# Patient Record
Sex: FEMALE | Race: WHITE | NOT HISPANIC OR LATINO | Employment: OTHER | URBAN - METROPOLITAN AREA
[De-identification: names, ages, dates, MRNs, and addresses within clinical notes are randomized per-mention and may not be internally consistent; named-entity substitution may affect disease eponyms.]

---

## 2017-01-15 NOTE — PROGRESS NOTES
MEDICAL ONCOLOGY FOLLOW-UP PATIENT VISIT     NAME: Sushila Chambers     DATE: January 17, 2016    PRIMARY CARE PHYSICIAN: Meryl Muro    PATIENT ID: Multifocal, stage IIa, T2N0M0, ER positive, SD negative, HER2 non-amplified invasive lobular carcinoma of the left breast.     Oncology History: Ms. Chambers is a 66 year old female with a history of lung cancer and right breast cancer with more recent invasive lobular carcinoma of the left breast. Ms. Chambers was treated for right sided breast cancer in 2009 with right breast lumpectomy, radiation, and endocrine therapy. Ms. Chambers self palpated a mass in the left breast in early August, 2015. A diagnostic mammogram and ultrasound showed 2 ill-defined spiculated masses at the 4:30 and 2:00 positions of the left breast. There was surrounding architectural distortion and the total area of involvement measured 6.5 cm. Targeted left breast ultrasound showed a multilobulated hypoechoic mass at the 4:00 position measuring 2.5 cm and an ill-defined hypoechoic mass at the 2:00 position measuring 2.4 cm. There was no suspicious left axillary lymphadenopathy. Biopsy of the mass at the 4:00 position showed grade II invasive pleomorphic lobular carcinoma. Biopsy of the mass at the 2:00 position showed a grade II invasive lobular carcinoma. No angiolymphatic invasion or associated LCIS was seen in either specimen. Estrogen receptor staining was positive in >80% of tumor cell nuclei. Progesterone receptor staining was positive in <5% of tumor cell nuclei. HER2 was non-amplifed in both specimens by FISH. Ms. Chambers elected to screen for ISPY-2. Breast MRI showed a slightly enlarged enhancing left axillary lymph node with normal morphologic appearance. Left axillary lymph node biopsy performed on 9/9/15 was benign. Mammaprint testing returned high-risk. She was randomized to the ganetespib arm of the trial. She received 12 weeks of ganetespib and Taxol and 4 cycles of dose dense  LUIS from 9/29/15 - 2/1/16. On 2/24/16 she underwent left breast mastectomy and sentinel lymph node procedure under the care of Dr. Betts. Pathology showed a residual grade II 4.8 cm, pleomorphic invasive lobular carcinoma with associated LCIS. Invasive tumor cellularity was 30%.  There was evidence of perineural invasion, however, no lymphvascular space invasion. Two sentinel lymph nodes were negative.  She has been on adjuvant letrozole since 03/2016.    Interim History:   Sushila comes in to clinic today for routine 3-month breast cancer followup.  She continues on letrozole and has now been on the medication for 10 months.  She has side effects including hot flashes, arthralgias, and insomnia.  She reports hot flashes are occurring intermittently both day and night.  They are more bothersome at night, because they wake her from sleep and then she has difficulty going back to sleep.  She reports insomnia over the last few months.  Both hot flashes and the need to urinate at night will wake her from sleep.  She will then be up for about an hour after trying to fall back asleep.  She feels she has been more fatigued during the day due to the episodes of wakefulness at night.  She states mood has been good.  She has otherwise been feeling well.  She denies vaginal dryness, however, states she has not been sexually active.  She denies concerning right breast or left chest lumps or masses.  She denies swelling or discomfort.  She currently has a cold characterized by rhinorrhea and cough.  Cough is productive of clear/whitish mucous.  She has some shortness of breath on exertion, only since having the cold.  She has not been exercising as much due to the cold but plans to resume this in the upcoming weeks.  She denies chest pain.  She has no abdominal complaints.  She reports pain in the bilateral shoulders, hips, and knees that presents as stiffness after prolonged periods of immobility and improves with movement.  She  has persistent neuropathy in the toes of the bilateral feet.  The remainder of a 10 point review of systems is otherwise negative.    REVIEW OF SYSTEMS: Full 10-point review of systems was performed. Pertinent symptoms are reviewed above per HPI.    PAST MEDICAL HISTORY:   Past Medical History   Diagnosis Date     Hypothyroid      Breast cancer, right breast (H)      Lung cancer (H) 2012     right     History of blood transfusion      Basal cell carcinoma        PAST SURGICAL HISTORY:  Past Surgical History   Procedure Laterality Date     Ectopic pregnancy surgery Left 1988     Lung surgery Right 2001     histoplasmosis     Lumpectomy breast Right 2010     Biopsy, lung nodule Right 2012     + cancer     Abdomen surgery       Colonoscopy       Genitourinary surgery       Gyn surgery       Thoracic surgery       Insert port vascular access       Mastectomy simple, sentinel node, combined Left 2/24/2016     Procedure: COMBINED MASTECTOMY SIMPLE, SENTINEL NODE;  Surgeon: Satnam Betts MD;  Location: [facility] OR     Remove port vascular access N/A 2/24/2016     Procedure: REMOVE PORT VASCULAR ACCESS;  Surgeon: Satnam Betts MD;  Location: [facility] OR     MEDS:  Current Outpatient Prescriptions   Medication     clotrimazole (LOTRIMIN) 1 % cream     zolpidem (AMBIEN) 5 MG tablet     levothyroxine (SYNTHROID, LEVOTHROID) 50 MCG tablet     order for DME     letrozole (FEMARA) 2.5 MG tablet     order for DME     order for DME     Acetaminophen (TYLENOL PO)     pyridOXINE (VITAMIN B6) 100 MG TABS     order for DME     multivitamin, therapeutic with minerals (MULTI-VITAMIN) TABS     biotin 1000 MCG TABS tablet     VITAMIN D, CHOLECALCIFEROL, PO     calcium carbonate (OS-OCTAVIANO 500 MG [place]. [place]) 500 MG tablet     No current facility-administered medications for this visit.     ALLERGIES:  Allergies   Allergen Reactions     Latex      SKIN REACTION       Sulfa Drugs Itching and Rash        PHYSICAL EXAM:  KPS: 100%  GENERAL: AAOx3, no acute  distress  HEENT: Normocephalic, atraumatic, PERRLA, oropharynx clear with no mucositis or thrush  LYMPH: No palpable cervical, supraclavicular, or axillary lymphadenopathy appreciated  CV: RRR, normal S1/S2, No murmurs, gallops, or rubs  LUNGS: CTA B/L, no wheezing or rhonchi  Breast:  Right breast is of increased fibroglandular density.  Left mastectomy.  There are no discretely palpable masses of either the right breast or left chest wall.  ABDOMEN: soft, nontender, non-distended, no hepatosplenomegaly. Bowel sounds present.  EXTREMITIES: no lower extremity edema  NEURO: Cranial nerves II-XII grossly intact  INTEGUMENT: No rashes    LABS REVIEWED THIS VISIT:  No interim laboratory studies pertinent to today's visit.    RADIOLOGY:   4/11/16 DEXA bone density scan:  Conclusions:    The most negative and valid T-score of -0.7 at the level of the left femoral neck, and -0.7 at the level of the right femoral neck,  corresponds with normal bone density.      8/8/16 Diagnostic right breast mammogram:  FINDINGS:  BREAST DENSITY: Scattered fibroglandular densities.      No significant change. Post therapy findings on the right.                                                                        IMPRESSION: BI-RADS CATEGORY: 2 - Benign Finding(s).     RECOMMENDED: Annual Mammography      IMPRESSION/PLAN: 65 yo female with stage II, T2N0M0, grade II, ER positive, DE negative, HER2 non-amplified invasive lobular carcinoma of the left breast. She is s/p treatment with 12 weeks of taxol and ganetespib followed by 4 cycles of dose dense adriamycin and cyclophosphamide. Left breast mastectomy and sentinel lymph node procedure was performed on 2/24/16. Her pathology at that time revealed a 4.8 cm residual tumor without sentinel node positivity. She has been on letrozole since 03/2016.    1.  Left breast ER-positive carcinoma:  Ms. Chambers is now approximately 11 months out from completion of neoadjuvant chemotherapy and surgical  excision of a left breast cancer.  She has been on adjuvant letrozole for the past 10 months.  She is tolerating the medication remarkably well.  We plan to treat her for a total of 10 years (through 03/2026).  There is no evidence of disease recurrence on my clinical breast examination performed today.  We will continue routine surveillance visits.  I will see her back in clinic in 3 months for her next exam.  We will also continue annual screening mammography which is next due in 08/2017.       I would like Sushila to be seen in survivor clinic to receive a treatment summary as well as review potential long term side effects of chemotherapy and breast cancer prevention.  We have previously discussed lifestyle recommendations suggested to prevent breast cancer recurrence including 30 minutes of daily cardiovascular exercise, maintaining a normal BMI, a diet low in saturated fat and refined sugar, a daily baby aspirin, vitamin D supplementation, and reduced alcohol intake.  Sushila admits she has not been routinely exercising lately, but plans to do so in the upcoming weeks.    2.  Hot flashes/insomnia:  Hot flashes are worst at night and wake her from sleep.  Start Gabapentin 300 mg PO qhs.     3.  Neuropathy:  Continue vitamin B6.  Gabapentin as above.     4.  Arthralgias:  Secondary to osteoarthritis an exacerbated by letrozole.  I asked that she continue to take daily vitamin D supplementation as well as perform a half hour a day of cardiovascular exercise.     5.  Bone health:  DEXA bone density in 04/2016 showed a lowest T-score of -0.7.  She continues on calcium and vitamin D supplementation as well as daily weightbearing activity.  We will plan to repeat a DEXA bone density scan when she has been on AI therapy for 2 years.  She has a h/o vitamin D deficiency, therefore will check a vitamin D level at our next visit.     6.  VUS in MRE11:  She is undergoing breast cancer screening recommendations as above.  It is  not clear that she requires any increase in other cancer screening surveillance.       7.  Followup:  Survivor clinic visit with Sofi Paz within 1-2 months.  Return to clinic in 3 months for visit with me.     It was a pleasure to see Sushila in clinic today.  A total of 25 minutes of our 30-minute face-to-face visit was spent counseling the patient.

## 2017-01-17 ENCOUNTER — ONCOLOGY VISIT (OUTPATIENT)
Dept: ONCOLOGY | Facility: CLINIC | Age: 67
End: 2017-01-17
Attending: INTERNAL MEDICINE
Payer: MEDICARE

## 2017-01-17 DIAGNOSIS — E55.9 VITAMIN D DEFICIENCY: ICD-10-CM

## 2017-01-17 DIAGNOSIS — G62.9 NEUROPATHY: ICD-10-CM

## 2017-01-17 DIAGNOSIS — C50.512 MALIGNANT NEOPLASM OF LOWER-OUTER QUADRANT OF LEFT FEMALE BREAST (H): ICD-10-CM

## 2017-01-17 DIAGNOSIS — C50.512 MALIGNANT NEOPLASM OF LOWER-OUTER QUADRANT OF LEFT FEMALE BREAST (H): Primary | ICD-10-CM

## 2017-01-17 DIAGNOSIS — N95.1 MENOPAUSAL SYNDROME (HOT FLASHES): ICD-10-CM

## 2017-01-17 PROCEDURE — 99212 OFFICE O/P EST SF 10 MIN: CPT

## 2017-01-17 PROCEDURE — 99212 OFFICE O/P EST SF 10 MIN: CPT | Mod: 25,ZF

## 2017-01-17 PROCEDURE — 99214 OFFICE O/P EST MOD 30 MIN: CPT | Mod: ZP | Performed by: INTERNAL MEDICINE

## 2017-01-17 RX ORDER — GABAPENTIN 300 MG/1
CAPSULE ORAL
Qty: 30 CAPSULE | Refills: 11 | Status: SHIPPED | OUTPATIENT
Start: 2017-01-17 | End: 2017-04-20

## 2017-01-17 NOTE — MR AVS SNAPSHOT
After Visit Summary   1/17/2017    Sushila Chambers    MRN: [id number]           Patient Information     Date Of Birth          1950        Visit Information        Provider Department      1/17/2017 8:45 AM Brittany Villegas MD [facility]        Today's Diagnoses     Malignant neoplasm of lower-outer quadrant of left female breast (H)    -  1     Vitamin D deficiency         Neuropathy (H)         Menopausal syndrome (hot flashes)            Follow-ups after your visit        Additional Services     ONC/HEME ADULT REFERRAL       Your provider has referred you to: [facility] [[email or web address]]: [facility] Infusion [facility] [phone]   [email or web address]    Please be aware that coverage of these services is subject to the terms and limitations of your health insurance plan.  Call member services at your health plan with any benefit or coverage questions.      Please bring the following with you to your appointment:    (1) Any X-Rays, CTs or MRIs which have been performed.  Contact the facility where they were done to arrange for  prior to your scheduled appointment.   (2) List of current medications  (3) This referral request   (4) Any documents/labs given to you for this referral                  Your next 10 appointments already scheduled     Feb 20, 2017  2:00 PM   (Arrive by 1:45 PM)   LONG TERM RETURN with Sofi Paz PA-C   [facility] ([facility])    [address]  [address] [facility] [id number] [phone]            Mar 16, 2017   Procedure with Morales Napier MD   [facility] [place] Endoscopy ([facility])    [address] [id number] [phone]           [facility] is located on the corner of [address] and [address] on the  [address] [place]. It is easily accessible from virtually any point in the [facility] area, via [address] and [address].            Apr 17, 2017  2:15 PM   (Arrive by 2:00 PM)   Return Visit with Brittany Villegas MD   [facility] ([facility])    [address]  [address] [facility] [address] [phone]              Who to contact     If you have questions or need follow up information about today's clinic visit or your schedule please contact [facility] directly at [phone].  Normal or non-critical lab and imaging results will be communicated to you by MyChart, letter or phone within 4 business days after the clinic has received the results. If you do not hear from us within 7 days, please contact the clinic through [employer] or phone. If you have a critical or abnormal lab result, we will notify you by phone as soon as possible.  Submit refill requests through [employer] or call your pharmacy and they will forward the refill request to us. Please allow 3 business days for your refill to be completed.          Additional Information About Your Visit        [employer] Information     [employer] gives you secure access to your electronic health record. If you see a primary care provider, you can also send messages to your care team and make appointments. If you have questions, please call your primary care clinic.  If you do not have a primary care provider, please call [phone] and they will assist you.        Care EveryWhere ID     This is your Care EveryWhere ID. This could be used by other organizations to access your [facility] medical records  [id number]         Blood Pressure from Last 3 Encounters:   11/02/16 131/77   10/17/16 139/81   07/18/16 138/83    Weight from Last 3 Encounters:   10/17/16 65.227 kg (143 lb 12.8 oz)   07/18/16 64.501 kg (142 lb 3.2 oz)   05/17/16 63.413 kg (139 lb 12.8 oz)              We Performed  the Following     ONC/HEME ADULT REFERRAL          Today's Medication Changes          These changes are accurate as of: 1/17/17  9:43 AM.  If you have any questions, ask your nurse or doctor.               Start taking these medicines.        Dose/Directions    gabapentin 300 MG capsule   Commonly known as:  NEURONTIN   Used for:  Neuropathy (H), Menopausal syndrome (hot flashes)   Started by:  Brittany Villegas MD        Take 1 tablet (300 mg) every night   Quantity:  30 capsule   Refills:  11            Where to get your medicines      These medications were sent to [id number] - [place], [place] - [address] AT [facility] [address]  [address], [place] [address]     Phone:  [phone]    - gabapentin 300 MG capsule             Primary Care Provider    [facility] Other Clinic                Thank you!     Thank you for choosing [facility]  for your care. Our goal is always to provide you with excellent care. Hearing back from our patients is one way we can continue to improve our services. Please take a few minutes to complete the written survey that you may receive in the mail after your visit with us. Thank you!             Your Updated Medication List - Protect others around you: Learn how to safely use, store and throw away your medicines at www.disposemymeds.org.          This list is accurate as of: 1/17/17  9:43 AM.  Always use your most recent med list.                   Brand Name Dispense Instructions for use    biotin 1000 MCG Tabs tablet      Take by mouth daily       calcium carbonate 500 MG tablet    OS-OCTAVIANO 500 mg [place]. [place]     Take by mouth 2 times daily       clotrimazole 1 % cream    LOTRIMIN    60 g    Apply topically 2 times daily       gabapentin 300 MG capsule    NEURONTIN    30 capsule    Take 1 tablet (300 mg) every night       letrozole 2.5 MG tablet    FEMARA    90 tablet    Take 1 tablet (2.5 mg) by mouth daily        levothyroxine 50 MCG tablet    SYNTHROID/LEVOTHROID    90 tablet    Take 1 tablet (50 mcg) by mouth daily       Multi-vitamin Tabs tablet      Take 1 tablet by mouth daily       * order for DME     1 Device    Equipment being ordered: Cranial Prosthesis       * order for DME     1 Units    Two post mastectomy bra's and prosthesis twice a year as insurance allows .       * order for DME     1 Units    Please allow 12 bras and prosthesis yearly as insurance covers.       * order for DME     1 Units    Four bras as allowed by insurance.       pyridOXINE 100 MG Tabs    VITAMIN B6    30 tablet    Take 1 tablet (100 mg) by mouth daily       TYLENOL PO      Take 500 mg by mouth every 4 hours as needed for mild pain or fever       VITAMIN D (CHOLECALCIFEROL) PO      Take by mouth daily       zolpidem 5 MG tablet    AMBIEN    30 tablet    Take 1 tablet (5 mg) by mouth nightly as needed for sleep       * Notice:  This list has 4 medication(s) that are the same as other medications prescribed for you. Read the directions carefully, and ask your doctor or other care provider to review them with you.

## 2017-01-17 NOTE — NURSING NOTE
"Sushila Chambers is a 66 year old female who presents for:  Chief Complaint   Patient presents with     Oncology Clinic Visit     Breast Cancer        Initial Vitals:  There were no vitals taken for this visit. Estimated body mass index is 24.67 kg/(m^2) as calculated from the following:    Height as of 7/18/16: 1.626 m (5' 4.02\").    Weight as of 10/17/16: 65.227 kg (143 lb 12.8 oz).. There is no height or weight on file to calculate BSA. BP completed using cuff size: regular  Data Unavailable No LMP recorded. Patient is postmenopausal. Allergies and medications reviewed.     Medications: Medication refills not needed today.  Pharmacy name entered into [facility]:    [facility] - [place], [place] - [address]  [id number] - [place], [place] - [address] AT [facility] [address]  [facility]          Comments: Patient is not having any pain today.     6 minutes for nursing intake (face to face time)   [place] CMA         "

## 2017-01-17 NOTE — Clinical Note
1/17/2017       RE: Sushila Chambers  [address] [place] [address]     Dear Colleague,    Thank you for referring your patient, Sushila Chambers, to the [facility]. Please see a copy of my visit note below.    MEDICAL ONCOLOGY FOLLOW-UP PATIENT VISIT     NAME: Sushila Chambers     DATE: January 17, 2016    PRIMARY CARE PHYSICIAN: Meryl Muro    PATIENT ID: Multifocal, stage IIa, T2N0M0, ER positive, AZ negative, HER2 non-amplified invasive lobular carcinoma of the left breast.     Oncology History: Ms. Chambers is a 66 year old female with a history of lung cancer and right breast cancer with more recent invasive lobular carcinoma of the left breast. Ms. Chambers was treated for right sided breast cancer in 2009 with right breast lumpectomy, radiation, and endocrine therapy. Ms. Chambers self palpated a mass in the left breast in early August, 2015. A diagnostic mammogram and ultrasound showed 2 ill-defined spiculated masses at the 4:30 and 2:00 positions of the left breast. There was surrounding architectural distortion and the total area of involvement measured 6.5 cm. Targeted left breast ultrasound showed a multilobulated hypoechoic mass at the 4:00 position measuring 2.5 cm and an ill-defined hypoechoic mass at the 2:00 position measuring 2.4 cm. There was no suspicious left axillary lymphadenopathy. Biopsy of the mass at the 4:00 position showed grade II invasive pleomorphic lobular carcinoma. Biopsy of the mass at the 2:00 position showed a grade II invasive lobular carcinoma. No angiolymphatic invasion or associated LCIS was seen in either specimen. Estrogen receptor staining was positive in >80% of tumor cell nuclei. Progesterone receptor staining was positive in <5% of tumor cell nuclei. HER2 was non-amplifed in both specimens by FISH. Ms. Chambers elected to screen for ISPY-2. Breast MRI showed a slightly enlarged enhancing left axillary lymph node with normal morphologic  appearance. Left axillary lymph node biopsy performed on 9/9/15 was benign. Mammaprint testing returned high-risk. She was randomized to the ganetespib arm of the trial. She received 12 weeks of ganetespib and Taxol and 4 cycles of dose dense AC from 9/29/15 - 2/1/16. On 2/24/16 she underwent left breast mastectomy and sentinel lymph node procedure under the care of Dr. Betts. Pathology showed a residual grade II 4.8 cm, pleomorphic invasive lobular carcinoma with associated LCIS. Invasive tumor cellularity was 30%.  There was evidence of perineural invasion, however, no lymphvascular space invasion. Two sentinel lymph nodes were negative.  She has been on adjuvant letrozole since 03/2016.    Interim History:   Sushila comes in to clinic today for routine 3-month breast cancer followup.  She continues on letrozole and has now been on the medication for 10 months.  She has side effects including hot flashes, arthralgias, and insomnia.  She reports hot flashes are occurring intermittently both day and night.  They are more bothersome at night, because they wake her from sleep and then she has difficulty going back to sleep.  She reports insomnia over the last few months.  Both hot flashes and the need to urinate at night will wake her from sleep.  She will then be up for about an hour after trying to fall back asleep.  She feels she has been more fatigued during the day due to the episodes of wakefulness at night.  She states mood has been good.  She has otherwise been feeling well.  She denies vaginal dryness, however, states she has not been sexually active.  She denies concerning right breast or left chest lumps or masses.  She denies swelling or discomfort.  She currently has a cold characterized by rhinorrhea and cough.  Cough is productive of clear/whitish mucous.  She has some shortness of breath on exertion, only since having the cold.  She has not been exercising as much due to the cold but plans to resume  this in the upcoming weeks.  She denies chest pain.  She has no abdominal complaints.  She reports pain in the bilateral shoulders, hips, and knees that presents as stiffness after prolonged periods of immobility and improves with movement.  She has persistent neuropathy in the toes of the bilateral feet.  The remainder of a 10 point review of systems is otherwise negative.    REVIEW OF SYSTEMS: Full 10-point review of systems was performed. Pertinent symptoms are reviewed above per HPI.    PAST MEDICAL HISTORY:   Past Medical History   Diagnosis Date     Hypothyroid      Breast cancer, right breast (H)      Lung cancer (H) 2012     right     History of blood transfusion      Basal cell carcinoma        PAST SURGICAL HISTORY:  Past Surgical History   Procedure Laterality Date     Ectopic pregnancy surgery Left 1988     Lung surgery Right 2001     histoplasmosis     Lumpectomy breast Right 2010     Biopsy, lung nodule Right 2012     + cancer     Abdomen surgery       Colonoscopy       Genitourinary surgery       Gyn surgery       Thoracic surgery       Insert port vascular access       Mastectomy simple, sentinel node, combined Left 2/24/2016     Procedure: COMBINED MASTECTOMY SIMPLE, SENTINEL NODE;  Surgeon: Satnam Betts MD;  Location: [facility] OR     Remove port vascular access N/A 2/24/2016     Procedure: REMOVE PORT VASCULAR ACCESS;  Surgeon: Satnam Betts MD;  Location: [facility] OR     MEDS:  Current Outpatient Prescriptions   Medication     clotrimazole (LOTRIMIN) 1 % cream     zolpidem (AMBIEN) 5 MG tablet     levothyroxine (SYNTHROID, LEVOTHROID) 50 MCG tablet     order for DME     letrozole (FEMARA) 2.5 MG tablet     order for DME     order for DME     Acetaminophen (TYLENOL PO)     pyridOXINE (VITAMIN B6) 100 MG TABS     order for DME     multivitamin, therapeutic with minerals (MULTI-VITAMIN) TABS     biotin 1000 MCG TABS tablet     VITAMIN D, CHOLECALCIFEROL, PO     calcium carbonate (OS-OCTAVIANO 500 MG [place]. [place]) 500  MG tablet     No current facility-administered medications for this visit.     ALLERGIES:  Allergies   Allergen Reactions     Latex      SKIN REACTION       Sulfa Drugs Itching and Rash        PHYSICAL EXAM:  KPS: 100%  GENERAL: AAOx3, no acute distress  HEENT: Normocephalic, atraumatic, PERRLA, oropharynx clear with no mucositis or thrush  LYMPH: No palpable cervical, supraclavicular, or axillary lymphadenopathy appreciated  CV: RRR, normal S1/S2, No murmurs, gallops, or rubs  LUNGS: CTA B/L, no wheezing or rhonchi  Breast:  Right breast is of increased fibroglandular density.  Left mastectomy.  There are no discretely palpable masses of either the right breast or left chest wall.  ABDOMEN: soft, nontender, non-distended, no hepatosplenomegaly. Bowel sounds present.  EXTREMITIES: no lower extremity edema  NEURO: Cranial nerves II-XII grossly intact  INTEGUMENT: No rashes    LABS REVIEWED THIS VISIT:  No interim laboratory studies pertinent to today's visit.    RADIOLOGY:   4/11/16 DEXA bone density scan:  Conclusions:    The most negative and valid T-score of -0.7 at the level of the left femoral neck, and -0.7 at the level of the right femoral neck,  corresponds with normal bone density.      8/8/16 Diagnostic right breast mammogram:  FINDINGS:  BREAST DENSITY: Scattered fibroglandular densities.      No significant change. Post therapy findings on the right.                                                                        IMPRESSION: BI-RADS CATEGORY: 2 - Benign Finding(s).     RECOMMENDED: Annual Mammography      IMPRESSION/PLAN: 67 yo female with stage II, T2N0M0, grade II, ER positive, CO negative, HER2 non-amplified invasive lobular carcinoma of the left breast. She is s/p treatment with 12 weeks of taxol and ganetespib followed by 4 cycles of dose dense adriamycin and cyclophosphamide. Left breast mastectomy and sentinel lymph node procedure was performed on 2/24/16. Her pathology at that time revealed  a 4.8 cm residual tumor without sentinel node positivity. She has been on letrozole since 03/2016.    1.  Left breast ER-positive carcinoma:  Ms. Chambers is now approximately 11 months out from completion of neoadjuvant chemotherapy and surgical excision of a left breast cancer.  She has been on adjuvant letrozole for the past 10 months.  She is tolerating the medication remarkably well.  We plan to treat her for a total of 10 years (through 03/2026).  There is no evidence of disease recurrence on my clinical breast examination performed today.  We will continue routine surveillance visits.  I will see her back in clinic in 3 months for her next exam.  We will also continue annual screening mammography which is next due in 08/2017.       I would like Sushila to be seen in survivor clinic to receive a treatment summary as well as review potential long term side effects of chemotherapy and breast cancer prevention.  We have previously discussed lifestyle recommendations suggested to prevent breast cancer recurrence including 30 minutes of daily cardiovascular exercise, maintaining a normal BMI, a diet low in saturated fat and refined sugar, a daily baby aspirin, vitamin D supplementation, and reduced alcohol intake.  Sushila admits she has not been routinely exercising lately, but plans to do so in the upcoming weeks.    2.  Hot flashes/insomnia:  Hot flashes are worst at night and wake her from sleep.  Start Gabapentin 300 mg PO qhs.     3.  Neuropathy:  Continue vitamin B6.  Gabapentin as above.     4.  Arthralgias:  Secondary to osteoarthritis an exacerbated by letrozole.  I asked that she continue to take daily vitamin D supplementation as well as perform a half hour a day of cardiovascular exercise.     5.  Bone health:  DEXA bone density in 04/2016 showed a lowest T-score of -0.7.  She continues on calcium and vitamin D supplementation as well as daily weightbearing activity.  We will plan to repeat a DEXA bone  density scan when she has been on AI therapy for 2 years.  She has a h/o vitamin D deficiency, therefore will check a vitamin D level at our next visit.     6.  VUS in MRE11:  She is undergoing breast cancer screening recommendations as above.  It is not clear that she requires any increase in other cancer screening surveillance.       7.  Followup:  Survivor clinic visit with Sofi Paz within 1-2 months.  Return to clinic in 3 months for visit with me.     It was a pleasure to see Sushila in clinic today.  A total of 25 minutes of our 30-minute face-to-face visit was spent counseling the patient.       Again, thank you for allowing me to participate in the care of your patient.      Sincerely,    Brittany Villegas MD

## 2017-02-27 ENCOUNTER — ONCOLOGY VISIT (OUTPATIENT)
Dept: ONCOLOGY | Facility: CLINIC | Age: 67
End: 2017-02-27
Attending: PHYSICIAN ASSISTANT
Payer: MEDICARE

## 2017-02-27 VITALS
BODY MASS INDEX: 25.03 KG/M2 | SYSTOLIC BLOOD PRESSURE: 153 MMHG | DIASTOLIC BLOOD PRESSURE: 92 MMHG | HEIGHT: 64 IN | WEIGHT: 146.6 LBS | RESPIRATION RATE: 18 BRPM | OXYGEN SATURATION: 99 % | HEART RATE: 120 BPM | TEMPERATURE: 97.1 F

## 2017-02-27 DIAGNOSIS — C50.512 MALIGNANT NEOPLASM OF LOWER-OUTER QUADRANT OF LEFT FEMALE BREAST (H): Primary | ICD-10-CM

## 2017-02-27 DIAGNOSIS — C34.90 MALIGNANT NEOPLASM OF LUNG, UNSPECIFIED LATERALITY, UNSPECIFIED PART OF LUNG (H): ICD-10-CM

## 2017-02-27 DIAGNOSIS — R03.0 ELEVATED BLOOD PRESSURE READING WITHOUT DIAGNOSIS OF HYPERTENSION: ICD-10-CM

## 2017-02-27 DIAGNOSIS — C50.911 MALIGNANT NEOPLASM OF RIGHT FEMALE BREAST, UNSPECIFIED SITE OF BREAST: ICD-10-CM

## 2017-02-27 PROCEDURE — 99212 OFFICE O/P EST SF 10 MIN: CPT | Mod: ZF

## 2017-02-27 PROCEDURE — 99215 OFFICE O/P EST HI 40 MIN: CPT | Mod: ZP | Performed by: PHYSICIAN ASSISTANT

## 2017-02-27 NOTE — MR AVS SNAPSHOT
After Visit Summary   2/27/2017    Sushila Chambers    MRN: [id number]           Patient Information     Date Of Birth          1950        Visit Information        Provider Department      2/27/2017 11:45 AM Sofi Paz PA-C [facility]        Today's Diagnoses     Malignant neoplasm of lower-outer quadrant of left female breast (H)    -  1    Malignant neoplasm of lung, unspecified laterality, unspecified part of lung (H)        Malignant neoplasm of right female breast, unspecified site of breast (H)        Elevated blood pressure reading without diagnosis of hypertension           Follow-ups after your visit        Your next 10 appointments already scheduled     Mar 16, 2017   Procedure with Morales Napier MD   [facility] ([facility])    [address] [id number] [phone]           [facility] is located on the corner of [address] and [address] on the [facility] [facility]. It is easily accessible from virtually any point in the [facility] area, via [address] and [address].            Apr 17, 2017  2:15 PM CDT   (Arrive by 2:00 PM)   Return Visit with Brittany Villegas MD   [facility] ([facility])    [address]  [address] [facility] [address] [phone]              Who to contact     If you have questions or need follow up information about today's clinic visit or your schedule please contact [facility] directly at [phone].  Normal or non-critical lab and imaging results will be communicated to you by MyChart, letter or phone within 4 business days after the clinic has received the results. If you do not hear from us within 7 days, please contact the clinic through MyChart or phone. If you have a critical or abnormal lab result, we will notify you by phone  "as soon as possible.  Submit refill requests through [employer] or call your pharmacy and they will forward the refill request to us. Please allow 3 business days for your refill to be completed.          Additional Information About Your Visit        [employer] Information     [employer] gives you secure access to your electronic health record. If you see a primary care provider, you can also send messages to your care team and make appointments. If you have questions, please call your primary care clinic.  If you do not have a primary care provider, please call [phone] and they will assist you.        Care EveryWhere ID     This is your Care EveryWhere ID. This could be used by other organizations to access your [facility] medical records  [id number]        Your Vitals Were     Pulse Temperature Respirations Height Pulse Oximetry Breastfeeding?    120 97.1  F (36.2  C) (Oral) 18 1.626 m (5' 4.02\") 99% Yes    BMI (Body Mass Index)                   25.15 kg/m2            Blood Pressure from Last 3 Encounters:   02/27/17 (!) 153/92   11/02/16 131/77   10/17/16 139/81    Weight from Last 3 Encounters:   02/27/17 66.5 kg (146 lb 9.6 oz)   10/17/16 65.2 kg (143 lb 12.8 oz)   07/18/16 64.5 kg (142 lb 3.2 oz)              Today, you had the following     No orders found for display       Primary Care Provider Office Phone #    [facility] [phone] [address] [place] [address]        Thank you!     Thank you for choosing [facility]  for your care. Our goal is always to provide you with excellent care. Hearing back from our patients is one way we can continue to improve our services. Please take a few minutes to complete the written survey that you may receive in the mail after your visit with us. Thank you!             Your Updated Medication List - Protect others around you: Learn how to safely use, store and throw away your medicines at www.disposemymeds.org.        "   This list is accurate as of: 2/27/17 11:59 PM.  Always use your most recent med list.                   Brand Name Dispense Instructions for use    biotin 1000 MCG Tabs tablet      Take by mouth daily       calcium carbonate 500 MG tablet    OS-OCTAVIANO 500 mg [place]. Ca     Take by mouth 2 times daily       clotrimazole 1 % cream    LOTRIMIN    60 g    Apply topically 2 times daily       gabapentin 300 MG capsule    NEURONTIN    30 capsule    Take 1 tablet (300 mg) every night       letrozole 2.5 MG tablet    FEMARA    90 tablet    Take 1 tablet (2.5 mg) by mouth daily       levothyroxine 50 MCG tablet    SYNTHROID/LEVOTHROID    90 tablet    Take 1 tablet (50 mcg) by mouth daily       Multi-vitamin Tabs tablet      Take 1 tablet by mouth daily       * order for DME     1 Device    Equipment being ordered: Cranial Prosthesis       * order for DME     1 Units    Two post mastectomy bra's and prosthesis twice a year as insurance allows .       * order for DME     1 Units    Please allow 12 bras and prosthesis yearly as insurance covers.       * order for DME     1 Units    Four bras as allowed by insurance.       pyridOXINE 100 MG Tabs    VITAMIN B6    30 tablet    Take 1 tablet (100 mg) by mouth daily       TYLENOL PO      Take 500 mg by mouth every 4 hours as needed for mild pain or fever       VITAMIN D (CHOLECALCIFEROL) PO      Take by mouth daily       zolpidem 5 MG tablet    AMBIEN    30 tablet    Take 1 tablet (5 mg) by mouth nightly as needed for sleep       * Notice:  This list has 4 medication(s) that are the same as other medications prescribed for you. Read the directions carefully, and ask your doctor or other care provider to review them with you.

## 2017-02-27 NOTE — NURSING NOTE
"Sushila Chambers is a 67 year old female who presents for:  Chief Complaint   Patient presents with     Oncology Clinic Visit     Pt is here for a referral appt from her oncologist        Initial Vitals:  BP (!) 153/92 (BP Location: Left arm, Patient Position: Chair, Cuff Size: Adult Regular)  Pulse 120  Temp 97.1  F (36.2  C) (Oral)  Resp 18  Ht 1.626 m (5' 4.02\")  Wt 66.5 kg (146 lb 9.6 oz)  SpO2 99%  Breastfeeding? Yes  BMI 25.15 kg/m2 Estimated body mass index is 25.15 kg/(m^2) as calculated from the following:    Height as of this encounter: 1.626 m (5' 4.02\").    Weight as of this encounter: 66.5 kg (146 lb 9.6 oz).. Body surface area is 1.73 meters squared. BP completed using cuff size: regular  Data Unavailable No LMP recorded. Patient is postmenopausal. Allergies and medications reviewed.     Medications: Medication refills not needed today.  Pharmacy name entered into [facility]:    [facility] - [place], [place] - [address]  [id number] - [place], [place] - [address] [facility] [address]  [facility]          Comments:     6 minutes for nursing intake (face to face time)   Radha Ace CMA        "

## 2017-02-27 NOTE — PROGRESS NOTES
REASON FOR VISIT:  I am seeing Ms. Chambers in the Cancer Survivorship Program for her diagnosis of left breast cancer (previous diagnosis of right breast cancer and lung cancer treated in [place] [facility]).      Ms. Chambers states that she was living in [place] at the time that she got diagnosed with her right breast cancer.  She states this was found on the screening mammogram in 2009.  She had a lumpectomy followed by radiation followed by 3 years of Femara.  Records are not available.      She was doing fine until she was found to have a right lung cancer, but she is uncertain of the type.  She is status post surgery.  Records are in [facility] and unavailable.      She was doing well until she found a left breast lump in the summer of 2015.  She had a diagnostic mammogram on 08/18/2015.  This showed a large area of architectural distortion in the left breast.  There were 2 ill-defined spiculated masses, total area was 6.5 cm.  An ultrasound showed a multi-lobulated hypoechoic mass of 2.5 cm, and an ill-defined hypoechoic masses of 2.4 cm.  There were no suspicious lymph nodes.  She had a biopsy of the left breast x2 on 08/18/2015.  One showed an invasive pleomorphic lobular carcinoma, Dickinson grade 2.  The other was an infiltrating lobular carcinoma, Dickinson grade 2.  ER and KS-positive and HER2/juan ramon-negative.  She had a breast MRI on 09/04/2015.  There were 2 distinctive irregular masses with spiculated margins, both 2.7 cm.  There were prominent lymph nodes in the right axilla.  She had a lymph node biopsy on 09/09/2015, which showed no cancer.  She was diagnosed with a clinical stage II (T2 N0) left breast cancer.  She was on I-SPY and received ganetespib with Taxol x12 weeks from 09/29/2015 through 12/14/2015.  This was followed by dose-dense Adriamycin and Cytoxan x4 from 12/21/2015 until 02/01/2016.  Total accumulation dose of Adriamycin was 240 mg/m2.  She went on to have a left mastectomy with  sentinel lymph node biopsy on 02/24/2016.  This showed a residual invasive pleomorphic lobular carcinoma.  It was 4.8 cm with 30% cellularity.  Dunlevy grade 2.  LCIS was present.  Clear margins.  Zero of 2 lymph nodes positive.  She was started on letrozole in 03/2016.      Ms. Chambers states she is feeling fairly well at this time.  She has noticed elevation in her blood pressure at the last couple of visits.  She denies any vision changes or headaches.  She does have neuropathy in her feet, mainly in her toes and in her fingertips.  She states the peripheral neuropathy is constant.  She will notice intermittent sharp pains, but mostly this is a tingling sensation.  She thinks that the neuropathy is getting better.  She has begun taking Neurontin at night.  Her biggest concern remains some difficulty with sleeping.  She says certain nights she has problems getting to sleep; however, sometimes she gets to sleep, but wakes up in the middle of the night and is unable to get back to sleep.  She is otherwise eating and drinking fine.  She denies any chest pain, shortness of breath, cough, nausea, vomiting, abdominal pain or new bone pains.     CANCER TREATMENT SUMMARY:  Right breast cancer  *Right breast cancer.  She states this was found on the screening mammogram in 2009.  She had a lumpectomy followed by radiation followed by 3 years of Femara.  Treatment was in [place] and records are not available.     Lung cancer   *Right lung cancer, uncertain of the type.  She is status post surgery.  Treatment was in [place] [facility] and records are not available.     Left breast cancer   *Left breast lump in the summer of 2015.    *Diagnostic mammogram on 08/18/2015.  This showed a large area of architectural distortion in the left breast.  There were 2 ill-defined spiculated masses, total area was 6.5 cm.    *Ultrasound on 08/18/2015 showed a multi-lobulated hypoechoic mass of 2.5 cm, and an ill-defined hypoechoic masses  of 2.4 cm.  There were no suspicious lymph nodes.    *Biopsy of the left breast x2 on 08/18/2015.  One showed an invasive pleomorphic lobular carcinoma, Maday grade 2.  The other was an infiltrating lobular carcinoma, Colbert grade 2.  ER and HI-positive and HER2/juan ramon-negative.    *Breast MRI on 09/04/2015.  There were 2 distinctive irregular masses with spiculated margins, both 2.7 cm.  There were prominent lymph nodes in the right axilla.    *Lymph node biopsy on 09/09/2015, which showed no cancer.    *I-SPY clinical trial.  *Ganetespib with Taxol x12 weeks from 09/29/2015 through 12/14/2015.    *Dose-dense Adriamycin and Cytoxan x4 from 12/21/2015 until 02/01/2016.  Total accumulation dose of Adriamycin was 240 mg/m2.   *Left mastectomy with sentinel lymph node biopsy on 02/24/2016.  This showed a residual invasive pleomorphic lobular carcinoma.  It was 4.8 cm with 30% cellularity.  Maday grade 2.  LCIS was present.  Clear margins.  Zero of 2 lymph nodes positive.   *Letrozole 03/2016 to present.     MEDICATIONS:   Current Outpatient Prescriptions   Medication Sig Dispense Refill     gabapentin (NEURONTIN) 300 MG capsule Take 1 tablet (300 mg) every night 30 capsule 11     clotrimazole (LOTRIMIN) 1 % cream Apply topically 2 times daily 60 g 3     zolpidem (AMBIEN) 5 MG tablet Take 1 tablet (5 mg) by mouth nightly as needed for sleep 30 tablet 1     levothyroxine (SYNTHROID, LEVOTHROID) 50 MCG tablet Take 1 tablet (50 mcg) by mouth daily 90 tablet 3     order for DME Four bras as allowed by insurance. 1 Units 1     letrozole (FEMARA) 2.5 MG tablet Take 1 tablet (2.5 mg) by mouth daily 90 tablet 3     order for DME Please allow 12 bras and prosthesis yearly as insurance covers. 1 Units 1     order for DME Two post mastectomy bra's and prosthesis twice a year as insurance allows . 1 Units 1     Acetaminophen (TYLENOL PO) Take 500 mg by mouth every 4 hours as needed for mild pain or fever       pyridOXINE  "(VITAMIN B6) 100 MG TABS Take 1 tablet (100 mg) by mouth daily 30 tablet 11     order for DME Equipment being ordered: Cranial Prosthesis 1 Device 0     multivitamin, therapeutic with minerals (MULTI-VITAMIN) TABS Take 1 tablet by mouth daily       biotin 1000 MCG TABS tablet Take by mouth daily       VITAMIN D, CHOLECALCIFEROL, PO Take by mouth daily       calcium carbonate (OS-OCTAVIANO 500 MG [place]. [place]) 500 MG tablet Take by mouth 2 times daily         ALLERGIES:   Allergies   Allergen Reactions     Latex      SKIN REACTION       Sulfa Drugs Itching and Rash       FAMILY HISTORY:  Her mother is  at 99.  She did have a history of breast cancer.  Father is  at 71 from colorectal cancer and bone cancer.     PHYSICAL EXAM:  Vitals: BP (!) 153/92 (BP Location: Left arm, Patient Position: Chair, Cuff Size: Adult Regular)  Pulse 120  Temp 97.1  F (36.2  C) (Oral)  Resp 18  Ht 1.626 m (5' 4.02\")  Wt 66.5 kg (146 lb 9.6 oz)  SpO2 99%  Breastfeeding? Yes  BMI 25.15 kg/m2  GENERAL:  A pleasant person in no acute distress.   HEENT:  Sclerae are nonicteric.    NECK:  Supple.   NEUROLOGICAL:  Alert/orientated/able to answer all questions.  CN grossly intact.  PSYCH:  Normal affect.  SKIN:  No suspicious lesions on areas of exposed skin.    ASSESSMENT/PLAN:  I had the pleasure of seeing Ms. Chambers in the Cancer Survivorship Program for her breast cancer x2 and lung cancer.  Given her diagnosis and treatment, I gave her the following recommendations:   1.  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  This was treated as above with neoadjuvant chemotherapy, surgery and hormonal therapy.  She also has a prior history of a right breast cancer treated with surgery, radiation and hormonal therapy, and a lung cancer treated with surgery.  The right breast cancer and lung cancer were treated in [place], and I currently do not have any " information regarding these cancers, history per the patient.  Ms. Chambers is doing well at this time.  She does not have any signs or symptoms that would suggest recurrence of her cancer.  She will plan to continue to follow up with Dr. Villegas, and she will see her in 04/2017.  Plan will be to see her every 3-4 months for the first 2-3 years.  She already had a right mammogram in 08/2016, which was read out as benign, and will a followup mammogram in a year.  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   2.  Coping.  Overall, she seems to be coping well with her cancer diagnoses.  She admits tonot coping well with the death of her .  I did give her Kristen Aldrich' contact information, as well as information about [facility].   3.  Energy.  She currently feels that her energy level is okay.  She does do Pilates twice a week, and strength/endurance once a week.  I did ask her to add in cardiovascular exercises of at least 150 minutes a week.   4.  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  She believes this is getting a little bit better at this time.  She will remain on the Neurontin at night.   5.  Lymphedema.  She has not noted any swelling in either of her arms.  Should she notice any edema, she should contact the clinic for a referral to the lymphedema specialists.     6.  Letrozole.  She does have joint aches and pains, but they are tolerable.  She states that back in 2012 this is part of the reason why she discontinued the Femara.  I informed her that if the pains become intolerable, we could consider changing her to a different aromatase inhibitor.  She does have mild hot flashes, but overall they are improving.  She denies any difficulty with vaginal dryness.     7.  Bone health.  DEXA scan in 04/2015 was consistent with normal bone density.  She does weightbearing exercises 2-3 days a week.  She also should consume 3929-4215 mg of calcium  along with vitamin D daily.   8.  Cognitive changes.  She was having some difficulty with concentration, but feels she is back to baseline at this time.  Should the cognitive changes interfere with her daily activity, I would consider neuropsychological testing.   9.  Cardiac complications.  She did get exposed to Adriamycin, which could cause late effects of cardiotoxicity, left ventricular dysfunction and arrhythmias.  I suggested a baseline echocardiogram 5 years after completion of the chemotherapy, which would be in 02/2021.  She should see her primary care provider for aggressive management and treatment of cholesterol, blood pressure and glucose.   10.  Cancer screening.  She will plan to schedule her colonoscopy this year.  She does continue to get Pap and pelvic exams by her primary care provider.  Should have any vaginal bleeding, this would be abnormal, and would need to have this evaluated.  She should limit her sun exposure and use sunscreens.   11.  Healthy lifestyle.  She should refrain from tobacco smoking.  She should exercise 150 minutes of cardiovascular exercise per week.  Her diet should include low low-fats.  Her BMI should be between 20 and 25.  She should see her primary care provider for aggressive management and treatment of her cholesterol, blood pressure and glucose.  She should receive the influenza vaccine.  She should discuss the pneumococcal vaccine with her primary care provider.  She should see the eye doctor every 1-2 years.  She should see her dentist yearly.   12.  High blood pressure.  I asked MS. Chambers to check her BP at an outside facility.  If the BP remain 140/90 or greater, she was advised to follow up with her PCP.    If there are no interval concerns, the patient will follow up with Dr. Villegas in April.   I spent 45 minutes with this patient, over 50% in counseling and coordination of care.

## 2017-03-21 ENCOUNTER — OFFICE VISIT (OUTPATIENT)
Dept: FAMILY MEDICINE | Facility: CLINIC | Age: 67
End: 2017-03-21
Payer: MEDICARE

## 2017-03-21 VITALS
WEIGHT: 144.2 LBS | BODY MASS INDEX: 24.62 KG/M2 | DIASTOLIC BLOOD PRESSURE: 78 MMHG | HEIGHT: 64 IN | RESPIRATION RATE: 18 BRPM | SYSTOLIC BLOOD PRESSURE: 131 MMHG | TEMPERATURE: 97.5 F | OXYGEN SATURATION: 100 % | HEART RATE: 78 BPM

## 2017-03-21 DIAGNOSIS — Z23 NEED FOR PROPHYLACTIC VACCINATION WITH TETANUS-DIPHTHERIA (TD): ICD-10-CM

## 2017-03-21 DIAGNOSIS — E55.9 VITAMIN D DEFICIENCY: ICD-10-CM

## 2017-03-21 DIAGNOSIS — E03.9 HYPOTHYROIDISM, UNSPECIFIED TYPE: ICD-10-CM

## 2017-03-21 DIAGNOSIS — Z12.11 SCREEN FOR COLON CANCER: ICD-10-CM

## 2017-03-21 DIAGNOSIS — Z23 NEED FOR VACCINATION: ICD-10-CM

## 2017-03-21 DIAGNOSIS — Z23 NEED FOR PROPHYLACTIC VACCINATION AGAINST STREPTOCOCCUS PNEUMONIAE (PNEUMOCOCCUS): ICD-10-CM

## 2017-03-21 DIAGNOSIS — Z00.00 MEDICARE ANNUAL WELLNESS VISIT, SUBSEQUENT: Primary | ICD-10-CM

## 2017-03-21 LAB
ERYTHROCYTE [DISTWIDTH] IN BLOOD BY AUTOMATED COUNT: 13.2 % (ref 10–15)
HCT VFR BLD AUTO: 38.3 % (ref 35–47)
HGB BLD-MCNC: 12.8 G/DL (ref 11.7–15.7)
MCH RBC QN AUTO: 31.1 PG (ref 26.5–33)
MCHC RBC AUTO-ENTMCNC: 33.4 G/DL (ref 31.5–36.5)
MCV RBC AUTO: 93 FL (ref 78–100)
PLATELET # BLD AUTO: 159 10E9/L (ref 150–450)
RBC # BLD AUTO: 4.12 10E12/L (ref 3.8–5.2)
WBC # BLD AUTO: 5 10E9/L (ref 4–11)

## 2017-03-21 PROCEDURE — 80053 COMPREHEN METABOLIC PANEL: CPT | Performed by: NURSE PRACTITIONER

## 2017-03-21 PROCEDURE — 90715 TDAP VACCINE 7 YRS/> IM: CPT | Performed by: NURSE PRACTITIONER

## 2017-03-21 PROCEDURE — 82306 VITAMIN D 25 HYDROXY: CPT | Performed by: NURSE PRACTITIONER

## 2017-03-21 PROCEDURE — 80061 LIPID PANEL: CPT | Performed by: NURSE PRACTITIONER

## 2017-03-21 PROCEDURE — 84443 ASSAY THYROID STIM HORMONE: CPT | Performed by: NURSE PRACTITIONER

## 2017-03-21 PROCEDURE — G0009 ADMIN PNEUMOCOCCAL VACCINE: HCPCS | Mod: 59 | Performed by: NURSE PRACTITIONER

## 2017-03-21 PROCEDURE — G0439 PPPS, SUBSEQ VISIT: HCPCS | Performed by: NURSE PRACTITIONER

## 2017-03-21 PROCEDURE — 36415 COLL VENOUS BLD VENIPUNCTURE: CPT | Performed by: NURSE PRACTITIONER

## 2017-03-21 PROCEDURE — 90670 PCV13 VACCINE IM: CPT | Performed by: NURSE PRACTITIONER

## 2017-03-21 PROCEDURE — 85027 COMPLETE CBC AUTOMATED: CPT | Performed by: NURSE PRACTITIONER

## 2017-03-21 PROCEDURE — 90471 IMMUNIZATION ADMIN: CPT | Performed by: NURSE PRACTITIONER

## 2017-03-21 RX ORDER — LEVOTHYROXINE SODIUM 50 UG/1
50 TABLET ORAL DAILY
Qty: 90 TABLET | Refills: 3 | Status: SHIPPED | OUTPATIENT
Start: 2017-03-21 | End: 2018-05-08

## 2017-03-21 NOTE — NURSING NOTE
"Chief Complaint   Patient presents with     Wellness Visit       Initial /80  Pulse 78  Temp 97.5  F (36.4  C) (Oral)  Resp 18  Ht 5' 4\" (1.626 m)  Wt 144 lb 3.2 oz (65.4 kg)  SpO2 100%  BMI 24.75 kg/m2 Estimated body mass index is 24.75 kg/(m^2) as calculated from the following:    Height as of this encounter: 5' 4\" (1.626 m).    Weight as of this encounter: 144 lb 3.2 oz (65.4 kg).  Medication Reconciliation: complete     Cathy Bowling MA    "

## 2017-03-21 NOTE — PATIENT INSTRUCTIONS
Preventive Health Recommendations    Female Ages 65 +    Yearly exam:     See your health care provider every year in order to  o Review health changes.   o Discuss preventive care.    o Review your medicines if your doctor has prescribed any.      You no longer need a yearly Pap test unless you've had an abnormal Pap test in the past 10 years. If you have vaginal symptoms, such as bleeding or discharge, be sure to talk with your provider about a Pap test.      Every 1 to 2 years, have a mammogram.  If you are over 69, talk with your health care provider about whether or not you want to continue having screening mammograms.      Every 10 years, have a colonoscopy. Or, have a yearly FIT test (stool test). These exams will check for colon cancer.       Have a cholesterol test every 5 years, or more often if your doctor advises it.       Have a diabetes test (fasting glucose) every three years. If you are at risk for diabetes, you should have this test more often.       At age 65, have a bone density scan (DEXA) to check for osteoporosis (brittle bone disease).    Shots:    Get a flu shot each year.    Get a tetanus shot every 10 years.    Talk to your doctor about your pneumonia vaccines. There are now two you should receive - Pneumovax (PPSV 23) and Prevnar (PCV 13).    Talk to your doctor about the shingles vaccine.    Talk to your doctor about the hepatitis B vaccine.    Nutrition:     Eat at least 5 servings of fruits and vegetables each day.      Eat whole-grain bread, whole-wheat pasta and brown rice instead of white grains and rice.      Talk to your provider about Calcium and Vitamin D.     Lifestyle    Exercise at least 150 minutes a week (30 minutes a day, 5 days a week). This will help you control your weight and prevent disease.      Limit alcohol to one drink per day.      No smoking.       Wear sunscreen to prevent skin cancer.       See your dentist twice a year for an exam and cleaning.      See your  eye doctor every 1 to 2 years to screen for conditions such as glaucoma, macular degeneration and cataracts.        High Blood Pressure, To Be Confirmed, No Treatment  Your blood pressure today was higher than normal. Sometimes anxiety or pain can cause a temporary rise in blood pressure. It later returns to normal. Blood pressure that is high only one time doesn t mean that you have high blood pressure (hypertension). High blood pressure is a chronic illness. But you should have your blood pressure measured again within the next few days to find out if it s still high.    A blood pressure reading is made up of two numbers: a higher number over a lower number. The top number is the systolic pressure. The bottom number is the diastolic pressure. A normal blood pressure is less than 120 over less than 80.  High blood pressure is when either the top number is 140 or higher, or the bottom number is 90 or higher. This must be the result when taking your blood pressure a number of times.   The blood pressures between normal and high are called prehypertension. This is systolic pressure of 120 to 140 or diastolic pressure of 80 to 89. Prehypertension means you are at risk of getting high blood pressure. You should have your blood pressure checked regularly to be sure it isn t rising.  Home care  Measure your blood pressure on 3 different days and write down the results. This can be done at your health care provider's office or at this facility. Some pharmacies and grocery stores have automated blood pressure machines that you can use.  Follow-up care  If your blood pressure is high (more than 120 over 80) on 2 out of 3 days, you will need to follow up with your health care provider for more evaluation and treatment.  Don t put this off! High blood pressure can be treated. High blood pressure that s not treated raises your risk for heart attack and stroke.  When to seek medical advice  Call your health care provider right  away if any of these occur:    Chest pain or shortness of breath    Severe headache    Throbbing or rushing sound in the ears    Nosebleed    Sudden severe pain in your belly (abdomen)    Extreme drowsiness, confusion, or fainting    Dizziness or dizziness with spinning sensation (vertigo)    Weakness of an arm or leg or one side of the face    You have problems speaking or seeing     1662-3124 [email or web address] [address] [place], [place] [id number]. All rights reserved. This information is not intended as a substitute for professional medical care. Always follow your healthcare professional's instructions.

## 2017-03-21 NOTE — MR AVS SNAPSHOT
After Visit Summary   3/21/2017    Sushila Chambers    MRN: [id number]           Patient Information     Date Of Birth          1950        Visit Information        Provider Department      3/21/2017 11:20 AM Meryl Muro APRN [facility]        Today's Diagnoses     Medicare annual wellness visit, subsequent    -  1    Hypothyroidism, unspecified type        Vitamin D deficiency         Need for prophylactic vaccination against Streptococcus pneumoniae (pneumococcus)        Need for prophylactic vaccination with tetanus-diphtheria (TD)        Screen for colon cancer        Need for vaccination          Care Instructions      Preventive Health Recommendations    Female Ages 65 +    Yearly exam:     See your health care provider every year in order to  o Review health changes.   o Discuss preventive care.    o Review your medicines if your doctor has prescribed any.      You no longer need a yearly Pap test unless you've had an abnormal Pap test in the past 10 years. If you have vaginal symptoms, such as bleeding or discharge, be sure to talk with your provider about a Pap test.      Every 1 to 2 years, have a mammogram.  If you are over 69, talk with your health care provider about whether or not you want to continue having screening mammograms.      Every 10 years, have a colonoscopy. Or, have a yearly FIT test (stool test). These exams will check for colon cancer.       Have a cholesterol test every 5 years, or more often if your doctor advises it.       Have a diabetes test (fasting glucose) every three years. If you are at risk for diabetes, you should have this test more often.       At age 65, have a bone density scan (DEXA) to check for osteoporosis (brittle bone disease).    Shots:    Get a flu shot each year.    Get a tetanus shot every 10 years.    Talk to your doctor about your pneumonia vaccines. There are now two you should receive - Pneumovax (PPSV 23)  and Prevnar (PCV 13).    Talk to your doctor about the shingles vaccine.    Talk to your doctor about the hepatitis B vaccine.    Nutrition:     Eat at least 5 servings of fruits and vegetables each day.      Eat whole-grain bread, whole-wheat pasta and brown rice instead of white grains and rice.      Talk to your provider about Calcium and Vitamin D.     Lifestyle    Exercise at least 150 minutes a week (30 minutes a day, 5 days a week). This will help you control your weight and prevent disease.      Limit alcohol to one drink per day.      No smoking.       Wear sunscreen to prevent skin cancer.       See your dentist twice a year for an exam and cleaning.      See your eye doctor every 1 to 2 years to screen for conditions such as glaucoma, macular degeneration and cataracts.        High Blood Pressure, To Be Confirmed, No Treatment  Your blood pressure today was higher than normal. Sometimes anxiety or pain can cause a temporary rise in blood pressure. It later returns to normal. Blood pressure that is high only one time doesn t mean that you have high blood pressure (hypertension). High blood pressure is a chronic illness. But you should have your blood pressure measured again within the next few days to find out if it s still high.    A blood pressure reading is made up of two numbers: a higher number over a lower number. The top number is the systolic pressure. The bottom number is the diastolic pressure. A normal blood pressure is less than 120 over less than 80.  High blood pressure is when either the top number is 140 or higher, or the bottom number is 90 or higher. This must be the result when taking your blood pressure a number of times.   The blood pressures between normal and high are called prehypertension. This is systolic pressure of 120 to 140 or diastolic pressure of 80 to 89. Prehypertension means you are at risk of getting high blood pressure. You should have your blood pressure checked  regularly to be sure it isn t rising.  Home care  Measure your blood pressure on 3 different days and write down the results. This can be done at your health care provider's office or at this facility. Some pharmacies and grocery stores have automated blood pressure machines that you can use.  Follow-up care  If your blood pressure is high (more than 120 over 80) on 2 out of 3 days, you will need to follow up with your health care provider for more evaluation and treatment.  Don t put this off! High blood pressure can be treated. High blood pressure that s not treated raises your risk for heart attack and stroke.  When to seek medical advice  Call your health care provider right away if any of these occur:    Chest pain or shortness of breath    Severe headache    Throbbing or rushing sound in the ears    Nosebleed    Sudden severe pain in your belly (abdomen)    Extreme drowsiness, confusion, or fainting    Dizziness or dizziness with spinning sensation (vertigo)    Weakness of an arm or leg or one side of the face    You have problems speaking or seeing     [id number] [employer]. [address] [place] [address]. All rights reserved. This information is not intended as a substitute for professional medical care. Always follow your healthcare professional's instructions.              Follow-ups after your visit        Your next 10 appointments already scheduled     Apr 17, 2017  2:15 PM CDT   (Arrive by 2:00 PM)   Return Visit with Brittany Villegas MD   [facility] ([facility])    [address] [facility]  2nd [facility] [id number] [phone]            May 04, 2017   Procedure with Morales Napier MD   [facility] [place], Endoscopy ([facility], [facility])    [address] [address]   [phone]           The [facility] is located on the corner of [facility]  [address] [address] on the [facility]. It is easily accessible from virtually any point in the [facility] area, via [id number].              Who to contact     If you have questions or need follow up information about today's clinic visit or your schedule please contact [facility] directly at [phone].  Normal or non-critical lab and imaging results will be communicated to you by MyChart, letter or phone within 4 business days after the clinic has received the results. If you do not hear from us within 7 days, please contact the clinic through [employer] or phone. If you have a critical or abnormal lab result, we will notify you by phone as soon as possible.  Submit refill requests through [employer] or call your pharmacy and they will forward the refill request to us. Please allow 3 business days for your refill to be completed.          Additional Information About Your Visit        [employer] Information     [employer] gives you secure access to your electronic health record. If you see a primary care provider, you can also send messages to your care team and make appointments. If you have questions, please call your primary care clinic.  If you do not have a primary care provider, please call [phone] and they will assist you.        Care EveryWhere ID     This is your Care EveryWhere ID. This could be used by other organizations to access your [place] medical records  [id number]        Your Vitals Were     Pulse Temperature Respirations Height Pulse Oximetry BMI (Body Mass Index)    78 97.5  F (36.4  C) (Oral) 18 5' 4\" (1.626 m) 100% 24.75 kg/m2       Blood Pressure from Last 3 Encounters:   03/21/17 148/80   02/27/17 (!) 153/92   11/02/16 131/77    Weight from Last 3 Encounters:   03/21/17 144 lb 3.2 oz (65.4 kg)   02/27/17 146 lb 9.6 oz (66.5 kg)   10/17/16 143 lb 12.8 oz (65.2 kg)              We Performed the Following     ADMIN 1st VACCINE     CBC with " platelets     Comprehensive metabolic panel     EA ADD'L VACCINE     Lipid panel reflex to direct LDL     PNEUMOCOCCAL CONJ VACCINE 13 VALENT IM (PREVNAR 13)     TDAP (ADACEL AGES 11-64)     TSH with free T4 reflex     Vitamin D Deficiency          Where to get your medicines      These medications were sent to [id number] - [place], [place] - [address] AT [facility] [place] [address]  [address], [place] [address]     Phone:  [phone]     levothyroxine 50 MCG tablet          Primary Care Provider Office Phone #    [facility] [phone] [address] [place] [address]        Thank you!     Thank you for choosing [facility]  for your care. Our goal is always to provide you with excellent care. Hearing back from our patients is one way we can continue to improve our services. Please take a few minutes to complete the written survey that you may receive in the mail after your visit with us. Thank you!             Your Updated Medication List - Protect others around you: Learn how to safely use, store and throw away your medicines at www.disposemymeds.org.          This list is accurate as of: 3/21/17  1:27 PM.  Always use your most recent med list.                   Brand Name Dispense Instructions for use    biotin 1000 MCG Tabs tablet      Take by mouth daily       calcium carbonate 500 MG tablet    OS-OCTAVIANO 500 mg [place]. [place]     Take by mouth 2 times daily       clotrimazole 1 % cream    LOTRIMIN    60 g    Apply topically 2 times daily       gabapentin 300 MG capsule    NEURONTIN    30 capsule    Take 1 tablet (300 mg) every night       letrozole 2.5 MG tablet    FEMARA    90 tablet    Take 1 tablet (2.5 mg) by mouth daily       levothyroxine 50 MCG tablet    SYNTHROID/LEVOTHROID    90 tablet    Take 1 tablet (50 mcg) by mouth daily       Multi-vitamin Tabs tablet      Take 1 tablet by mouth daily       * order for DME      1 Device    Equipment being ordered: Cranial Prosthesis       * order for DME     1 Units    Two post mastectomy bra's and prosthesis twice a year as insurance allows .       * order for DME     1 Units    Please allow 12 bras and prosthesis yearly as insurance covers.       * order for DME     1 Units    Four bras as allowed by insurance.       pyridOXINE 100 MG Tabs    VITAMIN B6    30 tablet    Take 1 tablet (100 mg) by mouth daily       TYLENOL PO      Take 500 mg by mouth every 4 hours as needed for mild pain or fever Reported on 3/21/2017       VITAMIN D (CHOLECALCIFEROL) PO      Take by mouth daily       * Notice:  This list has 4 medication(s) that are the same as other medications prescribed for you. Read the directions carefully, and ask your doctor or other care provider to review them with you.

## 2017-03-21 NOTE — NURSING NOTE
Prior to injection verified patient identity using patient's name and date of birth.  Screening Questionnaire for Adult Immunization     Are you sick today?   No    Do you have allergies to medications, food or any vaccine?   yes    Have you ever had a serious reaction after receiving a vaccination?   No    Do you have a long-term health problem with heart disease, lung disease,  asthma, kidney disease, diabetes, anemia, metabolic or blood disease?   No    Do you have cancer, leukemia, AIDS, or any immune system problem?   No    Do you take cortisone, prednisone, other steroids, or anticancer drugs, or  have you had any x-ray (radiation) treatments?   No    Have you had a seizure, brain, or other nervous system problem?   No    During the past year, have you received a transfusion of blood or blood       products, or been given a medicine called immune (gamma) globulin?   No    For women: Are you pregnant or is there a chance you could become         pregnant during the next month?   No    Have you received any vaccinations in the past 4 weeks?   No     Immunization questionnaire answers were all negative.      MNVFC doesn't apply on this patient     Screening performed by Philippe Sears on 3/21/2017 at 12:31 PM.  Per orders of co, injection(s) of prevnar and tdap given by Philippe Sears. Patient instructed to remain in clinic for 20 minutes afterwards, and to report any adverse reaction to me immediately.

## 2017-03-22 LAB
ALBUMIN SERPL-MCNC: 3.8 G/DL (ref 3.4–5)
ALP SERPL-CCNC: 51 U/L (ref 40–150)
ALT SERPL W P-5'-P-CCNC: 24 U/L (ref 0–50)
ANION GAP SERPL CALCULATED.3IONS-SCNC: 9 MMOL/L (ref 3–14)
AST SERPL W P-5'-P-CCNC: 16 U/L (ref 0–45)
BILIRUB SERPL-MCNC: 1 MG/DL (ref 0.2–1.3)
BUN SERPL-MCNC: 15 MG/DL (ref 7–30)
CALCIUM SERPL-MCNC: 9.8 MG/DL (ref 8.5–10.1)
CHLORIDE SERPL-SCNC: 106 MMOL/L (ref 94–109)
CHOLEST SERPL-MCNC: 266 MG/DL
CO2 SERPL-SCNC: 28 MMOL/L (ref 20–32)
CREAT SERPL-MCNC: 0.9 MG/DL (ref 0.52–1.04)
DEPRECATED CALCIDIOL+CALCIFEROL SERPL-MC: 44 UG/L (ref 20–75)
GFR SERPL CREATININE-BSD FRML MDRD: 63 ML/MIN/1.7M2
GLUCOSE SERPL-MCNC: 83 MG/DL (ref 70–99)
HDLC SERPL-MCNC: 54 MG/DL
LDLC SERPL CALC-MCNC: 171 MG/DL
NONHDLC SERPL-MCNC: 212 MG/DL
POTASSIUM SERPL-SCNC: 4.1 MMOL/L (ref 3.4–5.3)
PROT SERPL-MCNC: 7.1 G/DL (ref 6.8–8.8)
SODIUM SERPL-SCNC: 143 MMOL/L (ref 133–144)
TRIGL SERPL-MCNC: 206 MG/DL
TSH SERPL DL<=0.005 MIU/L-ACNC: 1.81 MU/L (ref 0.4–4)

## 2017-03-22 NOTE — PROGRESS NOTES
Lavon Conti,    This note is to let you know the results of your recent lab studies.    Your complete blood count, liver function studies, kidney function studies, electrolytes, blood sugar, thyroid, and vitamin D levels are all normal.    Your cholesterol levels are mildly elevated but the results are not overly concerning at this time. I recommend a diet low in fat and cholesterol as well as regular aerobic activity. I would like to recheck your cholesterol in one year.    Let me know if you have any questions. It was great seeing you again. Take care of yourself.    Meryl RAYMUNDO CNP

## 2017-04-17 ENCOUNTER — ONCOLOGY VISIT (OUTPATIENT)
Dept: ONCOLOGY | Facility: CLINIC | Age: 67
End: 2017-04-17
Attending: INTERNAL MEDICINE
Payer: MEDICARE

## 2017-04-17 VITALS
WEIGHT: 145.9 LBS | BODY MASS INDEX: 24.31 KG/M2 | TEMPERATURE: 97.9 F | RESPIRATION RATE: 12 BRPM | OXYGEN SATURATION: 97 % | DIASTOLIC BLOOD PRESSURE: 87 MMHG | HEIGHT: 65 IN | SYSTOLIC BLOOD PRESSURE: 140 MMHG

## 2017-04-17 DIAGNOSIS — G62.9 NEUROPATHY: ICD-10-CM

## 2017-04-17 DIAGNOSIS — F19.982 DRUG INDUCED INSOMNIA (H): ICD-10-CM

## 2017-04-17 DIAGNOSIS — N95.1 MENOPAUSAL SYNDROME (HOT FLASHES): ICD-10-CM

## 2017-04-17 DIAGNOSIS — Z12.31 VISIT FOR SCREENING MAMMOGRAM: ICD-10-CM

## 2017-04-17 DIAGNOSIS — C50.512 MALIGNANT NEOPLASM OF LOWER-OUTER QUADRANT OF LEFT FEMALE BREAST (H): Primary | ICD-10-CM

## 2017-04-17 PROCEDURE — 99212 OFFICE O/P EST SF 10 MIN: CPT | Mod: ZF

## 2017-04-17 PROCEDURE — 99214 OFFICE O/P EST MOD 30 MIN: CPT | Mod: ZP | Performed by: INTERNAL MEDICINE

## 2017-04-17 RX ORDER — ZOLPIDEM TARTRATE 5 MG/1
5 TABLET ORAL
Qty: 30 TABLET | Refills: 1 | Status: SHIPPED | OUTPATIENT
Start: 2017-04-17 | End: 2021-06-29

## 2017-04-17 ASSESSMENT — PAIN SCALES - GENERAL: PAINLEVEL: NO PAIN (0)

## 2017-04-17 NOTE — LETTER
4/17/2017       RE: Sushila Chambers  [address] [place] [address]     Dear Colleague,    Thank you for referring your patient, Sushila Chambers, to the [facility]. Please see a copy of my visit note below.    MEDICAL ONCOLOGY FOLLOW-UP PATIENT VISIT     NAME: Sushila Chambers     DATE: 4/17/17    PRIMARY CARE PHYSICIAN: Meryl Muro    PATIENT ID: Multifocal, stage IIa, T2N0M0, ER positive, VT negative, HER2 non-amplified invasive lobular carcinoma of the left breast.     Oncology History: Ms. Chambers is a 67 year old female with a history of lung cancer and right breast cancer with more recent invasive lobular carcinoma of the left breast. Ms. Chambers was treated for right sided breast cancer in 2009 with right breast lumpectomy, radiation, and endocrine therapy. Ms. Chambers self palpated a mass in the left breast in early August, 2015. A diagnostic mammogram and ultrasound showed 2 ill-defined spiculated masses at the 4:30 and 2:00 positions of the left breast. There was surrounding architectural distortion and the total area of involvement measured 6.5 cm. Targeted left breast ultrasound showed a multilobulated hypoechoic mass at the 4:00 position measuring 2.5 cm and an ill-defined hypoechoic mass at the 2:00 position measuring 2.4 cm. There was no suspicious left axillary lymphadenopathy. Biopsy of the mass at the 4:00 position showed grade II invasive pleomorphic lobular carcinoma. Biopsy of the mass at the 2:00 position showed a grade II invasive lobular carcinoma. No angiolymphatic invasion or associated LCIS was seen in either specimen. Estrogen receptor staining was positive in >80% of tumor cell nuclei. Progesterone receptor staining was positive in <5% of tumor cell nuclei. HER2 was non-amplifed in both specimens by FISH. Ms. Chambers elected to screen for ISPY-2. Breast MRI showed a slightly enlarged enhancing left axillary lymph node with normal morphologic appearance.  Left axillary lymph node biopsy performed on 9/9/15 was benign. Mammaprint testing returned high-risk. She was randomized to the ganetespib arm of the trial. She received 12 weeks of ganetespib and Taxol and 4 cycles of dose dense AC from 9/29/15 - 2/1/16. On 2/24/16 she underwent left breast mastectomy and sentinel lymph node procedure under the care of Dr. Betts. Pathology showed a residual grade II 4.8 cm, pleomorphic invasive lobular carcinoma with associated LCIS. Invasive tumor cellularity was 30%.  There was evidence of perineural invasion, however, no lymphvascular space invasion. Two sentinel lymph nodes were negative.  She has been on adjuvant letrozole since 03/2016.    Interim History:   Sushila comes into clinic today for routine 3 month breast cancer follow up.  She continues on treatment with letrozole.  She reports she is tolerating the medication well.  Most bothersome to her at this time is insomnia.  She either has difficulty falling asleep or will fall asleep but then wake a few hours later.  She states this is chronic since her  passed away approximately 4-5 years ago, but is much worse since her breast cancer therapy.  She is not getting 7 hours of solid sleep a night and feels fatigued during the day.  She denies concerning lumps or masses of either the right breast or left chest wall.  She continues to have diffuse, chronic joint aches and pains.  No site is worse than any other.  She denies new bone or joint aches or pains.  She has no cough, shortness of breath, or chest pains.  She denies abdominal complaints.  She denies headaches, visual changes, or focal neurologic complaints.  She denies hot flashes, vaginal symptoms, anxiety, or depression.  She is leaving next week for [place], where she, her sister, and her sister's family will be going on a week long [employer] followed by a week stay at [employer].  The remainder of a 10 point review of systems is  "otherwise negative.    REVIEW OF SYSTEMS: Full 10-point review of systems was performed. Pertinent symptoms are reviewed above per HPI.    PAST MEDICAL HISTORY:   Past Medical History:   Diagnosis Date     Basal cell carcinoma      Breast cancer, right breast (H)      History of blood transfusion      Hypothyroid      Lung cancer (H) 2012    right       PAST SURGICAL HISTORY:  Past Surgical History:   Procedure Laterality Date     ABDOMEN SURGERY       BIOPSY, LUNG NODULE Right 2012    + cancer     COLONOSCOPY       ECTOPIC PREGNANCY SURGERY Left 1988     GENITOURINARY SURGERY       GYN SURGERY       INSERT PORT VASCULAR ACCESS       LUMPECTOMY BREAST Right 2010     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     MASTECTOMY SIMPLE, SENTINEL NODE, COMBINED Left 2/24/2016    Procedure: COMBINED MASTECTOMY SIMPLE, SENTINEL NODE;  Surgeon: Satnam Betts MD;  Location: [facility] OR     REMOVE PORT VASCULAR ACCESS N/A 2/24/2016    Procedure: REMOVE PORT VASCULAR ACCESS;  Surgeon: Satnam Betts MD;  Location: [facility] OR     THORACIC SURGERY       MEDS:  Current Outpatient Prescriptions   Medication     levothyroxine (SYNTHROID/LEVOTHROID) 50 MCG tablet     gabapentin (NEURONTIN) 300 MG capsule     clotrimazole (LOTRIMIN) 1 % cream     order for DME     letrozole (FEMARA) 2.5 MG tablet     order for DME     order for DME     Acetaminophen (TYLENOL PO)     pyridOXINE (VITAMIN B6) 100 MG TABS     order for DME     multivitamin, therapeutic with minerals (MULTI-VITAMIN) TABS     biotin 1000 MCG TABS tablet     VITAMIN D, CHOLECALCIFEROL, PO     calcium carbonate (OS-OCTAVIANO 500 MG [place]) 500 MG tablet     No current facility-administered medications for this visit.      ALLERGIES:  Allergies   Allergen Reactions     Latex      SKIN REACTION       Sulfa Drugs Itching and Rash        PHYSICAL EXAM:  /87  Temp 97.9  F (36.6  C) (Oral)  Resp 12  Ht 1.65 m (5' 4.96\")  Wt 66.2 kg (145 lb 14.4 oz)  SpO2 97%  BMI 24.31 kg/m2  KPS: " 100%  GENERAL: AAOx3, well appearing and in no acute distress  HEENT: Normocephalic, atraumatic, PERRL, MMM.  No lesions of the oropharynx  LYMPH: No palpable cervical, supraclavicular, or axillary lymphadenopathy  CV: RRR, normal S1/S2, No murmurs, gallops, or rubs  LUNGS: CTA B/L, no wheezing or rhonchi  Breast:  Right breast is of increased fibroglandular density.  Left mastectomy.  There are no discretely palpable masses of either the right breast or left chest wall.  ABDOMEN: soft, nontender, non-distended, no hepatosplenomegaly. Bowel sounds present.  EXTREMITIES: no lower extremity edema  NEURO: Cranial nerves II-XII grossly intact  INTEGUMENT: No rashes    LABS REVIEWED THIS VISIT:  No interim laboratory studies pertinent to today's visit.    RADIOLOGY:   4/11/16 DEXA bone density scan:  Conclusions:    The most negative and valid T-score of -0.7 at the level of the left femoral neck, and -0.7 at the level of the right femoral neck,  corresponds with normal bone density.      8/8/16 Diagnostic right breast mammogram:  FINDINGS:  BREAST DENSITY: Scattered fibroglandular densities.      No significant change. Post therapy findings on the right.                                                                        IMPRESSION: BI-RADS CATEGORY: 2 - Benign Finding(s).     RECOMMENDED: Annual Mammography      IMPRESSION/PLAN: 66 yo female with stage II, T2N0M0, grade II, ER positive, TN negative, HER2 non-amplified invasive lobular carcinoma of the left breast. She is s/p treatment with 12 weeks of taxol and ganetespib followed by 4 cycles of dose dense adriamycin and cyclophosphamide. Left breast mastectomy and sentinel lymph node procedure was performed on 2/24/16. She had a 4.8 cm residual tumor without lymph node involvement. She has been on letrozole since 03/2016.    1.  Left breast ER-positive carcinoma:  Ms. Chambers is now approximately 1 year and 2 months out from completion of neoadjuvant chemotherapy  and surgical excision of a left breast cancer.  She has been on adjuvant letrozole for nearly the same amount of time.  She is tolerating the medication remarkably well.  We plan to treat her for a total of 10 years (through 03/2026).  There is no evidence of disease recurrence on my clinical breast examination performed today.  I will see her back in clinic in approximately 3 months for her next exam.  We will schedule her return visit to coincide with her annual mammogram due in 08/2017.       We have previously discussed lifestyle recommendations suggested to prevent breast cancer recurrence including 30 minutes of daily cardiovascular exercise, maintaining a normal BMI, a diet low in saturated fat and refined sugar, a daily baby aspirin, vitamin D supplementation, and reduced alcohol intake.  Sushila has been seen in survivor clinic.    2.  Insomnia:  Start ambien 5 mg PO QHS prn.    3.  Hot flashes:  Some improvement with gabapentin 300 mg PO QHS.  She declines increase in dose at this time.  Prescription was refilled for her today.     3.  Neuropathy:  Continue vitamin B6.  Gabapentin as above.     4.  Arthralgias:  Secondary to osteoarthritis and exacerbated by letrozole.  Continue to take daily vitamin D supplementation and walk a half hour a day.  Ibu profen as needed for pain.     5.  Bone health:  DEXA bone density in 04/2016 showed a lowest T-score of -0.7.  She continues on calcium and vitamin D supplementation as well as daily weightbearing activity.  We will plan to repeat a DEXA bone density scan in 04/2018.       6.  VUS in MRE11:  Breast cancer screening recommendations as above.        7.  Followup:  Return to clinic in 3-4 months for visit with me.  Right breast screening mammogram the same day as the return visit.     It was a pleasure to see Sushila in clinic today.  A total of 25 minutes of our 30-minute face-to-face visit was spent counseling the patient.         Again, thank you for allowing me  to participate in the care of your patient.      Sincerely,    Brittany Villegas MD

## 2017-04-17 NOTE — PROGRESS NOTES
MEDICAL ONCOLOGY FOLLOW-UP PATIENT VISIT     NAME: Sushila Chambers     DATE: 4/17/17    PRIMARY CARE PHYSICIAN: Meryl Muro    PATIENT ID: Multifocal, stage IIa, T2N0M0, ER positive, MD negative, HER2 non-amplified invasive lobular carcinoma of the left breast.     Oncology History: Ms. Chambers is a 67 year old female with a history of lung cancer and right breast cancer with more recent invasive lobular carcinoma of the left breast. Ms. Chambers was treated for right sided breast cancer in 2009 with right breast lumpectomy, radiation, and endocrine therapy. Ms. Chambers self palpated a mass in the left breast in early August, 2015. A diagnostic mammogram and ultrasound showed 2 ill-defined spiculated masses at the 4:30 and 2:00 positions of the left breast. There was surrounding architectural distortion and the total area of involvement measured 6.5 cm. Targeted left breast ultrasound showed a multilobulated hypoechoic mass at the 4:00 position measuring 2.5 cm and an ill-defined hypoechoic mass at the 2:00 position measuring 2.4 cm. There was no suspicious left axillary lymphadenopathy. Biopsy of the mass at the 4:00 position showed grade II invasive pleomorphic lobular carcinoma. Biopsy of the mass at the 2:00 position showed a grade II invasive lobular carcinoma. No angiolymphatic invasion or associated LCIS was seen in either specimen. Estrogen receptor staining was positive in >80% of tumor cell nuclei. Progesterone receptor staining was positive in <5% of tumor cell nuclei. HER2 was non-amplifed in both specimens by FISH. Ms. Chambers elected to screen for ISPY-2. Breast MRI showed a slightly enlarged enhancing left axillary lymph node with normal morphologic appearance. Left axillary lymph node biopsy performed on 9/9/15 was benign. Mammaprint testing returned high-risk. She was randomized to the ganetespib arm of the trial. She received 12 weeks of ganetespib and Taxol and 4 cycles of dose dense AC from  9/29/15 - 2/1/16. On 2/24/16 she underwent left breast mastectomy and sentinel lymph node procedure under the care of Dr. Betts. Pathology showed a residual grade II 4.8 cm, pleomorphic invasive lobular carcinoma with associated LCIS. Invasive tumor cellularity was 30%.  There was evidence of perineural invasion, however, no lymphvascular space invasion. Two sentinel lymph nodes were negative.  She has been on adjuvant letrozole since 03/2016.    Interim History:   Sushila comes into clinic today for routine 3 month breast cancer follow up.  She continues on treatment with letrozole.  She reports she is tolerating the medication well.  Most bothersome to her at this time is insomnia.  She either has difficulty falling asleep or will fall asleep but then wake a few hours later.  She states this is chronic since her  passed away approximately 4-5 years ago, but is much worse since her breast cancer therapy.  She is not getting 7 hours of solid sleep a night and feels fatigued during the day.  She denies concerning lumps or masses of either the right breast or left chest wall.  She continues to have diffuse, chronic joint aches and pains.  No site is worse than any other.  She denies new bone or joint aches or pains.  She has no cough, shortness of breath, or chest pains.  She denies abdominal complaints.  She denies headaches, visual changes, or focal neurologic complaints.  She denies hot flashes, vaginal symptoms, anxiety, or depression.  She is leaving next week for [place], where she, her sister, and her sister's family will be going on a week long [employer] followed by a week stay at [employer] [place].  The remainder of a 10 point review of systems is otherwise negative.    REVIEW OF SYSTEMS: Full 10-point review of systems was performed. Pertinent symptoms are reviewed above per HPI.    PAST MEDICAL HISTORY:   Past Medical History:   Diagnosis Date     Basal cell carcinoma      Breast  "cancer, right breast (H)      History of blood transfusion      Hypothyroid      Lung cancer (H) 2012    right       PAST SURGICAL HISTORY:  Past Surgical History:   Procedure Laterality Date     ABDOMEN SURGERY       BIOPSY, LUNG NODULE Right 2012    + cancer     COLONOSCOPY       ECTOPIC PREGNANCY SURGERY Left 1988     GENITOURINARY SURGERY       GYN SURGERY       INSERT PORT VASCULAR ACCESS       LUMPECTOMY BREAST Right 2010     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     MASTECTOMY SIMPLE, SENTINEL NODE, COMBINED Left 2/24/2016    Procedure: COMBINED MASTECTOMY SIMPLE, SENTINEL NODE;  Surgeon: Satnam Betts MD;  Location: [facility] OR     REMOVE PORT VASCULAR ACCESS N/A 2/24/2016    Procedure: REMOVE PORT VASCULAR ACCESS;  Surgeon: Satnam Betts MD;  Location: [facility] OR     THORACIC SURGERY       MEDS:  Current Outpatient Prescriptions   Medication     levothyroxine (SYNTHROID/LEVOTHROID) 50 MCG tablet     gabapentin (NEURONTIN) 300 MG capsule     clotrimazole (LOTRIMIN) 1 % cream     order for DME     letrozole (FEMARA) 2.5 MG tablet     order for DME     order for DME     Acetaminophen (TYLENOL PO)     pyridOXINE (VITAMIN B6) 100 MG TABS     order for DME     multivitamin, therapeutic with minerals (MULTI-VITAMIN) TABS     biotin 1000 MCG TABS tablet     VITAMIN D, CHOLECALCIFEROL, PO     calcium carbonate (OS-OCTAVIANO 500 MG [place]) 500 MG tablet     No current facility-administered medications for this visit.      ALLERGIES:  Allergies   Allergen Reactions     Latex      SKIN REACTION       Sulfa Drugs Itching and Rash        PHYSICAL EXAM:  /87  Temp 97.9  F (36.6  C) (Oral)  Resp 12  Ht 1.65 m (5' 4.96\")  Wt 66.2 kg (145 lb 14.4 oz)  SpO2 97%  BMI 24.31 kg/m2  KPS: 100%  GENERAL: AAOx3, well appearing and in no acute distress  HEENT: Normocephalic, atraumatic, PERRL, MMM.  No lesions of the oropharynx  LYMPH: No palpable cervical, supraclavicular, or axillary lymphadenopathy  CV: RRR, normal S1/S2, No " murmurs, gallops, or rubs  LUNGS: CTA B/L, no wheezing or rhonchi  Breast:  Right breast is of increased fibroglandular density.  Left mastectomy.  There are no discretely palpable masses of either the right breast or left chest wall.  ABDOMEN: soft, nontender, non-distended, no hepatosplenomegaly. Bowel sounds present.  EXTREMITIES: no lower extremity edema  NEURO: Cranial nerves II-XII grossly intact  INTEGUMENT: No rashes    LABS REVIEWED THIS VISIT:  No interim laboratory studies pertinent to today's visit.    RADIOLOGY:   4/11/16 DEXA bone density scan:  Conclusions:    The most negative and valid T-score of -0.7 at the level of the left femoral neck, and -0.7 at the level of the right femoral neck,  corresponds with normal bone density.      8/8/16 Diagnostic right breast mammogram:  FINDINGS:  BREAST DENSITY: Scattered fibroglandular densities.      No significant change. Post therapy findings on the right.                                                                        IMPRESSION: BI-RADS CATEGORY: 2 - Benign Finding(s).     RECOMMENDED: Annual Mammography      IMPRESSION/PLAN: 68 yo female with stage II, T2N0M0, grade II, ER positive, TN negative, HER2 non-amplified invasive lobular carcinoma of the left breast. She is s/p treatment with 12 weeks of taxol and ganetespib followed by 4 cycles of dose dense adriamycin and cyclophosphamide. Left breast mastectomy and sentinel lymph node procedure was performed on 2/24/16. She had a 4.8 cm residual tumor without lymph node involvement. She has been on letrozole since 03/2016.    1.  Left breast ER-positive carcinoma:  Ms. Chambers is now approximately 1 year and 2 months out from completion of neoadjuvant chemotherapy and surgical excision of a left breast cancer.  She has been on adjuvant letrozole for nearly the same amount of time.  She is tolerating the medication remarkably well.  We plan to treat her for a total of 10 years (through 03/2026).  There  is no evidence of disease recurrence on my clinical breast examination performed today.  I will see her back in clinic in approximately 3 months for her next exam.  We will schedule her return visit to coincide with her annual mammogram due in 08/2017.       We have previously discussed lifestyle recommendations suggested to prevent breast cancer recurrence including 30 minutes of daily cardiovascular exercise, maintaining a normal BMI, a diet low in saturated fat and refined sugar, a daily baby aspirin, vitamin D supplementation, and reduced alcohol intake.  Sushila has been seen in survivor clinic.    2.  Insomnia:  Start ambien 5 mg PO QHS prn.    3.  Hot flashes:  Some improvement with gabapentin 300 mg PO QHS.  She declines increase in dose at this time.  Prescription was refilled for her today.     3.  Neuropathy:  Continue vitamin B6.  Gabapentin as above.     4.  Arthralgias:  Secondary to osteoarthritis and exacerbated by letrozole.  Continue to take daily vitamin D supplementation and walk a half hour a day.  Ibu profen as needed for pain.     5.  Bone health:  DEXA bone density in 04/2016 showed a lowest T-score of -0.7.  She continues on calcium and vitamin D supplementation as well as daily weightbearing activity.  We will plan to repeat a DEXA bone density scan in 04/2018.       6.  VUS in MRE11:  Breast cancer screening recommendations as above.        7.  Followup:  Return to clinic in 3-4 months for visit with me.  Right breast screening mammogram the same day as the return visit.     It was a pleasure to see Sushila in clinic today.  A total of 25 minutes of our 30-minute face-to-face visit was spent counseling the patient.

## 2017-04-17 NOTE — MR AVS SNAPSHOT
After Visit Summary   4/17/2017    Sushila Chambers    MRN: [id number]           Patient Information     Date Of Birth          1950        Visit Information        Provider Department      4/17/2017 2:15 PM Brittany Villegas MD [facility]        Today's Diagnoses     Malignant neoplasm of lower-outer quadrant of left female breast (H)    -  1    Visit for screening mammogram        Drug induced insomnia (H)           Follow-ups after your visit        Your next 10 appointments already scheduled     May 04, 2017   Procedure with Morales Napier MD   [facility] [place] ([facility])    [address] [address]   [phone]           The [facility] is located on the corner of [address] and [address] on the [facility]. It is easily accessible from virtually any point in the [facility] area, via [address] and [address].            Aug 14, 2017  1:45 PM CDT   (Arrive by 1:30 PM)   MA SCREENING DIGITAL RIGHT with [id number]   [facility] ([facility])    [address] [id number] [phone]           Do not use any powder, lotion or deodorant under your arms or on your breast. If you do, we will ask you to remove it before your exam.  Wear comfortable, two-piece clothing.  If you have any allergies, tell your care team.  Bring any previous mammograms from other facilities or have them mailed to the breast center. This mammogram location, [facility], now offers 3D mammography. It doesn't replace a screening mammogram and can be done with a regular screening mammogram. It is optional and not all insurances will pay for it. 3D mammography is a special kind of mammogram that produces a three-dimensional image of the breast by using low dose-xrays. 3D allows the  radiologist to see the breast tissue differently from 2D, which reduces the chance of repeat testing due to overlapping breast tissue. If you are interested in have a 3D mammogram, please check with your insurance before you arrive for your exam. 3D mammography is offered to all patients. On the day of your exam you will be asked to sign a form stating yes, you wish to have 3D imaging or, no, you decline.            Aug 14, 2017  2:45 PM CDT   (Arrive by 2:30 PM)   Return Visit with Brittany Villegas MD   [facility] ([facility])    [address] [facility] [address] [phone]              Future tests that were ordered for you today     Open Future Orders        Priority Expected Expires Ordered    Mammo screening  digital right* Routine 8/8/2017 5/16/2018 4/16/2017            Who to contact     If you have questions or need follow up information about today's clinic visit or your schedule please contact [facility] directly at [phone].  Normal or non-critical lab and imaging results will be communicated to you by GigaPanhart, letter or phone within 4 business days after the clinic has received the results. If you do not hear from us within 7 days, please contact the clinic through GigaPanhart or phone. If you have a critical or abnormal lab result, we will notify you by phone as soon as possible.  Submit refill requests through Idooble or call your pharmacy and they will forward the refill request to us. Please allow 3 business days for your refill to be completed.          Additional Information About Your Visit        Idooble Information     Idooble gives you secure access to your electronic health record. If you see a primary care provider, you can also send messages to your care team and make appointments. If you have questions, please call your primary care clinic.  If you do not have a primary care provider,  "please call [phone] and they will assist you.        Care EveryWhere ID     This is your Care EveryWhere ID. This could be used by other organizations to access your [facility] medical records  [id number]        Your Vitals Were     Temperature Respirations Height Pulse Oximetry BMI (Body Mass Index)       97.9  F (36.6  C) (Oral) 12 1.65 m (5' 4.96\") 97% 24.31 kg/m2        Blood Pressure from Last 3 Encounters:   04/17/17 140/87   03/21/17 131/78   02/27/17 (!) 153/92    Weight from Last 3 Encounters:   04/17/17 66.2 kg (145 lb 14.4 oz)   03/21/17 65.4 kg (144 lb 3.2 oz)   02/27/17 66.5 kg (146 lb 9.6 oz)                 Today's Medication Changes          These changes are accurate as of: 4/17/17  3:46 PM.  If you have any questions, ask your nurse or doctor.               Start taking these medicines.        Dose/Directions    zolpidem 5 MG tablet   Commonly known as:  AMBIEN   Used for:  Drug induced insomnia (H)   Started by:  Brittany Villegas MD        Dose:  5 mg   Take 1 tablet (5 mg) by mouth nightly as needed for sleep   Quantity:  30 tablet   Refills:  1            Where to get your medicines      Some of these will need a paper prescription and others can be bought over the counter.  Ask your nurse if you have questions.     Bring a paper prescription for each of these medications     zolpidem 5 MG tablet                Primary Care Provider Office Phone #    [facility] [phone] [address] [place] [address]        Thank you!     Thank you for choosing [facility]  for your care. Our goal is always to provide you with excellent care. Hearing back from our patients is one way we can continue to improve our services. Please take a few minutes to complete the written survey that you may receive in the mail after your visit with us. Thank you!             Your Updated Medication List - Protect others around you: Learn how to safely " use, store and throw away your medicines at www.disposemymeds.org.          This list is accurate as of: 4/17/17  3:46 PM.  Always use your most recent med list.                   Brand Name Dispense Instructions for use    biotin 1000 MCG Tabs tablet      Take by mouth daily       calcium carbonate 500 MG tablet    OS-OCTAVIANO 500 mg [place]     Take by mouth 2 times daily       clotrimazole 1 % cream    LOTRIMIN    60 g    Apply topically 2 times daily       gabapentin 300 MG capsule    NEURONTIN    30 capsule    Take 1 tablet (300 mg) every night       letrozole 2.5 MG tablet    FEMARA    90 tablet    Take 1 tablet (2.5 mg) by mouth daily       levothyroxine 50 MCG tablet    SYNTHROID/LEVOTHROID    90 tablet    Take 1 tablet (50 mcg) by mouth daily       Multi-vitamin Tabs tablet      Take 1 tablet by mouth daily       * order for DME     1 Device    Equipment being ordered: Cranial Prosthesis       * order for DME     1 Units    Two post mastectomy bra's and prosthesis twice a year as insurance allows .       * order for DME     1 Units    Please allow 12 bras and prosthesis yearly as insurance covers.       * order for DME     1 Units    Four bras as allowed by insurance.       pyridOXINE 100 MG Tabs    VITAMIN B6    30 tablet    Take 1 tablet (100 mg) by mouth daily       TYLENOL PO      Take 500 mg by mouth every 4 hours as needed for mild pain or fever Reported on 3/21/2017       VITAMIN D (CHOLECALCIFEROL) PO      Take by mouth daily       zolpidem 5 MG tablet    AMBIEN    30 tablet    Take 1 tablet (5 mg) by mouth nightly as needed for sleep       * Notice:  This list has 4 medication(s) that are the same as other medications prescribed for you. Read the directions carefully, and ask your doctor or other care provider to review them with you.

## 2017-04-17 NOTE — NURSING NOTE
"Sushila Chambers is a 67 year old female who presents for:  Chief Complaint   Patient presents with     Oncology Clinic Visit     f/u left breast        Initial Vitals:  /87  Temp 97.9  F (36.6  C) (Oral)  Resp 12  Ht 1.65 m (5' 4.96\")  Wt 66.2 kg (145 lb 14.4 oz)  SpO2 97%  BMI 24.31 kg/m2 Estimated body mass index is 24.31 kg/(m^2) as calculated from the following:    Height as of this encounter: 1.65 m (5' 4.96\").    Weight as of this encounter: 66.2 kg (145 lb 14.4 oz).. Body surface area is 1.74 meters squared. BP completed using cuff size: regular  No Pain (0) No LMP recorded. Patient is postmenopausal. Allergies and medications reviewed.     Medications: Medication refills not needed today.  Pharmacy name entered into treadalong:    [facility] [place], [place] [facility] [id number] [place] - [address] [facility] [address]  [facility]          Comments: pt denies pain    5 minutes for nursing intake (face to face time)   Rosette Alfonso CMA        "

## 2017-04-20 RX ORDER — GABAPENTIN 300 MG/1
CAPSULE ORAL
Qty: 90 CAPSULE | Refills: 3 | Status: SHIPPED | OUTPATIENT
Start: 2017-04-20 | End: 2018-04-30

## 2017-06-11 DIAGNOSIS — C50.512 MALIGNANT NEOPLASM OF LOWER-OUTER QUADRANT OF LEFT FEMALE BREAST (H): ICD-10-CM

## 2017-06-12 RX ORDER — LETROZOLE 2.5 MG/1
TABLET, FILM COATED ORAL
Qty: 90 TABLET | Refills: 0 | Status: SHIPPED | OUTPATIENT
Start: 2017-06-12 | End: 2017-10-31

## 2017-06-13 DIAGNOSIS — E03.9 HYPOTHYROIDISM, UNSPECIFIED TYPE: ICD-10-CM

## 2017-06-14 RX ORDER — LEVOTHYROXINE SODIUM 50 UG/1
TABLET ORAL
Qty: 90 TABLET | Refills: 2 | Status: SHIPPED | OUTPATIENT
Start: 2017-06-14 | End: 2017-11-13

## 2017-06-14 NOTE — TELEPHONE ENCOUNTER
Levothyroxine     Last Written Prescription Date: 3/21/17  Last Quantity: 90, # refills: 3  Last Office Visit with [facility] or [facility] prescribing provider: 3/21/17  roger rosales          TSH   Date Value Ref Range Status   03/21/2017 1.81 0.40 - 4.00 mU/L Final

## 2017-08-14 ENCOUNTER — ONCOLOGY VISIT (OUTPATIENT)
Dept: ONCOLOGY | Facility: CLINIC | Age: 67
End: 2017-08-14
Attending: INTERNAL MEDICINE
Payer: MEDICARE

## 2017-08-14 VITALS
SYSTOLIC BLOOD PRESSURE: 156 MMHG | HEIGHT: 65 IN | WEIGHT: 150.2 LBS | RESPIRATION RATE: 16 BRPM | DIASTOLIC BLOOD PRESSURE: 83 MMHG | TEMPERATURE: 98 F | OXYGEN SATURATION: 99 % | BODY MASS INDEX: 25.02 KG/M2 | HEART RATE: 73 BPM

## 2017-08-14 DIAGNOSIS — Z17.0 MALIGNANT NEOPLASM OF LOWER-OUTER QUADRANT OF LEFT BREAST OF FEMALE, ESTROGEN RECEPTOR POSITIVE (H): Primary | ICD-10-CM

## 2017-08-14 DIAGNOSIS — C50.512 MALIGNANT NEOPLASM OF LOWER-OUTER QUADRANT OF LEFT BREAST OF FEMALE, ESTROGEN RECEPTOR POSITIVE (H): Primary | ICD-10-CM

## 2017-08-14 PROCEDURE — 99212 OFFICE O/P EST SF 10 MIN: CPT | Mod: ZF

## 2017-08-14 PROCEDURE — 99214 OFFICE O/P EST MOD 30 MIN: CPT | Mod: ZP | Performed by: INTERNAL MEDICINE

## 2017-08-14 ASSESSMENT — PAIN SCALES - GENERAL: PAINLEVEL: NO PAIN (0)

## 2017-08-14 NOTE — LETTER
8/14/2017       RE: Sushila Chambers  [address] [place] [address]     Dear Colleague,    Thank you for referring your patient, Sushila Chambers, to the [facility]. Please see a copy of my visit note below.    MEDICAL ONCOLOGY FOLLOW-UP PATIENT VISIT     NAME: Sushila Chambers     DATE: 8/14/17    PRIMARY CARE PHYSICIAN: Meryl Muro    PATIENT ID: Multifocal, stage IIa, T2N0M0, ER positive, AR negative, HER2 non-amplified invasive lobular carcinoma of the left breast.     Oncology History: Ms. Chambers is a 67 year old female with a history of lung cancer and right breast cancer with more recent invasive lobular carcinoma of the left breast. Ms. Chambers was treated for right sided breast cancer in 2009 with right breast lumpectomy, radiation, and endocrine therapy. Ms. Chambers self palpated a mass in the left breast in early August, 2015. A diagnostic mammogram and ultrasound showed 2 ill-defined spiculated masses at the 4:30 and 2:00 positions of the left breast. There was surrounding architectural distortion and the total area of involvement measured 6.5 cm. Targeted left breast ultrasound showed a multilobulated hypoechoic mass at the 4:00 position measuring 2.5 cm and an ill-defined hypoechoic mass at the 2:00 position measuring 2.4 cm. There was no suspicious left axillary lymphadenopathy. Biopsy of the mass at the 4:00 position showed grade II invasive pleomorphic lobular carcinoma. Biopsy of the mass at the 2:00 position showed a grade II invasive lobular carcinoma. No angiolymphatic invasion or associated LCIS was seen in either specimen. Estrogen receptor staining was positive in >80% of tumor cell nuclei. Progesterone receptor staining was positive in <5% of tumor cell nuclei. HER2 was non-amplifed in both specimens by FISH. Ms. Chambers elected to screen for ISPY-2. Breast MRI showed a slightly enlarged enhancing left axillary lymph node with normal morphologic appearance.  Left axillary lymph node biopsy performed on 9/9/15 was benign. Mammaprint testing returned high-risk. She was randomized to the ganetespib arm of the trial. She received 12 weeks of ganetespib and Taxol and 4 cycles of dose dense AC from 9/29/15 - 2/1/16. On 2/24/16 she underwent left breast mastectomy and sentinel lymph node procedure under the care of Dr. Betts. Pathology showed a residual grade II 4.8 cm, pleomorphic invasive lobular carcinoma with associated LCIS. Invasive tumor cellularity was 30%.  There was evidence of perineural invasion, however, no lymphvascular space invasion. Two sentinel lymph nodes were negative.  She has been on adjuvant letrozole since 03/2016.    Interim History:   Sushila comes into clinic today for routine breast cancer follow up. She continues on treatment with letrozole.  She reports she is tolerating the medication well. She has diffuse, chronic joint aches and pains that has remained stable. She does not use pain meds. She denies new bone or joint aches or pains. She has chronic difficulty falling asleep and or staying asleep, has tried OTC melatonin but only 1 mg with no benefit.  She is vary active doing Pilates 2 times/week. She denies concerning lumps or masses of either the right breast or left chest wall. She denies cough, shortness of breath, or chest pains. She denies abdominal complaints, N/V/D. Denies headaches, visual changes, or focal neurologic complaints. She denies hot flashes, vaginal symptoms, anxiety.    REVIEW OF SYSTEMS: Full 10-point review of systems was performed. Pertinent symptoms are reviewed above per HPI.    PAST MEDICAL HISTORY:   Past Medical History:   Diagnosis Date     Basal cell carcinoma      Breast cancer, right breast (H)      History of blood transfusion      Hypothyroid      Lung cancer (H) 2012    right       PAST SURGICAL HISTORY:  Past Surgical History:   Procedure Laterality Date     ABDOMEN SURGERY       BIOPSY, LUNG NODULE Right  "2012    + cancer     COLONOSCOPY       ECTOPIC PREGNANCY SURGERY Left 1988     GENITOURINARY SURGERY       GYN SURGERY       INSERT PORT VASCULAR ACCESS       LUMPECTOMY BREAST Right 2010     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     MASTECTOMY SIMPLE, SENTINEL NODE, COMBINED Left 2/24/2016    Procedure: COMBINED MASTECTOMY SIMPLE, SENTINEL NODE;  Surgeon: Satnam Betts MD;  Location: [facility] OR     REMOVE PORT VASCULAR ACCESS N/A 2/24/2016    Procedure: REMOVE PORT VASCULAR ACCESS;  Surgeon: Satnam Betts MD;  Location: [facility] OR     THORACIC SURGERY       MEDS:  Current Outpatient Prescriptions   Medication     letrozole (FEMARA) 2.5 MG tablet     gabapentin (NEURONTIN) 300 MG capsule     levothyroxine (SYNTHROID/LEVOTHROID) 50 MCG tablet     order for DME     order for DME     pyridOXINE (VITAMIN B6) 100 MG TABS     multivitamin, therapeutic with minerals (MULTI-VITAMIN) TABS     biotin 1000 MCG TABS tablet     VITAMIN D, CHOLECALCIFEROL, PO     calcium carbonate (OS-OCTAVIANO 500 MG [place]. [place]) 500 MG tablet     levothyroxine (SYNTHROID/LEVOTHROID) 50 MCG tablet     zolpidem (AMBIEN) 5 MG tablet     clotrimazole (LOTRIMIN) 1 % cream     order for DME     Acetaminophen (TYLENOL PO)     order for DME     No current facility-administered medications for this visit.      ALLERGIES:  Allergies   Allergen Reactions     Latex      SKIN REACTION       Sulfa Drugs Itching and Rash        PHYSICAL EXAM:  /83 (BP Location: Right arm, Patient Position: Chair, Cuff Size: Adult Regular)  Pulse 73  Temp 98  F (36.7  C) (Tympanic)  Resp 16  Ht 1.65 m (5' 4.96\")  Wt 68.1 kg (150 lb 3.2 oz)  SpO2 99%  BMI 25.02 kg/m2  KPS: 100%  GENERAL: AAOx3, well appearing and in no acute distress  HEENT: Normocephalic, atraumatic, PERRL, MMM.  No lesions of the oropharynx  LYMPH: No palpable cervical, supraclavicular, or axillary lymphadenopathy  CV: RRR, normal S1/S2, No murmurs, gallops, or rubs  LUNGS: CTA B/L, no wheezing or " rhonchi  Breast:  Right breast is of increased fibroglandular density.  Left mastectomy.  There are no discretely palpable masses of either the right breast or left chest wall.  ABDOMEN: soft, nontender, non-distended, no hepatosplenomegaly. Bowel sounds present.  EXTREMITIES: no lower extremity edema  NEURO: Cranial nerves II-XII grossly intact  INTEGUMENT: No rashes    LABS REVIEWED THIS VISIT:  No interim laboratory studies pertinent to today's visit.    RADIOLOGY:   4/11/16 DEXA bone density scan:  Conclusions:    The most negative and valid T-score of -0.7 at the level of the left femoral neck, and -0.7 at the level of the right femoral neck,  corresponds with normal bone density.      Mammography: 8/14/2017    Findings:  Post conservation therapy changes.  No significant change.   Impression: BI-RADS CATEGORY: 2 - Benign Findings..   Recommended Followup: Annual Mammography.         IMPRESSION/PLAN: 66 yo female with stage II, T2N0M0, grade II, ER positive, [place] negative, HER2 non-amplified invasive lobular carcinoma of the left breast. She is s/p treatment with 12 weeks of taxol and ganetespib followed by 4 cycles of dose dense adriamycin and cyclophosphamide. Left breast mastectomy and sentinel lymph node procedure was performed on 2/24/16. She had a 4.8 cm residual tumor without lymph node involvement. She has been on letrozole since 03/2016.    1.  Left breast ER-positive carcinoma:  Ms. Chambers is now approximately 1 year and 6 months out from completion of neoadjuvant chemotherapy and surgical excision of a left breast cancer.  She has been on adjuvant letrozole for nearly the same amount of time.  She is tolerating the medication remarkably well.  We plan to treat her for a total of 10 years (through 03/2026).  There is no evidence of disease recurrence on my clinical breast examination performed today.  Mammogram done today on 8/14/17 was unremarkable. We will see her again in 3 months. Next mammogram in  8/2018.      We have previously discussed lifestyle recommendations suggested to prevent breast cancer recurrence including 30 minutes of daily cardiovascular exercise, maintaining a normal BMI, a diet low in saturated fat and refined sugar, a daily baby aspirin, vitamin D supplementation, and reduced alcohol intake.  Sushila has been seen in survivor clinic.    2.  Insomnia:  She did not start the Ambien 5 mg PO QHS prn. Asked her to start OTC Melatonin at 3 mg at night time daily.    3.  Hot flashes:  Improvement with gabapentin 300 mg PO QHS.       3.  Neuropathy:  Improved. Continue vitamin B6.  Gabapentin as above.     4.  Arthralgias:  Secondary to osteoarthritis and exacerbated by letrozole.  Continue to take daily vitamin D supplementation and walk a half hour a day.  Ibu profen as needed for pain.     5.  Bone health:  DEXA bone density in 04/2016 showed a lowest T-score of -0.7.  She continues on calcium and vitamin D supplementation as well as daily weightbearing activity.  We will plan to repeat a DEXA bone density scan in 04/2018.       6.  VUS in MRE11:  Breast cancer screening recommendations as above.        7.  Followup:  Return to clinic in 3-4 months for visit with me.       Patient was seen and discussed with Dr. Kaye.  I have edited the above note to reflect our joint assessment and plan.  A total of 25 minutes of our 30-minute face-to-face visit was spent counseling the patient.         Again, thank you for allowing me to participate in the care of your patient.      Sincerely,    Brittany Villegas MD

## 2017-08-14 NOTE — PROGRESS NOTES
MEDICAL ONCOLOGY FOLLOW-UP PATIENT VISIT     NAME: Sushila Chambers     DATE: 8/14/17    PRIMARY CARE PHYSICIAN: Meryl Muro    PATIENT ID: Multifocal, stage IIa, T2N0M0, ER positive, GA negative, HER2 non-amplified invasive lobular carcinoma of the left breast.     Oncology History: Ms. Chambers is a 67 year old female with a history of lung cancer and right breast cancer with more recent invasive lobular carcinoma of the left breast. Ms. Chambers was treated for right sided breast cancer in 2009 with right breast lumpectomy, radiation, and endocrine therapy. Ms. Chambers self palpated a mass in the left breast in early August, 2015. A diagnostic mammogram and ultrasound showed 2 ill-defined spiculated masses at the 4:30 and 2:00 positions of the left breast. There was surrounding architectural distortion and the total area of involvement measured 6.5 cm. Targeted left breast ultrasound showed a multilobulated hypoechoic mass at the 4:00 position measuring 2.5 cm and an ill-defined hypoechoic mass at the 2:00 position measuring 2.4 cm. There was no suspicious left axillary lymphadenopathy. Biopsy of the mass at the 4:00 position showed grade II invasive pleomorphic lobular carcinoma. Biopsy of the mass at the 2:00 position showed a grade II invasive lobular carcinoma. No angiolymphatic invasion or associated LCIS was seen in either specimen. Estrogen receptor staining was positive in >80% of tumor cell nuclei. Progesterone receptor staining was positive in <5% of tumor cell nuclei. HER2 was non-amplifed in both specimens by FISH. Ms. Chambers elected to screen for ISPY-2. Breast MRI showed a slightly enlarged enhancing left axillary lymph node with normal morphologic appearance. Left axillary lymph node biopsy performed on 9/9/15 was benign. Mammaprint testing returned high-risk. She was randomized to the ganetespib arm of the trial. She received 12 weeks of ganetespib and Taxol and 4 cycles of dose dense AC from  9/29/15 - 2/1/16. On 2/24/16 she underwent left breast mastectomy and sentinel lymph node procedure under the care of Dr. Betts. Pathology showed a residual grade II 4.8 cm, pleomorphic invasive lobular carcinoma with associated LCIS. Invasive tumor cellularity was 30%.  There was evidence of perineural invasion, however, no lymphvascular space invasion. Two sentinel lymph nodes were negative.  She has been on adjuvant letrozole since 03/2016.    Interim History:   Sushila comes into clinic today for routine breast cancer follow up. She continues on treatment with letrozole.  She reports she is tolerating the medication well. She has diffuse, chronic joint aches and pains that has remained stable. She does not use pain meds. She denies new bone or joint aches or pains. She has chronic difficulty falling asleep and or staying asleep, has tried OTC melatonin but only 1 mg with no benefit.  She is vary active doing Pilates 2 times/week. She denies concerning lumps or masses of either the right breast or left chest wall. She denies cough, shortness of breath, or chest pains. She denies abdominal complaints, N/V/D. Denies headaches, visual changes, or focal neurologic complaints. She denies hot flashes, vaginal symptoms, anxiety.    REVIEW OF SYSTEMS: Full 10-point review of systems was performed. Pertinent symptoms are reviewed above per HPI.    PAST MEDICAL HISTORY:   Past Medical History:   Diagnosis Date     Basal cell carcinoma      Breast cancer, right breast (H)      History of blood transfusion      Hypothyroid      Lung cancer (H) 2012    right       PAST SURGICAL HISTORY:  Past Surgical History:   Procedure Laterality Date     ABDOMEN SURGERY       BIOPSY, LUNG NODULE Right 2012    + cancer     COLONOSCOPY       ECTOPIC PREGNANCY SURGERY Left 1988     GENITOURINARY SURGERY       GYN SURGERY       INSERT PORT VASCULAR ACCESS       LUMPECTOMY BREAST Right 2010     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      "MASTECTOMY SIMPLE, SENTINEL NODE, COMBINED Left 2/24/2016    Procedure: COMBINED MASTECTOMY SIMPLE, SENTINEL NODE;  Surgeon: Satnam Betts MD;  Location: [facility] OR     REMOVE PORT VASCULAR ACCESS N/A 2/24/2016    Procedure: REMOVE PORT VASCULAR ACCESS;  Surgeon: Satnam Betts MD;  Location: [facility] OR     THORACIC SURGERY       MEDS:  Current Outpatient Prescriptions   Medication     letrozole (FEMARA) 2.5 MG tablet     gabapentin (NEURONTIN) 300 MG capsule     levothyroxine (SYNTHROID/LEVOTHROID) 50 MCG tablet     order for DME     order for DME     pyridOXINE (VITAMIN B6) 100 MG TABS     multivitamin, therapeutic with minerals (MULTI-VITAMIN) TABS     biotin 1000 MCG TABS tablet     VITAMIN D, CHOLECALCIFEROL, PO     calcium carbonate (OS-OCTAVIANO 500 MG [place]. [place]) 500 MG tablet     levothyroxine (SYNTHROID/LEVOTHROID) 50 MCG tablet     zolpidem (AMBIEN) 5 MG tablet     clotrimazole (LOTRIMIN) 1 % cream     order for DME     Acetaminophen (TYLENOL PO)     order for DME     No current facility-administered medications for this visit.      ALLERGIES:  Allergies   Allergen Reactions     Latex      SKIN REACTION       Sulfa Drugs Itching and Rash        PHYSICAL EXAM:  /83 (BP Location: Right arm, Patient Position: Chair, Cuff Size: Adult Regular)  Pulse 73  Temp 98  F (36.7  C) (Tympanic)  Resp 16  Ht 1.65 m (5' 4.96\")  Wt 68.1 kg (150 lb 3.2 oz)  SpO2 99%  BMI 25.02 kg/m2  KPS: 100%  GENERAL: AAOx3, well appearing and in no acute distress  HEENT: Normocephalic, atraumatic, PERRL, MMM.  No lesions of the oropharynx  LYMPH: No palpable cervical, supraclavicular, or axillary lymphadenopathy  CV: RRR, normal S1/S2, No murmurs, gallops, or rubs  LUNGS: CTA B/L, no wheezing or rhonchi  Breast:  Right breast is of increased fibroglandular density.  Left mastectomy.  There are no discretely palpable masses of either the right breast or left chest wall.  ABDOMEN: soft, nontender, non-distended, no hepatosplenomegaly. Bowel " sounds present.  EXTREMITIES: no lower extremity edema  NEURO: Cranial nerves II-XII grossly intact  INTEGUMENT: No rashes    LABS REVIEWED THIS VISIT:  No interim laboratory studies pertinent to today's visit.    RADIOLOGY:   4/11/16 DEXA bone density scan:  Conclusions:    The most negative and valid T-score of -0.7 at the level of the left femoral neck, and -0.7 at the level of the right femoral neck,  corresponds with normal bone density.      Mammography: 8/14/2017    Findings:  Post conservation therapy changes.  No significant change.   Impression: BI-RADS CATEGORY: 2 - Benign Findings..   Recommended Followup: Annual Mammography.         IMPRESSION/PLAN: 68 yo female with stage II, T2N0M0, grade II, ER positive, [place] negative, HER2 non-amplified invasive lobular carcinoma of the left breast. She is s/p treatment with 12 weeks of taxol and ganetespib followed by 4 cycles of dose dense adriamycin and cyclophosphamide. Left breast mastectomy and sentinel lymph node procedure was performed on 2/24/16. She had a 4.8 cm residual tumor without lymph node involvement. She has been on letrozole since 03/2016.    1.  Left breast ER-positive carcinoma:  Ms. Chambers is now approximately 1 year and 6 months out from completion of neoadjuvant chemotherapy and surgical excision of a left breast cancer.  She has been on adjuvant letrozole for nearly the same amount of time.  She is tolerating the medication remarkably well.  We plan to treat her for a total of 10 years (through 03/2026).  There is no evidence of disease recurrence on my clinical breast examination performed today.  Mammogram done today on 8/14/17 was unremarkable. We will see her again in 3 months. Next mammogram in 8/2018.      We have previously discussed lifestyle recommendations suggested to prevent breast cancer recurrence including 30 minutes of daily cardiovascular exercise, maintaining a normal BMI, a diet low in saturated fat and refined sugar, a  daily baby aspirin, vitamin D supplementation, and reduced alcohol intake.  Sushila has been seen in survivor clinic.    2.  Insomnia:  She did not start the Ambien 5 mg PO QHS prn. Asked her to start OTC Melatonin at 3 mg at night time daily.    3.  Hot flashes:  Improvement with gabapentin 300 mg PO QHS.       3.  Neuropathy:  Improved. Continue vitamin B6.  Gabapentin as above.     4.  Arthralgias:  Secondary to osteoarthritis and exacerbated by letrozole.  Continue to take daily vitamin D supplementation and walk a half hour a day.  Ibu profen as needed for pain.     5.  Bone health:  DEXA bone density in 04/2016 showed a lowest T-score of -0.7.  She continues on calcium and vitamin D supplementation as well as daily weightbearing activity.  We will plan to repeat a DEXA bone density scan in 04/2018.       6.  VUS in MRE11:  Breast cancer screening recommendations as above.        7.  Followup:  Return to clinic in 3-4 months for visit with me.       Patient was seen and discussed with Dr. Kaye.  I have edited the above note to reflect our joint assessment and plan.  A total of 25 minutes of our 30-minute face-to-face visit was spent counseling the patient.

## 2017-08-14 NOTE — NURSING NOTE
"Oncology Rooming Note    August 14, 2017 2:45 PM   Sushila Chambers is a 67 year old female who presents for:    Chief Complaint   Patient presents with     Oncology Clinic Visit     Return-Breast Ca     Initial Vitals: /83 (BP Location: Right arm, Patient Position: Chair, Cuff Size: Adult Regular)  Pulse 73  Temp 98  F (36.7  C) (Tympanic)  Resp 16  Ht 1.65 m (5' 4.96\")  Wt 68.1 kg (150 lb 3.2 oz)  SpO2 99%  BMI 25.02 kg/m2 Estimated body mass index is 25.02 kg/(m^2) as calculated from the following:    Height as of this encounter: 1.65 m (5' 4.96\").    Weight as of this encounter: 68.1 kg (150 lb 3.2 oz). Body surface area is 1.77 meters squared.  No Pain (0) Comment: Data Unavailable   No LMP recorded. Patient is postmenopausal.  Allergies reviewed: Yes  Medications reviewed: Yes    Medications: Medication refills not needed today.  Pharmacy name entered into [facility]:    [facility] - [place], [place] - [address]  [id number] - [place], [place] - [address] AT [facility] [address]  [facility]          Clinical concerns: No new concerns    6 minutes for nursing intake (face to face time)     Ashanti Levy LPN            "

## 2017-09-20 ENCOUNTER — ALLIED HEALTH/NURSE VISIT (OUTPATIENT)
Dept: NURSING | Facility: CLINIC | Age: 67
End: 2017-09-20
Payer: MEDICARE

## 2017-09-20 DIAGNOSIS — Z23 NEED FOR PROPHYLACTIC VACCINATION AND INOCULATION AGAINST INFLUENZA: Primary | ICD-10-CM

## 2017-09-20 PROCEDURE — 90662 IIV NO PRSV INCREASED AG IM: CPT

## 2017-09-20 PROCEDURE — G0008 ADMIN INFLUENZA VIRUS VAC: HCPCS

## 2017-09-20 PROCEDURE — 99207 ZZC NO CHARGE NURSE ONLY: CPT

## 2017-09-20 NOTE — MR AVS SNAPSHOT
After Visit Summary   9/20/2017    Sushila Chambers    MRN: [id number]           Patient Information     Date Of Birth          1950        Visit Information        Provider Department      9/20/2017 3:00 PM [facility] NURSE [facility]        Today's Diagnoses     Need for prophylactic vaccination and inoculation against influenza    -  1       Follow-ups after your visit        Your next 10 appointments already scheduled     Sep 20, 2017  3:00 PM CDT   Nurse Only with [facility] NURSE   [facility] ([facility]    [address] [place] [id number]   [phone]            Nov 06, 2017 10:15 AM CST   (Arrive by 10:00 AM)   Return Visit with Zahida Zambrano MD   [facility] ([facility])    [address] [place] [id number] [phone]            Nov 13, 2017  2:15 PM CST   (Arrive by 2:00 PM)   Return Visit with Brittany Villegas MD   [facility] ([facility])    [address] [place] [id number] [phone]              Who to contact     If you have questions or need follow up information about today's clinic visit or your schedule please contact [facility] directly at [phone].  Normal or non-critical lab and imaging results will be communicated to you by MyChart, letter or phone within 4 business days after the clinic has received the results. If you do not hear from us within 7 days, please contact the clinic through MyChart or phone. If you have a critical or abnormal lab result, we will notify you by phone as soon as possible.  Submit refill requests through [employer] or call your pharmacy and they will forward the refill request to us. Please allow 3 business days for your refill to be completed.          Additional Information About Your Visit         Zinch Information     [employer] gives you secure access to your electronic health record. If you see a primary care provider, you can also send messages to your care team and make appointments. If you have questions, please call your primary care clinic.  If you do not have a primary care provider, please call [phone] and they will assist you.        Care EveryWhere ID     This is your Care EveryWhere ID. This could be used by other organizations to access your [place] medical records  [id number]         Blood Pressure from Last 3 Encounters:   08/14/17 156/83   04/17/17 140/87   03/21/17 131/78    Weight from Last 3 Encounters:   08/14/17 150 lb 3.2 oz (68.1 kg)   04/17/17 145 lb 14.4 oz (66.2 kg)   03/21/17 144 lb 3.2 oz (65.4 kg)              We Performed the Following     FLU VACCINE, INCREASED ANTIGEN, PRESV FREE, AGE 65+ [77632]     Vaccine Administration, Initial [27422]        Primary Care Provider Office Phone #    [facility] [phone] [address] [place] [address]        Equal Access to Services     SONAM SANDOVAL AH: Hadii aad ku hadasho Soomaali, waaxda luqadaha, qaybta kaalmada adeegyada, bakari donnelly hayиван chavez . So [facility] [phone].    ATENCIÓN: Si habla español, tiene a hinojosa disposición servicios gratuitos de asistencia lingüística. Cesar al [phone].    We comply with applicable federal civil rights laws and [place] laws. We do not discriminate on the basis of race, color, national origin, age, disability sex, sexual orientation or gender identity.            Thank you!     Thank you for choosing [facility]  for your care. Our goal is always to provide you with excellent care. Hearing back from our patients is one way we can continue to improve our services. Please take a few minutes to complete the written survey that you may receive in the mail after your visit with us. Thank you!             Your Updated  Medication List - Protect others around you: Learn how to safely use, store and throw away your medicines at www.disposemymeds.org.          This list is accurate as of: 9/20/17 12:45 PM.  Always use your most recent med list.                   Brand Name Dispense Instructions for use Diagnosis    biotin 1000 MCG Tabs tablet      Take by mouth daily        calcium carbonate 1250 MG tablet    OS-OCTAVIANO 500 mg [place]. Ca     Take by mouth 2 times daily        clotrimazole 1 % cream    LOTRIMIN    60 g    Apply topically 2 times daily    Tinea pedis of both feet       gabapentin 300 MG capsule    NEURONTIN    90 capsule    Take 1 tablet (300 mg) every night    Neuropathy (H), Menopausal syndrome (hot flashes)       letrozole 2.5 MG tablet    FEMARA    90 tablet    TAKE 1 TABLET(2.5 MG) BY MOUTH DAILY    Malignant neoplasm of lower-outer quadrant of left female breast (H)       * levothyroxine 50 MCG tablet    SYNTHROID/LEVOTHROID    90 tablet    Take 1 tablet (50 mcg) by mouth daily    Hypothyroidism, unspecified type       * levothyroxine 50 MCG tablet    SYNTHROID/LEVOTHROID    90 tablet    TAKE 1 TABLET(50 MCG) BY MOUTH DAILY    Hypothyroidism, unspecified type       Multi-vitamin Tabs tablet      Take 1 tablet by mouth daily        * order for DME     1 Device    Equipment being ordered: Cranial Prosthesis    Malignant neoplasm of left breast (H), Alopecia, Encounter for antineoplastic chemotherapy       * order for DME     1 Units    Two post mastectomy bra's and prosthesis twice a year as insurance allows .    Malignant neoplasm of left breast (H)       * order for DME     1 Units    Please allow 12 bras and prosthesis yearly as insurance covers.    Malignant neoplasm of lower-outer quadrant of left female breast (H)       * order for DME     1 Units    Four bras as allowed by insurance.    Breast cancer, right breast (H)       pyridOXINE 100 MG Tabs    VITAMIN B6    30 tablet    Take 1 tablet (100 mg) by mouth daily     Neuropathy (H)       TYLENOL PO      Take 500 mg by mouth every 4 hours as needed for mild pain or fever Reported on 3/21/2017        VITAMIN D (CHOLECALCIFEROL) PO      Take by mouth daily        zolpidem 5 MG tablet    AMBIEN    30 tablet    Take 1 tablet (5 mg) by mouth nightly as needed for sleep    Drug induced insomnia (H)       * Notice:  This list has 6 medication(s) that are the same as other medications prescribed for you. Read the directions carefully, and ask your doctor or other care provider to review them with you.

## 2017-09-20 NOTE — PROGRESS NOTES
Injectable Influenza Immunization Documentation    1.  Is the person to be vaccinated sick today?   No    2. Does the person to be vaccinated have an allergy to a component   of the vaccine?   No    3. Has the person to be vaccinated ever had a serious reaction   to influenza vaccine in the past?   No    4. Has the person to be vaccinated ever had Guillain-Barré syndrome?   No    Form completed by Cathy Bowling MA

## 2017-10-31 DIAGNOSIS — C50.512 MALIGNANT NEOPLASM OF LOWER-OUTER QUADRANT OF LEFT FEMALE BREAST (H): ICD-10-CM

## 2017-10-31 RX ORDER — LETROZOLE 2.5 MG/1
TABLET, FILM COATED ORAL
Qty: 90 TABLET | Refills: 0 | Status: SHIPPED | OUTPATIENT
Start: 2017-10-31 | End: 2018-01-29

## 2017-10-31 NOTE — TELEPHONE ENCOUNTER
letrozole      Last Written Prescription Date: 6/12/17  Last Fill Quantity: 90,  # refills: 0   Last Office Visit with [facility], [facility] or [facility] prescribing provider: 8/14/17 with Dr. Villegas  Next office visit: 11/13/17 with Dr. Villegas

## 2017-11-06 ENCOUNTER — OFFICE VISIT (OUTPATIENT)
Dept: DERMATOLOGY | Facility: CLINIC | Age: 67
End: 2017-11-06

## 2017-11-06 DIAGNOSIS — B35.3 TINEA PEDIS OF BOTH FEET: Primary | ICD-10-CM

## 2017-11-06 DIAGNOSIS — Z85.828 HISTORY OF SKIN CANCER: ICD-10-CM

## 2017-11-06 ASSESSMENT — PAIN SCALES - GENERAL: PAINLEVEL: NO PAIN (0)

## 2017-11-06 NOTE — LETTER
11/6/2017       RE: Sushila Chambers  [address] [place] [address]     Dear Colleague,    Thank you for referring your patient, Sushila Chambers, to the [facility] at [facility]. Please see a copy of my visit note below.    [facility] D[facility]atology Note      Dermatology Problem List:  1.Hx of NMSC  -Back, unknown type    2. Lobular breast cancer  -S/P chemo, radiation, and surgery    3. Tinea pedis  -Clotrimazole    Encounter Date: Nov 6, 2017    CC:   Chief Complaint   Patient presents with     Derm Problem     Sushila is here today for a skin check.  States she has no areas of concern today.          History of Present Illness:  Ms. Sushila Chambers is a 67 year old female who presents as a referral from her oncologist for a skin cancer exam. Last seen 11/2/2016. The pt admits to undergoing radiation (as well as chemo and surgery) for her breast cancer. 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 Otherwise, the pt denies any new or changing skin lesions, and denies any painful, itching, or bleeding skin lesions today.    Past Medical History:   Patient Active Problem List   Diagnosis     Hypothyroidism     Lung cancer (H)     Breast cancer, right breast (H)     Hyperlipidemia LDL goal <130     Grief     Breast cancer, left breast (H)     Encounter for antineoplastic chemotherapy     Malignant neoplasm of lower-outer quadrant of left female breast (H)     Diarrhea     Non-intractable vomiting with nausea, vomiting of unspecified type     Other specified anemias     Past Medical History:   Diagnosis Date     Basal cell carcinoma      Breast cancer, right breast (H)      History of blood transfusion      Hypothyroid      Lung cancer (H) 2012    right     Past Surgical History:   Procedure Laterality Date     ABDOMEN SURGERY       BIOPSY, LUNG NODULE Right  2012    + cancer     COLONOSCOPY       ECTOPIC PREGNANCY SURGERY Left 1988     GENITOURINARY SURGERY       GYN SURGERY       INSERT PORT VASCULAR ACCESS       LUMPECTOMY BREAST Right 2010     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     MASTECTOMY SIMPLE, SENTINEL NODE, COMBINED Left 2/24/2016    Procedure: COMBINED MASTECTOMY SIMPLE, SENTINEL NODE;  Surgeon: Satnam Betts MD;  Location: [facility] OR     REMOVE PORT VASCULAR ACCESS N/A 2/24/2016    Procedure: REMOVE PORT VASCULAR ACCESS;  Surgeon: Satnam Betts MD;  Location: [facility] OR     THORACIC SURGERY         Social History:  The patient is retired. The patient denies use of tanning beds.    Family History:  There is no family history of skin cancer.    Medications:  Current Outpatient Prescriptions   Medication Sig Dispense Refill     letrozole (FEMARA) 2.5 MG tablet TAKE 1 TABLET(2.5 MG) BY MOUTH DAILY 90 tablet 0     levothyroxine (SYNTHROID/LEVOTHROID) 50 MCG tablet TAKE 1 TABLET(50 MCG) BY MOUTH DAILY 90 tablet 2     gabapentin (NEURONTIN) 300 MG capsule Take 1 tablet (300 mg) every night 90 capsule 3     zolpidem (AMBIEN) 5 MG tablet Take 1 tablet (5 mg) by mouth nightly as needed for sleep 30 tablet 1     levothyroxine (SYNTHROID/LEVOTHROID) 50 MCG tablet Take 1 tablet (50 mcg) by mouth daily 90 tablet 3     clotrimazole (LOTRIMIN) 1 % cream Apply topically 2 times daily 60 g 3     order for DME Four bras as allowed by insurance. 1 Units 1     order for DME Please allow 12 bras and prosthesis yearly as insurance covers. 1 Units 1     order for DME Two post mastectomy bra's and prosthesis twice a year as insurance allows . 1 Units 1     Acetaminophen (TYLENOL PO) Take 500 mg by mouth every 4 hours as needed for mild pain or fever Reported on 3/21/2017       pyridOXINE (VITAMIN B6) 100 MG TABS Take 1 tablet (100 mg) by mouth daily 30 tablet 11     order for DME Equipment being ordered: Cranial Prosthesis 1 Device 0     multivitamin, therapeutic with minerals  (MULTI-VITAMIN) TABS Take 1 tablet by mouth daily       biotin 1000 MCG TABS tablet Take by mouth daily       VITAMIN D, CHOLECALCIFEROL, PO Take by mouth daily       calcium carbonate (OS-OCTAVIANO 500 MG [place]. CA) 500 MG tablet Take by mouth 2 times daily          Allergies   Allergen Reactions     Latex      SKIN REACTION       Sulfa Drugs Itching and Rash         Review of Systems:  -As per HPI  -Constitutional: The patient denies fatigue, fevers, chills, unintended weight loss, and night sweats.  -HEENT: Patient denies nonhealing oral sores.  -Skin: As above in HPI. No additional skin concerns.    Physical exam:  Vitals: There were no vitals taken for this visit.  GEN: This is a well developed, well-nourished female in no acute distress, in a pleasant mood.    SKIN: Full skin, which includes the head/face, both arms, chest, back, abdomen,both legs, genitalia and/or groin buttocks, digits and/or nails, was examined.  -There are bright red dome-shaped papules scattered on the chest, back, arms, and legs.   -Radiation tattoos on the chest  -There are waxy stuck on tan to brown papules on the back and abdomen.  -Maceration between the 4th and 5th toes on both feet  -No other lesions of concern on areas examined  -no axillary lymphadenopathy    Impression/Plan:  1. Tinea pedis    Clotrimazole 1% BID to bilateral feet for four weeks and then use once a week for prevention    2. Hx of NMSC    Discussed sun protective measures with the pt    Follow-up in 1 year, earlier for new or changing lesions.     Dr. Zambrano staffed the patient.    Staff Involved:  Resident(Ethan Munguia)/Staff(as above)    .I, Zahida Zambrano MD, saw this patient with the resident and agree with the resident s findings and plan of care as documented in the resident s note.

## 2017-11-06 NOTE — MR AVS SNAPSHOT
After Visit Summary   11/6/2017    Sushila Chambers    MRN: [id number]           Patient Information     Date Of Birth          1950        Visit Information        Provider Department      11/6/2017 10:15 AM Zahida Zambrano MD [facility]        Today's Diagnoses     Tinea pedis of both feet    -  1    History of skin cancer          Care Instructions    Clotrimazole twice daily for four weeks  IF this does not cause skin to return to normal, may change to Lamisil (terbinafine) cream in the same manner          Follow-ups after your visit        Follow-up notes from your care team     Return in about 1 year (around 11/6/2018).      Your next 10 appointments already scheduled     Nov 13, 2017  2:15 PM CST   (Arrive by 2:00 PM)   Return Visit with Brittany Villegas MD   [facility] ([facility])    [address] [place] [id number] [phone]              Who to contact     Please call your clinic at [phone] to:    Ask questions about your health    Make or cancel appointments    Discuss your medicines    Learn about your test results    Speak to your doctor   If you have compliments or concerns about an experience at your clinic, or if you wish to file a complaint, please contact [facility] Patient Relations at [phone] or email us at [email or web address]         Additional Information About Your Visit        MyChart Information     [employer] gives you secure access to your electronic health record. If you see a primary care provider, you can also send messages to your care team and make appointments. If you have questions, please call your primary care clinic.  If you do not have a primary care provider, please call [phone] and they will assist you.      [employer] is an electronic gateway that provides easy, online access to your medical records. With  [employer], you can request a clinic appointment, read your test results, renew a prescription or communicate with your care team.     To access your existing account, please contact your [facility] or call [phone] for assistance.        Care EveryWhere ID     This is your Care EveryWhere ID. This could be used by other organizations to access your [place] medical records  [id number]         Blood Pressure from Last 3 Encounters:   08/14/17 156/83   04/17/17 140/87   03/21/17 131/78    Weight from Last 3 Encounters:   08/14/17 68.1 kg (150 lb 3.2 oz)   04/17/17 66.2 kg (145 lb 14.4 oz)   03/21/17 65.4 kg (144 lb 3.2 oz)              Today, you had the following     No orders found for display       Primary Care Provider Office Phone # Fax #    [facility] [phone] [address] [facility] [address]        Equal Access to Services     SONAM SANDOVAL : Hadii aad ku hadasho Soomaali, waaxda luqadaha, qaybta kaalmada adeegyada, waxay idiin hayaan adeeg kharaflora la'иван . So [facility] [phone].    ATENCIÓN: Si habla español, tiene a hinojosa disposición servicios gratuitos de asistencia lingüística. Llame al [phone].    We comply with applicable federal civil rights laws and [place] laws. We do not discriminate on the basis of race, color, national origin, age, disability, sex, sexual orientation, or gender identity.            Thank you!     Thank you for choosing [facility]  for your care. Our goal is always to provide you with excellent care. Hearing back from our patients is one way we can continue to improve our services. Please take a few minutes to complete the written survey that you may receive in the mail after your visit with us. Thank you!             Your Updated Medication List - Protect others around you: Learn how to safely use, store and throw away your medicines at www.disposemymeds.org.          This list is accurate as  of: 11/6/17 10:39 AM.  Always use your most recent med list.                   Brand Name Dispense Instructions for use Diagnosis    biotin 1000 MCG Tabs tablet      Take by mouth daily        calcium carbonate 1250 MG tablet    OS-OCTAVIANO 500 mg [place]. Ca     Take by mouth 2 times daily        clotrimazole 1 % cream    LOTRIMIN    60 g    Apply topically 2 times daily    Tinea pedis of both feet       gabapentin 300 MG capsule    NEURONTIN    90 capsule    Take 1 tablet (300 mg) every night    Neuropathy, Menopausal syndrome (hot flashes)       letrozole 2.5 MG tablet    FEMARA    90 tablet    TAKE 1 TABLET(2.5 MG) BY MOUTH DAILY    Malignant neoplasm of lower-outer quadrant of left female breast (H)       * levothyroxine 50 MCG tablet    SYNTHROID/LEVOTHROID    90 tablet    Take 1 tablet (50 mcg) by mouth daily    Hypothyroidism, unspecified type       * levothyroxine 50 MCG tablet    SYNTHROID/LEVOTHROID    90 tablet    TAKE 1 TABLET(50 MCG) BY MOUTH DAILY    Hypothyroidism, unspecified type       Multi-vitamin Tabs tablet      Take 1 tablet by mouth daily        * order for DME     1 Device    Equipment being ordered: Cranial Prosthesis    Malignant neoplasm of left breast (H), Alopecia, Encounter for antineoplastic chemotherapy       * order for DME     1 Units    Two post mastectomy bra's and prosthesis twice a year as insurance allows .    Malignant neoplasm of left breast (H)       * order for DME     1 Units    Please allow 12 bras and prosthesis yearly as insurance covers.    Malignant neoplasm of lower-outer quadrant of left female breast (H)       * order for DME     1 Units    Four bras as allowed by insurance.    Breast cancer, right breast (H)       pyridOXINE 100 MG Tabs    VITAMIN B6    30 tablet    Take 1 tablet (100 mg) by mouth daily    Neuropathy       TYLENOL PO      Take 500 mg by mouth every 4 hours as needed for mild pain or fever Reported on 3/21/2017        VITAMIN D (CHOLECALCIFEROL) PO       Take by mouth daily        zolpidem 5 MG tablet    AMBIEN    30 tablet    Take 1 tablet (5 mg) by mouth nightly as needed for sleep    Drug induced insomnia (H)       * Notice:  This list has 6 medication(s) that are the same as other medications prescribed for you. Read the directions carefully, and ask your doctor or other care provider to review them with you.

## 2017-11-06 NOTE — PATIENT INSTRUCTIONS
Clotrimazole twice daily for four weeks  IF this does not cause skin to return to normal, may change to Lamisil (terbinafine) cream in the same manner

## 2017-11-06 NOTE — PROGRESS NOTES
[facility] Dermatology Note      Dermatology Problem List:  1.Hx of NMSC  -Back, unknown type    2. Lobular breast cancer  -S/P chemo, radiation, and surgery    3. Tinea pedis  -Clotrimazole    Encounter Date: Nov 6, 2017    CC:   Chief Complaint   Patient presents with     Derm Problem     Sushila is here today for a skin check.  States she has no areas of concern today.          History of Present Illness:  Ms. Sushila Chambers is a 67 year old female who presents as a referral from her oncologist for a skin cancer exam. Last seen 11/2/2016. The pt admits to undergoing radiation (as well as chemo and surgery) for her breast cancer. 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 Otherwise, the pt denies any new or changing skin lesions, and denies any painful, itching, or bleeding skin lesions today.    Past Medical History:   Patient Active Problem List   Diagnosis     Hypothyroidism     Lung cancer (H)     Breast cancer, right breast (H)     Hyperlipidemia LDL goal <130     Grief     Breast cancer, left breast (H)     Encounter for antineoplastic chemotherapy     Malignant neoplasm of lower-outer quadrant of left female breast (H)     Diarrhea     Non-intractable vomiting with nausea, vomiting of unspecified type     Other specified anemias     Past Medical History:   Diagnosis Date     Basal cell carcinoma      Breast cancer, right breast (H)      History of blood transfusion      Hypothyroid      Lung cancer (H) 2012    right     Past Surgical History:   Procedure Laterality Date     ABDOMEN SURGERY       BIOPSY, LUNG NODULE Right 2012    + cancer     COLONOSCOPY       ECTOPIC PREGNANCY SURGERY Left 1988     GENITOURINARY SURGERY       GYN SURGERY       INSERT PORT VASCULAR ACCESS       LUMPECTOMY BREAST Right 2010     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     MASTECTOMY SIMPLE, SENTINEL NODE,  COMBINED Left 2/24/2016    Procedure: COMBINED MASTECTOMY SIMPLE, SENTINEL NODE;  Surgeon: Satnam Betts MD;  Location: [facility] OR     REMOVE PORT VASCULAR ACCESS N/A 2/24/2016    Procedure: REMOVE PORT VASCULAR ACCESS;  Surgeon: Satnam Betts MD;  Location: [facility] OR     THORACIC SURGERY         Social History:  The patient is retired. The patient denies use of tanning beds.    Family History:  There is no family history of skin cancer.    Medications:  Current Outpatient Prescriptions   Medication Sig Dispense Refill     letrozole (FEMARA) 2.5 MG tablet TAKE 1 TABLET(2.5 MG) BY MOUTH DAILY 90 tablet 0     levothyroxine (SYNTHROID/LEVOTHROID) 50 MCG tablet TAKE 1 TABLET(50 MCG) BY MOUTH DAILY 90 tablet 2     gabapentin (NEURONTIN) 300 MG capsule Take 1 tablet (300 mg) every night 90 capsule 3     zolpidem (AMBIEN) 5 MG tablet Take 1 tablet (5 mg) by mouth nightly as needed for sleep 30 tablet 1     levothyroxine (SYNTHROID/LEVOTHROID) 50 MCG tablet Take 1 tablet (50 mcg) by mouth daily 90 tablet 3     clotrimazole (LOTRIMIN) 1 % cream Apply topically 2 times daily 60 g 3     order for DME Four bras as allowed by insurance. 1 Units 1     order for DME Please allow 12 bras and prosthesis yearly as insurance covers. 1 Units 1     order for DME Two post mastectomy bra's and prosthesis twice a year as insurance allows . 1 Units 1     Acetaminophen (TYLENOL PO) Take 500 mg by mouth every 4 hours as needed for mild pain or fever Reported on 3/21/2017       pyridOXINE (VITAMIN B6) 100 MG TABS Take 1 tablet (100 mg) by mouth daily 30 tablet 11     order for DME Equipment being ordered: Cranial Prosthesis 1 Device 0     multivitamin, therapeutic with minerals (MULTI-VITAMIN) TABS Take 1 tablet by mouth daily       biotin 1000 MCG TABS tablet Take by mouth daily       VITAMIN D, CHOLECALCIFEROL, PO Take by mouth daily       calcium carbonate (OS-OCTAVIANO 500 MG [place]. CA) 500 MG tablet Take by mouth 2 times daily          Allergies    Allergen Reactions     Latex      SKIN REACTION       Sulfa Drugs Itching and Rash         Review of Systems:  -As per HPI  -Constitutional: The patient denies fatigue, fevers, chills, unintended weight loss, and night sweats.  -HEENT: Patient denies nonhealing oral sores.  -Skin: As above in HPI. No additional skin concerns.    Physical exam:  Vitals: There were no vitals taken for this visit.  GEN: This is a well developed, well-nourished female in no acute distress, in a pleasant mood.    SKIN: Full skin, which includes the head/face, both arms, chest, back, abdomen,both legs, genitalia and/or groin buttocks, digits and/or nails, was examined.  -There are bright red dome-shaped papules scattered on the chest, back, arms, and legs.   -Radiation tattoos on the chest  -There are waxy stuck on tan to brown papules on the back and abdomen.  -Maceration between the 4th and 5th toes on both feet  -No other lesions of concern on areas examined  -no axillary lymphadenopathy    Impression/Plan:  1. Tinea pedis    Clotrimazole 1% BID to bilateral feet for four weeks and then use once a week for prevention    2. Hx of NMSC    Discussed sun protective measures with the pt    Follow-up in 1 year, earlier for new or changing lesions.     Dr. Zambrano staffed the patient.    Staff Involved:  Resident(Ethan Munguia)/Staff(as above)    .I, Zahida Zambrano MD, saw this patient with the resident and agree with the resident s findings and plan of care as documented in the resident s note.

## 2017-11-06 NOTE — NURSING NOTE
Dermatology Rooming Note    Sushila Chambers's goals for this visit include:   Chief Complaint   Patient presents with     Derm Problem     Sushila is here today for a skin check.  States she has no areas of concern today.          Nimo Manning LPN

## 2017-11-10 NOTE — PROGRESS NOTES
MEDICAL ONCOLOGY FOLLOW-UP PATIENT VISIT     NAME: Sushila Chambers     DATE: 11/13/17    PRIMARY CARE PHYSICIAN: Meryl Muro    PATIENT ID: Multifocal, stage IIa, T2N0M0, ER positive, OK negative, HER2 non-amplified invasive lobular carcinoma of the left breast.     Oncology History: Ms. Chambers is a 67 year old female with a history of lung cancer and right breast cancer with more recent invasive lobular carcinoma of the left breast. Ms. Chambers was treated for right sided breast cancer in 2009 with right breast lumpectomy, radiation, and endocrine therapy. Ms. Chambers self palpated a mass in the left breast in early August, 2015. A diagnostic mammogram and ultrasound showed 2 ill-defined spiculated masses at the 4:30 and 2:00 positions of the left breast. There was surrounding architectural distortion and the total area of involvement measured 6.5 cm. Targeted left breast ultrasound showed a multilobulated hypoechoic mass at the 4:00 position measuring 2.5 cm and an ill-defined hypoechoic mass at the 2:00 position measuring 2.4 cm. There was no suspicious left axillary lymphadenopathy. Biopsy of the mass at the 4:00 position showed grade II invasive pleomorphic lobular carcinoma. Biopsy of the mass at the 2:00 position showed a grade II invasive lobular carcinoma. No angiolymphatic invasion or associated LCIS was seen in either specimen. Estrogen receptor staining was positive in >80% of tumor cell nuclei. Progesterone receptor staining was positive in <5% of tumor cell nuclei. HER2 was non-amplifed in both specimens by FISH.     Ms. Chambers elected to screen for ISPY-2. Mammaprint testing returned high-risk.  She received 12 weeks of ganetespib and Taxol and 4 cycles of dose dense AC from 9/29/15 - 2/1/16. On 2/24/16 she underwent left breast mastectomy and sentinel lymph node procedure under the care of Dr. Betts. Pathology showed a residual grade II 4.8 cm, pleomorphic invasive lobular carcinoma with  associated LCIS. Invasive tumor cellularity was 30%.  There was evidence of perineural invasion, however, no lymphvascular space invasion. Two sentinel lymph nodes were negative.  She has been on adjuvant letrozole since 03/2016.    Interim History:   Sushila comes in to clinic today for routine 3-month breast cancer followup.  She is currently without concerns or complaints.  In fact, she states she has been very healthy over the last 3 months.  She denies breast lumps or masses.  She has no right breast pain or discomfort, or pain or discomfort of the left chest wall.  She has no headaches, visual changes or focal neurologic complaints.  She has chronic bilateral knee pain which has become slightly worse on letrozole but is tolerable.  She has had an increase in hot flashes since starting letrozole.  These are partially relieved by taking gabapentin at night. She describes the hot flashes as manageable manageable and she declines an increase in the gabapentin at this time. Gabapentin does cause drowsiness.  She has vaginal dryness that is not bothersome for her as she is not currently sexually active.  She denies symptoms of depression or anxiety.  She has no cough, shortness of breath or chest pain.  She reports that since she had her right upper lobe wedge resection for her lung cancer, her exercise tolerance is not what it used to be.  She is walking her dog daily.  She confirms that she is taking calcium and vitamin D.  The remainder of a    10-point review of systems is otherwise negative.     PAST MEDICAL HISTORY:   Past Medical History:   Diagnosis Date     Basal cell carcinoma      Breast cancer, right breast (H)      History of blood transfusion      Hypothyroid      Lung cancer (H) 2012    right       PAST SURGICAL HISTORY:  Past Surgical History:   Procedure Laterality Date     ABDOMEN SURGERY       BIOPSY, LUNG NODULE Right 2012    + cancer     COLONOSCOPY       ECTOPIC PREGNANCY SURGERY Left 1988      "GENITOURINARY SURGERY       GYN SURGERY       INSERT PORT VASCULAR ACCESS       LUMPECTOMY BREAST Right 2010     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     MASTECTOMY SIMPLE, SENTINEL NODE, COMBINED Left 2/24/2016    Procedure: COMBINED MASTECTOMY SIMPLE, SENTINEL NODE;  Surgeon: Satnam Betts MD;  Location: [facility] OR     REMOVE PORT VASCULAR ACCESS N/A 2/24/2016    Procedure: REMOVE PORT VASCULAR ACCESS;  Surgeon: Satnam Betts MD;  Location: [facility] OR     THORACIC SURGERY       MEDS:  Current Outpatient Prescriptions   Medication     letrozole (FEMARA) 2.5 MG tablet     levothyroxine (SYNTHROID/LEVOTHROID) 50 MCG tablet     gabapentin (NEURONTIN) 300 MG capsule     zolpidem (AMBIEN) 5 MG tablet     levothyroxine (SYNTHROID/LEVOTHROID) 50 MCG tablet     clotrimazole (LOTRIMIN) 1 % cream     order for DME     order for DME     order for DME     Acetaminophen (TYLENOL PO)     pyridOXINE (VITAMIN B6) 100 MG TABS     order for DME     multivitamin, therapeutic with minerals (MULTI-VITAMIN) TABS     biotin 1000 MCG TABS tablet     VITAMIN D, CHOLECALCIFEROL, PO     calcium carbonate (OS-OCTAVIANO 500 MG [place]. [place]) 500 MG tablet     No current facility-administered medications for this visit.      ALLERGIES:  Allergies   Allergen Reactions     Latex      SKIN REACTION       Sulfa Drugs Itching and Rash        PHYSICAL EXAM:  /76 (BP Location: Right arm, Patient Position: Sitting, Cuff Size: Adult Regular)  Pulse 86  Temp 97  F (36.1  C) (Oral)  Ht 1.65 m (5' 4.96\")  Wt 69.7 kg (153 lb 11.2 oz)  SpO2 99%  BMI 25.61 kg/m2  KPS: 100%  GENERAL: AAOx3, well appearing adult female in no acute distress  HEENT: Normocephalic, atraumatic, MMM.  No lesions of the oropharynx  LYMPH: No palpable cervical, supraclavicular, or axillary lymphadenopathy  CV: RRR, normal S1/S2, No murmurs, gallops, or rubs  LUNGS: CTA B/L, no wheezing or rhonchi  Breast:  Right breast is of increased fibroglandular density.  Left mastectomy.  There " are no discretely palpable masses of either the right breast or left chest wall.  Right nipple is everted.  ABDOMEN: soft, nontender, non-distended, no hepatosplenomegaly. Bowel sounds present.  EXTREMITIES: no lower extremity edema  NEURO: Cranial nerves II-XII grossly intact  INTEGUMENT: No rashes    LABS REVIEWED THIS VISIT:  Results for JEREMIAH MORA (MRN [id number]) as of 11/15/2017 10:04   Ref. Range 3/21/2017 12:44   Triglycerides Latest Ref Range: <150 mg/dL 206 (H)       RADIOLOGY:   Mammography: 8/14/2017    Findings:  Post conservation therapy changes.  No significant change.   Impression: BI-RADS CATEGORY: 2 - Benign Findings..   Recommended Followup: Annual Mammography.    IMPRESSION/PLAN: 68 yo female with stage II, T2N0M0, grade II, ER positive, VT negative, HER2 non-amplified invasive lobular carcinoma of the left breast. She is s/p treatment with taxol and ganetespib followed by dose dense adriamycin and cyclophosphamide, and left breast mastectomy. She has been on letrozole since 03/2016.    1.  Left breast ER-positive carcinoma:  Ms. Mora is now approximately 1 year and 9 months out from completion of neoadjuvant chemotherapy and surgical excision of a left breast cancer.  She has been on adjuvant letrozole for nearly the same amount of time.  She is tolerating the medication remarkably well.  We plan to treat her for a total of 10 years (through 03/2026).  There is no evidence of disease recurrence on my clinical breast examination performed today.  I will see her again in 3 months.     2.  Hypertriglyceridemia:  Triglycerides 206 in 03/2017.  May be elevated due to letrozole.  PCP is managing.  Attempting management with diet low in saturated fat and aerobic exercise at this time.    3.  Hot flashes:  Continue gabapentin 300 mg PO QHS.  Declines increase in dosing due to drowsiness with the medication.  Declines an SSRI.     3.  Neuropathy:  Improved. Continue vitamin B6.  Gabapentin as above.      4.  Arthralgias:  Secondary to osteoarthritis and exacerbated by letrozole.  Continue to take daily vitamin D supplementation and walk a half hour a day.  Ibu profen as needed for pain.     5.  Bone health:  DEXA bone density in 04/2016 showed a lowest T-score of -0.7.  She continues on calcium and vitamin D supplementation as well as daily weightbearing activity.  Will repeat a DEXA bone density scan in 04/2018.        6.  H/o lung cancer:  In 2012 treated with RUL wedge resection.  Will obtain surveillance CT chest within the next 1-2 weeks.     7.  Followup:  Return to clinic in approximately 3 months for visit with me.    It was a pleasure to see Ms. Chambers in clinic today.  A total of 20 minutes of our 25 minute face to face visit was spent in counseling.

## 2017-11-13 ENCOUNTER — ONCOLOGY VISIT (OUTPATIENT)
Dept: ONCOLOGY | Facility: CLINIC | Age: 67
End: 2017-11-13
Attending: INTERNAL MEDICINE
Payer: MEDICARE

## 2017-11-13 ENCOUNTER — MYC MEDICAL ADVICE (OUTPATIENT)
Dept: FAMILY MEDICINE | Facility: CLINIC | Age: 67
End: 2017-11-13

## 2017-11-13 VITALS
HEIGHT: 65 IN | HEART RATE: 86 BPM | OXYGEN SATURATION: 99 % | DIASTOLIC BLOOD PRESSURE: 76 MMHG | WEIGHT: 153.7 LBS | TEMPERATURE: 97 F | SYSTOLIC BLOOD PRESSURE: 166 MMHG | BODY MASS INDEX: 25.61 KG/M2

## 2017-11-13 DIAGNOSIS — C34.11 MALIGNANT NEOPLASM OF UPPER LOBE OF RIGHT LUNG (H): ICD-10-CM

## 2017-11-13 DIAGNOSIS — C50.512 MALIGNANT NEOPLASM OF LOWER-OUTER QUADRANT OF LEFT BREAST OF FEMALE, ESTROGEN RECEPTOR POSITIVE (H): Primary | ICD-10-CM

## 2017-11-13 DIAGNOSIS — Z17.0 MALIGNANT NEOPLASM OF LOWER-OUTER QUADRANT OF LEFT BREAST OF FEMALE, ESTROGEN RECEPTOR POSITIVE (H): Primary | ICD-10-CM

## 2017-11-13 PROCEDURE — 99213 OFFICE O/P EST LOW 20 MIN: CPT

## 2017-11-13 PROCEDURE — 99214 OFFICE O/P EST MOD 30 MIN: CPT | Mod: ZP | Performed by: INTERNAL MEDICINE

## 2017-11-13 PROCEDURE — 99212 OFFICE O/P EST SF 10 MIN: CPT | Mod: ZF

## 2017-11-13 ASSESSMENT — PAIN SCALES - GENERAL: PAINLEVEL: NO PAIN (0)

## 2017-11-13 NOTE — MR AVS SNAPSHOT
After Visit Summary   11/13/2017    Sushila Chambers    MRN: [id number]           Patient Information     Date Of Birth          1950        Visit Information        Provider Department      11/13/2017 2:15 PM Brittany Villegas MD [facility]        Today's Diagnoses     Malignant neoplasm of lower-outer quadrant of left breast of female, estrogen receptor positive (H)    -  1    Malignant neoplasm of upper lobe of right lung (H)           Follow-ups after your visit        Your next 10 appointments already scheduled     Nov 29, 2017 11:20 AM CST   (Arrive by 11:05 AM)   CT CHEST W/O CONTRAST with UCCT2   [facility] ([facility])    [address] [place] [id number] [phone]           Please bring any scans or X-rays taken at other hospitals, if similar tests were done. Also bring a list of your medicines, including vitamins, minerals and over-the-counter drugs. It is safest to leave personal items at home.  Be sure to tell your doctor:   If you have any allergies.   If there s any chance you are pregnant.   If you are breastfeeding.   If you have any special needs.  You do not need to do anything special to prepare.  Please wear loose clothing, such as a sweat suit or jogging clothes. Avoid snaps, zippers and other metal. We may ask you to undress and put on a hospital gown.            Feb 20, 2018  8:45 AM CST   (Arrive by 8:30 AM)   Return Visit with Brittany Villegas MD   [facility] ([facility])    [address] [place] [id number] [phone]              Future tests that were ordered for you today     Open Future Orders        Priority Expected Expires Ordered    CT Chest w/o contrast Routine  11/13/2018 11/13/2017            Who to contact     If you have questions or need follow up information about today's  "clinic visit or your schedule please contact [facility] directly at [phone].  Normal or non-critical lab and imaging results will be communicated to you by MyChart, letter or phone within 4 business days after the clinic has received the results. If you do not hear from us within 7 days, please contact the clinic through [employer] or phone. If you have a critical or abnormal lab result, we will notify you by phone as soon as possible.  Submit refill requests through [employer] or call your pharmacy and they will forward the refill request to us. Please allow 3 business days for your refill to be completed.          Additional Information About Your Visit        [employer] Information     [employer] gives you secure access to your electronic health record. If you see a primary care provider, you can also send messages to your care team and make appointments. If you have questions, please call your primary care clinic.  If you do not have a primary care provider, please call [phone] and they will assist you.        Care EveryWhere ID     This is your Care EveryWhere ID. This could be used by other organizations to access your House medical records  [id number]        Your Vitals Were     Pulse Temperature Height Pulse Oximetry BMI (Body Mass Index)       86 97  F (36.1  C) (Oral) 1.65 m (5' 4.96\") 99% 25.61 kg/m2        Blood Pressure from Last 3 Encounters:   11/13/17 166/76   08/14/17 156/83   04/17/17 140/87    Weight from Last 3 Encounters:   11/13/17 69.7 kg (153 lb 11.2 oz)   08/14/17 68.1 kg (150 lb 3.2 oz)   04/17/17 66.2 kg (145 lb 14.4 oz)                 Today's Medication Changes          These changes are accurate as of: 11/13/17  3:06 PM.  If you have any questions, ask your nurse or doctor.               These medicines have changed or have updated prescriptions.        Dose/Directions    levothyroxine 50 MCG tablet   Commonly known as:  SYNTHROID/LEVOTHROID   This may have " changed:  Another medication with the same name was removed. Continue taking this medication, and follow the directions you see here.   Used for:  Hypothyroidism, unspecified type   Changed by:  Meryl Muro APRN CNP        Dose:  50 mcg   Take 1 tablet (50 mcg) by mouth daily   Quantity:  90 tablet   Refills:  3       * order for DME   This may have changed:  Another medication with the same name was removed. Continue taking this medication, and follow the directions you see here.   Used for:  Malignant neoplasm of left breast (H), Alopecia, Encounter for antineoplastic chemotherapy   Changed by:  Brittany Villegas MD        Equipment being ordered: Cranial Prosthesis   Quantity:  1 Device   Refills:  0       * order for DME   This may have changed:  Another medication with the same name was removed. Continue taking this medication, and follow the directions you see here.   Used for:  Malignant neoplasm of left breast (H)        Two post mastectomy bra's and prosthesis twice a year as insurance allows .   Quantity:  1 Units   Refills:  1       * order for DME   This may have changed:  Another medication with the same name was removed. Continue taking this medication, and follow the directions you see here.   Used for:  Malignant neoplasm of lower-outer quadrant of left female breast (H)   Changed by:  Eva Duckworth, RN        Please allow 12 bras and prosthesis yearly as insurance covers.   Quantity:  1 Units   Refills:  1       * Notice:  This list has 3 medication(s) that are the same as other medications prescribed for you. Read the directions carefully, and ask your doctor or other care provider to review them with you.             Primary Care Provider Office Phone # Fax #    [facility] [phone] [address] [facility] [address]        Equal Access to Services     SONAM SANDOVAL : Christopher Daily, sofía robbins, kalpana young  bakari venturaani michaeldanisha nanceaan ah. So [facility] [phone].    ATENCIÓN: Si francesco dumont, tiene a hinojosa disposición servicios gratuitos de asistencia lingüística. Cesar al [phone].    We comply with applicable federal civil rights laws and [place] laws. We do not discriminate on the basis of race, color, national origin, age, disability, sex, sexual orientation, or gender identity.            Thank you!     Thank you for choosing [facility]  for your care. Our goal is always to provide you with excellent care. Hearing back from our patients is one way we can continue to improve our services. Please take a few minutes to complete the written survey that you may receive in the mail after your visit with us. Thank you!             Your Updated Medication List - Protect others around you: Learn how to safely use, store and throw away your medicines at www.disposemymeds.org.          This list is accurate as of: 11/13/17  3:06 PM.  Always use your most recent med list.                   Brand Name Dispense Instructions for use Diagnosis    biotin 1000 MCG Tabs tablet      Take by mouth daily        calcium carbonate 1250 MG tablet    OS-OCTAVIANO 500 mg [place]. [place]     Take by mouth 2 times daily        clotrimazole 1 % cream    LOTRIMIN    60 g    Apply topically 2 times daily    Tinea pedis of both feet       gabapentin 300 MG capsule    NEURONTIN    90 capsule    Take 1 tablet (300 mg) every night    Neuropathy, Menopausal syndrome (hot flashes)       letrozole 2.5 MG tablet    FEMARA    90 tablet    TAKE 1 TABLET(2.5 MG) BY MOUTH DAILY    Malignant neoplasm of lower-outer quadrant of left female breast (H)       levothyroxine 50 MCG tablet    SYNTHROID/LEVOTHROID    90 tablet    Take 1 tablet (50 mcg) by mouth daily    Hypothyroidism, unspecified type       Multi-vitamin Tabs tablet      Take 1 tablet by mouth daily        * order for DME     1 Device    Equipment being ordered:  Cranial Prosthesis    Malignant neoplasm of left breast (H), Alopecia, Encounter for antineoplastic chemotherapy       * order for DME     1 Units    Two post mastectomy bra's and prosthesis twice a year as insurance allows .    Malignant neoplasm of left breast (H)       * order for DME     1 Units    Please allow 12 bras and prosthesis yearly as insurance covers.    Malignant neoplasm of lower-outer quadrant of left female breast (H)       pyridOXINE 100 MG Tabs    VITAMIN B6    30 tablet    Take 1 tablet (100 mg) by mouth daily    Neuropathy       TYLENOL PO      Take 500 mg by mouth every 4 hours as needed for mild pain or fever Reported on 3/21/2017        VITAMIN D (CHOLECALCIFEROL) PO      Take by mouth daily        zolpidem 5 MG tablet    AMBIEN    30 tablet    Take 1 tablet (5 mg) by mouth nightly as needed for sleep    Drug induced insomnia (H)       * Notice:  This list has 3 medication(s) that are the same as other medications prescribed for you. Read the directions carefully, and ask your doctor or other care provider to review them with you.

## 2017-11-13 NOTE — LETTER
11/13/2017       RE: Sushila Chambers  [address] [place] [address]     Dear Colleague,    Thank you for referring your patient, Sushila Chambers, to the [facility]. Please see a copy of my visit note below.    MEDICAL ONCOLOGY FOLLOW-UP PATIENT VISIT     NAME: Sushila Chambers     DATE: 11/13/17    PRIMARY CARE PHYSICIAN: Meryl Muro    PATIENT ID: Multifocal, stage IIa, T2N0M0, ER positive, W[place] negative, HER2 non-amplified invasive lobular carcinoma of the left breast.     Oncology History: Ms. Chambers is a 67 year old female with a history of lung cancer and right breast cancer with more recent invasive lobular carcinoma of the left breast. Ms. Chambers was treated for right sided breast cancer in 2009 with right breast lumpectomy, radiation, and endocrine therapy. Ms. Chambers self palpated a mass in the left breast in early August, 2015. A diagnostic mammogram and ultrasound showed 2 ill-defined spiculated masses at the 4:30 and 2:00 positions of the left breast. There was surrounding architectural distortion and the total area of involvement measured 6.5 cm. Targeted left breast ultrasound showed a multilobulated hypoechoic mass at the 4:00 position measuring 2.5 cm and an ill-defined hypoechoic mass at the 2:00 position measuring 2.4 cm. There was no suspicious left axillary lymphadenopathy. Biopsy of the mass at the 4:00 position showed grade II invasive pleomorphic lobular carcinoma. Biopsy of the mass at the 2:00 position showed a grade II invasive lobular carcinoma. No angiolymphatic invasion or associated LCIS was seen in either specimen. Estrogen receptor staining was positive in >80% of tumor cell nuclei. Progesterone receptor staining was positive in <5% of tumor cell nuclei. HER2 was non-amplifed in both specimens by FISH.     Ms. Chambers elected to screen for ISPY-2. Mammaprint testing returned high-risk.  She received 12 weeks of ganetespib and Taxol and 4 cycles of dose  dense AC from 9/29/15 - 2/1/16. On 2/24/16 she underwent left breast mastectomy and sentinel lymph node procedure under the care of Dr. Betts. Pathology showed a residual grade II 4.8 cm, pleomorphic invasive lobular carcinoma with associated LCIS. Invasive tumor cellularity was 30%.  There was evidence of perineural invasion, however, no lymphvascular space invasion. Two sentinel lymph nodes were negative.  She has been on adjuvant letrozole since 03/2016.    Interim History:   Sushila comes in to clinic today for routine 3-month breast cancer followup.  She is currently without concerns or complaints.  In fact, she states she has been very healthy over the last 3 months.  She denies breast lumps or masses.  She has no right breast pain or discomfort, or pain or discomfort of the left chest wall.  She has no headaches, visual changes or focal neurologic complaints.  She has chronic bilateral knee pain which has become slightly worse on letrozole but is tolerable.  She has had an increase in hot flashes since starting letrozole.  These are partially relieved by taking gabapentin at night. She describes the hot flashes as manageable manageable and she declines an increase in the gabapentin at this time. Gabapentin does cause drowsiness.  She has vaginal dryness that is not bothersome for her as she is not currently sexually active.  She denies symptoms of depression or anxiety.  She has no cough, shortness of breath or chest pain.  She reports that since she had her right upper lobe wedge resection for her lung cancer, her exercise tolerance is not what it used to be.  She is walking her dog daily.  She confirms that she is taking calcium and vitamin D.  The remainder of a    10-point review of systems is otherwise negative.     PAST MEDICAL HISTORY:   Past Medical History:   Diagnosis Date     Basal cell carcinoma      Breast cancer, right breast (H)      History of blood transfusion      Hypothyroid      Lung cancer  "(H) 2012    right       PAST SURGICAL HISTORY:  Past Surgical History:   Procedure Laterality Date     ABDOMEN SURGERY       BIOPSY, LUNG NODULE Right 2012    + cancer     COLONOSCOPY       ECTOPIC PREGNANCY SURGERY Left 1988     GENITOURINARY SURGERY       GYN SURGERY       INSERT PORT VASCULAR ACCESS       LUMPECTOMY BREAST Right 2010     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     MASTECTOMY SIMPLE, SENTINEL NODE, COMBINED Left 2/24/2016    Procedure: COMBINED MASTECTOMY SIMPLE, SENTINEL NODE;  Surgeon: Satnam Betts MD;  Location: [facility] OR     REMOVE PORT VASCULAR ACCESS N/A 2/24/2016    Procedure: REMOVE PORT VASCULAR ACCESS;  Surgeon: Satnam Betts MD;  Location: [facility] OR     THORACIC SURGERY       MEDS:  Current Outpatient Prescriptions   Medication     letrozole (FEMARA) 2.5 MG tablet     levothyroxine (SYNTHROID/LEVOTHROID) 50 MCG tablet     gabapentin (NEURONTIN) 300 MG capsule     zolpidem (AMBIEN) 5 MG tablet     levothyroxine (SYNTHROID/LEVOTHROID) 50 MCG tablet     clotrimazole (LOTRIMIN) 1 % cream     order for DME     order for DME     order for DME     Acetaminophen (TYLENOL PO)     pyridOXINE (VITAMIN B6) 100 MG TABS     order for DME     multivitamin, therapeutic with minerals (MULTI-VITAMIN) TABS     biotin 1000 MCG TABS tablet     VITAMIN D, CHOLECALCIFEROL, PO     calcium carbonate (OS-OCTAVIANO 500 MG [place]. CA) 500 MG tablet     No current facility-administered medications for this visit.      ALLERGIES:  Allergies   Allergen Reactions     Latex      SKIN REACTION       Sulfa Drugs Itching and Rash        PHYSICAL EXAM:  /76 (BP Location: Right arm, Patient Position: Sitting, Cuff Size: Adult Regular)  Pulse 86  Temp 97  F (36.1  C) (Oral)  Ht 1.65 m (5' 4.96\")  Wt 69.7 kg (153 lb 11.2 oz)  SpO2 99%  BMI 25.61 kg/m2  KPS: 100%  GENERAL: AAOx3, well appearing adult female in no acute distress  HEENT: Normocephalic, atraumatic, MMM.  No lesions of the oropharynx  LYMPH: No palpable " cervical, supraclavicular, or axillary lymphadenopathy  CV: RRR, normal S1/S2, No murmurs, gallops, or rubs  LUNGS: CTA B/L, no wheezing or rhonchi  Breast:  Right breast is of increased fibroglandular density.  Left mastectomy.  There are no discretely palpable masses of either the right breast or left chest wall.  Right nipple is everted.  ABDOMEN: soft, nontender, non-distended, no hepatosplenomegaly. Bowel sounds present.  EXTREMITIES: no lower extremity edema  NEURO: Cranial nerves II-XII grossly intact  INTEGUMENT: No rashes    LABS REVIEWED THIS VISIT:  Results for JEREMIAH MORA (MRN [id number]) as of 11/15/2017 10:04   Ref. Range 3/21/2017 12:44   Triglycerides Latest Ref Range: <150 mg/dL 206 (H)       RADIOLOGY:   Mammography: 8/14/2017    Findings:  Post conservation therapy changes.  No significant change.   Impression: BI-RADS CATEGORY: 2 - Benign Findings..   Recommended Followup: Annual Mammography.    IMPRESSION/PLAN: 68 yo female with stage II, T2N0M0, grade II, ER positive, ND negative, HER2 non-amplified invasive lobular carcinoma of the left breast. She is s/p treatment with taxol and ganetespib followed by dose dense adriamycin and cyclophosphamide, and left breast mastectomy. She has been on letrozole since 03/2016.    1.  Left breast ER-positive carcinoma:  Ms. Mora is now approximately 1 year and 9 months out from completion of neoadjuvant chemotherapy and surgical excision of a left breast cancer.  She has been on adjuvant letrozole for nearly the same amount of time.  She is tolerating the medication remarkably well.  We plan to treat her for a total of 10 years (through 03/2026).  There is no evidence of disease recurrence on my clinical breast examination performed today.  I will see her again in 3 months.     2.  Hypertriglyceridemia:  Triglycerides 206 in 03/2017.  May be elevated due to letrozole.  PCP is managing.  Attempting management with diet low in saturated fat and aerobic  exercise at this time.    3.  Hot flashes:  Continue gabapentin 300 mg PO QHS.  Declines increase in dosing due to drowsiness with the medication.  Declines an SSRI.     3.  Neuropathy:  Improved. Continue vitamin B6.  Gabapentin as above.     4.  Arthralgias:  Secondary to osteoarthritis and exacerbated by letrozole.  Continue to take daily vitamin D supplementation and walk a half hour a day.  Ibu profen as needed for pain.     5.  Bone health:  DEXA bone density in 04/2016 showed a lowest T-score of -0.7.  She continues on calcium and vitamin D supplementation as well as daily weightbearing activity.  Will repeat a DEXA bone density scan in 04/2018.        6.  H/o lung cancer:  In 2012 treated with RUL wedge resection.  Will obtain surveillance CT chest within the next 1-2 weeks.     7.  Followup:  Return to clinic in approximately 3 months for visit with me.    It was a pleasure to see Ms. Chambers in clinic today.  A total of 20 minutes of our 25 minute face to face visit was spent in counseling.      Again, thank you for allowing me to participate in the care of your patient.      Sincerely,    Brittany Villegas MD

## 2017-11-13 NOTE — NURSING NOTE
"Oncology Rooming Note    November 13, 2017 1:59 PM   Sushila Chambers is a 67 year old female who presents for:    Chief Complaint   Patient presents with     Oncology Clinic Visit     Follow up-Breast CA     Initial Vitals: /76 (BP Location: Right arm, Patient Position: Sitting, Cuff Size: Adult Regular)  Pulse 86  Temp 97  F (36.1  C) (Oral)  Ht 1.65 m (5' 4.96\")  Wt 69.7 kg (153 lb 11.2 oz)  SpO2 99%  BMI 25.61 kg/m2 Estimated body mass index is 25.61 kg/(m^2) as calculated from the following:    Height as of this encounter: 1.65 m (5' 4.96\").    Weight as of this encounter: 69.7 kg (153 lb 11.2 oz). Body surface area is 1.79 meters squared.  No Pain (0) Comment: Data Unavailable   No LMP recorded. Patient is postmenopausal.  Allergies reviewed: Yes  Medications reviewed: Yes    Medications: Medication refills not needed today.  Pharmacy name entered into [facility]:    [facility] - [place], [place] [facility] [address] [id number] - [place], [place] [address] [facility] [address]  [facility]          Clinical concerns: No Concerns Dr. Villegas was notified.    10 minutes for nursing intake (face to face time)     Nikkie Collins LPN              "

## 2017-11-14 ENCOUNTER — OFFICE VISIT (OUTPATIENT)
Dept: FAMILY MEDICINE | Facility: CLINIC | Age: 67
End: 2017-11-14
Payer: MEDICARE

## 2017-11-14 VITALS
WEIGHT: 150 LBS | TEMPERATURE: 97.8 F | OXYGEN SATURATION: 98 % | HEART RATE: 102 BPM | BODY MASS INDEX: 24.99 KG/M2 | RESPIRATION RATE: 16 BRPM | SYSTOLIC BLOOD PRESSURE: 150 MMHG | DIASTOLIC BLOOD PRESSURE: 72 MMHG

## 2017-11-14 DIAGNOSIS — I10 BENIGN ESSENTIAL HYPERTENSION: Primary | ICD-10-CM

## 2017-11-14 PROCEDURE — 99213 OFFICE O/P EST LOW 20 MIN: CPT | Performed by: NURSE PRACTITIONER

## 2017-11-14 PROCEDURE — 80048 BASIC METABOLIC PNL TOTAL CA: CPT | Performed by: NURSE PRACTITIONER

## 2017-11-14 PROCEDURE — 36415 COLL VENOUS BLD VENIPUNCTURE: CPT | Performed by: NURSE PRACTITIONER

## 2017-11-14 RX ORDER — HYDROCHLOROTHIAZIDE 12.5 MG/1
12.5 TABLET ORAL DAILY
Qty: 90 TABLET | Refills: 1 | Status: CANCELLED | OUTPATIENT
Start: 2017-11-14

## 2017-11-14 NOTE — PROGRESS NOTES
SUBJECTIVE:   Sushila Chambers is a 67 year old female who presents to clinic today for the following health issues:  Elevated BP    HPI:  Patient presents to the clinic for elevated blood pressure. She was seen at her oncologists office yesterday and reports she had a systolic reading in the 160's.  She reports she can feel when her BP is elevated by pulsations in her head and ears.  She was on a cruise for 21 days and reports her diet was different, but for the most part she denies any significant changes.  She is vegetarian and does most of her own cooking.  She also denies changes to her medications.  She exercises regularly and does a combination of walking, pilates, and strength training.  She reports her mother had a severe stroke in her 70's due to hypertension and her sister was recently put on antihypertensive medication.    Problem list and histories reviewed & adjusted, as indicated.  Additional history: as documented    Patient Active Problem List   Diagnosis     Hypothyroidism     Lung cancer (H)     Breast cancer, right breast (H)     Hyperlipidemia LDL goal <130     Grief     Breast cancer, left breast (H)     Encounter for antineoplastic chemotherapy     Malignant neoplasm of lower-outer quadrant of left female breast (H)     Diarrhea     Non-intractable vomiting with nausea, vomiting of unspecified type     Other specified anemias     Uncontrolled hypertension     Hypertension goal BP (blood pressure) < 140/90     Past Surgical History:   Procedure Laterality Date     ABDOMEN SURGERY       BIOPSY, LUNG NODULE Right 2012    + cancer     COLONOSCOPY       ECTOPIC PREGNANCY SURGERY Left 1988     GENITOURINARY SURGERY       GYN SURGERY       INSERT PORT VASCULAR ACCESS       LUMPECTOMY BREAST Right 2010     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     MASTECTOMY SIMPLE, SENTINEL NODE, COMBINED Left 2/24/2016    Procedure: COMBINED MASTECTOMY SIMPLE, SENTINEL NODE;  Surgeon: Satnam Betts MD;   Location: [facility] OR     REMOVE PORT VASCULAR ACCESS N/A 2/24/2016    Procedure: REMOVE PORT VASCULAR ACCESS;  Surgeon: Satnam Betts MD;  Location: [facility] OR     THORACIC SURGERY         Social History   Substance Use Topics     Smoking status: Never Smoker     Smokeless tobacco: Never Used     Alcohol use Yes      Comment: Occasionally     Family History   Problem Relation Age of Onset     Breast Cancer Mother      CANCER Father      bone cancer     Melanoma No family hx of      Skin Cancer No family hx of          Current Outpatient Prescriptions   Medication Sig Dispense Refill     letrozole (FEMARA) 2.5 MG tablet TAKE 1 TABLET(2.5 MG) BY MOUTH DAILY 90 tablet 0     gabapentin (NEURONTIN) 300 MG capsule Take 1 tablet (300 mg) every night 90 capsule 3     levothyroxine (SYNTHROID/LEVOTHROID) 50 MCG tablet Take 1 tablet (50 mcg) by mouth daily 90 tablet 3     Acetaminophen (TYLENOL PO) Take 500 mg by mouth every 4 hours as needed for mild pain or fever Reported on 3/21/2017       multivitamin, therapeutic with minerals (MULTI-VITAMIN) TABS Take 1 tablet by mouth daily       biotin 1000 MCG TABS tablet Take by mouth daily       VITAMIN D, CHOLECALCIFEROL, PO Take by mouth daily       calcium carbonate (OS-OCTAVIANO 500 MG [place]. CA) 500 MG tablet Take by mouth 2 times daily       hydrochlorothiazide 12.5 MG TABS tablet Take 1 tablet (12.5 mg) by mouth daily 90 tablet 0     zolpidem (AMBIEN) 5 MG tablet Take 1 tablet (5 mg) by mouth nightly as needed for sleep (Patient not taking: Reported on 11/13/2017) 30 tablet 1     order for DME Please allow 12 bras and prosthesis yearly as insurance covers. (Patient not taking: Reported on 11/13/2017) 1 Units 1     order for DME Two post mastectomy bra's and prosthesis twice a year as insurance allows . (Patient not taking: Reported on 11/13/2017) 1 Units 1     order for DME Equipment being ordered: Cranial Prosthesis (Patient not taking: Reported on 11/13/2017) 1 Device 0         Reviewed  and updated as needed this visit by clinical staffTobacco  Allergies  Med Hx  Surg Hx  Fam Hx  Soc Hx      Reviewed and updated as needed this visit by Provider         ROS:  Constitutional, HEENT, cardiovascular, pulmonary, gi and gu systems are negative, except as otherwise noted.      OBJECTIVE:   /72  Pulse 102  Temp 97.8  F (36.6  C) (Oral)  Resp 16  Wt 150 lb (68 kg)  SpO2 98%  Breastfeeding? No  BMI 24.99 kg/m2  Body mass index is 24.99 kg/(m^2).  GENERAL: healthy, alert and no distress  EYES: Eyes grossly normal to inspection, PERRL and conjunctivae and sclerae normal  HENT: ear canals and TM's normal, nose and mouth without ulcers or lesions  NECK: no adenopathy, no asymmetry, masses, or scars and thyroid normal to palpation  RESP: lungs clear to auscultation - no rales, rhonchi or wheezes  CV: regular rate and rhythm, normal S1 S2, no S3 or S4, no murmur, click or rub, no peripheral edema and peripheral pulses strong  ABDOMEN: soft, nontender, no hepatosplenomegaly, no masses and bowel sounds normal  MS: no gross musculoskeletal defects noted, no edema    Diagnostic Test Results:  Results for orders placed or performed in visit on 11/14/17 (from the past 24 hour(s))   Basic metabolic panel  (Ca, Cl, CO2, Creat, Gluc, K, Na, BUN)   Result Value Ref Range    Sodium 140 133 - 144 mmol/L    Potassium 3.8 3.4 - 5.3 mmol/L    Chloride 106 94 - 109 mmol/L    Carbon Dioxide 25 20 - 32 mmol/L    Anion Gap 9 3 - 14 mmol/L    Glucose 102 (H) 70 - 99 mg/dL    Urea Nitrogen 16 7 - 30 mg/dL    Creatinine 0.93 0.52 - 1.04 mg/dL    GFR Estimate 60 (L) >60 mL/min/1.7m2    GFR Estimate If Black 73 >60 mL/min/1.7m2    Calcium 9.4 8.5 - 10.1 mg/dL       ASSESSMENT/PLAN:         1. Benign essential hypertension  Patient is hypertensive at this visit and looking back at her chart she has had several pre-hypertensive readings.  She is a candidate for the PGen study and we referred her to evaluate if she would  be a candidate.  She was instructed to contact them this week and if she is not able to participate, we will send an order for HCTZ 12.5mg.  We also angelia a BMP.  If she is not a candidate for the study we would like to see her back in a month or 2, otherwise she can be managed through the study.  - Basic metabolic panel  (Ca, Cl, CO2, Creat, Gluc, K, Na, BUN)    Follow-up per study guidelines or in 1-2 months if not able to participate in PGen study.    This note was scribed by Nakia Noel RN, Student NP    In supervising the nurse practitioner student, I have reviewed the ROS and PSFH documented by the student.  I performed the pertinent history, exam and assessment and plan components as documented above.   BREANNE Persaud [facility]

## 2017-11-14 NOTE — MR AVS SNAPSHOT
After Visit Summary   11/14/2017    Sushila Chambers    MRN: [id number]           Patient Information     Date Of Birth          1950        Visit Information        Provider Department      11/14/2017 1:40 PM Meryl Muro APRN [facility]        Today's Diagnoses     Benign essential hypertension    -  1      Care Instructions    [facility] Hypertension Study Summary     Thank you for your interest in the [facility] hypertension research study. This study is designed to test whether genetically guided blood pressure medication management is better than the standard of care.  Both groups will use medications that have been used for many years to treat high blood pressure ( diuretics, beta blockers, calcium channel blockers, AceI /ARB).  The ORDER of medications chosen may be different however. All participants will receive the genetic testing for free along with free research related visits.  Participants will not be given the results of their genetic test results until the END of the study.Participants will also receive compensation totaling about $100 for completing all study visits and surveys.      If you would like to enroll or know more about using your genetics to determine which hypertensive medications may work best for you please contact the research coordinator as [phone] or email [email or web address]    Who may qualify for the study:  1) New diagnosis of high blood pressure but not yet on blood pressure medication.  2) Existing diagnosis of hypertension but blood pressure is  uncontrolled with 1 or less class of medications.   3) Age between 30 and 70  4) BMI between 19-50    Who should NOT be in the study:    1) All patient taking more than 1 class of hypertensive medication are excluded.   2) Documented instance of following conditions    A. Cardiac Disease  i. ICD-10 Code for Coronary Artery Disease - CAD: 410.* -414.*, I25.*  ii. ICD-10 Code for Heart  Failure - 428.*, I50.*  iii. ICD-10 Code for Congenital Cardiac Disease - 745.*, -747.*, Q20.*, -Q28.*   B. Kidney Disease        i. ICD-10 Code for Chronic Kidney Disease - CKD:ICD9 585.*and ICD10 N18.*   C. Vascular Disease        i. ICD-10 Code for Peripheral Vascular Disease - 443.9, I73.9        ii. ICD-10 Code for Pulmonary Hypertension - 416.0, 416.8, I27.0, I27.2   D. Secondary Hypertension                     i. ICD-10 Code for Hypertension Secondary to Other Diseases - I15.0-2, I15.8-9  3) Any patient who has chronic medical conditions that  their doctor/provider feels would make them unsafe to participate in a research study where initial choice of blood pressure  medication could be from 1 of these 4 BP classes of medicines: diuretics, beta blockers, calcium channel blockers, AceI /ARB.    Study visit occur at the following clinic locations  North:  1) [place]  2) [place]  3) [place]  4) [place]  5) [place]  6) [place]  7) [place]    South:  1) [facility])  2) [place]  [facility]) [facility]  1) [place]    Patient Expectations:    1) Take Hypertension medications  2) Attend scheduled study visits  3) Complete online surveys sent between visits     If enrolled patient is asked to attend 5 to 8 study visits over the next 12 months.          Controlling High Blood Pressure  High blood pressure (hypertension) is often called the silent killer. This is because many people who have it don t know it. High blood pressure is defined as 140/90 mm Hg or higher. Know your blood pressure and remember to check it regularly. Doing so can save your life. Here are some things you can do to help control your blood pressure.    Choose heart-healthy foods    Select low-salt, low-fat foods. Limit sodium intake to 2,400 mg per day or the amount suggested by your healthcare provider.    Limit canned, dried, cured, packaged, and fast foods. These can contain a lot of salt.    Eat 8 to 10 servings  of fruits and vegetables every day.    Choose lean meats, fish, or chicken.    Eat whole-grain pasta, brown rice, and beans.    Eat 2 to 3 servings of low-fat or fat-free dairy products.    Ask your doctor about the DASH eating plan. This plan helps reduce blood pressure.    When you go to a restaurant, ask that your meal be prepared with no added salt.  Maintain a healthy weight    Ask your healthcare provider how many calories to eat a day. Then stick to that number.    Ask your healthcare provider what weight range is healthiest for you. If you are overweight, a weight loss of only 3% to 5% of your body weight can help lower blood pressure. Generally, a good weight loss goal is to lose 10% of your body weight in a year.    Limit snacks and sweets.    Get regular exercise.  Get up and get active    Choose activities you enjoy. Find ones you can do with friends or family. This includes bicycling, dancing, walking, and jogging.    Park farther away from building entrances.    Use stairs instead of the elevator.    When you can, walk or bike instead of driving.    [place] leaves, garden, or do household repairs.    Be active at a moderate to vigorous level of physical activity for at least 40 minutes for a minimum of 3 to 4 days a week.   Manage stress    Make time to relax and enjoy life. Find time to laugh.    Communicate your concerns with your loved ones and your healthcare provider.    Visit with family and friends, and keep up with hobbies.  Limit alcohol and quit smoking    Men should have no more than 2 drinks per day.    Women should have no more than 1 drink per day.    Talk with your healthcare provider about quitting smoking. Smoking significantly increases your risk for heart disease and stroke. Ask your healthcare provider about community smoking cessation programs and other options.  Medicines  If lifestyle changes aren t enough, your healthcare provider may prescribe high blood pressure medicine. Take  all medicines as prescribed. If you have any questions about your medicines, ask your healthcare provider before stopping or changing them.   Date Last Reviewed: 4/27/2016 2000-2017 [employer]. [address] [place] [address], [place] [address]. All rights reserved. This information is not intended as a substitute for professional medical care. Always follow your healthcare professional's instructions.                  Follow-ups after your visit        Follow-up notes from your care team     Return in about 4 weeks (around 12/12/2017), or follow-up in 1-2 months, for BP Recheck.      Your next 10 appointments already scheduled     Dec 07, 2017 11:20 AM CST   (Arrive by 11:05 AM)   CT CHEST W/O CONTRAST with UCCT1   [facility] ([facility])    [address] [place] [id number] [phone]           Please bring any scans or X-rays taken at other hospitals, if similar tests were done. Also bring a list of your medicines, including vitamins, minerals and over-the-counter drugs. It is safest to leave personal items at home.  Be sure to tell your doctor:   If you have any allergies.   If there s any chance you are pregnant.   If you are breastfeeding.   If you have any special needs.  You do not need to do anything special to prepare.  Please wear loose clothing, such as a sweat suit or jogging clothes. Avoid snaps, zippers and other metal. We may ask you to undress and put on a hospital gown.            Feb 20, 2018  8:45 AM CST   (Arrive by 8:30 AM)   Return Visit with Brittany Villegas MD   [facility] ([facility])    [address] [place] [address] [phone]              Who to contact     If you have questions or need follow up information about today's clinic visit or your schedule please contact [facility] directly at  [phone].  Normal or non-critical lab and imaging results will be communicated to you by [employer], letter or phone within 4 business days after the clinic has received the results. If you do not hear from us within 7 days, please contact the clinic through [employer] or phone. If you have a critical or abnormal lab result, we will notify you by phone as soon as possible.  Submit refill requests through [employer] or call your pharmacy and they will forward the refill request to us. Please allow 3 business days for your refill to be completed.          Additional Information About Your Visit        [employer] Information     [employer] gives you secure access to your electronic health record. If you see a primary care provider, you can also send messages to your care team and make appointments. If you have questions, please call your primary care clinic.  If you do not have a primary care provider, please call [phone] and they will assist you.        Care EveryWhere ID     This is your Care EveryWhere ID. This could be used by other organizations to access your [place] medical records  [id number]        Your Vitals Were     Pulse Temperature Respirations Pulse Oximetry Breastfeeding? BMI (Body Mass Index)    102 97.8  F (36.6  C) (Oral) 16 98% No 24.99 kg/m2       Blood Pressure from Last 3 Encounters:   11/14/17 150/72   11/13/17 166/76   08/14/17 156/83    Weight from Last 3 Encounters:   11/14/17 150 lb (68 kg)   11/13/17 153 lb 11.2 oz (69.7 kg)   08/14/17 150 lb 3.2 oz (68.1 kg)              We Performed the Following     Basic metabolic panel  (Ca, Cl, CO2, Creat, Gluc, K, Na, BUN)          Today's Medication Changes          These changes are accurate as of: 11/14/17 11:59 PM.  If you have any questions, ask your nurse or doctor.               Stop taking these medicines if you haven't already. Please contact your care team if you have questions.     clotrimazole 1 % cream   Commonly known as:  LOTRIMIN    Stopped by:  Meryl Muro APRN CNP                    Primary Care Provider Office Phone # Fax #    [facility] [phone] [address] [facility] [address]        Equal Access to Services     SONAM SANDOVAL : Hadii pop miller hadkathleeno Soomaali, waaxda luqadaha, qaybta kaalmada adeegyada, waxtee michellein haybostonfabián rodriguezmarcflora parry. So [facility] [phone].    ATENCIÓN: Si habla español, tiene a hinojosa disposición servicios gratuitos de asistencia lingüística. Llame al [phone].    We comply with applicable federal civil rights laws and [place] laws. We do not discriminate on the basis of race, color, national origin, age, disability, sex, sexual orientation, or gender identity.            Thank you!     Thank you for choosing [facility]  for your care. Our goal is always to provide you with excellent care. Hearing back from our patients is one way we can continue to improve our services. Please take a few minutes to complete the written survey that you may receive in the mail after your visit with us. Thank you!             Your Updated Medication List - Protect others around you: Learn how to safely use, store and throw away your medicines at www.disposemymeds.org.          This list is accurate as of: 11/14/17 11:59 PM.  Always use your most recent med list.                   Brand Name Dispense Instructions for use Diagnosis    biotin 1000 MCG Tabs tablet      Take by mouth daily        calcium carbonate 1250 MG tablet    OS-OCTAVIANO 500 mg [place]. Ca     Take by mouth 2 times daily        gabapentin 300 MG capsule    NEURONTIN    90 capsule    Take 1 tablet (300 mg) every night    Neuropathy, Menopausal syndrome (hot flashes)       letrozole 2.5 MG tablet    FEMARA    90 tablet    TAKE 1 TABLET(2.5 MG) BY MOUTH DAILY    Malignant neoplasm of lower-outer quadrant of left female breast (H)       levothyroxine 50 MCG tablet    SYNTHROID/LEVOTHROID    90  tablet    Take 1 tablet (50 mcg) by mouth daily    Hypothyroidism, unspecified type       Multi-vitamin Tabs tablet      Take 1 tablet by mouth daily        * order for DME     1 Device    Equipment being ordered: Cranial Prosthesis    Malignant neoplasm of left breast (H), Alopecia, Encounter for antineoplastic chemotherapy       * order for DME     1 Units    Two post mastectomy bra's and prosthesis twice a year as insurance allows .    Malignant neoplasm of left breast (H)       * order for DME     1 Units    Please allow 12 bras and prosthesis yearly as insurance covers.    Malignant neoplasm of lower-outer quadrant of left female breast (H)       TYLENOL PO      Take 500 mg by mouth every 4 hours as needed for mild pain or fever Reported on 3/21/2017        VITAMIN D (CHOLECALCIFEROL) PO      Take by mouth daily        zolpidem 5 MG tablet    AMBIEN    30 tablet    Take 1 tablet (5 mg) by mouth nightly as needed for sleep    Drug induced insomnia (H)       * Notice:  This list has 3 medication(s) that are the same as other medications prescribed for you. Read the directions carefully, and ask your doctor or other care provider to review them with you.

## 2017-11-14 NOTE — PATIENT INSTRUCTIONS
[facility] Hypertension Study Summary     Thank you for your interest in the [facility] hypertension research study. This study is designed to test whether genetically guided blood pressure medication management is better than the standard of care.  Both groups will use medications that have been used for many years to treat high blood pressure ( diuretics, beta blockers, calcium channel blockers, AceI /ARB).  The ORDER of medications chosen may be different however. All participants will receive the genetic testing for free along with free research related visits.  Participants will not be given the results of their genetic test results until the END of the study.Participants will also receive compensation totaling about $100 for completing all study visits and surveys.      If you would like to enroll or know more about using your genetics to determine which hypertensive medications may work best for you please contact the research coordinator as [phone] or email [email or web address]    Who may qualify for the study:  1) New diagnosis of high blood pressure but not yet on blood pressure medication.  2) Existing diagnosis of hypertension but blood pressure is  uncontrolled with 1 or less class of medications.   3) Age between 30 and 70  4) BMI between 19-50    Who should NOT be in the study:    1) All patient taking more than 1 class of hypertensive medication are excluded.   2) Documented instance of following conditions    A. Cardiac Disease  i. ICD-10 Code for Coronary Artery Disease - CAD: 410.* -414.*, I25.*  ii. ICD-10 Code for Heart Failure - 428.*, I50.*  iii. ICD-10 Code for Congenital Cardiac Disease - 745.*, -747.*, Q20.*, -Q28.*   B. Kidney Disease        i. ICD-10 Code for Chronic Kidney Disease - CKD:ICD9 585.*and ICD10 N18.*   C. Vascular Disease        i. ICD-10 Code for Peripheral Vascular Disease - 443.9, I73.9        ii. ICD-10 Code for Pulmonary Hypertension - 416.0, 416.8, I27.0, I27.2   D.  Secondary Hypertension                     i. ICD-10 Code for Hypertension Secondary to Other Diseases - I15.0-2, I15.8-9  3) Any patient who has chronic medical conditions that  their doctor/provider feels would make them unsafe to participate in a research study where initial choice of blood pressure  medication could be from 1 of these 4 BP classes of medicines: diuretics, beta blockers, calcium channel blockers, AceI /ARB.    Study visit occur at the following clinic locations  North:  1) [place]  2) [place]  3) [place]  4) [place]  5) [place]  6) [place]  7) [place]    South:  1) [facility]  2) [place]  [facility]) [facility]  [facility] [place]    Patient Expectations:    1) Take Hypertension medications  2) Attend scheduled study visits  3) Complete online surveys sent between visits     If enrolled patient is asked to attend 5 to 8 study visits over the next 12 months.          Controlling High Blood Pressure  High blood pressure (hypertension) is often called the silent killer. This is because many people who have it don t know it. High blood pressure is defined as 140/90 mm Hg or higher. Know your blood pressure and remember to check it regularly. Doing so can save your life. Here are some things you can do to help control your blood pressure.    Choose heart-healthy foods    Select low-salt, low-fat foods. Limit sodium intake to 2,400 mg per day or the amount suggested by your healthcare provider.    Limit canned, dried, cured, packaged, and fast foods. These can contain a lot of salt.    Eat 8 to 10 servings of fruits and vegetables every day.    Choose lean meats, fish, or chicken.    Eat whole-grain pasta, brown rice, and beans.    Eat 2 to 3 servings of low-fat or fat-free dairy products.    Ask your doctor about the DASH eating plan. This plan helps reduce blood pressure.    When you go to a restaurant, ask that your meal be prepared with no added salt.  Maintain a healthy  weight    Ask your healthcare provider how many calories to eat a day. Then stick to that number.    Ask your healthcare provider what weight range is healthiest for you. If you are overweight, a weight loss of only 3% to 5% of your body weight can help lower blood pressure. Generally, a good weight loss goal is to lose 10% of your body weight in a year.    Limit snacks and sweets.    Get regular exercise.  Get up and get active    Choose activities you enjoy. Find ones you can do with friends or family. This includes bicycling, dancing, walking, and jogging.    Park farther away from building entrances.    Use stairs instead of the elevator.    When you can, walk or bike instead of driving.    [place] leaves, garden, or do household repairs.    Be active at a moderate to vigorous level of physical activity for at least 40 minutes for a minimum of 3 to 4 days a week.   Manage stress    Make time to relax and enjoy life. Find time to laugh.    Communicate your concerns with your loved ones and your healthcare provider.    Visit with family and friends, and keep up with hobbies.  Limit alcohol and quit smoking    Men should have no more than 2 drinks per day.    Women should have no more than 1 drink per day.    Talk with your healthcare provider about quitting smoking. Smoking significantly increases your risk for heart disease and stroke. Ask your healthcare provider about community smoking cessation programs and other options.  Medicines  If lifestyle changes aren t enough, your healthcare provider may prescribe high blood pressure medicine. Take all medicines as prescribed. If you have any questions about your medicines, ask your healthcare provider before stopping or changing them.   Date Last Reviewed: 4/27/2016 2000-2017 [employer]. [address] [facility], [place] [address]. All rights reserved. This information is not intended as a substitute for professional medical care. Always follow your  healthcare professional's instructions.

## 2017-11-15 PROBLEM — I10 UNCONTROLLED HYPERTENSION: Status: ACTIVE | Noted: 2017-11-15

## 2017-11-15 PROBLEM — I10 HYPERTENSION GOAL BP (BLOOD PRESSURE) < 140/90: Status: ACTIVE | Noted: 2017-11-15

## 2017-11-15 LAB
ANION GAP SERPL CALCULATED.3IONS-SCNC: 9 MMOL/L (ref 3–14)
BUN SERPL-MCNC: 16 MG/DL (ref 7–30)
CALCIUM SERPL-MCNC: 9.4 MG/DL (ref 8.5–10.1)
CHLORIDE SERPL-SCNC: 106 MMOL/L (ref 94–109)
CO2 SERPL-SCNC: 25 MMOL/L (ref 20–32)
CREAT SERPL-MCNC: 0.93 MG/DL (ref 0.52–1.04)
GFR SERPL CREATININE-BSD FRML MDRD: 60 ML/MIN/1.7M2
GLUCOSE SERPL-MCNC: 102 MG/DL (ref 70–99)
POTASSIUM SERPL-SCNC: 3.8 MMOL/L (ref 3.4–5.3)
SODIUM SERPL-SCNC: 140 MMOL/L (ref 133–144)

## 2017-11-15 NOTE — PROGRESS NOTES
Lavon Conti,    This is to let you know that the results of your recent blood tests are all looking good.    Your blood sugar did come back slightly above the normal limit. I am not overly concerned about this at this time, but I do recommend that you eat a diet low and simple sugars, low in simple carbohydrates (such as pasta, rice, cereals,chips, crackers and starchy vegetables). An increase in aerobic activity maintaining a normal body weight can also be helpful for the treatment of elevated blood sugars and the prevention of type II diabetes.  I would like to check your blood sugar in one year.    One of your kidney function studies, the GFR, came back borderline at 60. Again, I am not overly concerned about this result, but I will recheck your kidney function in 6-12 months.    If there is anything else that I can do for you, please do not hesitate to let me know.    Meryl RAYMUNDO CNP

## 2018-01-29 DIAGNOSIS — C50.512 MALIGNANT NEOPLASM OF LOWER-OUTER QUADRANT OF LEFT FEMALE BREAST (H): ICD-10-CM

## 2018-01-29 RX ORDER — LETROZOLE 2.5 MG/1
2.5 TABLET, FILM COATED ORAL DAILY
Qty: 90 TABLET | Refills: 3 | Status: SHIPPED | OUTPATIENT
Start: 2018-01-29 | End: 2019-01-15

## 2018-02-02 DIAGNOSIS — N95.1 MENOPAUSAL SYNDROME (HOT FLASHES): ICD-10-CM

## 2018-02-02 DIAGNOSIS — G62.9 NEUROPATHY: ICD-10-CM

## 2018-02-02 RX ORDER — GABAPENTIN 300 MG/1
CAPSULE ORAL
Qty: 30 CAPSULE | Refills: 0 | OUTPATIENT
Start: 2018-02-02

## 2018-04-27 NOTE — PROGRESS NOTES
MEDICAL ONCOLOGY FOLLOW-UP PATIENT VISIT     NAME: Sushila Chambers     DATE: 4/30/18    PRIMARY CARE PHYSICIAN: Meryl Muro    PATIENT ID: Multifocal, stage IIa, T2N0M0, ER positive, MT negative, HER2 non-amplified invasive lobular carcinoma of the left breast.     Oncology History: Ms. Chambers is a 68 year old female with a history of lung cancer and right breast cancer with more recent invasive lobular carcinoma of the left breast. Ms. Chambers was treated for right sided breast cancer in 2009 with right breast lumpectomy, radiation, and endocrine therapy. Ms. Chambers self palpated a mass in the left breast in early August, 2015. A diagnostic mammogram and ultrasound showed 2 ill-defined spiculated masses at the 4:30 and 2:00 positions of the left breast. There was surrounding architectural distortion and the total area of involvement measured 6.5 cm. Targeted left breast ultrasound showed a multilobulated hypoechoic mass at the 4:00 position measuring 2.5 cm and an ill-defined hypoechoic mass at the 2:00 position measuring 2.4 cm. There was no suspicious left axillary lymphadenopathy. Biopsy of the mass at the 4:00 position showed grade II invasive pleomorphic lobular carcinoma. Biopsy of the mass at the 2:00 position showed a grade II invasive lobular carcinoma. No angiolymphatic invasion or associated LCIS was seen in either specimen. Estrogen receptor staining was positive in >80% of tumor cell nuclei. Progesterone receptor staining was positive in <5% of tumor cell nuclei. HER2 was non-amplifed in both specimens by FISH.     Ms. Chambers elected to screen for ISPY-2. Mammaprint testing returned high-risk.  She received 12 weeks of ganetespib and Taxol and 4 cycles of dose dense AC from 9/29/15 - 2/1/16. On 2/24/16 she underwent left breast mastectomy and sentinel lymph node procedure under the care of Dr. Betts. Pathology showed a residual grade II 4.8 cm, pleomorphic invasive lobular carcinoma with  "associated LCIS. Invasive tumor cellularity was 30%.  There was evidence of perineural invasion, however, no lymphvascular space invasion. Two sentinel lymph nodes were negative.  She has been on adjuvant letrozole since 03/2016.    Ms. Chambers had genetic testing on 9/15/15.  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  Of note she had a number of alterations in RUFINA, BARD1, BRCA1/2, MUTYH, and TP53 that were classified as \"likely benign\" and \"benign\".    Interim History:   Ms. Chambers comes in to clinic today for routine breast cancer followup.  She continues on treatment with letrozole.  She is tolerating it well.  She has hot flashes, both day and night.  She reports them as tolerable and declines medication for them.  She also has insomnia associated with letrozole.  She takes gabapentin prior to going to bed.  She has not taken any other sleep aids.  She does not feel excessively fatigued during the day.  She denies symptoms of depression or anxiety.  She has joint stiffness, especially in the hands and the feet.  These do improve with movement.  She is doing Pilates twice a week as well as doing strengthening and cardiovascular exercise on a regular basis.  She denies concerning lumps or masses of either breast.  She has no cough, shortness of breath or chest pain.  No current abdominal complaints.  No swelling of her lower extremities.  She denies headaches, visual changes or focal neurologic complaints.  She has no new bone or joint aches or pains.  She and her [place] recently went to [place]  She enjoyed the time with her family.  The remainder of a complete 12-point review of systems is otherwise negative.     PAST MEDICAL HISTORY:   Past Medical History:   Diagnosis Date     Basal cell carcinoma      Breast cancer, right breast (H)      History of blood transfusion      Hypothyroid      Lung cancer (H) 2012    right     Uncontrolled hypertension 11/15/2017       PAST SURGICAL HISTORY:  Past " "Surgical History:   Procedure Laterality Date     ABDOMEN SURGERY       BIOPSY, LUNG NODULE Right 2012    + cancer     COLONOSCOPY       ECTOPIC PREGNANCY SURGERY Left 1988     GENITOURINARY SURGERY       GYN SURGERY       INSERT PORT VASCULAR ACCESS       LUMPECTOMY BREAST Right 2010     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     MASTECTOMY SIMPLE, SENTINEL NODE, COMBINED Left 2/24/2016    Procedure: COMBINED MASTECTOMY SIMPLE, SENTINEL NODE;  Surgeon: Satnam Betts MD;  Location: [facility] OR     REMOVE PORT VASCULAR ACCESS N/A 2/24/2016    Procedure: REMOVE PORT VASCULAR ACCESS;  Surgeon: Satnam Betts MD;  Location: [facility] OR     THORACIC SURGERY       MEDS:  Current Outpatient Prescriptions   Medication     Acetaminophen (TYLENOL PO)     biotin 1000 MCG TABS tablet     calcium carbonate (OS-OCTAVIANO 500 MG [place]. CA) 500 MG tablet     gabapentin (NEURONTIN) 300 MG capsule     hydrochlorothiazide 12.5 MG TABS tablet     letrozole (FEMARA) 2.5 MG tablet     levothyroxine (SYNTHROID/LEVOTHROID) 50 MCG tablet     multivitamin, therapeutic with minerals (MULTI-VITAMIN) TABS     order for DME     order for DME     order for DME     VITAMIN D, CHOLECALCIFEROL, PO     zolpidem (AMBIEN) 5 MG tablet     No current facility-administered medications for this visit.      ALLERGIES:  Allergies   Allergen Reactions     Latex      SKIN REACTION       Sulfa Drugs Itching and Rash        PHYSICAL EXAM:  /82  Pulse 102  Temp 97.2  F (36.2  C) (Oral)  Resp 16  Ht 1.638 m (5' 4.5\")  Wt 69.4 kg (153 lb 1 oz)  SpO2 100%  Breastfeeding? No  BMI 25.87 kg/m2  KPS: 100%  GENERAL: AAOx3, well appearing adult female in no acute distress  HEENT: Normocephalic, atraumatic, MMM.  No lesions of the oropharynx  LYMPH: No palpable cervical, supraclavicular, or axillary lymphadenopathy  CV: RRR, normal S1/S2, No murmurs, gallops, or rubs  LUNGS: CTA B/L, no wheezing or rhonchi  Breast:  Right breast is of increased fibroglandular density.  " Left mastectomy.  There are no discretely palpable masses of either the right breast or left chest wall.  Right nipple is everted.  Exam is overall unchanged from prior.  ABDOMEN: soft, nontender, non-distended, no hepatosplenomegaly. Bowel sounds present.  EXTREMITIES: no pitting edema of the bilateral lower extremies  NEURO: Cranial nerves II-XII grossly intact.  Gait stable.  INTEGUMENT: No visible rashes or skin lesions.    LABS REVIEWED THIS VISIT:  No interim imaging or laboratory studies pertinent to today's visit.    IMPRESSION/PLAN: 69 yo female with stage II, T2N0M0, grade II, ER positive, AZ negative, HER2 non-amplified invasive lobular carcinoma of the left breast. She is s/p treatment with taxol and ganetespib followed by dose dense adriamycin and cyclophosphamide and left breast mastectomy. She has been on letrozole since 03/2016.    1.  Left breast ER-positive carcinoma:  Ms. Chambers is now approximately 2 years 2 months out from completion of neoadjuvant chemotherapy and surgical excision of a left breast cancer.  She continues on letrozole.  She has hot flashes and arthralgias secondary to the medication.  Despite these, she is willing to continue it at this time.  We plan to treat her for a total of 10 years (through 03/2026).  There is no evidence of disease recurrence on my clinical breast examination performed today.  I will see her back in mid-August at the time she is due for right breast screening mammogram.     2.  Hypertriglyceridemia:  Triglycerides 206 in 03/2017.  May be elevated due to letrozole.  PCP is managing.  Attempting management with diet low in saturated fat and aerobic exercise at this time.  Recommended repeat triglycerides at this time.    3.  Hot flashes:  Continue gabapentin 300 mg PO QHS.  Declines increase in dosing due to drowsiness with the medication.  Declines an SSRI.     3.  Neuropathy:  Advised to stop vitamin B6 at this point.     4.  Arthralgias:  Secondary to  osteoarthritis and exacerbated by letrozole.  Continue to take daily vitamin D supplementation and daily cardiovascular activity.  Ibu profen as needed for pain.     5.  Bone health:  DEXA bone density in 04/2016 showed a lowest T-score of -0.7.  She is due for  DEXA at this time.  We will schedule it for 5/7 (the same day as planned chest CT).  She continues on calcium and vitamin D supplementation as well as daily weightbearing activity.      6.  H/o lung cancer:  In 2012 treated with RUL wedge resection.  She is overdue for chest CT at this time; it is currently scheduled for 5/7/18.     7.  Followup:  DEXA bone density scan and CT chest on 5/7/18.  Return to clinic mid-August for right breast screening mammogram and visit with me.    It was a pleasure to see Ms. Chambers in clinic today.  A total of 20 minutes of our 25 minute face to face visit was spent in counseling.

## 2018-04-30 ENCOUNTER — ONCOLOGY VISIT (OUTPATIENT)
Dept: ONCOLOGY | Facility: CLINIC | Age: 68
End: 2018-04-30
Attending: INTERNAL MEDICINE
Payer: MEDICARE

## 2018-04-30 VITALS
SYSTOLIC BLOOD PRESSURE: 147 MMHG | TEMPERATURE: 97.2 F | HEIGHT: 65 IN | WEIGHT: 153.06 LBS | BODY MASS INDEX: 25.5 KG/M2 | HEART RATE: 102 BPM | DIASTOLIC BLOOD PRESSURE: 82 MMHG | RESPIRATION RATE: 16 BRPM | OXYGEN SATURATION: 100 %

## 2018-04-30 DIAGNOSIS — C50.512 MALIGNANT NEOPLASM OF LOWER-OUTER QUADRANT OF LEFT BREAST OF FEMALE, ESTROGEN RECEPTOR POSITIVE (H): Primary | ICD-10-CM

## 2018-04-30 DIAGNOSIS — Z17.0 MALIGNANT NEOPLASM OF LOWER-OUTER QUADRANT OF LEFT BREAST OF FEMALE, ESTROGEN RECEPTOR POSITIVE (H): Primary | ICD-10-CM

## 2018-04-30 DIAGNOSIS — G62.9 NEUROPATHY: ICD-10-CM

## 2018-04-30 DIAGNOSIS — M89.9 DISORDER OF BONE AND CARTILAGE: Primary | ICD-10-CM

## 2018-04-30 DIAGNOSIS — R92.30 DENSE BREAST TISSUE: ICD-10-CM

## 2018-04-30 DIAGNOSIS — Z12.39 BREAST CANCER SCREENING, HIGH RISK PATIENT: ICD-10-CM

## 2018-04-30 DIAGNOSIS — M94.9 DISORDER OF BONE AND CARTILAGE: Primary | ICD-10-CM

## 2018-04-30 DIAGNOSIS — N95.1 MENOPAUSAL SYNDROME (HOT FLASHES): ICD-10-CM

## 2018-04-30 PROCEDURE — 99214 OFFICE O/P EST MOD 30 MIN: CPT | Mod: ZP | Performed by: INTERNAL MEDICINE

## 2018-04-30 PROCEDURE — G0463 HOSPITAL OUTPT CLINIC VISIT: HCPCS | Mod: ZF

## 2018-04-30 ASSESSMENT — PAIN SCALES - GENERAL: PAINLEVEL: NO PAIN (0)

## 2018-04-30 NOTE — MR AVS SNAPSHOT
After Visit Summary   4/30/2018    Sushila Chambers    MRN: [id number]           Patient Information     Date Of Birth          1950        Visit Information        Provider Department      4/30/2018 12:45 PM Brittany Villegas MD [facility]        Today's Diagnoses     Malignant neoplasm of lower-outer quadrant of left breast of female, estrogen receptor positive (H)    -  1    Breast cancer screening, high risk patient        Dense breast tissue           Follow-ups after your visit        Your next 10 appointments already scheduled     May 07, 2018  1:20 PM CDT   (Arrive by 1:05 PM)   CT CHEST W/O CONTRAST with UCCT2   [facility] CT ([facility])    [address] [place] [id number] [phone]           Please bring any scans or X-rays taken at other hospitals, if similar tests were done. Also bring a list of your medicines, including vitamins, minerals and over-the-counter drugs. It is safest to leave personal items at home.  Be sure to tell your doctor:   If you have any allergies.   If there s any chance you are pregnant.   If you are breastfeeding.  You do not need to do anything special to prepare for this exam.  Please wear loose clothing, such as a sweat suit or jogging clothes. Avoid snaps, zippers and other metal. We may ask you to undress and put on a hospital gown.            May 14, 2018  1:30 PM CDT   DX HIP/PELVIS/SPINE with UCDX1   [facility] Dexa ([facility])    [address] [place] [address] [phone]           Please do not take any of the following 24 hours prior to the day of your exam: vitamins, calcium tablets, antacids.  If possible, please wear clothes without metal (snaps, zippers). A sweatsuit works well.            Aug 20, 2018  3:00 PM CDT   MA SCREEN RIGHT W/ MILI with UCBCMA1   [facility]  "Imaging ([facility])    [address]  [address]  [facility] [id number] [phone]           Three-dimensional (3D) mammograms are available at [facility] locations in [facility], [place], [place], [facility], [place], [place], and [place]. [facility] locations include [place] and [facility] in [place]. Benefits of 3D mammograms include: - Improved rate of cancer detection - Decreases your chance of having to go back for more tests, which means fewer: - \"False-positive\" results (This means that there is an abnormal area but it isn't cancer.) - Invasive testing procedures, such as a biopsy or surgery - Can provide clearer images of the breast if you have dense breast tissue. 3D mammography is an optional exam that anyone can have with a 2D mammogram. It doesn't replace or take the place of a 2D mammogram. 2D mammograms remain an effective screening test for all women.  Not all insurance companies cover the cost of a 3D mammogram. Check with your insurance.            Aug 20, 2018  3:45 PM CDT   (Arrive by 3:30 PM)   Return Visit with Brittany Villegas MD   [facility] ([facility])    [address]  [address]  [facility] [id number] [phone]              Future tests that were ordered for you today     Open Future Orders        Priority Expected Expires Ordered    DX Hip/Pelvis/Spine Routine  11/26/2018 4/30/2018    MA Screen Right w/Hugh Routine  4/27/2019 4/27/2018            Who to contact     If you have questions or need follow up information about today's clinic visit or your schedule please contact [facility] directly at [phone].  Normal or non-critical lab and imaging results will be communicated to you by MyChart, letter or phone within 4 business days after the clinic has received the results. If you do not hear from us within 7 days, please " "contact the clinic through [employer] or phone. If you have a critical or abnormal lab result, we will notify you by phone as soon as possible.  Submit refill requests through [employer] or call your pharmacy and they will forward the refill request to us. Please allow 3 business days for your refill to be completed.          Additional Information About Your Visit        [employer] Information     [employer] gives you secure access to your electronic health record. If you see a primary care provider, you can also send messages to your care team and make appointments. If you have questions, please call your primary care clinic.  If you do not have a primary care provider, please call [phone] and they will assist you.        Care EveryWhere ID     This is your Care EveryWhere ID. This could be used by other organizations to access your [place] medical records  [id number]        Your Vitals Were     Pulse Temperature Respirations Height Pulse Oximetry Breastfeeding?    102 97.2  F (36.2  C) (Oral) 16 1.638 m (5' 4.5\") 100% No    BMI (Body Mass Index)                   25.87 kg/m2            Blood Pressure from Last 3 Encounters:   04/30/18 147/82   11/14/17 150/72   11/13/17 166/76    Weight from Last 3 Encounters:   04/30/18 69.4 kg (153 lb 1 oz)   11/14/17 68 kg (150 lb)   11/13/17 69.7 kg (153 lb 11.2 oz)               Primary Care Provider Office Phone # Fax #    [facility] [phone] [address] [address]        Equal Access to Services     SONAM SANDOVAL AH: Hadii pop ku hadasho Soomaali, waaxda luqadaha, qaybta kaalmada adeegyada, bakari parry. So [facility] [phone].    ATENCIÓN: Si habla español, tiene a hinojosa disposición servicios gratuitos de asistencia lingüística. Llame al [phone].    We comply with applicable federal civil rights laws and [place] laws. We do not discriminate on the basis of race, color, national origin, age, " disability, sex, sexual orientation, or gender identity.            Thank you!     Thank you for choosing [facility]  for your care. Our goal is always to provide you with excellent care. Hearing back from our patients is one way we can continue to improve our services. Please take a few minutes to complete the written survey that you may receive in the mail after your visit with us. Thank you!             Your Updated Medication List - Protect others around you: Learn how to safely use, store and throw away your medicines at www.disposemymeds.org.          This list is accurate as of 4/30/18  1:56 PM.  Always use your most recent med list.                   Brand Name Dispense Instructions for use Diagnosis    biotin 1000 MCG Tabs tablet      Take by mouth daily        calcium carbonate 500 tablet    OS-OCTAVIANO 500 mg [place]. Ca     Take by mouth 2 times daily        gabapentin 300 MG capsule    NEURONTIN    90 capsule    Take 1 tablet (300 mg) every night    Neuropathy, Menopausal syndrome (hot flashes)       hydrochlorothiazide 12.5 MG Tabs tablet     90 tablet    TAKE 1 TABLET(12.5 MG) BY MOUTH DAILY    Hypertension goal BP (blood pressure) < 140/90, Uncontrolled hypertension       letrozole 2.5 MG tablet    FEMARA    90 tablet    Take 1 tablet (2.5 mg) by mouth daily    Malignant neoplasm of lower-outer quadrant of left female breast (H)       levothyroxine 50 MCG tablet    SYNTHROID/LEVOTHROID    90 tablet    Take 1 tablet (50 mcg) by mouth daily    Hypothyroidism, unspecified type       Multi-vitamin Tabs tablet      Take 1 tablet by mouth daily        order for DME     1 Device    Equipment being ordered: Cranial Prosthesis    Malignant neoplasm of left breast (H), Alopecia, Encounter for antineoplastic chemotherapy       * order for DME     1 Units    Two post mastectomy bra's and prosthesis twice a year as insurance allows .    Malignant neoplasm of left breast (H)       * order for DME     1  Units    Please allow 12 bras and prosthesis yearly as insurance covers.    Malignant neoplasm of lower-outer quadrant of left female breast (H)       TYLENOL PO      Take 500 mg by mouth every 4 hours as needed for mild pain or fever Reported on 3/21/2017        VITAMIN D (CHOLECALCIFEROL) PO      Take by mouth daily        zolpidem 5 MG tablet    AMBIEN    30 tablet    Take 1 tablet (5 mg) by mouth nightly as needed for sleep    Drug induced insomnia (H)       * Notice:  This list has 2 medication(s) that are the same as other medications prescribed for you. Read the directions carefully, and ask your doctor or other care provider to review them with you.

## 2018-04-30 NOTE — NURSING NOTE
"Oncology Rooming Note    April 30, 2018 1:01 PM   Sushila Chambers is a 68 year old female who presents for:    Chief Complaint   Patient presents with     Oncology Clinic Visit     Return: Breast CA     Initial Vitals: /82  Pulse 102  Temp 97.2  F (36.2  C) (Oral)  Resp 16  Ht 1.638 m (5' 4.5\")  Wt 69.4 kg (153 lb 1 oz)  SpO2 100%  Breastfeeding? No  BMI 25.87 kg/m2 Estimated body mass index is 25.87 kg/(m^2) as calculated from the following:    Height as of this encounter: 1.638 m (5' 4.5\").    Weight as of this encounter: 69.4 kg (153 lb 1 oz). Body surface area is 1.78 meters squared.  No Pain (0) Comment: Data Unavailable   No LMP recorded. Patient is postmenopausal.  Allergies reviewed: Yes  Medications reviewed: Yes    Medications: Medication refills not needed today.  Pharmacy name entered into [facility]:    [facility] - [place], [place] - [address] [id number] - [place], [place] [address] [facility] [address]  [facility]          Clinical concerns: no new concerns today Dr. Villegas was notified.    10 minutes for nursing intake (face to face time)     Aaron Presley CMA              "

## 2018-05-01 RX ORDER — GABAPENTIN 300 MG/1
300 CAPSULE ORAL AT BEDTIME
Qty: 90 CAPSULE | Refills: 3 | Status: SHIPPED | OUTPATIENT
Start: 2018-05-01 | End: 2019-01-23

## 2018-05-14 ENCOUNTER — RADIANT APPOINTMENT (OUTPATIENT)
Dept: BONE DENSITY | Facility: CLINIC | Age: 68
End: 2018-05-14
Attending: INTERNAL MEDICINE
Payer: MEDICARE

## 2018-05-14 DIAGNOSIS — M94.9 DISORDER OF BONE AND CARTILAGE: ICD-10-CM

## 2018-05-14 DIAGNOSIS — M89.9 DISORDER OF BONE AND CARTILAGE: ICD-10-CM

## 2018-05-22 ENCOUNTER — OFFICE VISIT (OUTPATIENT)
Dept: FAMILY MEDICINE | Facility: CLINIC | Age: 68
End: 2018-05-22
Payer: MEDICARE

## 2018-05-22 VITALS
HEART RATE: 98 BPM | DIASTOLIC BLOOD PRESSURE: 80 MMHG | TEMPERATURE: 98.5 F | OXYGEN SATURATION: 98 % | RESPIRATION RATE: 20 BRPM | BODY MASS INDEX: 24.99 KG/M2 | SYSTOLIC BLOOD PRESSURE: 139 MMHG | WEIGHT: 150 LBS | HEIGHT: 65 IN

## 2018-05-22 DIAGNOSIS — M85.9 LOW BONE DENSITY: ICD-10-CM

## 2018-05-22 DIAGNOSIS — E03.9 HYPOTHYROIDISM, UNSPECIFIED TYPE: ICD-10-CM

## 2018-05-22 DIAGNOSIS — Z12.11 SCREENING FOR COLON CANCER: ICD-10-CM

## 2018-05-22 DIAGNOSIS — I10 HYPERTENSION GOAL BP (BLOOD PRESSURE) < 140/90: Primary | ICD-10-CM

## 2018-05-22 LAB
ANION GAP SERPL CALCULATED.3IONS-SCNC: 9 MMOL/L (ref 3–14)
BUN SERPL-MCNC: 17 MG/DL (ref 7–30)
CALCIUM SERPL-MCNC: 9.9 MG/DL (ref 8.5–10.1)
CHLORIDE SERPL-SCNC: 101 MMOL/L (ref 94–109)
CO2 SERPL-SCNC: 28 MMOL/L (ref 20–32)
CREAT SERPL-MCNC: 1 MG/DL (ref 0.52–1.04)
GFR SERPL CREATININE-BSD FRML MDRD: 55 ML/MIN/1.7M2
GLUCOSE SERPL-MCNC: 109 MG/DL (ref 70–99)
POTASSIUM SERPL-SCNC: 3.8 MMOL/L (ref 3.4–5.3)
SODIUM SERPL-SCNC: 138 MMOL/L (ref 133–144)
TSH SERPL DL<=0.005 MIU/L-ACNC: 3.48 MU/L (ref 0.4–4)

## 2018-05-22 PROCEDURE — 84443 ASSAY THYROID STIM HORMONE: CPT | Performed by: NURSE PRACTITIONER

## 2018-05-22 PROCEDURE — 99214 OFFICE O/P EST MOD 30 MIN: CPT | Performed by: NURSE PRACTITIONER

## 2018-05-22 PROCEDURE — 80048 BASIC METABOLIC PNL TOTAL CA: CPT | Performed by: NURSE PRACTITIONER

## 2018-05-22 PROCEDURE — 36415 COLL VENOUS BLD VENIPUNCTURE: CPT | Performed by: NURSE PRACTITIONER

## 2018-05-22 RX ORDER — LEVOTHYROXINE SODIUM 50 UG/1
50 TABLET ORAL DAILY
Qty: 90 TABLET | Refills: 3 | Status: SHIPPED | OUTPATIENT
Start: 2018-05-22 | End: 2019-01-23

## 2018-05-22 RX ORDER — HYDROCHLOROTHIAZIDE 12.5 MG/1
12.5 TABLET ORAL DAILY
Qty: 90 TABLET | Refills: 3 | Status: SHIPPED | OUTPATIENT
Start: 2018-05-22 | End: 2019-01-23

## 2018-05-22 NOTE — PROGRESS NOTES
SUBJECTIVE:   Sushila Chambers is a 68 year old female who presents to clinic today for the following health issues:      Hypertension Follow-up      Outpatient blood pressures are not being checked.    Low Salt Diet: low salt    Amount of exercise or physical activity: piliates, and strength taining    Problems taking medications regularly: No    Medication side effects: none    Diet: regular (no restrictions) and low salt    Requesting blood sugar test/labs  Nutritionist     Dexa scan:  On Famira (known for drawing calcium from her bones).  Post-menopausal.  She takes a calcium supplement  she has an upcoming appointment planned with Brittany nunez with who ordered the DEXA scan.  Sushila tries to eat a healthy diet, vegetarian, but she does get calcium sources.  She also tries to get regular exercise, aerobic and weight lifting.    Vegetarian:  No red meat  She eats fish.  No poultry.  She is requesting a consultation with a nutritionist for vegetarian diet/cancer survivor/calcium.    She is feeling great overall.    Problem list and histories reviewed & adjusted, as indicated.  Additional history: as documented    Patient Active Problem List   Diagnosis     Hypothyroidism     Lung cancer (H)     Breast cancer, right breast (H)     Hyperlipidemia LDL goal <130     Grief     Breast cancer, left breast (H)     Encounter for antineoplastic chemotherapy     Malignant neoplasm of lower-outer quadrant of left female breast (H)     Diarrhea     Non-intractable vomiting with nausea, vomiting of unspecified type     Other specified anemias     Uncontrolled hypertension     Hypertension goal BP (blood pressure) < 140/90     Low bone density     Past Surgical History:   Procedure Laterality Date     ABDOMEN SURGERY       BIOPSY, LUNG NODULE Right 2012    + cancer     COLONOSCOPY       ECTOPIC PREGNANCY SURGERY Left 1988     GENITOURINARY SURGERY       GYN SURGERY       INSERT PORT VASCULAR ACCESS       LUMPECTOMY BREAST Right  2010     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     MASTECTOMY SIMPLE, SENTINEL NODE, COMBINED Left 2/24/2016    Procedure: COMBINED MASTECTOMY SIMPLE, SENTINEL NODE;  Surgeon: Satnam Betts MD;  Location: [facility] OR     REMOVE PORT VASCULAR ACCESS N/A 2/24/2016    Procedure: REMOVE PORT VASCULAR ACCESS;  Surgeon: Satnam Betts MD;  Location: [facility] OR     THORACIC SURGERY         Social History   Substance Use Topics     Smoking status: Never Smoker     Smokeless tobacco: Never Used     Alcohol use Yes      Comment: Occasionally     Family History   Problem Relation Age of Onset     Breast Cancer Mother      CANCER Father      bone cancer     Melanoma No family hx of      Skin Cancer No family hx of          Current Outpatient Prescriptions   Medication Sig Dispense Refill     Acetaminophen (TYLENOL PO) Take 500 mg by mouth every 4 hours as needed for mild pain or fever Reported on 3/21/2017       biotin 1000 MCG TABS tablet Take by mouth daily       calcium carbonate (OS-OCTAVIANO 500 MG [place]. [place]) 500 MG tablet Take by mouth 2 times daily       gabapentin (NEURONTIN) 300 MG capsule Take 1 capsule (300 mg) by mouth At Bedtime 90 capsule 3     hydrochlorothiazide 12.5 MG TABS tablet Take 1 tablet (12.5 mg) by mouth daily 90 tablet 3     letrozole (FEMARA) 2.5 MG tablet Take 1 tablet (2.5 mg) by mouth daily 90 tablet 3     levothyroxine (SYNTHROID/LEVOTHROID) 50 MCG tablet Take 1 tablet (50 mcg) by mouth daily 90 tablet 3     multivitamin, therapeutic with minerals (MULTI-VITAMIN) TABS Take 1 tablet by mouth daily       VITAMIN D, CHOLECALCIFEROL, PO Take by mouth daily       zolpidem (AMBIEN) 5 MG tablet Take 1 tablet (5 mg) by mouth nightly as needed for sleep 30 tablet 1     Allergies   Allergen Reactions     Latex      SKIN REACTION       Sulfa Drugs Itching and Rash     Recent Labs   Lab Test  05/22/18   1130  11/14/17   1436  03/21/17   1244   03/30/16   1418   01/18/16   0905   09/09/15   0857  06/22/15   0924   "09/09/14   1019   A1C   --    --    --    --    --    --    --    --   5.3   --    --    LDL   --    --   171*   --    --    --    --    --    --   67  103   HDL   --    --   54   --    --    --    --    --    --   72  62   TRIG   --    --   206*   --    --    --    --    --    --   105  187*   ALT   --    --   24   --   33   --   14   < >  20   --   20   CR  1.00  0.93  0.90   --   0.87   --   0.71   < >  0.88   --    --    GFRESTIMATED  55*  60*  63   --   65   --   82   < >  64   --    --    GFRESTBLACK  66  73  76   --   79   --   >90   GFR Calc     < >  78   --    --    POTASSIUM  3.8  3.8  4.1   --   3.5   --   3.9   < >  3.7   --    --    TSH  3.48   --   1.81   < >   --    < >   --    --    --   2.34   --     < > = values in this interval not displayed.      BP Readings from Last 3 Encounters:   05/22/18 139/80   04/30/18 147/82   11/14/17 150/72    Wt Readings from Last 3 Encounters:   05/22/18 150 lb (68 kg)   04/30/18 153 lb 1 oz (69.4 kg)   11/14/17 150 lb (68 kg)              Labs reviewed in EPIC    Reviewed and updated as needed this visit by clinical staff  Tobacco  Allergies  Med Hx  Surg Hx  Fam Hx  Soc Hx      Reviewed and updated as needed this visit by Provider         ROS:  Constitutional, HEENT, cardiovascular, pulmonary, GI, , musculoskeletal, neuro, skin, endocrine and psych systems are negative, except as otherwise noted.    OBJECTIVE:     /80  Pulse 98  Temp 98.5  F (36.9  C) (Oral)  Resp 20  Ht 5' 4.5\" (1.638 m)  Wt 150 lb (68 kg)  SpO2 98%  Breastfeeding? No  BMI 25.35 kg/m2  Body mass index is 25.35 kg/(m^2).  Constitutional: healthy, alert and no distress  Neck: supple, no adenopathy  Cardiovascular: RRR. No murmurs, clicks gallops or rub  Respiratory: Respirations easy and regular. No respiratory distress noted. Lung sounds clear to auscultation.  Gastrointestinal: abdomen flat, soft, nontender to light or deep palpation. Bowel sounds active in 4 " quadrants. No hepatosplenomegaly. No rebound or guarding.  Neurologic: Gait normal. Reflexes normal and symmetric. Sensation grossly WNL.  Psychiatric: mentation appears normal and affect normal/bright      ASSESSMENT/PLAN:     (I10) Hypertension goal BP (blood pressure) < 140/90  (primary encounter diagnosis)  Comment: Improved  Plan: Basic metabolic panel, hydrochlorothiazide 12.5        MG TABS tablet        Sushila will continue her medications.  Labs done today and results are pending.  She will continue to take care of herself with a healthy diet, lower salt choices, and she will continue her aerobic activity.  I did give her a consultation to a nutritionist to be seen at her convenience.    She is to return to the clinic to see me in 6 months for a follow-up/physical, sooner if problems.    (E03.9) Hypothyroidism, unspecified type  Comment: Uncertain  Plan: TSH with free T4 reflex, levothyroxine         (SYNTHROID/LEVOTHROID) 50 MCG tablet        Labs pending.  Continue current dose of levothyroxine.    (M85.80) Low bone density  Comment:   Plan: NUTRITION REFERRAL        I did review the results of the DEXA scan with Sushila showing her low bone density.  Since this was ordered by Dr. Villegas, I will reserve additional treatment regarding this result as I anticipate Dr. Villegas will take care of it.  Sushila is to let me know or let Dr. Villegas know if she does not get official results/Additional treatment recommendations.    (Z12.11) Screening for colon cancer  Comment: routine  Plan: Fecal colorectal cancer screen (FIT)        FIT at earliest convenience.  If negative for blood, next FIT test in one year.  If positive for blood, referral for colonoscopy.  Patient verbalizes understanding.        BREANNE Persaud [facility]

## 2018-05-22 NOTE — MR AVS SNAPSHOT
After Visit Summary   5/22/2018    Sushila Chambers    MRN: [id number]           Patient Information     Date Of Birth          1950        Visit Information        Provider Department      5/22/2018 10:50 AM Meryl Muro APRN CNP [facility]        Today's Diagnoses     Hypertension goal BP (blood pressure) < 140/90    -  1    Hypothyroidism, unspecified type        Low bone density        Screening for colon cancer           Follow-ups after your visit        Additional Services     NUTRITION REFERRAL       Your provider has referred you to:   G: [facility] [phone]   [email or web address]  G: [facility], [phone]  [email or web address]   UMP: [facility] (on call location) [facility] [phone]   [email or web address]  RUST: [facility] (on call location)  [facility] [phone]   [email or web address]    Please be aware that coverage of these services is subject to the terms and limitations of your health insurance plan.  Call member services at your health plan with any benefit or coverage questions.      Please bring the following with you to your appointment:    (1) This referral request  (2) Any documents given to you regarding this referral  (3) Any specific questions you have about diet and/or food choices                  Your next 10 appointments already scheduled     Jul 02, 2018 10:40 AM CDT   CT CHEST W/O CONTRAST with UCCT1   [facility] ([facility])    [address] [address] [phone]           Please bring any scans or X-rays taken at other hospitals, if similar tests were done. Also bring a list of your medicines, including vitamins, minerals and over-the-counter  "drugs. It is safest to leave personal items at home.  Be sure to tell your doctor:   If you have any allergies.   If there s any chance you are pregnant.   If you are breastfeeding.  You do not need to do anything special to prepare for this exam.  Please wear loose clothing, such as a sweat suit or jogging clothes. Avoid snaps, zippers and other metal. We may ask you to undress and put on a hospital gown.            Aug 20, 2018  3:00 PM CDT   MA SCREEN RIGHT W/ MILI with [id number]   [facility] ([facility])    [address]  [address] Floor  [facility] [address] [phone]           Three-dimensional (3D) mammograms are available at [place] locations in [facility], [facility], and [place]. [facility] locations include [place] [facility] in [place]. Benefits of 3D mammograms include: - Improved rate of cancer detection - Decreases your chance of having to go back for more tests, which means fewer: - \"False-positive\" results (This means that there is an abnormal area but it isn't cancer.) - Invasive testing procedures, such as a biopsy or surgery - Can provide clearer images of the breast if you have dense breast tissue. 3D mammography is an optional exam that anyone can have with a 2D mammogram. It doesn't replace or take the place of a 2D mammogram. 2D mammograms remain an effective screening test for all women.  Not all insurance companies cover the cost of a 3D mammogram. Check with your insurance.            Aug 20, 2018  3:45 PM CDT   (Arrive by 3:30 PM)   Return Visit with Brittany Villegas MD   [facility] ([facility])    [address]  [address]  [facility] [address] [phone]              Future tests that were ordered for you today     Open Future Orders        Priority Expected Expires Ordered    Fecal " "colorectal cancer screen (FIT) Routine 6/12/2018 8/14/2018 5/22/2018            Who to contact     If you have questions or need follow up information about today's clinic visit or your schedule please contact [facility] directly at [phone].  Normal or non-critical lab and imaging results will be communicated to you by MyChart, letter or phone within 4 business days after the clinic has received the results. If you do not hear from us within 7 days, please contact the clinic through [facility] or phone. If you have a critical or abnormal lab result, we will notify you by phone as soon as possible.  Submit refill requests through [employer] or call your pharmacy and they will forward the refill request to us. Please allow 3 business days for your refill to be completed.          Additional Information About Your Visit        MyChart Information     [employer] gives you secure access to your electronic health record. If you see a primary care provider, you can also send messages to your care team and make appointments. If you have questions, please call your primary care clinic.  If you do not have a primary care provider, please call [phone] and they will assist you.        Care EveryWhere ID     This is your Care EveryWhere ID. This could be used by other organizations to access your [facility] medical records  [id number]        Your Vitals Were     Pulse Temperature Respirations Height Pulse Oximetry Breastfeeding?    98 98.5  F (36.9  C) (Oral) 20 5' 4.5\" (1.638 m) 98% No    BMI (Body Mass Index)                   25.35 kg/m2            Blood Pressure from Last 3 Encounters:   05/22/18 139/80   04/30/18 147/82   11/14/17 150/72    Weight from Last 3 Encounters:   05/22/18 150 lb (68 kg)   04/30/18 153 lb 1 oz (69.4 kg)   11/14/17 150 lb (68 kg)              We Performed the Following     Basic metabolic panel     NUTRITION REFERRAL     TSH with free T4 reflex          Today's Medication " Changes          These changes are accurate as of 5/22/18 11:23 AM.  If you have any questions, ask your nurse or doctor.               These medicines have changed or have updated prescriptions.        Dose/Directions    levothyroxine 50 MCG tablet   Commonly known as:  SYNTHROID/LEVOTHROID   This may have changed:  See the new instructions.   Used for:  Hypothyroidism, unspecified type   Changed by:  Meryl Muro APRN CNP        Dose:  50 mcg   Take 1 tablet (50 mcg) by mouth daily   Quantity:  90 tablet   Refills:  3         Stop taking these medicines if you haven't already. Please contact your care team if you have questions.     order for DME   Stopped by:  Meryl Muro APRN CNP           order for DME   Stopped by:  Meryl Muro APRN CNP                Where to get your medicines      These medications were sent to [id number] - [place], [place] - [address] AT [facility] [address]  [address], [place] [address]     Phone:  [phone]     levothyroxine 50 MCG tablet                Primary Care Provider Office Phone # Fax #    [facility] [phone] [address] [address]        Equal Access to Services     SONAM SANDOVAL AH: Hadii pop ku hadasho Soomaali, waaxda luqadaha, qaybta kaalmada adeegyada, waxay michellein hayиван parry. So [facility] [phone].    ATENCIÓN: Si habla español, tiene a hinojosa disposición servicios gratuitos de asistencia lingüística. Llame al [phone].    We comply with applicable federal civil rights laws and [place] laws. We do not discriminate on the basis of race, color, national origin, age, disability, sex, sexual orientation, or gender identity.            Thank you!     Thank you for choosing [facility]  for your care. Our goal is always to provide you with excellent care. Hearing back from our patients is one way  we can continue to improve our services. Please take a few minutes to complete the written survey that you may receive in the mail after your visit with us. Thank you!             Your Updated Medication List - Protect others around you: Learn how to safely use, store and throw away your medicines at www.disposemymeds.org.          This list is accurate as of 5/22/18 11:23 AM.  Always use your most recent med list.                   Brand Name Dispense Instructions for use Diagnosis    biotin 1000 MCG Tabs tablet      Take by mouth daily        calcium carbonate 500 MG tablet    OS-OCTAVIANO 500 mg [place]. Ca     Take by mouth 2 times daily        gabapentin 300 MG capsule    NEURONTIN    90 capsule    Take 1 capsule (300 mg) by mouth At Bedtime    Neuropathy, Menopausal syndrome (hot flashes)       hydrochlorothiazide 12.5 MG Tabs tablet     90 tablet    TAKE 1 TABLET BY MOUTH EVERY DAY    Hypertension goal BP (blood pressure) < 140/90, Uncontrolled hypertension       letrozole 2.5 MG tablet    FEMARA    90 tablet    Take 1 tablet (2.5 mg) by mouth daily    Malignant neoplasm of lower-outer quadrant of left female breast (H)       levothyroxine 50 MCG tablet    SYNTHROID/LEVOTHROID    90 tablet    Take 1 tablet (50 mcg) by mouth daily    Hypothyroidism, unspecified type       Multi-vitamin Tabs tablet      Take 1 tablet by mouth daily        TYLENOL PO      Take 500 mg by mouth every 4 hours as needed for mild pain or fever Reported on 3/21/2017        VITAMIN D (CHOLECALCIFEROL) PO      Take by mouth daily        zolpidem 5 MG tablet    AMBIEN    30 tablet    Take 1 tablet (5 mg) by mouth nightly as needed for sleep    Drug induced insomnia (H)

## 2018-05-23 NOTE — PROGRESS NOTES
Lavon Conit,    This note is to let you know the results of your recent lab studies.    Your electrolytes and calcium levels are normal.    Your blood sugar did come back slightly above the normal limit. I am not overly concerned about this at this time, but I do recommend that you eat a diet low and simple sugars, simple carbohydrates (such as pasta, rice, cereals,chips, crackders and starchy vegetables). An increase in aerobic activity maintaining a normal body weight can also be helpful for the treatment of elevated blood sugars and the prevention of type II diabetes.  I would like to check your blood sugar in one year.    One of your kidney function studies, the GFR, came back slightly low.  This is not overly concerning, but it is slightly lower than 6 months ago.  I would recommend that you avoid ibuprofen or Aleve products as they are excreted from your body by your kidneys.  Otherwise, I will plan to recheck your kidney function studies in 6-12 months.  Let me know if you have any questions.    Meryl

## 2018-08-11 DIAGNOSIS — I10 HYPERTENSION GOAL BP (BLOOD PRESSURE) < 140/90: ICD-10-CM

## 2018-08-11 DIAGNOSIS — I10 UNCONTROLLED HYPERTENSION: ICD-10-CM

## 2018-08-12 NOTE — TELEPHONE ENCOUNTER
"Requested Prescriptions   Pending Prescriptions Disp Refills     hydrochlorothiazide 12.5 MG TABS tablet [Pharmacy Med Name: HYDROCHLOROTHIAZIDE 12.5MG TABLETS]  Last Written Prescription Date:  5/22/18  Last Fill Quantity: 90 TABLET,  # refills: 3   Last office visit: 5/22/2018 with prescribing provider:  LOPEZ   Future Office Visit:     90 tablet 0     Sig: TAKE 1 TABLET BY MOUTH EVERY DAY    Diuretics (Including Combos) Protocol Passed    8/11/2018 11:43 AM       Passed - Blood pressure under 140/90 in past 12 months    BP Readings from Last 3 Encounters:   05/22/18 139/80   04/30/18 147/82   11/14/17 150/72                Passed - Recent (12 mo) or future (30 days) visit within the authorizing provider's specialty    Patient had office visit in the last 12 months or has a visit in the next 30 days with authorizing provider or within the authorizing provider's specialty.  See \"Patient Info\" tab in inbasket, or \"Choose Columns\" in Meds & Orders section of the refill encounter.           Passed - Patient is age 18 or older       Passed - No active pregancy on record       Passed - Normal serum creatinine on file in past 12 months    Recent Labs   Lab Test  05/22/18   1130   CR  1.00             Passed - Normal serum potassium on file in past 12 months    Recent Labs   Lab Test  05/22/18   1130   POTASSIUM  3.8                   Passed - Normal serum sodium on file in past 12 months    Recent Labs   Lab Test  05/22/18   1130   NA  138             Passed - No positive pregnancy test in past 12 months          "

## 2018-08-15 RX ORDER — HYDROCHLOROTHIAZIDE 12.5 MG/1
TABLET ORAL
Qty: 0.1 TABLET | Refills: 0 | OUTPATIENT
Start: 2018-08-15

## 2018-08-15 NOTE — TELEPHONE ENCOUNTER
duplicate see script sent 5/22/2018 hydrochlorothiazide 12.5 MG TABS tablet # 90 tablet x 3 refills

## 2018-08-19 NOTE — PROGRESS NOTES
MEDICAL ONCOLOGY FOLLOW-UP PATIENT VISIT     NAME: Sushila Chambers     DATE: 8/20/18    PRIMARY CARE PHYSICIAN: Meryl Muro    PATIENT ID: Multifocal, stage IIa, T2N0M0, ER positive, MD negative, HER2 non-amplified invasive lobular carcinoma of the left breast.     Oncology History: Ms. Chambers is a 68 year old female with a history of lung cancer and right breast cancer with more recent invasive lobular carcinoma of the left breast. Ms. Chambers was treated for right sided breast cancer in 2009 with right breast lumpectomy, radiation, and endocrine therapy. She self palpated a mass in the left breast in early August, 2015. Diagnostic mammogram and ultrasound showed 2 ill-defined spiculated masses at the 4:30 and 2:00 positions of the left breast. There was surrounding architectural distortion and the total area of involvement measured 6.5 cm. Targeted left breast ultrasound showed a multilobulated hypoechoic mass at the 4:00 position measuring 2.5 cm and an ill-defined hypoechoic mass at the 2:00 position measuring 2.4 cm.  Biopsy of the mass at the 4:00 position showed grade II invasive pleomorphic lobular carcinoma. Biopsy of the mass at the 2:00 position showed a grade II invasive lobular carcinoma. No angiolymphatic invasion or associated LCIS was seen in either specimen. Estrogen receptor staining was positive in >80% of tumor cell nuclei. Progesterone receptor staining was positive in <5% of tumor cell nuclei. HER2 was non-amplifed by FISH in both specimens.     Ms. Chambers enrolled on the ISPY2 clinical trial.  She received 12 weeks of ganetespib and Taxol and 4 cycles of dose dense AC from 9/29/15 - 2/1/16. On 2/24/16 she underwent left breast mastectomy and sentinel lymph node procedure under the care of Dr. Betts. Pathology showed a residual grade II 4.8 cm, pleomorphic invasive lobular carcinoma with associated LCIS. Invasive tumor cellularity was 30%.  There was evidence of perineural invasion,  "however, no lymphvascular space invasion. Two sentinel lymph nodes were negative.  She has been on adjuvant letrozole since 03/2016.    Ms. Chambers had genetic testing on 9/15/15.  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  Of note she had a number of alterations in RUFINA, BARD1, BRCA1/2, MUTYH, and TP53 that were classified as \"likely benign\" and \"benign\".    Interim History:   Ms. Chambers presents today for routine follow up. She has no specific questions nor concerns, noting that her endocrine therapy related symptoms have been stable and generally tolerable. Notably, she continues to have arthralgias and neuropathy (in b/l feet). They are not life/QOL limiting. She continues to be active, going to the gym 1hr/day x 4 days per week, doing pilates and strength training.    She has two daughters who live locally, one with a 1 and 15 year old.    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 They also worked on a project with [employer], raising money. Her  unfortunately passed away from metastatic cancer.    On ROS:  Endorses  +stable, good energy level, not napping    Denies  Headaches, nausea/vomiting, weight loss/gain, chest pain, shortness of breath, abdominal pain, constipation/diarrhea, medication, hematuria, dysuria, rashes, lymphadenopathy, edema.  The remainder of a complete 12 point review of systems was completed and was negative with the exception of that mentioned above.    PAST MEDICAL HISTORY:   Past Medical History:   Diagnosis Date     Basal cell carcinoma      Breast cancer, right breast (H)      History of blood transfusion      Hypothyroid      Lung cancer (H) 2012    right     Uncontrolled hypertension 11/15/2017       PAST SURGICAL HISTORY:  Past Surgical History:   Procedure Laterality Date     ABDOMEN SURGERY       BIOPSY, LUNG NODULE Right 2012    + cancer     COLONOSCOPY       ECTOPIC PREGNANCY SURGERY " "Left 1988     GENITOURINARY SURGERY       GYN SURGERY       INSERT PORT VASCULAR ACCESS       LUMPECTOMY BREAST Right 2010     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     MASTECTOMY SIMPLE, SENTINEL NODE, COMBINED Left 2/24/2016    Procedure: COMBINED MASTECTOMY SIMPLE, SENTINEL NODE;  Surgeon: Satnam Betts MD;  Location: [facility] OR     REMOVE PORT VASCULAR ACCESS N/A 2/24/2016    Procedure: REMOVE PORT VASCULAR ACCESS;  Surgeon: Satnam Betts MD;  Location: [facility] OR     THORACIC SURGERY       MEDS:  Current Outpatient Prescriptions   Medication     Acetaminophen (TYLENOL PO)     biotin 1000 MCG TABS tablet     calcium carbonate (OS-OCTAVIANO 500 MG [place]. [place]) 500 MG tablet     gabapentin (NEURONTIN) 300 MG capsule     hydrochlorothiazide 12.5 MG TABS tablet     letrozole (FEMARA) 2.5 MG tablet     levothyroxine (SYNTHROID/LEVOTHROID) 50 MCG tablet     multivitamin, therapeutic with minerals (MULTI-VITAMIN) TABS     VITAMIN D, CHOLECALCIFEROL, PO     zolpidem (AMBIEN) 5 MG tablet     No current facility-administered medications for this visit.      ALLERGIES:  Allergies   Allergen Reactions     Latex      SKIN REACTION       Sulfa Drugs Itching and Rash        PHYSICAL EXAM:  /75 (BP Location: Right arm, Patient Position: Sitting, Cuff Size: Adult Regular)  Pulse 92  Temp 97.3  F (36.3  C) (Oral)  Resp 16  Ht 1.638 m (5' 4.49\")  Wt 67.2 kg (148 lb 1.6 oz)  SpO2 99%  BMI 25.04 kg/m2  KPS: 100%  GENERAL: AAOx3, well appearing adult female in no acute distress  HEENT: Normocephalic, atraumatic, MMM.  No lesions of the oropharynx  LYMPH: Approx 1 cm firm right posterior cervical node (patient reports has had for decades and stable); No palpable supraclavicular, or axillary lymphadenopathy  CV: RRR, normal S1/S2, No murmurs, gallops, or rubs  LUNGS: CTA B/L, no wheezing or rhonchi  Breast:  Right breast is of increased fibroglandular density, particularly notable at 3 o'clock position just lateral to the areola, " however no discretely palpable mass in this area.  Left mastectomy.  There are no discretely palpable masses of either the right breast or left chest wall.  Right nipple is everted.  Exam is overall unchanged from prior.  ABDOMEN: soft, nontender, non-distended, no hepatosplenomegaly. Bowel sounds present.  EXTREMITIES: no pitting edema of the bilateral lower extremies  NEURO: Cranial nerves II-XII grossly intact.  Gait stable.  INTEGUMENT: No visible rashes or skin lesions.    LABS REVIEWED THIS VISIT:  8/20/18 Right breast mammogram:  IMPRESSION: BI-RADS CATEGORY: 2 - Benign Finding(s).  RECOMMENDED FOLLOW-UP: Annual Mammography.    5/14/18 DEXA bone density scan:  Lowest T-score of -1.4    IMPRESSION/PLAN: 67 yo female with a h/o T2N0M0, grade II, ER positive, ND negative, HER2 non-amplified invasive lobular carcinoma of the left breast. She is s/p treatment with taxol and ganetespib followed by dose dense adriamycin and cyclophosphamide and left breast mastectomy. She has been on letrozole since 03/2016.    1.  Left breast ER-positive carcinoma:  Ms. Chambers is 2 years 6 months out from completion of neoadjuvant chemotherapy and surgical excision of a left breast cancer.  She continues on letrozole.  She has hot flashes and arthralgias secondary to the medication.  Despite these, she is willing to continue it at this time.  We plan to treat her for a total of 10 years (through 03/2026).  There is no evidence of disease recurrence on either my clinical breast examination or right breast mammogram performed today.  I will see her back in clinic in approximately 4 months.    2.  Hypertriglyceridemia:  Triglycerides 206 in 03/2017.  May be elevated due to letrozole.  PCP is managing.  Attempting management with diet low in saturated fat and aerobic exercise at this time.  Recommended repeat triglycerides at this time.    3.  Hot flashes:  Continue gabapentin 300 mg PO QHS.  Declines increase in dosing due to  drowsiness with the medication.  Declines an SSRI.    4.  Arthralgias:  Secondary to osteoarthritis and exacerbated by letrozole.  Continue to take daily vitamin D supplementation and daily cardiovascular activity.  Ibuprofen as needed for pain.     5.  Bone health:  DEXA bone density in 05/2018 showed a lowest T-score of -1.4.  This is decreased from two years prior (was -0.7).  Continue calcium and vitamin D supplementation as well as daily weightbearing activity.  Will repeat DEXA in 2 years.    6.  H/o lung cancer:  In 2012 treated with RUL wedge resection.  She is overdue for chest CT.  Scheduled, but canceled x 2.  Will reschedule at this time.     7.  Followup:  CT chest within 1 month.  Return to clinic in approximately 4 months for visit with me.    Patient discussed with Dr. Villegas, attending physician on service.    Julia Harrington MD ([place]), PhD   Hem/onc fellow    Patient was seen and discussed with Dr. Harrington.  I have edited the above note to reflect our joint assessment and plan.     Brittany Villegas MD

## 2018-08-20 ENCOUNTER — ONCOLOGY VISIT (OUTPATIENT)
Dept: ONCOLOGY | Facility: CLINIC | Age: 68
End: 2018-08-20
Attending: INTERNAL MEDICINE
Payer: MEDICARE

## 2018-08-20 ENCOUNTER — RADIANT APPOINTMENT (OUTPATIENT)
Dept: MAMMOGRAPHY | Facility: CLINIC | Age: 68
End: 2018-08-20
Payer: MEDICARE

## 2018-08-20 VITALS
HEIGHT: 64 IN | OXYGEN SATURATION: 99 % | HEART RATE: 92 BPM | SYSTOLIC BLOOD PRESSURE: 149 MMHG | WEIGHT: 148.1 LBS | BODY MASS INDEX: 25.29 KG/M2 | RESPIRATION RATE: 16 BRPM | DIASTOLIC BLOOD PRESSURE: 75 MMHG | TEMPERATURE: 97.3 F

## 2018-08-20 DIAGNOSIS — Z17.0 MALIGNANT NEOPLASM OF LOWER-OUTER QUADRANT OF LEFT BREAST OF FEMALE, ESTROGEN RECEPTOR POSITIVE (H): Primary | ICD-10-CM

## 2018-08-20 DIAGNOSIS — C50.512 MALIGNANT NEOPLASM OF LOWER-OUTER QUADRANT OF LEFT BREAST OF FEMALE, ESTROGEN RECEPTOR POSITIVE (H): Primary | ICD-10-CM

## 2018-08-20 DIAGNOSIS — Z12.39 BREAST CANCER SCREENING, HIGH RISK PATIENT: ICD-10-CM

## 2018-08-20 DIAGNOSIS — R92.30 DENSE BREAST TISSUE: ICD-10-CM

## 2018-08-20 PROCEDURE — G0463 HOSPITAL OUTPT CLINIC VISIT: HCPCS | Mod: ZF

## 2018-08-20 PROCEDURE — 99214 OFFICE O/P EST MOD 30 MIN: CPT | Mod: ZP | Performed by: INTERNAL MEDICINE

## 2018-08-20 ASSESSMENT — PAIN SCALES - GENERAL: PAINLEVEL: NO PAIN (0)

## 2018-08-20 NOTE — NURSING NOTE
"Oncology Rooming Note    August 20, 2018 3:50 PM   Sushila Chambers is a 68 year old female who presents for:    Chief Complaint   Patient presents with     RECHECK     ONc Breast Ca     Initial Vitals: There were no vitals taken for this visit. Estimated body mass index is 25.35 kg/(m^2) as calculated from the following:    Height as of 5/22/18: 1.638 m (5' 4.5\").    Weight as of 5/22/18: 68 kg (150 lb). There is no height or weight on file to calculate BSA.  Data Unavailable Comment: Data Unavailable   No LMP recorded. Patient is postmenopausal.  Allergies reviewed: Yes  Medications reviewed: Yes    Medications: Medication refills not needed today.  Pharmacy name entered into [facility]:    [facility] - [place], [place] - [address]  [id number] - [place], [place] - [address] [facility] [place] [place] [address]  [facility]          Clinical concerns: none      8 minutes for nursing intake (face to face time)     Leena ELIS [place]              "

## 2018-08-20 NOTE — MR AVS SNAPSHOT
After Visit Summary   8/20/2018    Sushila Chambers    MRN: [id number]           Patient Information     Date Of Birth          1950        Visit Information        Provider Department      8/20/2018 3:45 PM Brittany Villegas MD [facility]        Today's Diagnoses     Malignant neoplasm of lower-outer quadrant of left breast of female, estrogen receptor positive (H)    -  1       Follow-ups after your visit        Your next 10 appointments already scheduled     Sep 10, 2018  3:00 PM CDT   CT CHEST W/O CONTRAST with UCCT2   [facility] ([facility])    [address]  1st Floor  [facility] [address] [phone]           Please bring any scans or X-rays taken at other hospitals, if similar tests were done. Also bring a list of your medicines, including vitamins, minerals and over-the-counter drugs. It is safest to leave personal items at home.  Be sure to tell your doctor:   If you have any allergies.   If there s any chance you are pregnant.   If you are breastfeeding.  You do not need to do anything special to prepare for this exam.  Please wear loose clothing, such as a sweat suit or jogging clothes. Avoid snaps, zippers and other metal. We may ask you to undress and put on a hospital gown.            Dec 17, 2018 11:15 AM CST   (Arrive by 11:00 AM)   Return Visit with Brittany Villegas MD   [facility] ([facility])    [address] [place] [address]  [address]  [facility] [address] [phone]              Who to contact     If you have questions or need follow up information about today's clinic visit or your schedule please contact [facility] directly at [phone].  Normal or non-critical lab and imaging results will be communicated to you by MyChart, letter or phone within 4 business days after the clinic has received the results. If you do  "not hear from us within 7 days, please contact the clinic through [employer] or phone. If you have a critical or abnormal lab result, we will notify you by phone as soon as possible.  Submit refill requests through [employer] or call your pharmacy and they will forward the refill request to us. Please allow 3 business days for your refill to be completed.          Additional Information About Your Visit        QPDhart Information     [employer] gives you secure access to your electronic health record. If you see a primary care provider, you can also send messages to your care team and make appointments. If you have questions, please call your primary care clinic.  If you do not have a primary care provider, please call [phone] and they will assist you.        Care EveryWhere ID     This is your Care EveryWhere ID. This could be used by other organizations to access your [facility] medical records  [id number]        Your Vitals Were     Pulse Temperature Respirations Height Pulse Oximetry BMI (Body Mass Index)    92 97.3  F (36.3  C) (Oral) 16 1.638 m (5' 4.49\") 99% 25.04 kg/m2       Blood Pressure from Last 3 Encounters:   08/20/18 149/75   05/22/18 139/80   04/30/18 147/82    Weight from Last 3 Encounters:   08/20/18 67.2 kg (148 lb 1.6 oz)   05/22/18 68 kg (150 lb)   04/30/18 69.4 kg (153 lb 1 oz)              Today, you had the following     No orders found for display       Primary Care Provider Office Phone # Fax #    [facility] [phone] [address] [address]        Equal Access to Services     [facility] : Hadii aad ku hadasho Sodoriali, waaxda luqadaha, qaybta kaalmada audrey, bakari parry. So [facility] [phone].    ATENCIÓN: Si habla español, tiene a hinojosa disposición servicios gratuitos de asistencia lingüística. Tomame al [phone].    We comply with applicable federal civil rights laws and [place] laws. We do not " discriminate on the basis of race, color, national origin, age, disability, sex, sexual orientation, or gender identity.            Thank you!     Thank you for choosing [facility]  for your care. Our goal is always to provide you with excellent care. Hearing back from our patients is one way we can continue to improve our services. Please take a few minutes to complete the written survey that you may receive in the mail after your visit with us. Thank you!             Your Updated Medication List - Protect others around you: Learn how to safely use, store and throw away your medicines at www.disposemymeds.org.          This list is accurate as of 8/20/18 11:59 PM.  Always use your most recent med list.                   Brand Name Dispense Instructions for use Diagnosis    biotin 1000 MCG Tabs tablet      Take by mouth daily        calcium carbonate 500 MG tablet    OS-OCTAVIANO 500 mg [place]. Ca     Take by mouth 2 times daily        gabapentin 300 MG capsule    NEURONTIN    90 capsule    Take 1 capsule (300 mg) by mouth At Bedtime    Neuropathy, Menopausal syndrome (hot flashes)       hydrochlorothiazide 12.5 MG Tabs tablet     90 tablet    Take 1 tablet (12.5 mg) by mouth daily    Hypertension goal BP (blood pressure) < 140/90       letrozole 2.5 MG tablet    FEMARA    90 tablet    Take 1 tablet (2.5 mg) by mouth daily    Malignant neoplasm of lower-outer quadrant of left female breast (H)       levothyroxine 50 MCG tablet    SYNTHROID/LEVOTHROID    90 tablet    Take 1 tablet (50 mcg) by mouth daily    Hypothyroidism, unspecified type       Multi-vitamin Tabs tablet      Take 1 tablet by mouth daily        TYLENOL PO      Take 500 mg by mouth every 4 hours as needed for mild pain or fever Reported on 3/21/2017        VITAMIN D (CHOLECALCIFEROL) PO      Take by mouth daily        zolpidem 5 MG tablet    AMBIEN    30 tablet    Take 1 tablet (5 mg) by mouth nightly as needed for sleep    Drug induced  insomnia (H)

## 2018-08-20 NOTE — LETTER
8/20/2018       RE: Sushila Chambers  [address] [place] [address]     Dear Colleague,    Thank you for referring your patient, Sushila Chambers, to the [facility]. Please see a copy of my visit note below.    MEDICAL ONCOLOGY FOLLOW-UP PATIENT VISIT     NAME: Sushila Chambers     DATE: 8/20/18    PRIMARY CARE PHYSICIAN: Meryl Muro    PATIENT ID: Multifocal, stage IIa, T2N0M0, ER positive, [place] negative, HER2 non-amplified invasive lobular carcinoma of the left breast.     Oncology History: Ms. Chambers is a 68 year old female with a history of lung cancer and right breast cancer with more recent invasive lobular carcinoma of the left breast. Ms. Chambers was treated for right sided breast cancer in 2009 with right breast lumpectomy, radiation, and endocrine therapy. She self palpated a mass in the left breast in early August, 2015. Diagnostic mammogram and ultrasound showed 2 ill-defined spiculated masses at the 4:30 and 2:00 positions of the left breast. There was surrounding architectural distortion and the total area of involvement measured 6.5 cm. Targeted left breast ultrasound showed a multilobulated hypoechoic mass at the 4:00 position measuring 2.5 cm and an ill-defined hypoechoic mass at the 2:00 position measuring 2.4 cm.  Biopsy of the mass at the 4:00 position showed grade II invasive pleomorphic lobular carcinoma. Biopsy of the mass at the 2:00 position showed a grade II invasive lobular carcinoma. No angiolymphatic invasion or associated LCIS was seen in either specimen. Estrogen receptor staining was positive in >80% of tumor cell nuclei. Progesterone receptor staining was positive in <5% of tumor cell nuclei. HER2 was non-amplifed by FISH in both specimens.     Ms. Chambers enrolled on the ISPY2 clinical trial.  She received 12 weeks of ganetespib and Taxol and 4 cycles of dose dense AC from 9/29/15 - 2/1/16. On 2/24/16 she underwent left breast mastectomy and sentinel  "lymph node procedure under the care of Dr. Betts. Pathology showed a residual grade II 4.8 cm, pleomorphic invasive lobular carcinoma with associated LCIS. Invasive tumor cellularity was 30%.  There was evidence of perineural invasion, however, no lymphvascular space invasion. Two sentinel lymph nodes were negative.  She has been on adjuvant letrozole since 03/2016.    Ms. Chambers had genetic testing on 9/15/15.  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  Of note she had a number of alterations in RUFINA, BARD1, BRCA1/2, MUTYH, and TP53 that were classified as \"likely benign\" and \"benign\".    Interim History:   Ms. Chambers presents today for routine follow up. She has no specific questions nor concerns, noting that her endocrine therapy related symptoms have been stable and generally tolerable. Notably, she continues to have arthralgias and neuropathy (in b/l feet). They are not life/QOL limiting. She continues to be active, going to the gym 1hr/day x 4 days per week, doing pilates and strength training.    She has two daughters who live locally, one with a 1 and 15 year old.    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 They also worked on a project with [employer], raising money. Her  unfortunately passed away from metastatic cancer.    On ROS:  Endorses  +stable, good energy level, not napping    Denies  Headaches, nausea/vomiting, weight loss/gain, chest pain, shortness of breath, abdominal pain, constipation/diarrhea, medication, hematuria, dysuria, rashes, lymphadenopathy, edema.  The remainder of a complete 12 point review of systems was completed and was negative with the exception of that mentioned above.    PAST MEDICAL HISTORY:   Past Medical History:   Diagnosis Date     Basal cell carcinoma      Breast cancer, right breast (H)      History of blood transfusion      Hypothyroid      Lung cancer (H) 2012    right " "    Uncontrolled hypertension 11/15/2017       PAST SURGICAL HISTORY:  Past Surgical History:   Procedure Laterality Date     ABDOMEN SURGERY       BIOPSY, LUNG NODULE Right 2012    + cancer     COLONOSCOPY       ECTOPIC PREGNANCY SURGERY Left 1988     GENITOURINARY SURGERY       GYN SURGERY       INSERT PORT VASCULAR ACCESS       LUMPECTOMY BREAST Right 2010     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     MASTECTOMY SIMPLE, SENTINEL NODE, COMBINED Left 2/24/2016    Procedure: COMBINED MASTECTOMY SIMPLE, SENTINEL NODE;  Surgeon: Satnam Betts MD;  Location: [facility] OR     REMOVE PORT VASCULAR ACCESS N/A 2/24/2016    Procedure: REMOVE PORT VASCULAR ACCESS;  Surgeon: Satnam Betts MD;  Location: [facility] OR     THORACIC SURGERY       MEDS:  Current Outpatient Prescriptions   Medication     Acetaminophen (TYLENOL PO)     biotin 1000 MCG TABS tablet     calcium carbonate (OS-OCTAVIANO 500 MG [place]. [place]) 500 MG tablet     gabapentin (NEURONTIN) 300 MG capsule     hydrochlorothiazide 12.5 MG TABS tablet     letrozole (FEMARA) 2.5 MG tablet     levothyroxine (SYNTHROID/LEVOTHROID) 50 MCG tablet     multivitamin, therapeutic with minerals (MULTI-VITAMIN) TABS     VITAMIN D, CHOLECALCIFEROL, PO     zolpidem (AMBIEN) 5 MG tablet     No current facility-administered medications for this visit.      ALLERGIES:  Allergies   Allergen Reactions     Latex      SKIN REACTION       Sulfa Drugs Itching and Rash        PHYSICAL EXAM:  /75 (BP Location: Right arm, Patient Position: Sitting, Cuff Size: Adult Regular)  Pulse 92  Temp 97.3  F (36.3  C) (Oral)  Resp 16  Ht 1.638 m (5' 4.49\")  Wt 67.2 kg (148 lb 1.6 oz)  SpO2 99%  BMI 25.04 kg/m2  KPS: 100%  GENERAL: AAOx3, well appearing adult female in no acute distress  HEENT: Normocephalic, atraumatic, MMM.  No lesions of the oropharynx  LYMPH: Approx 1 cm firm right posterior cervical node (patient reports has had for decades and stable); No palpable supraclavicular, or axillary " lymphadenopathy  CV: RRR, normal S1/S2, No murmurs, gallops, or rubs  LUNGS: CTA B/L, no wheezing or rhonchi  Breast:  Right breast is of increased fibroglandular density, particularly notable at 3 o'clock position just lateral to the areola, however no discretely palpable mass in this area.  Left mastectomy.  There are no discretely palpable masses of either the right breast or left chest wall.  Right nipple is everted.  Exam is overall unchanged from prior.  ABDOMEN: soft, nontender, non-distended, no hepatosplenomegaly. Bowel sounds present.  EXTREMITIES: no pitting edema of the bilateral lower extremies  NEURO: Cranial nerves II-XII grossly intact.  Gait stable.  INTEGUMENT: No visible rashes or skin lesions.    LABS REVIEWED THIS VISIT:  8/20/18 Right breast mammogram:  IMPRESSION: BI-RADS CATEGORY: 2 - Benign Finding(s).  RECOMMENDED FOLLOW-UP: Annual Mammography.    5/14/18 DEXA bone density scan:  Lowest T-score of -1.4    IMPRESSION/PLAN: 67 yo female with a h/o T2N0M0, grade II, ER positive, [place] negative, HER2 non-amplified invasive lobular carcinoma of the left breast. She is s/p treatment with taxol and ganetespib followed by dose dense adriamycin and cyclophosphamide and left breast mastectomy. She has been on letrozole since 03/2016.    1.  Left breast ER-positive carcinoma:  Ms. Chambers is 2 years 6 months out from completion of neoadjuvant chemotherapy and surgical excision of a left breast cancer.  She continues on letrozole.  She has hot flashes and arthralgias secondary to the medication.  Despite these, she is willing to continue it at this time.  We plan to treat her for a total of 10 years (through 03/2026).  There is no evidence of disease recurrence on either my clinical breast examination or right breast mammogram performed today.  I will see her back in clinic in approximately 4 months.    2.  Hypertriglyceridemia:  Triglycerides 206 in 03/2017.  May be elevated due to letrozole.  PCP is  managing.  Attempting management with diet low in saturated fat and aerobic exercise at this time.  Recommended repeat triglycerides at this time.    3.  Hot flashes:  Continue gabapentin 300 mg PO QHS.  Declines increase in dosing due to drowsiness with the medication.  Declines an SSRI.    4.  Arthralgias:  Secondary to osteoarthritis and exacerbated by letrozole.  Continue to take daily vitamin D supplementation and daily cardiovascular activity.  Ibuprofen as needed for pain.     5.  Bone health:  DEXA bone density in 05/2018 showed a lowest T-score of -1.4.  This is decreased from two years prior (was -0.7).  Continue calcium and vitamin D supplementation as well as daily weightbearing activity.  Will repeat DEXA in 2 years.    6.  H/o lung cancer:  In 2012 treated with RUL wedge resection.  She is overdue for chest CT.  Scheduled, but canceled x 2.  Will reschedule at this time.     7.  Followup:  CT chest within 1 month.  Return to clinic in approximately 4 months for visit with me.    Patient discussed with Dr. Villegas, attending physician on service.    Julia Harrington MD ([place]), PhD   Hem/onc fellow    Patient was seen and discussed with Dr. Harrington.  I have edited the above note to reflect our joint assessment and plan.     Brittany Villegas MD

## 2018-12-14 ENCOUNTER — MYC MEDICAL ADVICE (OUTPATIENT)
Dept: FAMILY MEDICINE | Facility: CLINIC | Age: 68
End: 2018-12-14

## 2018-12-16 NOTE — PROGRESS NOTES
MEDICAL ONCOLOGY FOLLOW-UP PATIENT VISIT     NAME: Sushila Chambers     DATE: 12/17/18    PRIMARY CARE PHYSICIAN: Meryl Muro    PATIENT ID: Multifocal, stage IIa, T2N0M0, ER positive, [place] negative, HER2 non-amplified invasive lobular carcinoma of the left breast.     Oncology History: Ms. Chambers is a 68 year old female with a history of lung cancer and right breast cancer with more recent invasive lobular carcinoma of the left breast. Ms. Chambers was treated for right sided breast cancer in 2009 with right breast lumpectomy, radiation, and endocrine therapy. She self palpated a mass in the left breast in early August, 2015. Diagnostic mammogram and ultrasound showed 2 ill-defined spiculated masses at the 4:30 and 2:00 positions of the left breast. There was surrounding architectural distortion and the total area of involvement measured 6.5 cm. Targeted left breast ultrasound showed a multilobulated hypoechoic mass at the 4:00 position measuring 2.5 cm and an ill-defined hypoechoic mass at the 2:00 position measuring 2.4 cm.  Biopsy of the mass at the 4:00 position showed grade II invasive pleomorphic lobular carcinoma. Biopsy of the mass at the 2:00 position showed a grade II invasive lobular carcinoma. No angiolymphatic invasion or associated LCIS was seen in either specimen. Estrogen receptor staining was positive in >80% of tumor cell nuclei. Progesterone receptor staining was positive in <5% of tumor cell nuclei. HER2 was non-amplifed by FISH in both specimens.     Ms. Chambers enrolled on the ISPY2 clinical trial.  She received 12 weeks of ganetespib and Taxol and 4 cycles of dose dense AC from 9/29/15 - 2/1/16. On 2/24/16 she underwent left breast mastectomy and sentinel lymph node procedure under the care of Dr. Betts. Pathology showed a residual grade II 4.8 cm, pleomorphic invasive lobular carcinoma with associated LCIS. Invasive tumor cellularity was 30%.  There was evidence of perineural invasion,  "however, no lymphvascular space invasion. Two sentinel lymph nodes were negative.  She has been on adjuvant letrozole since 03/2016.    Ms. Chambers had genetic testing on 9/15/15.  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  Of note she had a number of alterations in RUFINA, BARD1, BRCA1/2, MUTYH, and TP53 that were classified as \"likely benign\" and \"benign\".    Interim History:   Ms. Chambers comes into clinic today for routine breast cancer follow-up.  She continues on treatment with letrozole.  She continues to have intermittent hot flashes and describes them as tolerable.  She takes gabapentin prior to sleep at night which greatly helps.  She has ongoing stiffness in her hands and feet.  She denies new bone or joint aches or pains.  She denies vaginal complaints.  She reports that she recently developed a cold after returning from [facility] over Thanksgiving.  She states initially she had fever and respiratory symptoms.  Fever has resolved and she currently only has nasal congestion and cough.  She denies new lumps or masses.  She has no cough, shortness of breath, or chest pain.  She states that she is not exercising as much as she used to.  Her dog is getting older and no longer requires the long walks that he used to.  She has also found it difficult to go for walks in the cold weather.  She denies headaches, visual changes, or focal neurologic complaints.  She has no current abdominal complaints.  The remainder of a complete 12 point review of systems was completed and was negative with the exception of that mentioned above.  She had a number of questions today regarding routine health maintenance including management of cardiovascular risk factors and what she should have for cardiovascular screening.     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 [place]) 500 MG tablet     gabapentin (NEURONTIN) 300 MG capsule     hydrochlorothiazide 12.5 MG TABS tablet     letrozole (FEMARA) 2.5 MG tablet     levothyroxine (SYNTHROID/LEVOTHROID) 50 MCG tablet     multivitamin, therapeutic with minerals (MULTI-VITAMIN) TABS     VITAMIN D, CHOLECALCIFEROL, PO     zolpidem (AMBIEN) 5 MG tablet     No current facility-administered medications for this visit.      ALLERGIES:  Allergies   Allergen Reactions     Latex      SKIN REACTION       Sulfa Drugs Itching and Rash        PHYSICAL EXAM:  /89 (BP Location: Left arm, Patient Position: Sitting, Cuff Size: Adult Regular)   Pulse 117   Temp 97.7  F (36.5  C) (Oral)   Resp 16   Ht 1.638 m (5' 4.49\")   Wt 65.4 kg (144 lb 3.2 oz)   SpO2 99%   BMI 24.38 kg/m    KPS: 100%  GENERAL: AAOx3, well appearing adult female in no acute distress  HEENT: Normocephalic, atraumatic, MMM.  No lesions of the oropharynx  LYMPH: No palpable supraclavicular, cervical, or axillary lymphadenopathy  CV: RRR, normal S1/S2, No murmurs, gallops, or " rubs  LUNGS: CTA B/L, no wheezing or rhonchi  Breast:  Right breast is of increased fibroglandular density.  Left mastectomy.  There are no discretely palpable masses of either the right breast or left chest wall.  Right nipple is everted.    ABDOMEN: soft, nontender, non-distended, no hepatosplenomegaly. Bowel sounds present.  EXTREMITIES: no pitting edema of the bilateral lower extremies  NEURO: Cranial nerves II-XII grossly intact.  Gait stable.  INTEGUMENT: No visible rashes or skin lesions.  PSYCH:  Mood and affect appear normal.    LABS REVIEWED THIS VISIT:  No interim imaging or laboratory exams pertinent to today's visit.    IMPRESSION/PLAN: 69 yo female with a h/o T2N0M0, grade II, ER positive, [place] negative, HER2 non-amplified invasive lobular carcinoma of the left breast. She is s/p treatment with taxol and ganetespib followed by dose dense adriamycin and cyclophosphamide, left breast mastectomy, and has been on letrozole since 03/2016.    1.  Left breast ER-positive carcinoma:  Ms. Chambers is 2 years 10 months out from completion of neoadjuvant chemotherapy and surgical excision of a left breast cancer.  She continues on letrozole.  She is overall tolerating the medication well.  We plan to treat her for a total of 10 years (through 03/2026).  There is no evidence of disease recurrence on exam today.  I will see her back in clinic in approximately 4 months.      We discussed today the risks and benefits of high risk screening breast MRI.  Due to h/o bilateral breast cancers and multiple gene mutations, she is certainly eligible.  She would like to proceed.  I recommend doing these no more than 5 years.  Will obtain breast MRI in 02/2019.  Next mammogram will be due in 08/2019.    2.  Hypertriglyceridemia:  Triglycerides were elevated at 206 in 03/2017.  May be elevated due to letrozole.  Recommend repeat triglycerides at this time.  I have entered orders; she will have drawn upon returning from  [place].    3.  Hot flashes:  Stable since last visit.  Continue gabapentin 300 mg PO QHS.      4.  Arthralgias:  Secondary to osteoarthritis and exacerbated by letrozole.  Continue to take daily vitamin D supplementation and daily cardiovascular activity.  Ibuprofen as needed for pain.     5.  Bone health:  DEXA bone density in 05/2018 showed a lowest T-score of -1.4.  Continue calcium and vitamin D supplementation as well as daily weightbearing activity.    6.  H/o lung cancer:  In 2012 treated with RUL wedge resection.       7.  Followup:  Breast MRI in 02/2019.  Return to clinic in approximately 4 months for labs and visit with me.    It was a pleasure to meet with Ms. Cecilioin in clinic today.  A total of 20 minutes of our 25 minute face to face visit was spent in counseling.

## 2018-12-17 ENCOUNTER — ONCOLOGY VISIT (OUTPATIENT)
Dept: ONCOLOGY | Facility: CLINIC | Age: 68
End: 2018-12-17
Attending: INTERNAL MEDICINE
Payer: MEDICARE

## 2018-12-17 VITALS
OXYGEN SATURATION: 99 % | DIASTOLIC BLOOD PRESSURE: 89 MMHG | WEIGHT: 144.2 LBS | RESPIRATION RATE: 16 BRPM | HEIGHT: 64 IN | SYSTOLIC BLOOD PRESSURE: 179 MMHG | TEMPERATURE: 97.7 F | HEART RATE: 117 BPM | BODY MASS INDEX: 24.62 KG/M2

## 2018-12-17 DIAGNOSIS — Z85.3 PERSONAL HISTORY OF MALIGNANT NEOPLASM OF BREAST: ICD-10-CM

## 2018-12-17 DIAGNOSIS — Z15.01 BRCA GENE MUTATION POSITIVE: ICD-10-CM

## 2018-12-17 DIAGNOSIS — Z15.09 BRCA GENE MUTATION POSITIVE: ICD-10-CM

## 2018-12-17 DIAGNOSIS — Z12.39 BREAST CANCER SCREENING, HIGH RISK PATIENT: ICD-10-CM

## 2018-12-17 DIAGNOSIS — Z17.0 MALIGNANT NEOPLASM OF LOWER-OUTER QUADRANT OF LEFT BREAST OF FEMALE, ESTROGEN RECEPTOR POSITIVE (H): Primary | ICD-10-CM

## 2018-12-17 DIAGNOSIS — C50.512 MALIGNANT NEOPLASM OF LOWER-OUTER QUADRANT OF LEFT BREAST OF FEMALE, ESTROGEN RECEPTOR POSITIVE (H): Primary | ICD-10-CM

## 2018-12-17 DIAGNOSIS — E78.1 HYPERTRIGLYCERIDEMIA: ICD-10-CM

## 2018-12-17 PROCEDURE — 99214 OFFICE O/P EST MOD 30 MIN: CPT | Mod: ZP | Performed by: INTERNAL MEDICINE

## 2018-12-17 PROCEDURE — G0463 HOSPITAL OUTPT CLINIC VISIT: HCPCS | Mod: ZF

## 2018-12-17 ASSESSMENT — PAIN SCALES - GENERAL: PAINLEVEL: NO PAIN (0)

## 2018-12-17 ASSESSMENT — MIFFLIN-ST. JEOR: SCORE: 1171.84

## 2018-12-17 NOTE — TELEPHONE ENCOUNTER
Patient states bp running high via my chart   Advised office visit     Elisabet Schafer Registered Nurse   [facility]

## 2018-12-17 NOTE — NURSING NOTE
"Oncology Rooming Note    December 17, 2018 11:31 AM   Sushila Chambers is a 68 year old female who presents for:    Chief Complaint   Patient presents with     Oncology Clinic Visit     Breast Ca     Initial Vitals: /89 (BP Location: Left arm, Patient Position: Sitting, Cuff Size: Adult Regular)   Pulse 117   Temp 97.7  F (36.5  C) (Oral)   Resp 16   Ht 1.638 m (5' 4.49\")   Wt 65.4 kg (144 lb 3.2 oz)   SpO2 99%   BMI 24.38 kg/m   Estimated body mass index is 24.38 kg/m  as calculated from the following:    Height as of this encounter: 1.638 m (5' 4.49\").    Weight as of this encounter: 65.4 kg (144 lb 3.2 oz). Body surface area is 1.73 meters squared.  No Pain (0) Comment: Data Unavailable   No LMP recorded. Patient is postmenopausal.  Allergies reviewed: Yes  Medications reviewed: Yes    Medications: Medication refills not needed today.  Pharmacy name entered into [facility]:    [facility] - [place], [place] - [address]  [id number] - [place], [place] - [address] AT [facility] [address]  [facility]          Clinical concerns: None, Dr Villegas was NOT notified.    10 minutes for nursing intake (face to face time)     CARLA TO LPN            "

## 2018-12-17 NOTE — LETTER
12/17/2018       RE: Sushila Chambers  [address] [place] [address]     Dear Colleague,    Thank you for referring your patient, Sushila Chambers, to the [facility]. Please see a copy of my visit note below.    MEDICAL ONCOLOGY FOLLOW-UP PATIENT VISIT     NAME: Sushila Chambers     DATE: 12/17/18    PRIMARY CARE PHYSICIAN: Meryl Muro    PATIENT ID: Multifocal, stage IIa, T2N0M0, ER positive, [place] negative, HER2 non-amplified invasive lobular carcinoma of the left breast.     Oncology History: Ms. Chambers is a 68 year old female with a history of lung cancer and right breast cancer with more recent invasive lobular carcinoma of the left breast. Ms. Chambers was treated for right sided breast cancer in 2009 with right breast lumpectomy, radiation, and endocrine therapy. She self palpated a mass in the left breast in early August, 2015. Diagnostic mammogram and ultrasound showed 2 ill-defined spiculated masses at the 4:30 and 2:00 positions of the left breast. There was surrounding architectural distortion and the total area of involvement measured 6.5 cm. Targeted left breast ultrasound showed a multilobulated hypoechoic mass at the 4:00 position measuring 2.5 cm and an ill-defined hypoechoic mass at the 2:00 position measuring 2.4 cm.  Biopsy of the mass at the 4:00 position showed grade II invasive pleomorphic lobular carcinoma. Biopsy of the mass at the 2:00 position showed a grade II invasive lobular carcinoma. No angiolymphatic invasion or associated LCIS was seen in either specimen. Estrogen receptor staining was positive in >80% of tumor cell nuclei. Progesterone receptor staining was positive in <5% of tumor cell nuclei. HER2 was non-amplifed by FISH in both specimens.     Ms. Chambers enrolled on the ISPY2 clinical trial.  She received 12 weeks of ganetespib and Taxol and 4 cycles of dose dense AC from 9/29/15 - 2/1/16. On 2/24/16 she underwent left breast mastectomy and sentinel  "lymph node procedure under the care of Dr. Betts. Pathology showed a residual grade II 4.8 cm, pleomorphic invasive lobular carcinoma with associated LCIS. Invasive tumor cellularity was 30%.  There was evidence of perineural invasion, however, no lymphvascular space invasion. Two sentinel lymph nodes were negative.  She has been on adjuvant letrozole since 03/2016.    Ms. Chambers had genetic testing on 9/15/15.  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  Of note she had a number of alterations in RUFINA, BARD1, BRCA1/2, MUTYH, and TP53 that were classified as \"likely benign\" and \"benign\".    Interim History:   Ms. Chambers comes into clinic today for routine breast cancer follow-up.  She continues on treatment with letrozole.  She continues to have intermittent hot flashes and describes them as tolerable.  She takes gabapentin prior to sleep at night which greatly helps.  She has ongoing stiffness in her hands and feet.  She denies new bone or joint aches or pains.  She denies vaginal complaints.  She reports that she recently developed a cold after returning from [facility] over Thanksgiving.  She states initially she had fever and respiratory symptoms.  Fever has resolved and she currently only has nasal congestion and cough.  She denies new lumps or masses.  She has no cough, shortness of breath, or chest pain.  She states that she is not exercising as much as she used to.  Her dog is getting older and no longer requires the long walks that he used to.  She has also found it difficult to go for walks in the cold weather.  She denies headaches, visual changes, or focal neurologic complaints.  She has no current abdominal complaints.  The remainder of a complete 12 point review of systems was completed and was negative with the exception of that mentioned above.  She had a number of questions today regarding routine health maintenance including management of cardiovascular risk factors and what she should " "have for cardiovascular screening.     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     MASTECTOMY SIMPLE, SENTINEL NODE, COMBINED Left 2/24/2016    Procedure: COMBINED MASTECTOMY SIMPLE, SENTINEL NODE;  Surgeon: Satnam Betts MD;  Location: [facility] OR     REMOVE PORT VASCULAR ACCESS N/A 2/24/2016    Procedure: REMOVE PORT VASCULAR ACCESS;  Surgeon: Satnam Betts MD;  Location: [facility] OR     THORACIC SURGERY       MEDS:  Current Outpatient Medications   Medication     Acetaminophen (TYLENOL PO)     biotin 1000 MCG TABS tablet     calcium carbonate (OS-OCTAVIANO 500 MG [place]. [place]) 500 MG tablet     gabapentin (NEURONTIN) 300 MG capsule     hydrochlorothiazide 12.5 MG TABS tablet     letrozole (FEMARA) 2.5 MG tablet     levothyroxine (SYNTHROID/LEVOTHROID) 50 MCG tablet     multivitamin, therapeutic with minerals (MULTI-VITAMIN) TABS     VITAMIN D, CHOLECALCIFEROL, PO     zolpidem (AMBIEN) 5 MG tablet     No current facility-administered medications for this visit.      ALLERGIES:  Allergies   Allergen Reactions     Latex      SKIN REACTION       Sulfa Drugs Itching and Rash        PHYSICAL EXAM:  /89 (BP Location: Left arm, Patient Position: Sitting, Cuff Size: Adult Regular)   Pulse 117   Temp 97.7  F (36.5  C) (Oral)   Resp 16   Ht 1.638 m (5' 4.49\")   Wt 65.4 kg (144 lb 3.2 oz)   SpO2 99%   BMI 24.38 kg/m     KPS: 100%  GENERAL: AAOx3, well appearing " adult female in no acute distress  HEENT: Normocephalic, atraumatic, MMM.  No lesions of the oropharynx  LYMPH: No palpable supraclavicular, cervical, or axillary lymphadenopathy  CV: RRR, normal S1/S2, No murmurs, gallops, or rubs  LUNGS: CTA B/L, no wheezing or rhonchi  Breast:  Right breast is of increased fibroglandular density.  Left mastectomy.  There are no discretely palpable masses of either the right breast or left chest wall.  Right nipple is everted.    ABDOMEN: soft, nontender, non-distended, no hepatosplenomegaly. Bowel sounds present.  EXTREMITIES: no pitting edema of the bilateral lower extremies  NEURO: Cranial nerves II-XII grossly intact.  Gait stable.  INTEGUMENT: No visible rashes or skin lesions.  PSYCH:  Mood and affect appear normal.    LABS REVIEWED THIS VISIT:  No interim imaging or laboratory exams pertinent to today's visit.    IMPRESSION/PLAN: 67 yo female with a h/o T2N0M0, grade II, ER positive, [place] negative, HER2 non-amplified invasive lobular carcinoma of the left breast. She is s/p treatment with taxol and ganetespib followed by dose dense adriamycin and cyclophosphamide, left breast mastectomy, and has been on letrozole since 03/2016.    1.  Left breast ER-positive carcinoma:  Ms. Chambers is 2 years 10 months out from completion of neoadjuvant chemotherapy and surgical excision of a left breast cancer.  She continues on letrozole.  She is overall tolerating the medication well.  We plan to treat her for a total of 10 years (through 03/2026).  There is no evidence of disease recurrence on exam today.  I will see her back in clinic in approximately 4 months.      We discussed today the risks and benefits of high risk screening breast MRI.  Due to h/o bilateral breast cancers and multiple gene mutations, she is certainly eligible.  She would like to proceed.  I recommend doing these no more than 5 years.  Will obtain breast MRI in 02/2019.  Next mammogram will be due in 08/2019.    2.   Hypertriglyceridemia:  Triglycerides were elevated at 206 in 03/2017.  May be elevated due to letrozole.  Recommend repeat triglycerides at this time.  I have entered orders; she will have drawn upon returning from [place].    3.  Hot flashes:  Stable since last visit.  Continue gabapentin 300 mg PO QHS.      4.  Arthralgias:  Secondary to osteoarthritis and exacerbated by letrozole.  Continue to take daily vitamin D supplementation and daily cardiovascular activity.  Ibuprofen as needed for pain.     5.  Bone health:  DEXA bone density in 05/2018 showed a lowest T-score of -1.4.  Continue calcium and vitamin D supplementation as well as daily weightbearing activity.    6.  H/o lung cancer:  In 2012 treated with RUL wedge resection.       7.  Followup:  Breast MRI in 02/2019.  Return to clinic in approximately 4 months for labs and visit with me.    It was a pleasure to meet with Ms. Chambers in clinic today.  A total of 20 minutes of our 25 minute face to face visit was spent in counseling.    Again, thank you for allowing me to participate in the care of your patient.      Sincerely,    Brittany Villegas MD

## 2018-12-19 ENCOUNTER — OFFICE VISIT (OUTPATIENT)
Dept: FAMILY MEDICINE | Facility: CLINIC | Age: 68
End: 2018-12-19
Payer: MEDICARE

## 2018-12-19 VITALS
OXYGEN SATURATION: 97 % | SYSTOLIC BLOOD PRESSURE: 124 MMHG | RESPIRATION RATE: 20 BRPM | HEART RATE: 78 BPM | HEIGHT: 65 IN | TEMPERATURE: 97.9 F | BODY MASS INDEX: 23.32 KG/M2 | WEIGHT: 140 LBS | DIASTOLIC BLOOD PRESSURE: 70 MMHG

## 2018-12-19 DIAGNOSIS — I10 HYPERTENSION GOAL BP (BLOOD PRESSURE) < 140/90: Primary | ICD-10-CM

## 2018-12-19 PROCEDURE — 99213 OFFICE O/P EST LOW 20 MIN: CPT | Performed by: NURSE PRACTITIONER

## 2018-12-19 ASSESSMENT — MIFFLIN-ST. JEOR: SCORE: 1152.98

## 2018-12-19 NOTE — PATIENT INSTRUCTIONS
Patient Education     Established High Blood Pressure    High blood pressure (hypertension) is a chronic disease. Often, healthcare providers don t know what causes it. But it can be caused by certain health conditions and medicines.  If you have high blood pressure, you may not have any symptoms. If you do have symptoms, they may include headache, dizziness, changes in your vision, chest pain, and shortness of breath. But even without symptoms, high blood pressure that s not treated raises your risk for heart attack, heart failure, and stroke. High blood pressure is a serious health risk and shouldn t be ignored.  Blood pressure measurements are given as 2 numbers. Systolic blood pressure is the upper number. This is the pressure when the heart contracts. Diastolic blood pressure is the lower number. This is the pressure when the heart relaxes between beats. You will see your blood pressure readings written together. For example, a person with a systolic pressure of 118 and a diastolic pressure of 78 will have 118/78 written in the medical record.  Blood pressure is categorized as normal, elevated, or stage 1 or stage 2 high blood pressure:    Normal blood pressure is systolic of less than 120 and diastolic of less than 80 (120/80)    Elevated blood pressure is systolic of 120 to 129 and diastolic less than 80    Stage 1 high blood pressure is systolic is 130 to 139 or diastolic between 80 to 89    Stage 2 high blood pressure is when systolic is 140 or higher or the diastolic is 90 or higher  Home care  If you have high blood pressure, follow these home care guidelines to help lower your blood pressure. If you are taking medicines for high blood pressure, these methods may reduce or end your need for medicines in the future.    Start a weight-loss program if you are overweight.    Cut back on how much salt you get in your diet. Here s how to do this:  ? Don t eat foods that have a lot of salt. These include olives,  pickles, smoked meats, and salted potato chips.  ? Don t add salt to your food at the table.  ? Use only small amounts of salt when cooking.    Start an exercise program. Talk with your healthcare provider about the type of exercise program that would be best for you. It doesn't have to be hard. Even brisk walking for 20 minutes 3 times a week is a good form of exercise.    Don t take medicines that stimulate the heart. This includes many over-the-counter cold and sinus decongestant pills and sprays, as well as diet pills. Check the warnings about high blood pressure on the label. Before buying any over-the-counter medicines or supplements, always ask the pharmacist about the product's potential interaction with your high blood pressure and your high blood pressure medicines.    Stimulants such as amphetamine or cocaine could be deadly for someone with high blood pressure. Never take these.    Limit how much caffeine you get in your diet. Switch to caffeine-free products.    Stop smoking. If you are a long-time smoker, this can be hard. Talk to your healthcare provider about medicines and nicotine replacement options to help you. Also, enroll in a stop-smoking program to make it more likely that you will quit for good.    Learn how to handle stress. This is an important part of any program to lower blood pressure. Learn about relaxation methods like meditation, yoga, or biofeedback.    If your provider prescribed medicines, take them exactly as directed. Missing doses may cause your blood pressure get out of control.    If you miss a dose or doses, check with your healthcare provider or pharmacist about what to do.    Consider buying an automatic blood pressure machine to check your blood pressure at home. Ask your provider for a recommendation. You can get one of these at most pharmacies.     The American Heart Association recommends the following guidelines for home blood pressure monitoring:    Don't smoke or  drink coffee for 30 minutes before taking your blood pressure.    Go to the bathroom before the test.    Relax for 5 minutes before taking the measurement.    Sit with your back supported (don't sit on a couch or soft chair); keep your feet on the floor uncrossed. Place your arm on a solid flat surface (like a table) with the upper part of the arm at heart level. Place the middle of the cuff directly above the bend of the elbow. Check the monitor's instruction manual for an illustration.    Take multiple readings. When you measure, take 2 to 3 readings one minute apart and record all of the results.    Take your blood pressure at the same time every day, or as your healthcare provider recommends.    Record the date, time, and blood pressure reading.    Take the record with you to your next medical appointment. If your blood pressure monitor has a built-in memory, simply take the monitor with you to your next appointment.    Call your provider if you have several high readings. Don't be frightened by a single high blood pressure reading, but if you get several high readings, check in with your healthcare provider.    Note: When blood pressure reaches a systolic (top number) of 180 or higher OR diastolic (bottom number) of 110 or higher, seek emergency medical treatment.  Follow-up care  You will need to see your healthcare provider regularly. This is to check your blood pressure and to make changes to your medicines. Make a follow-up appointment as directed. Bring the record of your home blood pressure readings to the appointment.  When to seek medical advice  Call your healthcare provider right away if any of these occur:    Blood pressure reaches a systolic (upper number) of 180 or higher OR a diastolic (bottom number) of 110 or higher    Chest pain or shortness of breath    Severe headache    Throbbing or rushing sound in the ears    Nosebleed    Sudden severe pain in your belly (abdomen)    Extreme drowsiness,  confusion, or fainting    Dizziness or spinning sensation (vertigo)    Weakness of an arm or leg or one side of the face    You have problems speaking or seeing   Date Last Reviewed: 12/1/2016 2000-2018 [employer]. [address] [place] [address], [place] [address]. All rights reserved. This information is not intended as a substitute for professional medical care. Always follow your healthcare professional's instructions.

## 2018-12-19 NOTE — PROGRESS NOTES
"  SUBJECTIVE:   Sushila Chambers is a 68 year old female who presents to clinic today for the following health issues:  Chief Complaint   Patient presents with     Hypertension       Hypertension Follow-up      Outpatient blood pressures are being checked at home.  Results have been in the on the higher end. This past week, they have returned back to normal.     Low Salt Diet: no added salt      Amount of exercise or physical activity: 6-7 days/week for an average of 30-45 minutes    Problems taking medications regularly: No    Medication side effects: none    Diet: regular (no restrictions) and low salt- not monitoring alt but does not add to meals.     Sushila was in her oncologist's office 2 days ago.  Her BP was high at that time.  \"they took my blood pressure right when I got there.\"  She was surprised when her BP was as high as it was, 179/89.  She did not have any symptoms at that time.   She has been taking her hydrochlorothiazide as prescribed.  Since being in the oncologist office, she has been taking her BP 1-2 times a day.  Her BP's have been better.  117-148/74-81   She is feeling good with no CP, SOB, fatigue, and she is feeling good overall.  At the time that she was in the clinic with her BP elevated, she had a mild URI, and that is getting better.     passed away 5 years ago, December 30th so she knows this is always a tricky time of year for her.        Problem list and histories reviewed & adjusted, as indicated.  Additional history: as documented    Patient Active Problem List   Diagnosis     Hypothyroidism     Lung cancer (H)     Breast cancer, right breast (H)     Hyperlipidemia LDL goal <130     Grief     Breast cancer, left breast (H)     Encounter for antineoplastic chemotherapy     Malignant neoplasm of lower-outer quadrant of left female breast (H)     Diarrhea     Non-intractable vomiting with nausea, vomiting of unspecified type     Other specified anemias     Uncontrolled hypertension "     Hypertension goal BP (blood pressure) < 140/90     Low bone density     Past Surgical History:   Procedure Laterality Date     ABDOMEN SURGERY       BIOPSY, LUNG NODULE Right 2012    + cancer     COLONOSCOPY       ECTOPIC PREGNANCY SURGERY Left 1988     GENITOURINARY SURGERY       GYN SURGERY       INSERT PORT VASCULAR ACCESS       LUMPECTOMY BREAST Right 2010     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     MASTECTOMY SIMPLE, SENTINEL NODE, COMBINED Left 2/24/2016    Procedure: COMBINED MASTECTOMY SIMPLE, SENTINEL NODE;  Surgeon: Satnam Betts MD;  Location: [facility] OR     REMOVE PORT VASCULAR ACCESS N/A 2/24/2016    Procedure: REMOVE PORT VASCULAR ACCESS;  Surgeon: Satnam Betts MD;  Location: [facility] OR     THORACIC SURGERY         Social History     Tobacco Use     Smoking status: Never Smoker     Smokeless tobacco: Never Used   Substance Use Topics     Alcohol use: Yes     Comment: Occasionally     Family History   Problem Relation Age of Onset     Breast Cancer Mother      Cancer Father         bone cancer     Melanoma No family hx of      Skin Cancer No family hx of          Current Outpatient Medications   Medication Sig Dispense Refill     Acetaminophen (TYLENOL PO) Take 500 mg by mouth every 4 hours as needed for mild pain or fever Reported on 3/21/2017       biotin 1000 MCG TABS tablet Take by mouth daily       calcium carbonate (OS-OCTAVIANO 500 MG [place]. [place]) 500 MG tablet Take by mouth 2 times daily       gabapentin (NEURONTIN) 300 MG capsule Take 1 capsule (300 mg) by mouth At Bedtime 90 capsule 3     hydrochlorothiazide 12.5 MG TABS tablet Take 1 tablet (12.5 mg) by mouth daily 90 tablet 3     letrozole (FEMARA) 2.5 MG tablet Take 1 tablet (2.5 mg) by mouth daily 90 tablet 3     levothyroxine (SYNTHROID/LEVOTHROID) 50 MCG tablet Take 1 tablet (50 mcg) by mouth daily 90 tablet 3     multivitamin, therapeutic with minerals (MULTI-VITAMIN) TABS Take 1 tablet by mouth daily       VITAMIN D, CHOLECALCIFEROL, PO  "Take by mouth daily       zolpidem (AMBIEN) 5 MG tablet Take 1 tablet (5 mg) by mouth nightly as needed for sleep (Patient not taking: Reported on 12/19/2018) 30 tablet 1     Allergies   Allergen Reactions     Latex      SKIN REACTION       Sulfa Drugs Itching and Rash     Recent Labs   Lab Test 05/22/18  1130 11/14/17  1436 03/21/17  1244  03/30/16  1418  01/18/16  0905  09/09/15  0857 06/22/15  0924 09/09/14  1019   A1C  --   --   --   --   --   --   --   --  5.3  --   --    LDL  --   --  171*  --   --   --   --   --   --  67 103   HDL  --   --  54  --   --   --   --   --   --  72 62   TRIG  --   --  206*  --   --   --   --   --   --  105 187*   ALT  --   --  24  --  33  --  14   < > 20  --  20   CR 1.00 0.93 0.90  --  0.87  --  0.71   < > 0.88  --   --    GFRESTIMATED 55* 60* 63  --  65  --  82   < > 64  --   --    GFRESTBLACK 66 73 76  --  79  --  >90   GFR Calc     < > 78  --   --    POTASSIUM 3.8 3.8 4.1  --  3.5  --  3.9   < > 3.7  --   --    TSH 3.48  --  1.81   < >  --    < >  --   --   --  2.34  --     < > = values in this interval not displayed.      BP Readings from Last 3 Encounters:   12/19/18 124/70   12/17/18 179/89   08/20/18 149/75    Wt Readings from Last 3 Encounters:   12/19/18 63.5 kg (140 lb)   12/17/18 65.4 kg (144 lb 3.2 oz)   08/20/18 67.2 kg (148 lb 1.6 oz)                  Labs reviewed in EPIC    Reviewed and updated as needed this visit by clinical staff  Tobacco  Allergies  Meds  Med Hx  Surg Hx  Fam Hx  Soc Hx      Reviewed and updated as needed this visit by Provider  Tobacco         ROS:  Constitutional, HEENT, cardiovascular, pulmonary, GI, , musculoskeletal, neuro, skin, endocrine and psych systems are negative, except as otherwise noted.    OBJECTIVE:     /70 (BP Location: Right arm, Patient Position: Sitting, Cuff Size: Adult Regular)   Pulse 78   Temp 97.9  F (36.6  C) (Oral)   Resp 20   Ht 1.638 m (5' 4.5\")   Wt 63.5 kg (140 lb)   SpO2 97%  "  BMI 23.66 kg/m    Body mass index is 23.66 kg/m .Constitutional: healthy, alert and no distress  Neck: supple, no adenopathy  Cardiovascular: RRR. No murmurs, clicks gallops or rub. No pedal edema.   Respiratory: Respirations easy and regular. No respiratory distress noted. Lung sounds clear to auscultation.  Neurologic: Gait normal. Reflexes normal and symmetric. Sensation grossly WNL.  Psychiatric: mentation appears normal and affect normal/bright      ASSESSMENT/PLAN:     (I10) Hypertension goal BP (blood pressure) < 140/90  (primary encounter diagnosis)  Comment: improved  Plan: continue     For now, she will continue her hydrochlorothiazide as prescribed.  She will try to increase her aerobic activity, treadmill, etc.  I discussed and reviewed a healthy diet, low salt, low fat, etc.  She will return to the clinic to see me in 1-2 months for her physical, labs, and necessary immunizations (pneumonia and shingrix offered today but she declined due to an upcoming [place] trip).      BREANNE Persaud [facility]

## 2019-01-15 DIAGNOSIS — C50.512 MALIGNANT NEOPLASM OF LOWER-OUTER QUADRANT OF LEFT FEMALE BREAST (H): ICD-10-CM

## 2019-01-16 RX ORDER — LETROZOLE 2.5 MG/1
TABLET, FILM COATED ORAL
Qty: 90 TABLET | Refills: 0 | Status: SHIPPED | OUTPATIENT
Start: 2019-01-16 | End: 2019-03-31

## 2019-01-22 ENCOUNTER — OFFICE VISIT (OUTPATIENT)
Dept: DERMATOLOGY | Facility: CLINIC | Age: 69
End: 2019-01-22
Payer: MEDICARE

## 2019-01-22 DIAGNOSIS — L57.8 PHOTOAGING OF SKIN: ICD-10-CM

## 2019-01-22 DIAGNOSIS — L82.0 INFLAMED SEBORRHEIC KERATOSIS: ICD-10-CM

## 2019-01-22 DIAGNOSIS — Z85.828 HISTORY OF NONMELANOMA SKIN CANCER: ICD-10-CM

## 2019-01-22 DIAGNOSIS — Z12.83 SCREENING EXAM FOR SKIN CANCER: ICD-10-CM

## 2019-01-22 DIAGNOSIS — D22.5 MELANOCYTIC NEVUS OF TRUNK: ICD-10-CM

## 2019-01-22 DIAGNOSIS — L82.1 SK (SEBORRHEIC KERATOSIS): Primary | ICD-10-CM

## 2019-01-22 RX ORDER — TRETINOIN 0.5 MG/G
CREAM TOPICAL AT BEDTIME
Qty: 45 G | Refills: 11 | Status: SHIPPED | OUTPATIENT
Start: 2019-01-22 | End: 2020-01-22

## 2019-01-22 ASSESSMENT — PAIN SCALES - GENERAL: PAINLEVEL: NO PAIN (0)

## 2019-01-22 NOTE — NURSING NOTE
Dermatology Rooming Note    Sushila Chambers's goals for this visit include:   Chief Complaint   Patient presents with     Skin Check     Sushila is here todday for a skin check- Antiximena notes no areas of concern.      ADIN Art

## 2019-01-22 NOTE — LETTER
1/22/2019       RE: Sushila Chambers  [address] [place] [address]     Dear Colleague,    Thank you for referring your patient, Sushila Chambers, to the [facility] DERMATOLOGY at [facility]. Please see a copy of my visit note below.    [facility] Dermatology Note    Dermatology Problem List:  1. Hx of NMSC  -Back, unknown type     2. Lobular breast cancer  -S/P chemo, radiation, and surgery     3. Tinea pedis  -Clotrimazole    4. Skin tag  5. Cherry angiomata  6. SKs  7. Nevi  8. Folliculitis    Encounter Date: Jan 22, 2019    CC:  Chief Complaint   Patient presents with     Skin Check     Sushila is here todday for a skin check- Antia notes no areas of concern.        History of Present Illness:  Ms. Sushila Chambers is a 68 year old female who with a history of skin cancer presents for a skin check. She has no family history of melanoma. Today she does not have any areas that are painful, itchy, or bleed spontaneously, and she has not noticed any new moles of concern. There is a lesion of concern on her neck that is irritating, catches on jewelry, and she would like evaluated. She does note that she was not diligent about sun protection when she was young. The patient also inquires as to if there are any preventative measures that she can use for wrinkles and lines as she ages. The patient voices no other concerns.      Past Medical History:   Patient Active Problem List   Diagnosis     Hypothyroidism     Lung cancer (H)     Breast cancer, right breast (H)     Hyperlipidemia LDL goal <130     Grief     Breast cancer, left breast (H)     Encounter for antineoplastic chemotherapy     Malignant neoplasm of lower-outer quadrant of left female breast (H)     Diarrhea     Non-intractable vomiting with nausea, vomiting of unspecified type     Other specified anemias     Uncontrolled hypertension     Hypertension goal BP (blood pressure) < 140/90     Low bone density      Past Medical History:   Diagnosis Date     Basal cell carcinoma      Breast cancer, right breast (H)      History of blood transfusion      Hypothyroid      Lung cancer (H) 2012    right     Uncontrolled hypertension 11/15/2017     Past Surgical History:   Procedure Laterality Date     ABDOMEN SURGERY       BIOPSY, LUNG NODULE Right 2012    + cancer     COLONOSCOPY       ECTOPIC PREGNANCY SURGERY Left 1988     GENITOURINARY SURGERY       GYN SURGERY       INSERT PORT VASCULAR ACCESS       LUMPECTOMY BREAST Right 2010     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     MASTECTOMY SIMPLE, SENTINEL NODE, COMBINED Left 2/24/2016    Procedure: COMBINED MASTECTOMY SIMPLE, SENTINEL NODE;  Surgeon: Satnam Betts MD;  Location: [facility] OR     REMOVE PORT VASCULAR ACCESS N/A 2/24/2016    Procedure: REMOVE PORT VASCULAR ACCESS;  Surgeon: Satnam Betts MD;  Location: [facility] OR     THORACIC SURGERY         Social History:  Patient reports that  has never smoked. she has never used smokeless tobacco. She reports that she drinks alcohol. She reports that she does not use drugs.    Family History:  Family History   Problem Relation Age of Onset     Breast Cancer Mother      Cancer Father         bone cancer     Melanoma No family hx of      Skin Cancer No family hx of        Medications:  Current Outpatient Medications   Medication Sig Dispense Refill     Acetaminophen (TYLENOL PO) Take 500 mg by mouth every 4 hours as needed for mild pain or fever Reported on 3/21/2017       biotin 1000 MCG TABS tablet Take by mouth daily       calcium carbonate (OS-OCTAVIANO 500 MG [place]. [place]) 500 MG tablet Take by mouth 2 times daily       gabapentin (NEURONTIN) 300 MG capsule Take 1 capsule (300 mg) by mouth At Bedtime 90 capsule 3     hydrochlorothiazide 12.5 MG TABS tablet Take 1 tablet (12.5 mg) by mouth daily 90 tablet 3     letrozole (FEMARA) 2.5 MG tablet TAKE 1 TABLET(2.5 MG) BY MOUTH DAILY 90 tablet 0     levothyroxine (SYNTHROID/LEVOTHROID) 50  MCG tablet Take 1 tablet (50 mcg) by mouth daily 90 tablet 3     multivitamin, therapeutic with minerals (MULTI-VITAMIN) TABS Take 1 tablet by mouth daily       VITAMIN D, CHOLECALCIFEROL, PO Take by mouth daily       zolpidem (AMBIEN) 5 MG tablet Take 1 tablet (5 mg) by mouth nightly as needed for sleep (Patient not taking: Reported on 12/19/2018) 30 tablet 1       Allergies   Allergen Reactions     Latex      SKIN REACTION       Sulfa Drugs Itching and Rash       Review of Systems:  -As per HPI  -Constitutional: Otherwise feeling well today, in usual state of health.  -HEENT: Patient denies nonhealing oral sores.  -Skin: As above in HPI. No additional skin concerns.    Physical exam:  Vitals: There were no vitals taken for this visit.  GEN: This is a well developed, well-nourished female in no acute distress, in a pleasant mood.    SKIN: Full skin, which includes the head/face, both arms, chest, back, abdomen,both legs, genitalia and/or groin buttocks, digits and/or nails, was examined.  -Focal hemorrhagic crust on the L helix with no appreciable lesion associated with it   -There are waxy stuck on tan to brown papules on the back.  -between 2nd-3rd left toes there is a 2mm brown macule with even pigment  -There are dome shaped bright red papules on the buttocks and back.  -6 erythematous papules on the R midline abdomen   -well healed scar on the back  -There is a  skin colored pedunculated papule with a mildly inflamed base on the neck.   -No other lesions of concern on areas examined.     Impression/Plan:  1. Inflamed     Cryotherapy procedure note: After verbal consent and discussion of risks and benefits including but no limited to dyspigmentation/scar, blister, and pain, 1 was(were) treated with 1-2mm freeze border for 2 cycles with liquid nitrogen. Post cryotherapy instructions were provided.    2. Seborrheic keratosis, non irritated, clinically benign nevi, cherry angiomata, folliculitis, scar     No  further intervention required. Patient to report changes.     3. Photoaging of skin    Start: tretinoin 0.05% cream - not for insurance claim    4. History of nonmelanoma skin cancer (NMSC), last lesion identified and treated in the past 5 years  no evidence of recurrent disease via inspection or palpation; all sites well-healed  photoprotection discussed (SPF30+ sunscreen and proper use, UPF clothing, sun avoidance, tanning bed avoidance)    continued skin surveillance recommended    CC Meryl Muro, APRN CNP  [address] [place] [address] [place] [address] on close of this encounter.  Follow-up in 1 year, earlier for new or changing lesions.     Staff Involved:  Scribe/Staff    Scribe Disclosure  I, Dominic Najjar, am serving as a scribe to document services personally performed by Dr. Alfredito Vaz MD, based on data collection and the provider's statements to me.     Staff attestation:  The documentation recorded by the scribe accurately reflects the services I personally performed and the decisions I personally made. I have made edits where needed.    Alfredito Vaz MD  Staff Dermatologist and Dermatopathologist  , Department of Dermatology      [facility] Dermatology Note    Dermatology Problem List:  1. Hx of NMSC  -Back, unknown type     2. Lobular breast cancer  -S/P chemo, radiation, and surgery     3. Tinea pedis  -Clotrimazole    4. Skin tag  5. Cherry angiomata  6. SKs  7. Nevi  8. Folliculitis      Encounter Date: Jan 22, 2019    CC:  Chief Complaint   Patient presents with     Skin Check     Sushila is here todday for a skin check- Antia notes no areas of concern.          History of Present Illness:  Ms. Sushila Chambers is a 68 year old female who with a history of skin cancer presents for a skin check. She has no family history of melanoma. Today she does not have any areas that are painful, itchy, or bleed spontaneously, and she has not noticed any new  moles of concern. There is a lesion of concern on her neck that is irritating, catches on jewelry, and she would like evaluated. She does note that she was not diligent about sun protection when she was young. The patient also inquires as to if there are any preventative measures that she can use for wrinkles and lines as she ages. The patient voices no other concerns.      Past Medical History:   Patient Active Problem List   Diagnosis     Hypothyroidism     Lung cancer (H)     Breast cancer, right breast (H)     Hyperlipidemia LDL goal <130     Grief     Breast cancer, left breast (H)     Encounter for antineoplastic chemotherapy     Malignant neoplasm of lower-outer quadrant of left female breast (H)     Diarrhea     Non-intractable vomiting with nausea, vomiting of unspecified type     Other specified anemias     Uncontrolled hypertension     Hypertension goal BP (blood pressure) < 140/90     Low bone density     Past Medical History:   Diagnosis Date     Basal cell carcinoma      Breast cancer, right breast (H)      History of blood transfusion      Hypothyroid      Lung cancer (H) 2012    right     Uncontrolled hypertension 11/15/2017     Past Surgical History:   Procedure Laterality Date     ABDOMEN SURGERY       BIOPSY, LUNG NODULE Right 2012    + cancer     COLONOSCOPY       ECTOPIC PREGNANCY SURGERY Left 1988     GENITOURINARY SURGERY       GYN SURGERY       INSERT PORT VASCULAR ACCESS       LUMPECTOMY BREAST Right 2010     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     MASTECTOMY SIMPLE, SENTINEL NODE, COMBINED Left 2/24/2016    Procedure: COMBINED MASTECTOMY SIMPLE, SENTINEL NODE;  Surgeon: Satnam Betts MD;  Location: [facility] OR     REMOVE PORT VASCULAR ACCESS N/A 2/24/2016    Procedure: REMOVE PORT VASCULAR ACCESS;  Surgeon: Satnam Betts MD;  Location: [facility] OR     THORACIC SURGERY         Social History:  Patient reports that  has never smoked. she has never used smokeless tobacco. She reports that she  drinks alcohol. She reports that she does not use drugs.    Family History:  Family History   Problem Relation Age of Onset     Breast Cancer Mother      Cancer Father         bone cancer     Melanoma No family hx of      Skin Cancer No family hx of        Medications:  Current Outpatient Medications   Medication Sig Dispense Refill     Acetaminophen (TYLENOL PO) Take 500 mg by mouth every 4 hours as needed for mild pain or fever Reported on 3/21/2017       biotin 1000 MCG TABS tablet Take by mouth daily       calcium carbonate (OS-OCTAVIANO 500 MG [place]. [place]) 500 MG tablet Take by mouth 2 times daily       gabapentin (NEURONTIN) 300 MG capsule Take 1 capsule (300 mg) by mouth At Bedtime 90 capsule 3     hydrochlorothiazide 12.5 MG TABS tablet Take 1 tablet (12.5 mg) by mouth daily 90 tablet 3     letrozole (FEMARA) 2.5 MG tablet TAKE 1 TABLET(2.5 MG) BY MOUTH DAILY 90 tablet 0     levothyroxine (SYNTHROID/LEVOTHROID) 50 MCG tablet Take 1 tablet (50 mcg) by mouth daily 90 tablet 3     multivitamin, therapeutic with minerals (MULTI-VITAMIN) TABS Take 1 tablet by mouth daily       VITAMIN D, CHOLECALCIFEROL, PO Take by mouth daily       zolpidem (AMBIEN) 5 MG tablet Take 1 tablet (5 mg) by mouth nightly as needed for sleep (Patient not taking: Reported on 12/19/2018) 30 tablet 1       Allergies   Allergen Reactions     Latex      SKIN REACTION       Sulfa Drugs Itching and Rash       Review of Systems:  -As per HPI  -Constitutional: Otherwise feeling well today, in usual state of health.  -HEENT: Patient denies nonhealing oral sores.  -Skin: As above in HPI. No additional skin concerns.    Physical exam:  Vitals: There were no vitals taken for this visit.  GEN: This is a well developed, well-nourished female in no acute distress, in a pleasant mood.    SKIN: Full skin, which includes the head/face, both arms, chest, back, abdomen,both legs, genitalia and/or groin buttocks, digits and/or nails, was examined.  -Focal  hemorrhagic crust on the L helix with no appreciable lesion associated with it   -There are waxy stuck on tan to brown papules on the back.  -between 2nd-3rd left toes there is a 2mm brown macule with even pigment  -There are dome shaped bright red papules on the buttocks and back.  -6 erythematous papules on the R midline abdomen   -well healed scar on the back  -There is a  skin colored pedunculated papule with a mildly inflamed base on the neck.   -No other lesions of concern on areas examined.       Impression/Plan:  5. Inflamed seborrheic keratosis    Cryotherapy procedure note: After verbal consent and discussion of risks and benefits including but no limited to dyspigmentation/scar, blister, and pain, 1 was(were) treated with 1-2mm freeze border for 2 cycles with liquid nitrogen. Post cryotherapy instructions were provided.    6. Seborrheic keratosis, non irritated, clinically benign nevi, cherry angiomata, folliculitis, scar     No further intervention required. Patient to report changes.     7. Photoaging of skin    Start: tretinoin 0.05% cream - not for insurance claim    8. History of nonmelanoma skin cancer (NMSC), last lesion identified and treated in the past 5 years  no evidence of recurrent disease via inspection or palpation; all sites well-healed  photoprotection discussed (SPF30+ sunscreen and proper use, UPF clothing, sun avoidance, tanning bed avoidance)    continued skin surveillance recommended    CC Meryl Muro, APRN CNP  [address] [place] [address] [place] [address] on close of this encounter.    Follow-up in 1 year, earlier for new or changing lesions.     Staff Involved:  Scribe/Staff    Scribe Disclosure  I, Dominic Najjar, am serving as a scribe to document services personally performed by Dr. Alfredito Vaz MD, based on data collection and the provider's statements to me.     Staff attestation:  The documentation recorded by the scribe accurately reflects the services I  personally performed and the decisions I personally made. I have made edits where needed.    Alfredito Vaz MD  Staff Dermatologist and Dermatopathologist  , Department of Dermatology

## 2019-01-22 NOTE — PROGRESS NOTES
[facility] Dermatology Note      Dermatology Problem List:  1. Hx of NMSC  -Back, unknown type     2. Lobular breast cancer  -S/P chemo, radiation, and surgery     3. Tinea pedis  -Clotrimazole    4. Skin tag  5. Cherry angiomata  6. SKs  7. Nevi  8. Folliculitis      Encounter Date: Jan 22, 2019    CC:  Chief Complaint   Patient presents with     Skin Check     Sushila is here todday for a skin check- Antia notes no areas of concern.          History of Present Illness:  Ms. Sushila Chambers is a 68 year old female who with a history of skin cancer presents for a skin check. She has no family history of melanoma. Today she does not have any areas that are painful, itchy, or bleed spontaneously, and she has not noticed any new moles of concern. There is a lesion of concern on her neck that is irritating, catches on jewelry, and she would like evaluated. She does note that she was not diligent about sun protection when she was young. The patient also inquires as to if there are any preventative measures that she can use for wrinkles and lines as she ages. The patient voices no other concerns.      Past Medical History:   Patient Active Problem List   Diagnosis     Hypothyroidism     Lung cancer (H)     Breast cancer, right breast (H)     Hyperlipidemia LDL goal <130     Grief     Breast cancer, left breast (H)     Encounter for antineoplastic chemotherapy     Malignant neoplasm of lower-outer quadrant of left female breast (H)     Diarrhea     Non-intractable vomiting with nausea, vomiting of unspecified type     Other specified anemias     Uncontrolled hypertension     Hypertension goal BP (blood pressure) < 140/90     Low bone density     Past Medical History:   Diagnosis Date     Basal cell carcinoma      Breast cancer, right breast (H)      History of blood transfusion      Hypothyroid      Lung cancer (H) 2012    right     Uncontrolled hypertension 11/15/2017     Past Surgical History:   Procedure  Laterality Date     ABDOMEN SURGERY       BIOPSY, LUNG NODULE Right 2012    + cancer     COLONOSCOPY       ECTOPIC PREGNANCY SURGERY Left 1988     GENITOURINARY SURGERY       GYN SURGERY       INSERT PORT VASCULAR ACCESS       LUMPECTOMY BREAST Right 2010     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     MASTECTOMY SIMPLE, SENTINEL NODE, COMBINED Left 2/24/2016    Procedure: COMBINED MASTECTOMY SIMPLE, SENTINEL NODE;  Surgeon: Satnam Betts MD;  Location: [facility] OR     REMOVE PORT VASCULAR ACCESS N/A 2/24/2016    Procedure: REMOVE PORT VASCULAR ACCESS;  Surgeon: Satnam Betts MD;  Location: [facility] OR     THORACIC SURGERY         Social History:  Patient reports that  has never smoked. she has never used smokeless tobacco. She reports that she drinks alcohol. She reports that she does not use drugs.    Family History:  Family History   Problem Relation Age of Onset     Breast Cancer Mother      Cancer Father         bone cancer     Melanoma No family hx of      Skin Cancer No family hx of        Medications:  Current Outpatient Medications   Medication Sig Dispense Refill     Acetaminophen (TYLENOL PO) Take 500 mg by mouth every 4 hours as needed for mild pain or fever Reported on 3/21/2017       biotin 1000 MCG TABS tablet Take by mouth daily       calcium carbonate (OS-OCTAVIANO 500 MG [place]. [place]) 500 MG tablet Take by mouth 2 times daily       gabapentin (NEURONTIN) 300 MG capsule Take 1 capsule (300 mg) by mouth At Bedtime 90 capsule 3     hydrochlorothiazide 12.5 MG TABS tablet Take 1 tablet (12.5 mg) by mouth daily 90 tablet 3     letrozole (FEMARA) 2.5 MG tablet TAKE 1 TABLET(2.5 MG) BY MOUTH DAILY 90 tablet 0     levothyroxine (SYNTHROID/LEVOTHROID) 50 MCG tablet Take 1 tablet (50 mcg) by mouth daily 90 tablet 3     multivitamin, therapeutic with minerals (MULTI-VITAMIN) TABS Take 1 tablet by mouth daily       VITAMIN D, CHOLECALCIFEROL, PO Take by mouth daily       zolpidem (AMBIEN) 5 MG tablet Take 1 tablet (5 mg) by  mouth nightly as needed for sleep (Patient not taking: Reported on 12/19/2018) 30 tablet 1       Allergies   Allergen Reactions     Latex      SKIN REACTION       Sulfa Drugs Itching and Rash       Review of Systems:  -As per HPI  -Constitutional: Otherwise feeling well today, in usual state of health.  -HEENT: Patient denies nonhealing oral sores.  -Skin: As above in HPI. No additional skin concerns.    Physical exam:  Vitals: There were no vitals taken for this visit.  GEN: This is a well developed, well-nourished female in no acute distress, in a pleasant mood.    SKIN: Full skin, which includes the head/face, both arms, chest, back, abdomen,both legs, genitalia and/or groin buttocks, digits and/or nails, was examined.  -Focal hemorrhagic crust on the L helix with no appreciable lesion associated with it   -There are waxy stuck on tan to brown papules on the back.  -between 2nd-3rd left toes there is a 2mm brown macule with even pigment  -There are dome shaped bright red papules on the buttocks and back.  -6 erythematous papules on the R midline abdomen   -well healed scar on the back  -There is a  skin colored pedunculated papule with a mildly inflamed base on the neck.   -No other lesions of concern on areas examined.       Impression/Plan:  1. Inflamed seborrheic keratosis    Cryotherapy procedure note: After verbal consent and discussion of risks and benefits including but no limited to dyspigmentation/scar, blister, and pain, 1 was(were) treated with 1-2mm freeze border for 2 cycles with liquid nitrogen. Post cryotherapy instructions were provided.    2. Seborrheic keratosis, non irritated, clinically benign nevi, cherry angiomata, folliculitis, scar     No further intervention required. Patient to report changes.     3. Photoaging of skin    Start: tretinoin 0.05% cream - not for insurance claim    4. History of nonmelanoma skin cancer (NMSC), last lesion identified and treated in the past 5 years  no  evidence of recurrent disease via inspection or palpation; all sites well-healed  photoprotection discussed (SPF30+ sunscreen and proper use, UPF clothing, sun avoidance, tanning bed avoidance)    continued skin surveillance recommended    CC Meryl Muro, BREANNE CNP  [address] [place] [address] [place] [address] on close of this encounter.  Follow-up in 1 year, earlier for new or changing lesions.     Staff Involved:  Scribe/Staff    Scribe Disclosure  I, Dominic Najjar, am serving as a scribe to document services personally performed by Dr. Alfredito Vaz MD, based on data collection and the provider's statements to me.     Staff attestation:  The documentation recorded by the scribe accurately reflects the services I personally performed and the decisions I personally made. I have made edits where needed.    Alfredito Vaz MD  Staff Dermatologist and Dermatopathologist  , Department of Dermatology

## 2019-01-23 ENCOUNTER — OFFICE VISIT (OUTPATIENT)
Dept: FAMILY MEDICINE | Facility: CLINIC | Age: 69
End: 2019-01-23
Payer: MEDICARE

## 2019-01-23 VITALS
HEART RATE: 88 BPM | BODY MASS INDEX: 23.32 KG/M2 | RESPIRATION RATE: 20 BRPM | OXYGEN SATURATION: 100 % | WEIGHT: 140 LBS | SYSTOLIC BLOOD PRESSURE: 129 MMHG | HEIGHT: 65 IN | DIASTOLIC BLOOD PRESSURE: 85 MMHG

## 2019-01-23 DIAGNOSIS — Z12.11 SCREEN FOR COLON CANCER: ICD-10-CM

## 2019-01-23 DIAGNOSIS — F19.982 DRUG INDUCED INSOMNIA (H): ICD-10-CM

## 2019-01-23 DIAGNOSIS — I10 HYPERTENSION GOAL BP (BLOOD PRESSURE) < 140/90: Primary | ICD-10-CM

## 2019-01-23 DIAGNOSIS — E03.9 HYPOTHYROIDISM, UNSPECIFIED TYPE: ICD-10-CM

## 2019-01-23 DIAGNOSIS — Z23 NEED FOR VACCINATION: ICD-10-CM

## 2019-01-23 DIAGNOSIS — G62.9 NEUROPATHY: ICD-10-CM

## 2019-01-23 DIAGNOSIS — G62.0 DRUG-INDUCED POLYNEUROPATHY (H): ICD-10-CM

## 2019-01-23 DIAGNOSIS — C34.11 MALIGNANT NEOPLASM OF UPPER LOBE OF RIGHT LUNG (H): ICD-10-CM

## 2019-01-23 DIAGNOSIS — M85.9 LOW BONE DENSITY: ICD-10-CM

## 2019-01-23 DIAGNOSIS — Z12.11 SCREENING FOR COLON CANCER: ICD-10-CM

## 2019-01-23 DIAGNOSIS — Z13.21 ENCOUNTER FOR VITAMIN DEFICIENCY SCREENING: ICD-10-CM

## 2019-01-23 LAB
BASOPHILS # BLD AUTO: 0 10E9/L (ref 0–0.2)
BASOPHILS NFR BLD AUTO: 0.5 %
DIFFERENTIAL METHOD BLD: NORMAL
EOSINOPHIL # BLD AUTO: 0.2 10E9/L (ref 0–0.7)
EOSINOPHIL NFR BLD AUTO: 2.5 %
ERYTHROCYTE [DISTWIDTH] IN BLOOD BY AUTOMATED COUNT: 13.1 % (ref 10–15)
HCT VFR BLD AUTO: 39.7 % (ref 35–47)
HGB BLD-MCNC: 13.2 G/DL (ref 11.7–15.7)
LYMPHOCYTES # BLD AUTO: 1.3 10E9/L (ref 0.8–5.3)
LYMPHOCYTES NFR BLD AUTO: 21.3 %
MCH RBC QN AUTO: 30.4 PG (ref 26.5–33)
MCHC RBC AUTO-ENTMCNC: 33.2 G/DL (ref 31.5–36.5)
MCV RBC AUTO: 92 FL (ref 78–100)
MONOCYTES # BLD AUTO: 0.3 10E9/L (ref 0–1.3)
MONOCYTES NFR BLD AUTO: 5.2 %
NEUTROPHILS # BLD AUTO: 4.2 10E9/L (ref 1.6–8.3)
NEUTROPHILS NFR BLD AUTO: 70.5 %
PLATELET # BLD AUTO: 214 10E9/L (ref 150–450)
RBC # BLD AUTO: 4.34 10E12/L (ref 3.8–5.2)
WBC # BLD AUTO: 6 10E9/L (ref 4–11)

## 2019-01-23 PROCEDURE — 99214 OFFICE O/P EST MOD 30 MIN: CPT | Mod: 25 | Performed by: NURSE PRACTITIONER

## 2019-01-23 PROCEDURE — G0009 ADMIN PNEUMOCOCCAL VACCINE: HCPCS | Performed by: NURSE PRACTITIONER

## 2019-01-23 PROCEDURE — 82043 UR ALBUMIN QUANTITATIVE: CPT | Performed by: NURSE PRACTITIONER

## 2019-01-23 PROCEDURE — 80053 COMPREHEN METABOLIC PANEL: CPT | Performed by: NURSE PRACTITIONER

## 2019-01-23 PROCEDURE — 85025 COMPLETE CBC W/AUTO DIFF WBC: CPT | Performed by: NURSE PRACTITIONER

## 2019-01-23 PROCEDURE — 36415 COLL VENOUS BLD VENIPUNCTURE: CPT | Performed by: NURSE PRACTITIONER

## 2019-01-23 PROCEDURE — 90732 PPSV23 VACC 2 YRS+ SUBQ/IM: CPT | Performed by: NURSE PRACTITIONER

## 2019-01-23 RX ORDER — LEVOTHYROXINE SODIUM 50 UG/1
50 TABLET ORAL DAILY
Qty: 90 TABLET | Refills: 3 | Status: SHIPPED | OUTPATIENT
Start: 2019-01-23 | End: 2020-03-09

## 2019-01-23 RX ORDER — HYDROCHLOROTHIAZIDE 12.5 MG/1
12.5 TABLET ORAL DAILY
Qty: 90 TABLET | Refills: 3 | Status: SHIPPED | OUTPATIENT
Start: 2019-01-23 | End: 2020-03-09

## 2019-01-23 RX ORDER — GABAPENTIN 300 MG/1
300 CAPSULE ORAL AT BEDTIME
Qty: 90 CAPSULE | Refills: 3 | Status: SHIPPED | OUTPATIENT
Start: 2019-01-23 | End: 2020-03-09

## 2019-01-23 ASSESSMENT — MIFFLIN-ST. JEOR: SCORE: 1152.98

## 2019-01-23 NOTE — PROGRESS NOTES
SUBJECTIVE:   Sushila Chambers is a 68 year old female who presents to clinic today for the following health issues:  Chief Complaint   Patient presents with     Hypertension     Health Maintenance         History of Present Illness     Hypertension:     Outpatient blood pressures:  Are not being checked    Dietary sodium intake::  Not monitoring salt intake  Frequency of exercise:  4-5 days/week  Taking medications regularly:  Yes  Medication side effects:  None  Additional concerns today:  No    Sushila is in the clinic today to talk about her medications, have appropriate labs drawn, colon cancer screening, immunizations if applicable, and any additional follow-up/health maintenance topics if needed.    Hypertension:  Sushila has been on hydrochlorothiazide for blood pressure and she reports is going well.  She denies any side effects or problems.    She is requesting refills today.    Hypothyroidism:  Diagnosed several years ago.  Taking her levothyroxine as prescribed.  She denies any side effects or problems.    Breast cancer:  She is still a patient at the cancer clinic.  Her next oncology follow-up appointment is in 2 months.    Gabapentin:  Was prescribed for neuropathy that was caused by her chemo.  She is requesting refills today.    Exercise 4 times a week with personal trainers.  She is feeling very healthy.  Her weight is down 10+ pounds.  She is eating healthy, fruits and veggies, vegetarian and she feels like she eats a balanced diet.    Vaccines:  She is due pneumonia vaccine.  She is considering the shingles vaccine but not today.    Problem list and histories reviewed & adjusted, as indicated.  Additional history: as documented      Patient Active Problem List   Diagnosis     Hypothyroidism     Lung cancer (H)     Breast cancer, right breast (H)     Hyperlipidemia LDL goal <130     Grief     Breast cancer, left breast (H)     Encounter for antineoplastic chemotherapy     Malignant neoplasm of  lower-outer quadrant of left female breast (H)     Diarrhea     Non-intractable vomiting with nausea, vomiting of unspecified type     Other specified anemias     Uncontrolled hypertension     Hypertension goal BP (blood pressure) < 140/90     Low bone density     Past Surgical History:   Procedure Laterality Date     ABDOMEN SURGERY       BIOPSY, LUNG NODULE Right 2012    + cancer     COLONOSCOPY       ECTOPIC PREGNANCY SURGERY Left 1988     GENITOURINARY SURGERY       GYN SURGERY       INSERT PORT VASCULAR ACCESS       LUMPECTOMY BREAST Right 2010     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     MASTECTOMY SIMPLE, SENTINEL NODE, COMBINED Left 2/24/2016    Procedure: COMBINED MASTECTOMY SIMPLE, SENTINEL NODE;  Surgeon: Satnam Betts MD;  Location: [facility] OR     REMOVE PORT VASCULAR ACCESS N/A 2/24/2016    Procedure: REMOVE PORT VASCULAR ACCESS;  Surgeon: Satnam Betts MD;  Location: [facility] OR     THORACIC SURGERY         Social History     Tobacco Use     Smoking status: Never Smoker     Smokeless tobacco: Never Used   Substance Use Topics     Alcohol use: Yes     Comment: Occasionally     Family History   Problem Relation Age of Onset     Breast Cancer Mother      Cancer Father         bone cancer     Melanoma No family hx of      Skin Cancer No family hx of          Current Outpatient Medications   Medication Sig Dispense Refill     Acetaminophen (TYLENOL PO) Take 500 mg by mouth every 4 hours as needed for mild pain or fever Reported on 3/21/2017       biotin 1000 MCG TABS tablet Take by mouth daily       calcium carbonate (OS-OCTAVIANO 500 MG [place]. [place]) 500 MG tablet Take by mouth 2 times daily       gabapentin (NEURONTIN) 300 MG capsule Take 1 capsule (300 mg) by mouth At Bedtime 90 capsule 3     hydrochlorothiazide (HYDRODIURIL) 12.5 MG tablet Take 1 tablet (12.5 mg) by mouth daily 90 tablet 3     letrozole (FEMARA) 2.5 MG tablet TAKE 1 TABLET(2.5 MG) BY MOUTH DAILY 90 tablet 0     levothyroxine (SYNTHROID/LEVOTHROID)  "50 MCG tablet Take 1 tablet (50 mcg) by mouth daily 90 tablet 3     multivitamin, therapeutic with minerals (MULTI-VITAMIN) TABS Take 1 tablet by mouth daily       tretinoin (RETIN-A) 0.05 % external cream Apply topically At Bedtime Apply a pea-sized amount to the face every other night for two weeks, then nightly thereafter. 45 g 11     zolpidem (AMBIEN) 5 MG tablet Take 1 tablet (5 mg) by mouth nightly as needed for sleep 30 tablet 1     VITAMIN D, CHOLECALCIFEROL, PO Take by mouth daily       Allergies   Allergen Reactions     Latex      SKIN REACTION       Sulfa Drugs Itching and Rash     Recent Labs   Lab Test 05/22/18  1130 11/14/17  1436 03/21/17  1244  03/30/16  1418  01/18/16  0905  09/09/15  0857 06/22/15  0924 09/09/14  1019   A1C  --   --   --   --   --   --   --   --  5.3  --   --    LDL  --   --  171*  --   --   --   --   --   --  67 103   HDL  --   --  54  --   --   --   --   --   --  72 62   TRIG  --   --  206*  --   --   --   --   --   --  105 187*   ALT  --   --  24  --  33  --  14   < > 20  --  20   CR 1.00 0.93 0.90  --  0.87  --  0.71   < > 0.88  --   --    GFRESTIMATED 55* 60* 63  --  65  --  82   < > 64  --   --    GFRESTBLACK 66 73 76  --  79  --  >90   GFR Calc     < > 78  --   --    POTASSIUM 3.8 3.8 4.1  --  3.5  --  3.9   < > 3.7  --   --    TSH 3.48  --  1.81   < >  --    < >  --   --   --  2.34  --     < > = values in this interval not displayed.      BP Readings from Last 3 Encounters:   01/23/19 129/85   12/19/18 124/70   12/17/18 179/89    Wt Readings from Last 3 Encounters:   01/23/19 63.5 kg (140 lb)   12/19/18 63.5 kg (140 lb)   12/17/18 65.4 kg (144 lb 3.2 oz)                  Labs reviewed in EPIC    ROS:  Constitutional, HEENT, cardiovascular, pulmonary, GI, , musculoskeletal, neuro, skin, endocrine and psych systems are negative, except as otherwise noted.    OBJECTIVE:     /85   Pulse 88   Resp 20   Ht 1.638 m (5' 4.5\")   Wt 63.5 kg (140 lb)   " SpO2 100%   BMI 23.66 kg/m    Body mass index is 23.66 kg/m .  GENERAL: healthy, alert and no distress  EYES: Eyes grossly normal to inspection, PERRL and conjunctivae and sclerae normal  HENT: ear canals and TM's normal, nose and mouth without ulcers or lesions  NECK: no adenopathy, no asymmetry, masses, or scars and thyroid normal to palpation  RESP: lungs clear to auscultation - no rales, rhonchi or wheezes  CV: regular rate and rhythm, normal S1 S2, no S3 or S4, no murmur, click or rub, no peripheral edema and peripheral pulses strong  ABDOMEN: soft, nontender, no hepatosplenomegaly, no masses and bowel sounds normal  MS: no gross musculoskeletal defects noted, no edema  SKIN: no suspicious lesions or rashes  NEURO: Normal strength and tone, mentation intact and speech normal  PSYCH: mentation appears normal, affect normal/bright    Diagnostic Test Results:  Results pending    ASSESSMENT/PLAN:     (I10) Hypertension goal BP (blood pressure) < 140/90  (primary encounter diagnosis)  Comment: Controlled  Plan: hydrochlorothiazide (HYDRODIURIL) 12.5 MG         tablet, CBC with platelets and differential,         Comprehensive metabolic panel, Albumin Random         Urine Quantitative with Creat Ratio, Fecal         colorectal cancer screen (FIT)        I encouraged the patient to continue her blood pressure medication, regular aerobic activity, and maintaining her current healthy weight.    (E03.9) Hypothyroidism, unspecified type  Comment: Uncertain  Plan: levothyroxine (SYNTHROID/LEVOTHROID) 50 MCG         tablet        Lab pending today.  Continue levothyroxine at current dose unless the lab test result indicates otherwise.    (Z12.11) Screen for colon cancer  Comment: Routine  Plan: CBC with platelets and differential        Fit test at her earliest convenience.    (G62.9) Neuropathy  Comment: Controlled  Plan: gabapentin (NEURONTIN) 300 MG capsule        Continue Neurontin.  Yearly clinic check ends for  refills.    (Z13.21) Encounter for vitamin deficiency screening  Comment: Uncertain  Plan: Uncertain the vitamin D level status.  With today's lab order is unable to validate the necessity of this lab test with her insurance so we did decide to hold the vitamin D level screening today.  See low bone density for additional information.    (M85.80) Low bone density  Comment: Uncertain  Plan: ENDOCRINOLOGY ADULT REFERRAL        Per the last DEXA scan results, the patient does have low bone density but this was not classified as either osteopenia or osteoporosis.  I did tell her that it is important that she continue her calcium and vitamin D intake.  According to the DEXA scan result also it states that there is a secondary reason for her osteoporosis, likely her cancer/cancer treatment.  Nonetheless, I did encourage her to follow-up with endocrinology for additional workup, follow-up, and treatment recommendations.  The patient agrees and understands will follow-up with endocrinology at her earliest convenience.    (Z12.11) Screening for colon cancer  Comment: Routie  Plan: Fecal colorectal cancer screen (FIT)        FIT at earliest convenience.  If negative for blood, next FIT test in one year.  If positive for blood, referral for colonoscopy.  Patient verbalizes understanding.      (Z23) Need for vaccination  Comment:   Plan: ADMIN: Vaccine, Initial (89926)        23 given today.  She consider the shingles vaccine but due to the potential side effects, she will hold for now she will consider that at a future date.      (G62.0) Drug-induced polyneuropathy (H)  Comment:   Plan: continue gabapentin and care with Oncology    (F19.212) Drug induced insomnia (H)  Comment:   Plan: continue supportive management care with oncology    (C34.11) Malignant neoplasm of upper lobe of right lung (H)  Comment:   Plan: continue care with oncology            BREANNE Persaud [facility]

## 2019-01-24 LAB
ALBUMIN SERPL-MCNC: 3.8 G/DL (ref 3.4–5)
ALP SERPL-CCNC: 51 U/L (ref 40–150)
ALT SERPL W P-5'-P-CCNC: 29 U/L (ref 0–50)
ANION GAP SERPL CALCULATED.3IONS-SCNC: 6 MMOL/L (ref 3–14)
AST SERPL W P-5'-P-CCNC: 23 U/L (ref 0–45)
BILIRUB SERPL-MCNC: 1 MG/DL (ref 0.2–1.3)
BUN SERPL-MCNC: 15 MG/DL (ref 7–30)
CALCIUM SERPL-MCNC: 10.2 MG/DL (ref 8.5–10.1)
CHLORIDE SERPL-SCNC: 99 MMOL/L (ref 94–109)
CO2 SERPL-SCNC: 30 MMOL/L (ref 20–32)
CREAT SERPL-MCNC: 0.93 MG/DL (ref 0.52–1.04)
GFR SERPL CREATININE-BSD FRML MDRD: 62 ML/MIN/{1.73_M2}
GLUCOSE SERPL-MCNC: 104 MG/DL (ref 70–99)
POTASSIUM SERPL-SCNC: 4.3 MMOL/L (ref 3.4–5.3)
PROT SERPL-MCNC: 7.6 G/DL (ref 6.8–8.8)
SODIUM SERPL-SCNC: 135 MMOL/L (ref 133–144)

## 2019-01-25 LAB
CREAT UR-MCNC: 53 MG/DL
MICROALBUMIN UR-MCNC: 5 MG/L
MICROALBUMIN/CREAT UR: 9.66 MG/G CR (ref 0–25)

## 2019-01-28 NOTE — RESULT ENCOUNTER NOTE
Lavon Conti,    This note is to let you know that all of your lab test results look great.    On your comprehensive metabolic panel, your blood sugar and calcium levels came back slightly high.  This is not overly worrisome and there is no treatment necessary right now.  I will ask you to eat a healthy diet, continue your exercise and current weight management, and I will recheck your labs in 1 year.  Let me know if you have any questions.    Meryl

## 2019-01-29 PROBLEM — F19.982 DRUG INDUCED INSOMNIA (H): Status: ACTIVE | Noted: 2019-01-29

## 2019-01-29 PROBLEM — G62.0 DRUG-INDUCED POLYNEUROPATHY (H): Status: ACTIVE | Noted: 2019-01-29

## 2019-03-06 ENCOUNTER — OFFICE VISIT (OUTPATIENT)
Dept: ENDOCRINOLOGY | Facility: CLINIC | Age: 69
End: 2019-03-06
Payer: MEDICARE

## 2019-03-06 VITALS
HEIGHT: 65 IN | WEIGHT: 143.1 LBS | BODY MASS INDEX: 23.84 KG/M2 | DIASTOLIC BLOOD PRESSURE: 89 MMHG | HEART RATE: 76 BPM | SYSTOLIC BLOOD PRESSURE: 143 MMHG

## 2019-03-06 DIAGNOSIS — C50.912 BILATERAL MALIGNANT NEOPLASM OF BREAST IN FEMALE, UNSPECIFIED ESTROGEN RECEPTOR STATUS, UNSPECIFIED SITE OF BREAST (H): ICD-10-CM

## 2019-03-06 DIAGNOSIS — M85.80 OSTEOPENIA, UNSPECIFIED LOCATION: Primary | ICD-10-CM

## 2019-03-06 DIAGNOSIS — E83.52 HYPERCALCEMIA: ICD-10-CM

## 2019-03-06 DIAGNOSIS — C50.911 BILATERAL MALIGNANT NEOPLASM OF BREAST IN FEMALE, UNSPECIFIED ESTROGEN RECEPTOR STATUS, UNSPECIFIED SITE OF BREAST (H): ICD-10-CM

## 2019-03-06 DIAGNOSIS — M81.0 SENILE OSTEOPOROSIS: ICD-10-CM

## 2019-03-06 ASSESSMENT — MIFFLIN-ST. JEOR: SCORE: 1162.04

## 2019-03-06 ASSESSMENT — PAIN SCALES - GENERAL: PAINLEVEL: NO PAIN (0)

## 2019-03-06 NOTE — LETTER
3/6/2019       RE: Sushila Chambers  [address] [place] [address]     Dear Colleague,    Thank you for referring your patient, Sushila Chambers, to the [facility] ENDOCRINOLOGY at [facility]. Please see a copy of my visit note below.                                                                               - Endocrinology Initial Consultation -    Reason for visit/consult:  osteopenia    Primary care provider: Meryl Muro    HPI: A 70 yo female here for evaluation of her low bone density.  Referral from her oncologist.  The patient had a history of breast cancer.  She was diagnosed right-sided breast cancer in 2010 status post lumpectomy and radiation therapy.  In 2014 she had left-sided breast cancer which she underwent chemotherapy and a total mastectomy, since then until currently she is on aromatase inhibitor.     She also has long-standing hypothyroidism for 15-20 years, taking levothyroxine 50 mcg.    Prior fragility fracture: no  Parental history of hip fracture: no  Rheumatoid arthritis: no  Secondary cause of osteoporosis: aromtase inhibitor  Body habitus (BMI less than or equal to 20): no  Family history of osteoporosis: sister osteopenia  Sedentary lifestyle: no, active  Current tabacco smoking: no  History of thyroid disorder: hypothryodism 50 LT4 for 20 years.   Calcuim and Vitmin D supplements: Oscal 1 tab a day, vitamin D OTC  Other supplement or bone treatment: no  Medication use (PPI, epileptic medications etc): aromatase inhibitor   Early menopause (female): age 54    Past Medical/Surgical History:  Past Medical History:   Diagnosis Date     Basal cell carcinoma      Breast cancer, right breast (H)      History of blood transfusion      Hypothyroid      Lung cancer (H) 2012    right     Uncontrolled hypertension 11/15/2017     Past Surgical History:   Procedure Laterality Date     ABDOMEN SURGERY       BIOPSY, LUNG NODULE Right 2012    +  cancer     COLONOSCOPY       ECTOPIC PREGNANCY SURGERY Left 1988     GENITOURINARY SURGERY       GYN SURGERY       INSERT PORT VASCULAR ACCESS       LUMPECTOMY BREAST Right 2010     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     MASTECTOMY SIMPLE, SENTINEL NODE, COMBINED Left 2/24/2016    Procedure: COMBINED MASTECTOMY SIMPLE, SENTINEL NODE;  Surgeon: Satnam Betts MD;  Location: [facility] OR     REMOVE PORT VASCULAR ACCESS N/A 2/24/2016    Procedure: REMOVE PORT VASCULAR ACCESS;  Surgeon: Satnam Betts MD;  Location: [facility] OR     THORACIC SURGERY         Allergies:  Allergies   Allergen Reactions     Latex      SKIN REACTION       Sulfa Drugs Itching and Rash       Current Medications   Current Outpatient Medications   Medication     Acetaminophen (TYLENOL PO)     biotin 1000 MCG TABS tablet     calcium carbonate (OS-OCTAVIANO 500 MG [place]. [place]) 500 MG tablet     gabapentin (NEURONTIN) 300 MG capsule     hydrochlorothiazide (HYDRODIURIL) 12.5 MG tablet     letrozole (FEMARA) 2.5 MG tablet     levothyroxine (SYNTHROID/LEVOTHROID) 50 MCG tablet     multivitamin, therapeutic with minerals (MULTI-VITAMIN) TABS     tretinoin (RETIN-A) 0.05 % external cream     VITAMIN D, CHOLECALCIFEROL, PO     zolpidem (AMBIEN) 5 MG tablet     No current facility-administered medications for this visit.        Family History:  Family History   Problem Relation Age of Onset     Breast Cancer Mother      Cancer Father         bone cancer     Melanoma No family hx of      Skin Cancer No family hx of    sister: osteopenia    Social History:  Social History     Tobacco Use     Smoking status: Never Smoker     Smokeless tobacco: Never Used   Substance Use Topics     Alcohol use: Yes     Comment: Occasionally   Lives with a dog. 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 Otherwise a complete 14 point review of systems was taken and is negative unless stated in the history  above.    Physical Exam:   not currently breastfeeding.    General: well appearing, no acute distress, pleasant and conversant,   Mental Status/neuro: alert and oriented  Face: symmetrical, normal facial color  Eyes: anicteric, PERRL, no proptosis or lid lag  Neck: suppler, no lymphadenopahty  Thyroid: normal size and texture, no nodule palpable, no bruits  Back: No scoliosis no kyphosis  Heart: regular rhythm, S1S2, no murmur appreciated  Lung: clear to auscultation bilaterally  Abdomen: soft, NT/ND, no hepatomegaly  Legs: no swelling or edema  Feet: no deformities or ulcers, 2+ DP pulses, normal monofilament sensation      Labs : I reviewed data from epic and extract and summarize the pertinent data here.   Lab Results   Component Value Date     01/23/2019      Lab Results   Component Value Date    POTASSIUM 4.3 01/23/2019     Lab Results   Component Value Date    CHLORIDE 99 01/23/2019     Lab Results   Component Value Date    OCTAVIANO 10.2 01/23/2019     Lab Results   Component Value Date    CO2 30 01/23/2019     Lab Results   Component Value Date    BUN 15 01/23/2019     Lab Results   Component Value Date    CR 0.93 01/23/2019     Lab Results   Component Value Date     01/23/2019     Lab Results   Component Value Date    TSH 3.48 05/22/2018     No results found for: T4  Lab Results   Component Value Date    A1C 5.3 09/09/2015         Bone denisty 5/14/2018: I personally reviewed the original images and agree with the below reports.   Region Date BMD T - score Z - score BMD change from baseline BMD % change from baseline       L1-L4 05/14/2018 1.203 g/cm  0.2 1.7 -0.070 g/cm  -5.5%       L1-L4 04/11/2016       baseline baseline          Neck Left 05/14/2018 0.890 g/cm  -1.1 0.5           Neck Left 04/11/2016                    Total Left 05/14/2018 0.991 g/cm  -0.1 1.2 -0.030 g/cm  -2.9%       Total Left 04/11/2016       baseline baseline          Neck Right 05/14/2018 0.837 g/cm  -1.4 0.1           Neck  Right 04/11/2016                    Total Right 05/14/2018 1.010 g/cm  0.0 1.3 -0.003 g/cm  -0.3%       Total Right 04/11/2016       baseline baseline         Assessment and Plan  69 year old female with breast cancer currently on aromatase inhibitor here for the evaluation of low bone density    #   Osteopenia  Patient has a few naren factor, on AI due to breast cancer, and also family history.   T score -1.4 on the right neck, there is a significant reduction of the bone density in the lumbar and left hip.  Discussed with the patient over the option of the medications.   We will switch calcium carbonate to calcium citrate plus D tabs (elemental Ca 945 mg and vitamin D 750)  - Continue current vitamin D supplement  - continue current exercise and activity  -Repeat bone density in 1 year and if there is no improvement then we will discuss about other medical options such as bisphosphonate treatment.    #Elevated to calcium level  Last blood work borderline elevation calcium 10.2, previous no history of hypoglycemia.  We will recheck calcium level as well as vitamin D level this time.    RTC with me in 1 year after bone denstiy.     I spent 45 minutes with this patient face to face and explained the conditions and plans (more than 50% of time was counseling/coordination of care) . The patient understood and is satisfied with today's visit. Return to clinic with me in 1 year.         hSena Sandy MD  Staff Physician  Endocrinology and Metabolism  License: [id number]

## 2019-03-31 DIAGNOSIS — C50.512 MALIGNANT NEOPLASM OF LOWER-OUTER QUADRANT OF LEFT FEMALE BREAST (H): ICD-10-CM

## 2019-04-01 RX ORDER — LETROZOLE 2.5 MG/1
TABLET, FILM COATED ORAL
Qty: 90 TABLET | Refills: 0 | Status: SHIPPED | OUTPATIENT
Start: 2019-04-01 | End: 2019-08-06

## 2019-04-14 NOTE — PROGRESS NOTES
MEDICAL ONCOLOGY FOLLOW-UP PATIENT VISIT     NAME: Sushila Chambers     DATE: 4/15/19    PRIMARY CARE PHYSICIAN: Meryl Muro    PATIENT ID: Multifocal, stage IIa, T2N0M0, ER positive, VT negative, HER2 non-amplified invasive lobular carcinoma of the left breast.     Oncology History: Ms. Chambers is a 69 year old female with a history of lung cancer and right breast cancer with more recent invasive lobular carcinoma of the left breast. Ms. Chambers was treated for right sided breast cancer in 2009 with right breast lumpectomy, radiation, and endocrine therapy. She self palpated a mass in the left breast in early August, 2015. Diagnostic mammogram and ultrasound showed 2 ill-defined spiculated masses at the 4:30 and 2:00 positions of the left breast. There was surrounding architectural distortion and the total area of involvement measured 6.5 cm. Targeted left breast ultrasound showed a multilobulated hypoechoic mass at the 4:00 position measuring 2.5 cm and an ill-defined hypoechoic mass at the 2:00 position measuring 2.4 cm.  Biopsy of the mass at the 4:00 position showed grade II invasive pleomorphic lobular carcinoma. Biopsy of the mass at the 2:00 position showed a grade II invasive lobular carcinoma. No angiolymphatic invasion or associated LCIS was seen in either specimen. Estrogen receptor staining was positive in >80% of tumor cell nuclei. Progesterone receptor staining was positive in <5% of tumor cell nuclei. HER2 was non-amplifed by FISH in both specimens.     Ms. Chambers enrolled on the ISPY2 clinical trial.  She received 12 weeks of ganetespib and Taxol and 4 cycles of dose dense AC from 9/29/15 - 2/1/16. On 2/24/16 she underwent left breast mastectomy and sentinel lymph node procedure under the care of Dr. Betts. Pathology showed a residual grade II 4.8 cm, pleomorphic invasive lobular carcinoma with associated LCIS. Invasive tumor cellularity was 30%.  There was evidence of perineural invasion,  "however, no lymphvascular space invasion. Two sentinel lymph nodes were negative.  She has been on adjuvant letrozole since 03/2016.    Ms. Chambers had genetic testing on 9/15/15.  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  Of note she had a number of alterations in RUFINA, BARD1, BRCA1/2, MUTYH, and TP53 that were classified as \"likely benign\" and \"benign\".    Interim History:   Ms. Chambers comes into clinic today for routine breast cancer followup.  She continues on treatment with letrozole.  She is tolerating the medication remarkably well.  In fact, hot flashes are intermittent and not bothersome.  She denies vaginal complaints.  She has had no symptoms of depression or anxiety.  She also reports a decrease in joint pains.  She is exercising at least 4 days a week.  She is doing Pilates 2 days a week and is doing strength training exercises with a  the other 2 days of the week.  She states she does not like to exercise, but as she has a  and appointments, she feels obligated to do it.  She denies concerning breast or chest wall complaints.  She has no current cough, shortness of breath or chest pain; however, did have an approximate 6 week bout of bronchitis this past winter.  In fact, she showed up to her breast MRI and due to frequent coughing was unable to have the test performed.  She denies abdominal complaints.  She has no headaches, visual complaints or focal neurologic complaints.  The remainder of her complete 12 point review of systems was reviewed with the patient and was negative with the exception of that mentioned above.     PAST MEDICAL HISTORY:   Past Medical History:   Diagnosis Date     Basal cell carcinoma      Breast cancer, right breast (H)      History of blood transfusion      Hypothyroid      Lung cancer (H) 2012    right     Uncontrolled hypertension 11/15/2017       PAST SURGICAL HISTORY:  Past Surgical History:   Procedure Laterality Date     ABDOMEN " "SURGERY       BIOPSY, LUNG NODULE Right 2012    + cancer     COLONOSCOPY       ECTOPIC PREGNANCY SURGERY Left 1988     GENITOURINARY SURGERY       GYN SURGERY       INSERT PORT VASCULAR ACCESS       LUMPECTOMY BREAST Right 2010     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     MASTECTOMY SIMPLE, SENTINEL NODE, COMBINED Left 2/24/2016    Procedure: COMBINED MASTECTOMY SIMPLE, SENTINEL NODE;  Surgeon: Satnam Betts MD;  Location: [facility] OR     REMOVE PORT VASCULAR ACCESS N/A 2/24/2016    Procedure: REMOVE PORT VASCULAR ACCESS;  Surgeon: Satnam Betts MD;  Location: [facility] OR     THORACIC SURGERY       MEDS:  Current Outpatient Medications   Medication     Acetaminophen (TYLENOL PO)     biotin 1000 MCG TABS tablet     calcium carbonate (OS-OCTAVIANO 500 MG [place]. CA) 500 MG tablet     gabapentin (NEURONTIN) 300 MG capsule     hydrochlorothiazide (HYDRODIURIL) 12.5 MG tablet     letrozole (FEMARA) 2.5 MG tablet     levothyroxine (SYNTHROID/LEVOTHROID) 50 MCG tablet     multivitamin, therapeutic with minerals (MULTI-VITAMIN) TABS     tretinoin (RETIN-A) 0.05 % external cream     VITAMIN D, CHOLECALCIFEROL, PO     zolpidem (AMBIEN) 5 MG tablet     No current facility-administered medications for this visit.      ALLERGIES:  Allergies   Allergen Reactions     Latex      SKIN REACTION       Sulfa Drugs Itching and Rash        PHYSICAL EXAM:  /73 (BP Location: Right arm, Patient Position: Sitting, Cuff Size: Adult Regular)   Pulse 92   Temp 97.1  F (36.2  C) (Oral)   Resp 16   Ht 1.638 m (5' 4.49\")   Wt 64.4 kg (141 lb 14.4 oz)   SpO2 100%   BMI 23.99 kg/m    KPS: 100%  GENERAL: A&Ox3, well appearing adult female in no acute distress  HEENT: Normocephalic, atraumatic, MMM.  No lesions of the oropharynx  LYMPH: No palpable supraclavicular, cervical, or axillary lymphadenopathy  CV: RRR, normal S1/S2, No murmurs, gallops, or rubs  LUNGS: CTA B/L, no wheezing or rhonchi  Breast:  Right breast is of increased fibroglandular " density.  Left mastectomy.  There are no discretely palpable masses of either the right breast or left chest wall.  Right nipple is everted.    ABDOMEN: soft, nontender, non-distended, no hepatosplenomegaly. Bowel sounds present.  EXTREMITIES: no pitting edema of the bilateral lower extremies  NEURO: Cranial nerves II-XII grossly intact.  Gait stable.  INTEGUMENT: No visible rashes or skin lesions.  PSYCH:  Mood and affect appear normal.    LABS REVIEWED THIS VISIT:  No interim imaging or laboratory results pertinent to today's visit.    IMPRESSION/PLAN: 68 yo female with a h/o T2N0M0, grade II, ER positive, [place] negative, HER2 non-amplified invasive lobular carcinoma of the left breast. She is s/p treatment with taxol and ganetespib followed by dose dense adriamycin and cyclophosphamide, left breast mastectomy, and has been on letrozole since 03/2016.    1.  Left breast ER-positive carcinoma:  Ms. Chambers is 3 years 2 months out from excision of a left breast cancer.  She continues on letrozole.  She is overall tolerating the medication well.  We plan to treat her for a total of 10 years (through 03/2026).  There is no evidence of disease recurrence on exam today.  I will see her back in clinic in approximately 6 months.      Due to h/o bilateral breast cancers and multiple gene mutations, we are performing high risk screening with both annual mammogram and breast MRI.  Patient canceled 2/25/19 breast MRI due to illness; will reschedule at this time.  Plan to stop breast MRI at 75 yo.  Next mammogram will be due in 08/2019.    2.  Hot flashes:  Improved since last visit.  Continue gabapentin 300 mg PO QHS.      3.  Arthralgias:  Improved with routine exercise.  Secondary to osteoarthritis and exacerbated by letrozole.  Continue to take daily vitamin D supplementation and daily cardiovascular activity.       4.  Osteopenia:  DEXA bone density in 05/2018 showed a lowest T-score of -1.4, which is consistent with  osteopenia.  Endocrinology recommended 1 year follow up.  We discussed that on AI, we would typically do a 2 year follow up.  She is okay with 2 year follow up DEXA.  Continue calcium and vitamin D supplementation as well as daily weightbearing activity.  Of note, since last visit, she has started weight training exercises; I anticipate this will have a positive effect on her bone density.     5.  Followup:  Breast MRI in 1-2 weeks.  Right breast screening mammogram 8/20/19 or later. Return to clinic in approximately 6 months for labs and visit with me.    It was a pleasure to meet with MsDivina Reyes in clinic today.  A total of 20 minutes of our 25 minute face to face visit was spent in discussing plan for endocrine therapy and surveillance moving forward.

## 2019-04-15 ENCOUNTER — ONCOLOGY VISIT (OUTPATIENT)
Dept: ONCOLOGY | Facility: CLINIC | Age: 69
End: 2019-04-15
Attending: INTERNAL MEDICINE
Payer: MEDICARE

## 2019-04-15 VITALS
DIASTOLIC BLOOD PRESSURE: 73 MMHG | OXYGEN SATURATION: 100 % | WEIGHT: 141.9 LBS | HEART RATE: 92 BPM | TEMPERATURE: 97.1 F | BODY MASS INDEX: 24.22 KG/M2 | SYSTOLIC BLOOD PRESSURE: 148 MMHG | RESPIRATION RATE: 16 BRPM | HEIGHT: 64 IN

## 2019-04-15 DIAGNOSIS — C50.512 MALIGNANT NEOPLASM OF LOWER-OUTER QUADRANT OF LEFT BREAST OF FEMALE, ESTROGEN RECEPTOR POSITIVE (H): Primary | ICD-10-CM

## 2019-04-15 DIAGNOSIS — Z17.0 MALIGNANT NEOPLASM OF LOWER-OUTER QUADRANT OF LEFT BREAST OF FEMALE, ESTROGEN RECEPTOR POSITIVE (H): Primary | ICD-10-CM

## 2019-04-15 DIAGNOSIS — Z92.21 HX ANTINEOPLASTIC CHEMOTHERAPY: ICD-10-CM

## 2019-04-15 DIAGNOSIS — Z12.39 BREAST CANCER SCREENING, HIGH RISK PATIENT: ICD-10-CM

## 2019-04-15 PROCEDURE — G0463 HOSPITAL OUTPT CLINIC VISIT: HCPCS | Mod: ZF

## 2019-04-15 PROCEDURE — 99214 OFFICE O/P EST MOD 30 MIN: CPT | Mod: ZP | Performed by: INTERNAL MEDICINE

## 2019-04-15 ASSESSMENT — MIFFLIN-ST. JEOR: SCORE: 1156.4

## 2019-04-15 ASSESSMENT — PAIN SCALES - GENERAL: PAINLEVEL: NO PAIN (0)

## 2019-04-15 NOTE — NURSING NOTE
"Oncology Rooming Note    April 15, 2019 2:42 PM   Sushila Chambers is a 69 year old female who presents for:    Chief Complaint   Patient presents with     RECHECK     ONc Breast CA 4 mo f/up      Initial Vitals: There were no vitals taken for this visit. Estimated body mass index is 24.18 kg/m  as calculated from the following:    Height as of 3/6/19: 1.638 m (5' 4.5\").    Weight as of 3/6/19: 64.9 kg (143 lb 1.6 oz). There is no height or weight on file to calculate BSA.  Data Unavailable Comment: Data Unavailable   No LMP recorded. Patient is postmenopausal.  Allergies reviewed: Yes  Medications reviewed: Yes    Medications: Medication refills not needed today.  Pharmacy name entered into [facility]:    [facility] - [place], [place] - [address]  [id number] - [place], [place] - [address] AT [facility] [address]  [facility]          Clinical concerns: none        Leena Saw, CMA              "

## 2019-04-15 NOTE — LETTER
4/15/2019       RE: Sushila Chambers  [address] [place] [address]     Dear Colleague,    Thank you for referring your patient, Sushila Chambers, to the [facility]. Please see a copy of my visit note below.    MEDICAL ONCOLOGY FOLLOW-UP PATIENT VISIT     NAME: Sushila Chambers     DATE: 4/15/19    PRIMARY CARE PHYSICIAN: Meryl Muro    PATIENT ID: Multifocal, stage IIa, T2N0M0, ER positive, [place] negative, HER2 non-amplified invasive lobular carcinoma of the left breast.     Oncology History: Ms. Chambers is a 69 year old female with a history of lung cancer and right breast cancer with more recent invasive lobular carcinoma of the left breast. Ms. Chambers was treated for right sided breast cancer in 2009 with right breast lumpectomy, radiation, and endocrine therapy. She self palpated a mass in the left breast in early August, 2015. Diagnostic mammogram and ultrasound showed 2 ill-defined spiculated masses at the 4:30 and 2:00 positions of the left breast. There was surrounding architectural distortion and the total area of involvement measured 6.5 cm. Targeted left breast ultrasound showed a multilobulated hypoechoic mass at the 4:00 position measuring 2.5 cm and an ill-defined hypoechoic mass at the 2:00 position measuring 2.4 cm.  Biopsy of the mass at the 4:00 position showed grade II invasive pleomorphic lobular carcinoma. Biopsy of the mass at the 2:00 position showed a grade II invasive lobular carcinoma. No angiolymphatic invasion or associated LCIS was seen in either specimen. Estrogen receptor staining was positive in >80% of tumor cell nuclei. Progesterone receptor staining was positive in <5% of tumor cell nuclei. HER2 was non-amplifed by FISH in both specimens.     Ms. Chambers enrolled on the ISPY2 clinical trial.  She received 12 weeks of ganetespib and Taxol and 4 cycles of dose dense AC from 9/29/15 - 2/1/16. On 2/24/16 she underwent left breast mastectomy and sentinel  "lymph node procedure under the care of Dr. Betts. Pathology showed a residual grade II 4.8 cm, pleomorphic invasive lobular carcinoma with associated LCIS. Invasive tumor cellularity was 30%.  There was evidence of perineural invasion, however, no lymphvascular space invasion. Two sentinel lymph nodes were negative.  She has been on adjuvant letrozole since 03/2016.    Ms. Chambers had genetic testing on 9/15/15.  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  Of note she had a number of alterations in RUFINA, BARD1, BRCA1/2, MUTYH, and TP53 that were classified as \"likely benign\" and \"benign\".    Interim History:   Ms. Chambers comes into clinic today for routine breast cancer followup.  She continues on treatment with letrozole.  She is tolerating the medication remarkably well.  In fact, hot flashes are intermittent and not bothersome.  She denies vaginal complaints.  She has had no symptoms of depression or anxiety.  She also reports a decrease in joint pains.  She is exercising at least 4 days a week.  She is doing Pilates 2 days a week and is doing strength training exercises with a  the other 2 days of the week.  She states she does not like to exercise, but as she has a  and appointments, she feels obligated to do it.  She denies concerning breast or chest wall complaints.  She has no current cough, shortness of breath or chest pain; however, did have an approximate 6 week bout of bronchitis this past winter.  In fact, she showed up to her breast MRI and due to frequent coughing was unable to have the test performed.  She denies abdominal complaints.  She has no headaches, visual complaints or focal neurologic complaints.  The remainder of her complete 12 point review of systems was reviewed with the patient and was negative with the exception of that mentioned above.     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 CA) 500 MG tablet     gabapentin (NEURONTIN) 300 MG capsule     hydrochlorothiazide (HYDRODIURIL) 12.5 MG tablet     letrozole (FEMARA) 2.5 MG tablet     levothyroxine (SYNTHROID/LEVOTHROID) 50 MCG tablet     multivitamin, therapeutic with minerals (MULTI-VITAMIN) TABS     tretinoin (RETIN-A) 0.05 % external cream     VITAMIN D, CHOLECALCIFEROL, PO     zolpidem (AMBIEN) 5 MG tablet     No current facility-administered medications for this visit.      ALLERGIES:  Allergies   Allergen Reactions     Latex      SKIN REACTION       Sulfa Drugs Itching and Rash        PHYSICAL EXAM:  /73 (BP Location: Right arm, Patient Position: Sitting, Cuff Size: Adult Regular)   Pulse 92   Temp 97.1  F (36.2  C) (Oral)   Resp 16   Ht 1.638 m (5' 4.49\")   Wt 64.4 kg (141 lb 14.4 oz)   SpO2 100%   BMI 23.99 kg/m     KPS: 100%  GENERAL: A&Ox3, well appearing adult female in no acute distress  HEENT: Normocephalic, atraumatic, MMM.  No lesions of " the oropharynx  LYMPH: No palpable supraclavicular, cervical, or axillary lymphadenopathy  CV: RRR, normal S1/S2, No murmurs, gallops, or rubs  LUNGS: CTA B/L, no wheezing or rhonchi  Breast:  Right breast is of increased fibroglandular density.  Left mastectomy.  There are no discretely palpable masses of either the right breast or left chest wall.  Right nipple is everted.    ABDOMEN: soft, nontender, non-distended, no hepatosplenomegaly. Bowel sounds present.  EXTREMITIES: no pitting edema of the bilateral lower extremies  NEURO: Cranial nerves II-XII grossly intact.  Gait stable.  INTEGUMENT: No visible rashes or skin lesions.  PSYCH:  Mood and affect appear normal.    LABS REVIEWED THIS VISIT:  No interim imaging or laboratory results pertinent to today's visit.    IMPRESSION/PLAN: 68 yo female with a h/o T2N0M0, grade II, ER positive, MA negative, HER2 non-amplified invasive lobular carcinoma of the left breast. She is s/p treatment with taxol and ganetespib followed by dose dense adriamycin and cyclophosphamide, left breast mastectomy, and has been on letrozole since 03/2016.    1.  Left breast ER-positive carcinoma:  Ms. Chambers is 3 years 2 months out from excision of a left breast cancer.  She continues on letrozole.  She is overall tolerating the medication well.  We plan to treat her for a total of 10 years (through 03/2026).  There is no evidence of disease recurrence on exam today.  I will see her back in clinic in approximately 6 months.      Due to h/o bilateral breast cancers and multiple gene mutations, we are performing high risk screening with both annual mammogram and breast MRI.  Patient canceled 2/25/19 breast MRI due to illness; will reschedule at this time.  Plan to stop breast MRI at 75 yo.  Next mammogram will be due in 08/2019.    2.  Hot flashes:  Improved since last visit.  Continue gabapentin 300 mg PO QHS.      3.  Arthralgias:  Improved with routine exercise.  Secondary to  osteoarthritis and exacerbated by letrozole.  Continue to take daily vitamin D supplementation and daily cardiovascular activity.       4.  Osteopenia:  DEXA bone density in 05/2018 showed a lowest T-score of -1.4, which is consistent with osteopenia.  Endocrinology recommended 1 year follow up.  We discussed that on AI, we would typically do a 2 year follow up.  She is okay with 2 year follow up DEXA.  Continue calcium and vitamin D supplementation as well as daily weightbearing activity.  Of note, since last visit, she has started weight training exercises; I anticipate this will have a positive effect on her bone density.     5.  Followup:  Breast MRI in 1-2 weeks.  Right breast screening mammogram 8/20/19 or later. Return to clinic in approximately 6 months for labs and visit with me.    It was a pleasure to meet with Ms. Chambers in clinic today.  A total of 20 minutes of our 25 minute face to face visit was spent in discussing plan for endocrine therapy and surveillance moving forward.      Again, thank you for allowing me to participate in the care of your patient.      Sincerely,    Brittany Villegas MD

## 2019-05-15 ENCOUNTER — ANCILLARY PROCEDURE (OUTPATIENT)
Dept: MRI IMAGING | Facility: CLINIC | Age: 69
End: 2019-05-15
Attending: INTERNAL MEDICINE
Payer: MEDICARE

## 2019-05-15 RX ORDER — GADOBUTROL 604.72 MG/ML
7.5 INJECTION INTRAVENOUS ONCE
Status: COMPLETED | OUTPATIENT
Start: 2019-05-15 | End: 2019-05-15

## 2019-05-15 RX ADMIN — GADOBUTROL 7.5 ML: 604.72 INJECTION INTRAVENOUS at 16:05

## 2019-05-15 NOTE — DISCHARGE INSTRUCTIONS
MRI Contrast Discharge Instructions    The IV contrast you received today will pass out of your body in your  urine. This will happen in the next 24 hours. You will not feel this process.  Your urine will not change color.    Drink at least 4 extra glasses of water or juice today (unless your doctor  has restricted your fluids). This reduces the stress on your kidneys.  You may take your regular medicines.    If you are on dialysis: It is best to have dialysis today.    If you have a reaction: Most reactions happen right away. If you have  any new symptoms after leaving the hospital (such as hives or swelling),  call your hospital at the correct number below. Or call your family doctor.  If you have breathing distress or wheezing, call 911.    Special instructions: ***    I have read and understand the above information.    Signature:______________________________________ Date:___________    Staff:__________________________________________ Date:___________     Time:__________    [place] [facility] Departments:    ___[place]: [phone]  ___[facility]: [phone]  ___[place]: [phone] ___[facility]: [phone]  ___[facility]: [phone]  ___[facility]: [phone]  ___[phone]  ___[facility]: [phone]  ___[phone]

## 2019-08-06 DIAGNOSIS — C50.512 MALIGNANT NEOPLASM OF LOWER-OUTER QUADRANT OF LEFT FEMALE BREAST (H): ICD-10-CM

## 2019-08-06 RX ORDER — LETROZOLE 2.5 MG/1
TABLET, FILM COATED ORAL
Qty: 90 TABLET | Refills: 3 | Status: SHIPPED | OUTPATIENT
Start: 2019-08-06 | End: 2020-07-20

## 2019-08-20 ENCOUNTER — ANCILLARY PROCEDURE (OUTPATIENT)
Dept: MAMMOGRAPHY | Facility: CLINIC | Age: 69
End: 2019-08-20
Payer: MEDICARE

## 2019-08-20 DIAGNOSIS — Z12.39 BREAST CANCER SCREENING, HIGH RISK PATIENT: ICD-10-CM

## 2019-09-30 ENCOUNTER — HEALTH MAINTENANCE LETTER (OUTPATIENT)
Age: 69
End: 2019-09-30

## 2019-10-14 NOTE — PROGRESS NOTES
MEDICAL ONCOLOGY FOLLOW-UP PATIENT VISIT     NAME: Sushila Chambers     DATE: 10/15/19    PRIMARY CARE PHYSICIAN: Meryl Muro    PATIENT ID: Multifocal, stage IIa, T2N0M0, ER positive, OR negative, HER2 non-amplified invasive lobular carcinoma of the left breast.     Oncology History: Ms. Chambers is a 69 year old female with a history of lung cancer and right breast cancer with more recent invasive lobular carcinoma of the left breast. Ms. Chambers was treated for right sided breast cancer in 2009 with right breast lumpectomy, radiation, and endocrine therapy. She self palpated a mass in the left breast in early August, 2015. Diagnostic mammogram and ultrasound showed 2 ill-defined spiculated masses at the 4:30 and 2:00 positions of the left breast. There was surrounding architectural distortion and the total area of involvement measured 6.5 cm. Targeted left breast ultrasound showed a multilobulated hypoechoic mass at the 4:00 position measuring 2.5 cm and an ill-defined hypoechoic mass at the 2:00 position measuring 2.4 cm.  Biopsy of the mass at the 4:00 position showed grade II invasive pleomorphic lobular carcinoma. Biopsy of the mass at the 2:00 position showed a grade II invasive lobular carcinoma. No angiolymphatic invasion or associated LCIS was seen in either specimen. Estrogen receptor staining was positive in >80% of tumor cell nuclei. Progesterone receptor staining was positive in <5% of tumor cell nuclei. HER2 was non-amplifed by FISH in both specimens.     Ms. Chambers enrolled on the ISPY2 clinical trial.  She received 12 weeks of ganetespib and Taxol and 4 cycles of dose dense AC from 9/29/15 - 2/1/16. On 2/24/16 she underwent left breast mastectomy and sentinel lymph node procedure under the care of Dr. Betts. Pathology showed a residual grade II 4.8 cm, pleomorphic invasive lobular carcinoma with associated LCIS. Invasive tumor cellularity was 30%.  There was evidence of perineural invasion,  "however, no lymphvascular space invasion. Two sentinel lymph nodes were negative.  She has been on adjuvant letrozole since 03/2016.    Ms. Chambers had genetic testing on 9/15/15.  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  Of note she had a number of alterations in RUFINA, BARD1, BRCA1/2, MUTYH, and TP53 that were classified as \"likely benign\" and \"benign\".    Interim History:    Ms. Chambers comes into clinic today for routine breast cancer followup.  She continues on treatment with letrozole.  Overall, she is tolerating the medication well.  She has intermittent joint stiffness in her hands and feet that improves with movement.  She also reports chronic low back pain which is unchanged from prior.  She believes it is likely due to arthritic type changes.  It does not wake her from sleep at night.  It is not painful enough for her to take pain medicine for it.  She denies concerning lumps or masses of either the right breast or left chest wall.  She has no cough, shortness of breath or chest pain.  She continues to exercise on a regular basis.  She does cardiovascular exercise 4 days a week, lifts weights twice a week, and does yoga or Pilates 2-3 days per week.  She recently got a new black lab, named Max, whom she is walking regularly.  She has no current abdominal complaints.  She has no headaches, visual changes or focal neurologic complaints.  The remainder of a complete 12-point review of systems was reviewed and was negative with the exception of that mentioned above.     PAST MEDICAL HISTORY:   Past Medical History:   Diagnosis Date     Basal cell carcinoma      Breast cancer, right breast (H)      History of blood transfusion      Hypothyroid      Lung cancer (H) 2012    right     Uncontrolled hypertension 11/15/2017       PAST SURGICAL HISTORY:  Past Surgical History:   Procedure Laterality Date     ABDOMEN SURGERY       BIOPSY, LUNG NODULE Right 2012    + cancer     COLONOSCOPY       ECTOPIC " "PREGNANCY SURGERY Left 1988     GENITOURINARY SURGERY       GYN SURGERY       INSERT PORT VASCULAR ACCESS       LUMPECTOMY BREAST Right 2010     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     MASTECTOMY SIMPLE, SENTINEL NODE, COMBINED Left 2/24/2016    Procedure: COMBINED MASTECTOMY SIMPLE, SENTINEL NODE;  Surgeon: Satnam Betts MD;  Location: [facility] OR     REMOVE PORT VASCULAR ACCESS N/A 2/24/2016    Procedure: REMOVE PORT VASCULAR ACCESS;  Surgeon: Satnam Betts MD;  Location: [facility] OR     THORACIC SURGERY       MEDS:  Current Outpatient Medications   Medication     Acetaminophen (TYLENOL PO)     biotin 1000 MCG TABS tablet     calcium carbonate (OS-OCTAVIANO 500 MG [place]. [place]) 500 MG tablet     gabapentin (NEURONTIN) 300 MG capsule     hydrochlorothiazide (HYDRODIURIL) 12.5 MG tablet     letrozole (FEMARA) 2.5 MG tablet     levothyroxine (SYNTHROID/LEVOTHROID) 50 MCG tablet     multivitamin, therapeutic with minerals (MULTI-VITAMIN) TABS     tretinoin (RETIN-A) 0.05 % external cream     VITAMIN D, CHOLECALCIFEROL, PO     zolpidem (AMBIEN) 5 MG tablet     No current facility-administered medications for this visit.      ALLERGIES:  Allergies   Allergen Reactions     Latex      SKIN REACTION       Sulfa Drugs Itching and Rash        PHYSICAL EXAM:  BP (!) 144/78 (BP Location: Left arm, Patient Position: Sitting, Cuff Size: Adult Regular)   Pulse 79   Temp 97.9  F (36.6  C) (Oral)   Resp 16   Ht 1.638 m (5' 4.49\")   Wt 63.2 kg (139 lb 4.8 oz)   SpO2 98%   BMI 23.55 kg/m    KPS: 100%  GENERAL: A&Ox3, well appearing adult female in no acute distress  HEENT: Normocephalic, atraumatic, MMM.  No lesions of the oropharynx  LYMPH: No palpable supraclavicular, cervical, or axillary lymphadenopathy  CV: RRR, normal S1/S2, No murmurs, gallops, or rubs  LUNGS: CTA B/L, no wheezing or rhonchi  Breast:  Right breast is of increased fibroglandular density.  Left mastectomy.  There are no discretely palpable masses of either the right " breast or left chest wall.  Right nipple is everted.  Exam overall is unchanged from prior.  ABDOMEN: soft, nontender, non-distended. Bowel sounds present.  EXTREMITIES: no pitting edema of the bilateral lower extremies  NEURO: Cranial nerves II-XII grossly intact.  Gait stable.  INTEGUMENT: No visible rashes or skin lesions.  PSYCH:  Mood and affect appear normal.    LABS REVIEWED THIS VISIT:  8/20/19 Right breast screening mammogram:  No suspicious findings.    IMPRESSION/PLAN: 70 yo female with a h/o T2N0M0, grade II, ER positive, HI negative, HER2 non-amplified invasive lobular carcinoma of the left breast. She is s/p treatment with taxol and ganetespib followed by dose dense adriamycin and cyclophosphamide, left breast mastectomy, and has been on letrozole since 03/2016.    1.  Left breast ER-positive carcinoma:  Ms. Chambers is 3 years 8 months out from excision of a left breast cancer.  She continues on letrozole.  She is overall tolerating the medication well.  We plan to treat her for a total of 10 years (through 03/2026).  There is no evidence of disease recurrence on exam today.  Mammogram of the right breast in August was also without concerning findings.  At her request, we reviewed signs and symptoms of breast cancer recurrence.  I will see her back in clinic in approximately 6 months.      Due to h/o bilateral breast cancers and multiple gene mutations, we are performing high risk screening with both annual mammogram and breast MRI.  She will be due for breast MRI in 02/2020.  Plan to stop breast MRI at 73 yo.      2.  Hot flashes:  Improved since last visit.  Continue gabapentin 300 mg PO QHS.      3.  Arthralgias:  Slightly worse than at last visit.  Secondary to osteoarthritis and exacerbated by letrozole.  Continue to take daily vitamin D supplementation and daily cardiovascular activity.  Recommend NSAIDs prn.     4.  Osteopenia:  DEXA bone density in 05/2018 showed a lowest T-score of -1.4, which  is consistent with osteopenia.  Continue calcium and vitamin D supplementation as well as daily weightbearing activity.  Will plan to repeat DEXA around 05/2020.     5.  Followup:  Breast MRI 2/25/20 or later.  Return to clinic in approximately 6 months for labs and visit with me.    It was a pleasure to meet with MsDivina Reyes in clinic today.  A total of 15 minutes of our 25 minute face to face visit was spent in counseling.

## 2019-10-15 ENCOUNTER — ONCOLOGY VISIT (OUTPATIENT)
Dept: ONCOLOGY | Facility: CLINIC | Age: 69
End: 2019-10-15
Attending: INTERNAL MEDICINE
Payer: MEDICARE

## 2019-10-15 ENCOUNTER — APPOINTMENT (OUTPATIENT)
Dept: LAB | Facility: CLINIC | Age: 69
End: 2019-10-15
Attending: INTERNAL MEDICINE
Payer: MEDICARE

## 2019-10-15 VITALS
BODY MASS INDEX: 23.78 KG/M2 | OXYGEN SATURATION: 98 % | RESPIRATION RATE: 16 BRPM | HEIGHT: 64 IN | WEIGHT: 139.3 LBS | HEART RATE: 79 BPM | SYSTOLIC BLOOD PRESSURE: 144 MMHG | TEMPERATURE: 97.9 F | DIASTOLIC BLOOD PRESSURE: 78 MMHG

## 2019-10-15 DIAGNOSIS — Z91.89 AT HIGH RISK FOR BREAST CANCER: ICD-10-CM

## 2019-10-15 DIAGNOSIS — Z85.3 PERSONAL HISTORY OF MALIGNANT NEOPLASM OF BREAST: ICD-10-CM

## 2019-10-15 DIAGNOSIS — Z92.21 HX ANTINEOPLASTIC CHEMOTHERAPY: ICD-10-CM

## 2019-10-15 DIAGNOSIS — Z17.0 MALIGNANT NEOPLASM OF LOWER-OUTER QUADRANT OF LEFT BREAST OF FEMALE, ESTROGEN RECEPTOR POSITIVE (H): Primary | ICD-10-CM

## 2019-10-15 DIAGNOSIS — C50.512 MALIGNANT NEOPLASM OF LOWER-OUTER QUADRANT OF LEFT BREAST OF FEMALE, ESTROGEN RECEPTOR POSITIVE (H): Primary | ICD-10-CM

## 2019-10-15 LAB
ALBUMIN SERPL-MCNC: 3.6 G/DL (ref 3.4–5)
ALP SERPL-CCNC: 46 U/L (ref 40–150)
ALT SERPL W P-5'-P-CCNC: 29 U/L (ref 0–50)
ANION GAP SERPL CALCULATED.3IONS-SCNC: 7 MMOL/L (ref 3–14)
AST SERPL W P-5'-P-CCNC: 22 U/L (ref 0–45)
BASOPHILS # BLD AUTO: 0 10E9/L (ref 0–0.2)
BASOPHILS NFR BLD AUTO: 0.7 %
BILIRUB SERPL-MCNC: 1 MG/DL (ref 0.2–1.3)
BUN SERPL-MCNC: 15 MG/DL (ref 7–30)
CALCIUM SERPL-MCNC: 9.5 MG/DL (ref 8.5–10.1)
CHLORIDE SERPL-SCNC: 101 MMOL/L (ref 94–109)
CO2 SERPL-SCNC: 30 MMOL/L (ref 20–32)
CREAT SERPL-MCNC: 0.96 MG/DL (ref 0.52–1.04)
DIFFERENTIAL METHOD BLD: NORMAL
EOSINOPHIL # BLD AUTO: 0.1 10E9/L (ref 0–0.7)
EOSINOPHIL NFR BLD AUTO: 2.5 %
ERYTHROCYTE [DISTWIDTH] IN BLOOD BY AUTOMATED COUNT: 12.3 % (ref 10–15)
GFR SERPL CREATININE-BSD FRML MDRD: 59 ML/MIN/{1.73_M2}
GLUCOSE SERPL-MCNC: 110 MG/DL (ref 70–99)
HCT VFR BLD AUTO: 38.1 % (ref 35–47)
HGB BLD-MCNC: 12.9 G/DL (ref 11.7–15.7)
IMM GRANULOCYTES # BLD: 0 10E9/L (ref 0–0.4)
IMM GRANULOCYTES NFR BLD: 0.4 %
LYMPHOCYTES # BLD AUTO: 1.6 10E9/L (ref 0.8–5.3)
LYMPHOCYTES NFR BLD AUTO: 27.5 %
MCH RBC QN AUTO: 30.6 PG (ref 26.5–33)
MCHC RBC AUTO-ENTMCNC: 33.9 G/DL (ref 31.5–36.5)
MCV RBC AUTO: 91 FL (ref 78–100)
MONOCYTES # BLD AUTO: 0.3 10E9/L (ref 0–1.3)
MONOCYTES NFR BLD AUTO: 6 %
NEUTROPHILS # BLD AUTO: 3.6 10E9/L (ref 1.6–8.3)
NEUTROPHILS NFR BLD AUTO: 62.9 %
NRBC # BLD AUTO: 0 10*3/UL
NRBC BLD AUTO-RTO: 0 /100
PLATELET # BLD AUTO: 200 10E9/L (ref 150–450)
POTASSIUM SERPL-SCNC: 3.2 MMOL/L (ref 3.4–5.3)
PROT SERPL-MCNC: 7.2 G/DL (ref 6.8–8.8)
RBC # BLD AUTO: 4.21 10E12/L (ref 3.8–5.2)
SODIUM SERPL-SCNC: 138 MMOL/L (ref 133–144)
WBC # BLD AUTO: 5.7 10E9/L (ref 4–11)

## 2019-10-15 PROCEDURE — 85025 COMPLETE CBC W/AUTO DIFF WBC: CPT | Performed by: INTERNAL MEDICINE

## 2019-10-15 PROCEDURE — 99214 OFFICE O/P EST MOD 30 MIN: CPT | Mod: ZP | Performed by: INTERNAL MEDICINE

## 2019-10-15 PROCEDURE — G0463 HOSPITAL OUTPT CLINIC VISIT: HCPCS | Mod: ZF

## 2019-10-15 PROCEDURE — 36415 COLL VENOUS BLD VENIPUNCTURE: CPT

## 2019-10-15 PROCEDURE — 80053 COMPREHEN METABOLIC PANEL: CPT | Performed by: INTERNAL MEDICINE

## 2019-10-15 ASSESSMENT — PAIN SCALES - GENERAL: PAINLEVEL: NO PAIN (0)

## 2019-10-15 ASSESSMENT — MIFFLIN-ST. JEOR: SCORE: 1144.64

## 2019-10-15 NOTE — NURSING NOTE
Chief Complaint   Patient presents with     Blood Draw     Labs drawn via  by RN in lab. VS Taken.     Charisse Yee RN

## 2019-10-15 NOTE — NURSING NOTE
"Oncology Rooming Note    October 15, 2019 10:39 AM   Sushila Chambers is a 69 year old female who presents for:    Chief Complaint   Patient presents with     Blood Draw     Labs drawn via  by RN in lab. VS Taken.     Oncology Clinic Visit     RETURN VISIT; BREAST CANCER FOLLOW UP      Initial Vitals: BP (!) 144/78 (BP Location: Left arm, Patient Position: Sitting, Cuff Size: Adult Regular)   Pulse 79   Temp 97.9  F (36.6  C) (Oral)   Resp 16   Ht 1.638 m (5' 4.49\")   Wt 63.2 kg (139 lb 4.8 oz)   SpO2 98%   BMI 23.55 kg/m   Estimated body mass index is 23.55 kg/m  as calculated from the following:    Height as of this encounter: 1.638 m (5' 4.49\").    Weight as of this encounter: 63.2 kg (139 lb 4.8 oz). Body surface area is 1.7 meters squared.  No Pain (0) Comment: Data Unavailable   No LMP recorded. Patient is postmenopausal.  Allergies reviewed: Yes  Medications reviewed: Yes    Medications: Medication refills not needed today.  Pharmacy name entered into [facility]:    [facility] - [place], [place] - [address]  [id number] - [place], [place] - [address] [place] [facility] [address]  [facility]    Clinical concerns: No new concerns today  Dr Villegas  was notified.      Margaret Rodriguez              "

## 2019-10-15 NOTE — LETTER
10/15/2019       RE: Sushila Chambers  [address] [place] [address]     Dear Colleague,    Thank you for referring your patient, Sushila Chambers, to the [facility]. Please see a copy of my visit note below.    MEDICAL ONCOLOGY FOLLOW-UP PATIENT VISIT     NAME: Sushila Chambers     DATE: 10/15/19    PRIMARY CARE PHYSICIAN: Meryl Muro    PATIENT ID: Multifocal, stage IIa, T2N0M0, ER positive, AL negative, HER2 non-amplified invasive lobular carcinoma of the left breast.     Oncology History: Ms. Chambers is a 69 year old female with a history of lung cancer and right breast cancer with more recent invasive lobular carcinoma of the left breast. Ms. Chambers was treated for right sided breast cancer in 2009 with right breast lumpectomy, radiation, and endocrine therapy. She self palpated a mass in the left breast in early August, 2015. Diagnostic mammogram and ultrasound showed 2 ill-defined spiculated masses at the 4:30 and 2:00 positions of the left breast. There was surrounding architectural distortion and the total area of involvement measured 6.5 cm. Targeted left breast ultrasound showed a multilobulated hypoechoic mass at the 4:00 position measuring 2.5 cm and an ill-defined hypoechoic mass at the 2:00 position measuring 2.4 cm.  Biopsy of the mass at the 4:00 position showed grade II invasive pleomorphic lobular carcinoma. Biopsy of the mass at the 2:00 position showed a grade II invasive lobular carcinoma. No angiolymphatic invasion or associated LCIS was seen in either specimen. Estrogen receptor staining was positive in >80% of tumor cell nuclei. Progesterone receptor staining was positive in <5% of tumor cell nuclei. HER2 was non-amplifed by FISH in both specimens.     Ms. Chambers enrolled on the ISPY2 clinical trial.  She received 12 weeks of ganetespib and Taxol and 4 cycles of dose dense AC from 9/29/15 - 2/1/16. On 2/24/16 she underwent left breast mastectomy and sentinel  "lymph node procedure under the care of Dr. Betts. Pathology showed a residual grade II 4.8 cm, pleomorphic invasive lobular carcinoma with associated LCIS. Invasive tumor cellularity was 30%.  There was evidence of perineural invasion, however, no lymphvascular space invasion. Two sentinel lymph nodes were negative.  She has been on adjuvant letrozole since 03/2016.    Ms. Chambers had genetic testing on 9/15/15.  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  Of note she had a number of alterations in RUFINA, BARD1, BRCA1/2, MUTYH, and TP53 that were classified as \"likely benign\" and \"benign\".    Interim History:    Ms. Chambers comes into clinic today for routine breast cancer followup.  She continues on treatment with letrozole.  Overall, she is tolerating the medication well.  She has intermittent joint stiffness in her hands and feet that improves with movement.  She also reports chronic low back pain which is unchanged from prior.  She believes it is likely due to arthritic type changes.  It does not wake her from sleep at night.  It is not painful enough for her to take pain medicine for it.  She denies concerning lumps or masses of either the right breast or left chest wall.  She has no cough, shortness of breath or chest pain.  She continues to exercise on a regular basis.  She does cardiovascular exercise 4 days a week, lifts weights twice a week, and does yoga or Pilates 2-3 days per week.  She recently got a new black lab, named Max, whom she is walking regularly.  She has no current abdominal complaints.  She has no headaches, visual changes or focal neurologic complaints.  The remainder of a complete 12-point review of systems was reviewed and was negative with the exception of that mentioned above.     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 [place]) 500 MG tablet     gabapentin (NEURONTIN) 300 MG capsule     hydrochlorothiazide (HYDRODIURIL) 12.5 MG tablet     letrozole (FEMARA) 2.5 MG tablet     levothyroxine (SYNTHROID/LEVOTHROID) 50 MCG tablet     multivitamin, therapeutic with minerals (MULTI-VITAMIN) TABS     tretinoin (RETIN-A) 0.05 % external cream     VITAMIN D, CHOLECALCIFEROL, PO     zolpidem (AMBIEN) 5 MG tablet     No current facility-administered medications for this visit.      ALLERGIES:  Allergies   Allergen Reactions     Latex      SKIN REACTION       Sulfa Drugs Itching and Rash        PHYSICAL EXAM:  BP (!) 144/78 (BP Location: Left arm, Patient Position: Sitting, Cuff Size: Adult Regular)   Pulse 79   Temp 97.9  F (36.6  C) (Oral)   Resp 16   Ht 1.638 m (5' 4.49\")   Wt 63.2 kg (139 lb 4.8 oz)   SpO2 98%   BMI 23.55 kg/m     KPS: 100%  GENERAL: A&Ox3, well appearing adult female in no acute distress  HEENT: Normocephalic, atraumatic, MMM.  No lesions of the oropharynx  LYMPH: No palpable supraclavicular, cervical, or axillary lymphadenopathy  CV: " RRR, normal S1/S2, No murmurs, gallops, or rubs  LUNGS: CTA B/L, no wheezing or rhonchi  Breast:  Right breast is of increased fibroglandular density.  Left mastectomy.  There are no discretely palpable masses of either the right breast or left chest wall.  Right nipple is everted.  Exam overall is unchanged from prior.  ABDOMEN: soft, nontender, non-distended. Bowel sounds present.  EXTREMITIES: no pitting edema of the bilateral lower extremies  NEURO: Cranial nerves II-XII grossly intact.  Gait stable.  INTEGUMENT: No visible rashes or skin lesions.  PSYCH:  Mood and affect appear normal.    LABS REVIEWED THIS VISIT:  8/20/19 Right breast screening mammogram:  No suspicious findings.    IMPRESSION/PLAN: 68 yo female with a h/o T2N0M0, grade II, ER positive, LA negative, HER2 non-amplified invasive lobular carcinoma of the left breast. She is s/p treatment with taxol and ganetespib followed by dose dense adriamycin and cyclophosphamide, left breast mastectomy, and has been on letrozole since 03/2016.    1.  Left breast ER-positive carcinoma:  Ms. Chambers is 3 years 8 months out from excision of a left breast cancer.  She continues on letrozole.  She is overall tolerating the medication well.  We plan to treat her for a total of 10 years (through 03/2026).  There is no evidence of disease recurrence on exam today.  Mammogram of the right breast in August was also without concerning findings.  At her request, we reviewed signs and symptoms of breast cancer recurrence.  I will see her back in clinic in approximately 6 months.      Due to h/o bilateral breast cancers and multiple gene mutations, we are performing high risk screening with both annual mammogram and breast MRI.  She will be due for breast MRI in 02/2020.  Plan to stop breast MRI at 75 yo.      2.  Hot flashes:  Improved since last visit.  Continue gabapentin 300 mg PO QHS.      3.  Arthralgias:  Slightly worse than at last visit.  Secondary to  osteoarthritis and exacerbated by letrozole.  Continue to take daily vitamin D supplementation and daily cardiovascular activity.  Recommend NSAIDs prn.     4.  Osteopenia:  DEXA bone density in 05/2018 showed a lowest T-score of -1.4, which is consistent with osteopenia.  Continue calcium and vitamin D supplementation as well as daily weightbearing activity.  Will plan to repeat DEXA around 05/2020.     5.  Followup:  Breast MRI 2/25/20 or later.  Return to clinic in approximately 6 months for labs and visit with me.    It was a pleasure to meet with Ms. Chambers in clinic today.  A total of 15 minutes of our 25 minute face to face visit was spent in counseling.  Again, thank you for allowing me to participate in the care of your patient.      Sincerely,    Brittany Villegas MD

## 2020-03-03 DIAGNOSIS — I10 HYPERTENSION GOAL BP (BLOOD PRESSURE) < 140/90: ICD-10-CM

## 2020-03-03 DIAGNOSIS — E03.9 HYPOTHYROIDISM, UNSPECIFIED TYPE: ICD-10-CM

## 2020-03-03 DIAGNOSIS — G62.9 NEUROPATHY: ICD-10-CM

## 2020-03-04 NOTE — TELEPHONE ENCOUNTER
"Requested Prescriptions   Pending Prescriptions Disp Refills     levothyroxine (SYNTHROID/LEVOTHROID) 50 MCG tablet [Pharmacy Med Name: LEVOTHYROXINE 0.05MG (50MCG) TAB] 90 tablet 3     Sig: TAKE 1 TABLET BY MOUTH DAILY  Last Written Prescription Date:  01/23/2019  Last Fill Quantity: 90 tablet,  # refills: 3   Last Office Visit: 1/23/2019 KAVITA  Future Office Visit:            Thyroid Protocol Failed - 3/3/2020 12:06 PM        Failed - Recent (12 mo) or future (30 days) visit within the authorizing provider's specialty     Patient has had an office visit with the authorizing provider or a provider within the authorizing providers department within the previous 12 mos or has a future within next 30 days. See \"Patient Info\" tab in inbasket, or \"Choose Columns\" in Meds & Orders section of the refill encounter.              Failed - Normal TSH on file in past 12 months     Recent Labs   Lab Test 05/22/18  1130   TSH 3.48              Passed - Patient is 12 years or older        Passed - Medication is active on med list        Passed - No active pregnancy on record     If patient is pregnant or has had a positive pregnancy test, please check TSH.          Passed - No positive pregnancy test in past 12 months     If patient is pregnant or has had a positive pregnancy test, please check TSH.     ________________________________________________________________________       hydrochlorothiazide (HYDRODIURIL) 12.5 MG tablet [Pharmacy Med Name: HYDROCHLOROTHIAZIDE 12.5MG TABLETS] 90 tablet 3     Sig: TAKE 1 TABLET BY MOUTH DAILY  Last Written Prescription Date:  01/23/2019  Last Fill Quantity: 90 tablet,  # refills: 3   Last Office Visit: 1/23/2019 KAVITA  Future Office Visit:            Diuretics (Including Combos) Protocol Failed - 3/3/2020 12:06 PM        Failed - Blood pressure under 140/90 in past 12 months     BP Readings from Last 3 Encounters:   10/15/19 (!) 144/78   04/15/19 148/73   03/06/19 143/89 " "                Failed - Recent (12 mo) or future (30 days) visit within the authorizing provider's specialty     Patient has had an office visit with the authorizing provider or a provider within the authorizing providers department within the previous 12 mos or has a future within next 30 days. See \"Patient Info\" tab in inbasket, or \"Choose Columns\" in Meds & Orders section of the refill encounter.              Failed - Normal serum potassium on file in past 12 months     Recent Labs   Lab Test 10/15/19  1023   POTASSIUM 3.2*                    Passed - Medication is active on med list        Passed - Patient is age 18 or older        Passed - No active pregancy on record        Passed - Normal serum creatinine on file in past 12 months     Recent Labs   Lab Test 10/15/19  1023   CR 0.96              Passed - Normal serum sodium on file in past 12 months     Recent Labs   Lab Test 10/15/19  1023                 Passed - No positive pregnancy test in past 12 months   ________________________________________________________________________       gabapentin (NEURONTIN) 300 MG capsule [Pharmacy Med Name: GABAPENTIN 300MG CAPSULES] 90 capsule 3     Sig: TAKE ONE CAPSULE BY MOUTH AT BEDTIME  Last Written Prescription Date:  01/23/2019  Last Fill Quantity: 90 capsule,  # refills: 3   Last Office Visit: 1/23/2019 KAVITA  Future Office Visit:            There is no refill protocol information for this order        "

## 2020-03-09 NOTE — TELEPHONE ENCOUNTER
"Routing refill request to provider for review/approval because:  --Labs out of range:  Potassium.  --Labs not current:  TSH (2018)  --Last Office Visit: 1/23/2019 Bhaskar plan \"Continue Neurontin.  Yearly clinic check ends for refills.\"  --Last three blood pressure's in specialist visits not at goal.       ,  --Please contact patient and ask her to schedule an annual office visit.    --A refill request for below medication was sent to the provider.          Future Office Visit:  NONE      Potassium   Date Value Ref Range Status   10/15/2019 3.2 (L) 3.4 - 5.3 mmol/L Final   01/23/2019 4.3 3.4 - 5.3 mmol/L Final   05/22/2018 3.8 3.4 - 5.3 mmol/L Final   11/14/2017 3.8 3.4 - 5.3 mmol/L Final   03/21/2017 4.1 3.4 - 5.3 mmol/L Final       BP Readings from Last 6 Encounters:   10/15/19 (!) 144/78   04/15/19 148/73   03/06/19 143/89   01/23/19 129/85   12/19/18 124/70   12/17/18 179/89         "

## 2020-03-09 NOTE — TELEPHONE ENCOUNTER
Patient only has 4-5 tablets left.     Appt scheduled on 3/18 with PCP.    Ashanti SCANLON     [facility]

## 2020-03-11 DIAGNOSIS — E03.9 HYPOTHYROIDISM, UNSPECIFIED TYPE: ICD-10-CM

## 2020-03-11 DIAGNOSIS — I10 HYPERTENSION GOAL BP (BLOOD PRESSURE) < 140/90: ICD-10-CM

## 2020-03-11 RX ORDER — GABAPENTIN 300 MG/1
CAPSULE ORAL
Qty: 30 CAPSULE | Refills: 0 | Status: SHIPPED | OUTPATIENT
Start: 2020-03-11 | End: 2020-03-17

## 2020-03-11 RX ORDER — LEVOTHYROXINE SODIUM 50 UG/1
TABLET ORAL
Qty: 30 TABLET | Refills: 0 | Status: SHIPPED | OUTPATIENT
Start: 2020-03-11 | End: 2020-03-17

## 2020-03-11 RX ORDER — HYDROCHLOROTHIAZIDE 12.5 MG/1
TABLET ORAL
Qty: 30 TABLET | Refills: 0 | Status: SHIPPED | OUTPATIENT
Start: 2020-03-11 | End: 2020-03-17

## 2020-03-12 NOTE — TELEPHONE ENCOUNTER
"Requested Prescriptions   Pending Prescriptions Disp Refills     hydrochlorothiazide (HYDRODIURIL) 12.5 MG tablet [Pharmacy Med Name: HYDROCHLOROTHIAZIDE 12.5MG TABLETS] 90 tablet      Sig: TAKE 1 TABLET BY MOUTH DAILY  Last Written Prescription Date:  3/11/20  Last Fill Quantity: 30 tab,  # refills: 0   Last office visit: 1/23/2019 with prescribing provider:  Bhaskar   Future Office Visit:   Next 5 appointments (look out 90 days)    Mar 18, 2020 12:00 PM CDT  PHYSICAL with Meryl Muro, APRN CNP  [facility] ([facility]) [address] [place] [address]  [phone]             Diuretics (Including Combos) Protocol Failed - 3/11/2020  4:42 PM        Failed - Blood pressure under 140/90 in past 12 months     BP Readings from Last 3 Encounters:   10/15/19 (!) 144/78   04/15/19 148/73   03/06/19 143/89                 Failed - Normal serum potassium on file in past 12 months     Recent Labs   Lab Test 10/15/19  1023   POTASSIUM 3.2*                    Passed - Recent (12 mo) or future (30 days) visit within the authorizing provider's specialty     Patient has had an office visit with the authorizing provider or a provider within the authorizing providers department within the previous 12 mos or has a future within next 30 days. See \"Patient Info\" tab in inbasket, or \"Choose Columns\" in Meds & Orders section of the refill encounter.              Passed - Medication is active on med list        Passed - Patient is age 18 or older        Passed - No active pregancy on record        Passed - Normal serum creatinine on file in past 12 months     Recent Labs   Lab Test 10/15/19  1023   CR 0.96              Passed - Normal serum sodium on file in past 12 months     Recent Labs   Lab Test 10/15/19  1023                 Passed - No positive pregnancy test in past 12 months           levothyroxine (SYNTHROID/LEVOTHROID) 50 MCG tablet [Pharmacy Med Name: " "LEVOTHYROXINE 0.05MG (50MCG) TAB] 90 tablet      Sig: TAKE 1 TABLET BY MOUTH DAILY  Last Written Prescription Date:  3/11/20  Last Fill Quantity: 30 tab,  # refills: 0   Last office visit: 1/23/2019 with prescribing provider:  Bhaskar   Future Office Visit:   Next 5 appointments (look out 90 days)    Mar 18, 2020 12:00 PM CDT  PHYSICAL with Meryl Muro, APRN CNP  [facility] ([facility]) [address] [place] [address]  [phone]             Thyroid Protocol Failed - 3/11/2020  4:42 PM        Failed - Normal TSH on file in past 12 months     Recent Labs   Lab Test 05/22/18  1130   TSH 3.48              Passed - Patient is 12 years or older        Passed - Recent (12 mo) or future (30 days) visit within the authorizing provider's specialty     Patient has had an office visit with the authorizing provider or a provider within the authorizing providers department within the previous 12 mos or has a future within next 30 days. See \"Patient Info\" tab in inbasket, or \"Choose Columns\" in Meds & Orders section of the refill encounter.              Passed - Medication is active on med list        Passed - No active pregnancy on record     If patient is pregnant or has had a positive pregnancy test, please check TSH.          Passed - No positive pregnancy test in past 12 months     If patient is pregnant or has had a positive pregnancy test, please check TSH.              "

## 2020-03-12 NOTE — TELEPHONE ENCOUNTER
Patient informed that a medication refill was sent to their pharmacy.    Ashanti SCANLON     [facility]

## 2020-03-15 ENCOUNTER — MYC MEDICAL ADVICE (OUTPATIENT)
Dept: FAMILY MEDICINE | Facility: CLINIC | Age: 70
End: 2020-03-15

## 2020-03-15 ENCOUNTER — HEALTH MAINTENANCE LETTER (OUTPATIENT)
Age: 70
End: 2020-03-15

## 2020-03-15 DIAGNOSIS — G62.9 NEUROPATHY: ICD-10-CM

## 2020-03-15 DIAGNOSIS — I10 HYPERTENSION GOAL BP (BLOOD PRESSURE) < 140/90: ICD-10-CM

## 2020-03-15 DIAGNOSIS — E03.9 HYPOTHYROIDISM, UNSPECIFIED TYPE: ICD-10-CM

## 2020-03-15 DIAGNOSIS — C50.512 MALIGNANT NEOPLASM OF LOWER-OUTER QUADRANT OF LEFT FEMALE BREAST (H): ICD-10-CM

## 2020-03-15 RX ORDER — LEVOTHYROXINE SODIUM 50 UG/1
TABLET ORAL
Qty: 0.1 TABLET | Refills: 0 | OUTPATIENT
Start: 2020-03-15

## 2020-03-15 RX ORDER — HYDROCHLOROTHIAZIDE 12.5 MG/1
TABLET ORAL
Qty: 0.1 TABLET | Refills: 0 | OUTPATIENT
Start: 2020-03-15

## 2020-03-15 NOTE — TELEPHONE ENCOUNTER
Push back for 90 day supply    Refused Prescriptions:                       Disp   Refills    hydrochlorothiazide (HYDRODIURIL) 12.5 MG *0.1 ta*0        Sig: TAKE 1 TABLET BY MOUTH DAILY  Refused By: KIRK ARREGUIN  Reason for Refusal: Patient needs appointment    levothyroxine (SYNTHROID/LEVOTHROID) 50 MC*0.1 ta*0        Sig: TAKE 1 TABLET BY MOUTH DAILY  Refused By: KIRK ARREGUIN  Reason for Refusal: Patient needs appointment

## 2020-03-17 NOTE — TELEPHONE ENCOUNTER
Spoke with pt who canceled appointment tomorrow will reschedule physical in the future but is requesting for pcp to extend refills. Please advise thank you.

## 2020-03-17 NOTE — TELEPHONE ENCOUNTER
For Sushila, she is on hydrochlorothiazide and in October her potassium level was abnormal.  I do need to make sure her potassium is okay.  If she not current ill (per our protocol), could she please come in for a face to face appointment and lab tests?

## 2020-03-18 RX ORDER — GABAPENTIN 300 MG/1
CAPSULE ORAL
Qty: 90 CAPSULE | Refills: 0 | Status: SHIPPED | OUTPATIENT
Start: 2020-03-18 | End: 2020-04-09

## 2020-03-18 RX ORDER — LEVOTHYROXINE SODIUM 50 UG/1
50 TABLET ORAL DAILY
Qty: 90 TABLET | Refills: 0 | Status: SHIPPED | OUTPATIENT
Start: 2020-03-18 | End: 2020-04-09

## 2020-03-18 RX ORDER — HYDROCHLOROTHIAZIDE 12.5 MG/1
12.5 TABLET ORAL DAILY
Qty: 90 TABLET | Refills: 0 | Status: SHIPPED | OUTPATIENT
Start: 2020-03-18 | End: 2020-04-09

## 2020-03-18 NOTE — TELEPHONE ENCOUNTER
Patient states that she's reluctant to come in with everything that going on & history of breast caner & lung surgeries.   Patient understands PCP's concern & is wondering if she were to come in & recheck her potassium- what would be the next steps? Couldn't this wait a few more months? Please advise, thank you!    Ashanti SCANLON     [facility]

## 2020-03-18 NOTE — TELEPHONE ENCOUNTER
I placed a telephone call to Al to talk about her medications, history, she does NOT want to leave her house/come into the clinic for anything if she can prevent coming in.  I did review with al her October CMP showing a low potassium and I would like that checked. Per al, she is feeling healthy and would prefer to intentionally ramp up the potassium in her diet.  She promises to come into the clinic for an appointment with me and lab tests as soon as our community concern for covid 19/coronavirus minimizes.  Questions were answered.  She is very appreciative.   Refills given for 90 days.

## 2020-04-08 DIAGNOSIS — I10 HYPERTENSION GOAL BP (BLOOD PRESSURE) < 140/90: ICD-10-CM

## 2020-04-08 DIAGNOSIS — Z12.11 SCREENING FOR COLON CANCER: ICD-10-CM

## 2020-04-08 DIAGNOSIS — E03.9 HYPOTHYROIDISM, UNSPECIFIED TYPE: Primary | ICD-10-CM

## 2020-04-08 DIAGNOSIS — Z13.220 SCREENING FOR HYPERLIPIDEMIA: ICD-10-CM

## 2020-04-08 DIAGNOSIS — Z13.1 SCREENING FOR DIABETES MELLITUS: ICD-10-CM

## 2020-04-08 DIAGNOSIS — Z13.29 SCREENING FOR THYROID DISORDER: ICD-10-CM

## 2020-04-08 RX ORDER — HYDROCHLOROTHIAZIDE 12.5 MG/1
TABLET ORAL
Qty: 30 TABLET | OUTPATIENT
Start: 2020-04-08

## 2020-04-08 RX ORDER — LEVOTHYROXINE SODIUM 50 UG/1
TABLET ORAL
Qty: 30 TABLET | OUTPATIENT
Start: 2020-04-08

## 2020-04-08 NOTE — TELEPHONE ENCOUNTER
Patient stated again that she does not want to come into the clinic for another few months and declined a lab only appt.   Please advise, thank you!    Ashanti SCANLON     [facility]

## 2020-04-08 NOTE — TELEPHONE ENCOUNTER
Phone visit scheduled for 4/9. Please remove pending medications, thanks!    Ashanti SCANLON     [facility]

## 2020-04-08 NOTE — TELEPHONE ENCOUNTER
Please call the patient.  I would like her to do a lab only blood test to check her thyroid levels, electrolytes, etc and then she should do a virtual visit with me to review the labs and get refills.    Let me know how she schedules.    Meryl

## 2020-04-09 ENCOUNTER — VIRTUAL VISIT (OUTPATIENT)
Dept: FAMILY MEDICINE | Facility: CLINIC | Age: 70
End: 2020-04-09
Payer: MEDICARE

## 2020-04-09 VITALS — HEIGHT: 63 IN | WEIGHT: 137 LBS | BODY MASS INDEX: 24.27 KG/M2

## 2020-04-09 DIAGNOSIS — F19.982 DRUG INDUCED INSOMNIA (H): ICD-10-CM

## 2020-04-09 DIAGNOSIS — C34.11 MALIGNANT NEOPLASM OF UPPER LOBE OF RIGHT LUNG (H): ICD-10-CM

## 2020-04-09 DIAGNOSIS — G62.0 DRUG-INDUCED POLYNEUROPATHY (H): ICD-10-CM

## 2020-04-09 DIAGNOSIS — E03.9 HYPOTHYROIDISM, UNSPECIFIED TYPE: Primary | ICD-10-CM

## 2020-04-09 DIAGNOSIS — G62.9 NEUROPATHY: ICD-10-CM

## 2020-04-09 DIAGNOSIS — N18.30 CKD (CHRONIC KIDNEY DISEASE) STAGE 3, GFR 30-59 ML/MIN (H): ICD-10-CM

## 2020-04-09 DIAGNOSIS — I10 HYPERTENSION GOAL BP (BLOOD PRESSURE) < 140/90: ICD-10-CM

## 2020-04-09 PROCEDURE — 99442 ZZC PHYSICIAN TELEPHONE EVALUATION 11-20 MIN: CPT | Performed by: NURSE PRACTITIONER

## 2020-04-09 RX ORDER — LEVOTHYROXINE SODIUM 50 UG/1
50 TABLET ORAL DAILY
Qty: 90 TABLET | Refills: 3 | Status: SHIPPED | OUTPATIENT
Start: 2020-04-09 | End: 2021-06-29

## 2020-04-09 RX ORDER — GABAPENTIN 300 MG/1
CAPSULE ORAL
Qty: 90 CAPSULE | Refills: 3 | Status: SHIPPED | OUTPATIENT
Start: 2020-04-09 | End: 2021-06-29

## 2020-04-09 RX ORDER — HYDROCHLOROTHIAZIDE 12.5 MG/1
12.5 TABLET ORAL DAILY
Qty: 90 TABLET | Refills: 3 | Status: SHIPPED | OUTPATIENT
Start: 2020-04-09 | End: 2021-06-09

## 2020-04-09 ASSESSMENT — MIFFLIN-ST. JEOR: SCORE: 1105.56

## 2020-04-09 NOTE — PROGRESS NOTES
"Isela Chambers is a 70 year old female who is being evaluated via a billable telephone visit.      The patient has been notified of following:     \"This telephone visit will be conducted via a call between you and your physician/provider. We have found that certain health care needs can be provided without the need for a physical exam.  This service lets us provide the care you need with a short phone conversation.  If a prescription is necessary we can send it directly to your pharmacy.  If lab work is needed we can place an order for that and you can then stop by our lab to have the test done at a later time.    Telephone visits are billed at different rates depending on your insurance coverage. During this emergency period, for some insurers they may be billed the same as an in-person visit.  Please reach out to your insurance provider with any questions.    If during the course of the call the physician/provider feels a telephone visit is not appropriate, you will not be charged for this service.\"    Patient has given verbal consent for Telephone visit?  Yes    How would you like to obtain your AVS? Robson Chambers complains of   Chief Complaint   Patient presents with     Hypertension     NEUROPATHY     Thyroid Disease       ALLERGIES  Latex and Sulfa drugs      Patient Active Problem List   Diagnosis     Hypothyroidism     Lung cancer (H)     Breast cancer, right breast (H)     Hyperlipidemia LDL goal <130     Grief     Breast cancer, left breast (H)     Encounter for antineoplastic chemotherapy     Malignant neoplasm of lower-outer quadrant of left female breast (H)     Diarrhea     Non-intractable vomiting with nausea, vomiting of unspecified type     Other specified anemias     Uncontrolled hypertension     Hypertension goal BP (blood pressure) < 140/90     Low bone density     Drug-induced polyneuropathy (H)     Drug induced insomnia (H)     CKD (chronic kidney disease) stage 3, GFR " 30-59 ml/min (H)     Past Surgical History:   Procedure Laterality Date     ABDOMEN SURGERY       BIOPSY, LUNG NODULE Right 2012    + cancer     COLONOSCOPY       ECTOPIC PREGNANCY SURGERY Left 1988     GENITOURINARY SURGERY       GYN SURGERY       INSERT PORT VASCULAR ACCESS       LUMPECTOMY BREAST Right 2010     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     MASTECTOMY SIMPLE, SENTINEL NODE, COMBINED Left 2/24/2016    Procedure: COMBINED MASTECTOMY SIMPLE, SENTINEL NODE;  Surgeon: Satnam Betts MD;  Location: [facility] OR     REMOVE PORT VASCULAR ACCESS N/A 2/24/2016    Procedure: REMOVE PORT VASCULAR ACCESS;  Surgeon: Satnam Betts MD;  Location: [facility] OR     THORACIC SURGERY         Social History     Tobacco Use     Smoking status: Never Smoker     Smokeless tobacco: Never Used   Substance Use Topics     Alcohol use: Yes     Comment: Occasionally     Family History   Problem Relation Age of Onset     Breast Cancer Mother      Cancer Father         bone cancer     Melanoma No family hx of      Skin Cancer No family hx of          Current Outpatient Medications   Medication Sig Dispense Refill     Acetaminophen (TYLENOL PO) Take 500 mg by mouth every 4 hours as needed for mild pain or fever Reported on 3/21/2017       biotin 1000 MCG TABS tablet Take by mouth daily       calcium carbonate (OS-OCTAVIANO 500 MG [place]. [place]) 500 MG tablet Take by mouth 2 times daily       gabapentin (NEURONTIN) 300 MG capsule TAKE ONE CAPSULE BY MOUTH AT BEDTIME 90 capsule 3     hydrochlorothiazide (HYDRODIURIL) 12.5 MG tablet Take 1 tablet (12.5 mg) by mouth daily 90 tablet 3     letrozole (FEMARA) 2.5 MG tablet TAKE 1 TABLET(2.5 MG) BY MOUTH DAILY 90 tablet 3     levothyroxine (SYNTHROID/LEVOTHROID) 50 MCG tablet Take 1 tablet (50 mcg) by mouth daily 90 tablet 3     multivitamin, therapeutic with minerals (MULTI-VITAMIN) TABS Take 1 tablet by mouth daily       VITAMIN D, CHOLECALCIFEROL, PO Take by mouth daily       zolpidem (AMBIEN) 5 MG tablet  "Take 1 tablet (5 mg) by mouth nightly as needed for sleep (Patient not taking: Reported on 4/9/2020) 30 tablet 1     Allergies   Allergen Reactions     Latex      SKIN REACTION       Sulfa Drugs Itching and Rash     Recent Labs   Lab Test 10/15/19  1023 01/23/19  1054 05/22/18  1130  03/21/17  1244  09/09/15  0857 06/22/15  0924 09/09/14  1019   A1C  --   --   --   --   --   --  5.3  --   --    LDL  --   --   --   --  171*  --   --  67 103   HDL  --   --   --   --  54  --   --  72 62   TRIG  --   --   --   --  206*  --   --  105 187*   ALT 29 29  --   --  24   < > 20  --  20   CR 0.96 0.93 1.00   < > 0.90   < > 0.88  --   --    GFRESTIMATED 59* 62 55*   < > 63   < > 64  --   --    GFRESTBLACK 69 72 66   < > 76   < > 78  --   --    POTASSIUM 3.2* 4.3 3.8   < > 4.1   < > 3.7  --   --    TSH  --   --  3.48  --  1.81   < >  --  2.34  --     < > = values in this interval not displayed.      BP Readings from Last 3 Encounters:   10/15/19 (!) 144/78   04/15/19 148/73   03/06/19 143/89    Wt Readings from Last 3 Encounters:   04/09/20 62.1 kg (137 lb)   10/15/19 63.2 kg (139 lb 4.8 oz)   04/15/19 64.4 kg (141 lb 14.4 oz)              \"I feel great.\"  No fever, chills, SOB, CP, cough, digestive issues, etc.    Levothyroxine:  Taking her medication as prescribed.  Due a thyroid level and she will return for labs.    Hydrochlorothiazide:  For hypertension.  No side effects or problems.  \"I know you have been worried about my potassium but I am eating healthy and I don't have any symptoms I am worried about.\"  Requesting refills today.    Neurontin:  For neuopathy.  Unchanged.    New puppy:  Max, black lab puppy.  \"He gets me outside.\"      Oncology:  Managing femara.  Upcoming appointment after Covid19 pandemic.  \"I'm in maintenance.\"      Reviewed and updated as needed this visit by Provider  Tobacco  Allergies  Meds  Problems  Med Hx  Surg Hx  Fam Hx         Review of Systems   ROS COMP: Constitutional, HEENT, " "cardiovascular, pulmonary, GI, , musculoskeletal, neuro, skin, endocrine and psych systems are negative, except as otherwise noted.       Objective   Reported vitals:  Ht 1.6 m (5' 3\")   Wt 62.1 kg (137 lb)   BMI 24.27 kg/m     healthy, alert and no distress  Psych: Alert and oriented times 3; coherent speech, normal   rate and volume, able to articulate logical thoughts, able   to abstract reason, no tangential thoughts, no hallucinations   or delusions  Her affect is bright.      Diagnostic Test Results:  Labs reviewed in Epic        Assessment/Plan:    (E03.9) Hypothyroidism, unspecified type  (primary encounter diagnosis)  Comment:   Plan: levothyroxine (SYNTHROID/LEVOTHROID) 50 MCG         tablet        Refills given.  Sushila is to come into the clinic in 1 month for a lab onlyappt/blood draw, sooner if problems.     (I10) Hypertension goal BP (blood pressure) < 140/90  Comment:   Plan: hydrochlorothiazide (HYDRODIURIL) 12.5 MG         tablet        For now, I did refill her meds.  I talked with her about her potassium level, borderline low before.  She is to eat a potassium rich diet.  Lab draw in one month, sooner prn.    (G62.9) Neuropathy  Comment: controlled  Plan: gabapentin (NEURONTIN) 300 MG capsule        Refills given    (C34.11) Malignant neoplasm of upper lobe of right lung (H)  Comment: history of  Plan: continue care with oncology    (G62.0) Drug-induced polyneuropathy (H)  Comment: history of  Plan: continue care with oncology    (F19.982) Drug induced insomnia (H)  Comment: history of  Plan: controlled at this time    (N18.3) CKD (chronic kidney disease) stage 3, GFR 30-59 ml/min (H)  Comment: chronic  Plan: labs due in one month.       Return in about 1 month (around 5/9/2020) for , one year for next med check,  sooner if problems. .      Phone call duration:  16 minutes      Meryl RAYMUNDO CNP      "

## 2020-07-19 DIAGNOSIS — C50.512 MALIGNANT NEOPLASM OF LOWER-OUTER QUADRANT OF LEFT FEMALE BREAST (H): ICD-10-CM

## 2020-07-20 RX ORDER — LETROZOLE 2.5 MG/1
TABLET, FILM COATED ORAL
Qty: 90 TABLET | Refills: 3 | Status: SHIPPED | OUTPATIENT
Start: 2020-07-20 | End: 2021-03-09

## 2020-07-20 NOTE — TELEPHONE ENCOUNTER
"Per most recent visit notes \"1.  Left breast ER-positive carcinoma:  Ms. Chambers is 3 years 8 months out from excision of a left breast cancer.  She continues on letrozole.  She is overall tolerating the medication well.  We plan to treat her for a total of 10 years (through 03/2026). \"  Refilling.  "

## 2020-12-08 NOTE — LETTER
4/30/2018       RE: Sushila Chambers  [address] [place] [address]     Dear Colleague,    Thank you for referring your patient, Sushila Chambers, to the [facility]. Please see a copy of my visit note below.    MEDICAL ONCOLOGY FOLLOW-UP PATIENT VISIT     NAME: Sushila Chambers     DATE: 4/30/18    PRIMARY CARE PHYSICIAN: Meryl Muro    PATIENT ID: Multifocal, stage IIa, T2N0M0, ER positive, MI negative, HER2 non-amplified invasive lobular carcinoma of the left breast.     Oncology History: Ms. Chambers is a 68 year old female with a history of lung cancer and right breast cancer with more recent invasive lobular carcinoma of the left breast. Ms. Chmabers was treated for right sided breast cancer in 2009 with right breast lumpectomy, radiation, and endocrine therapy. Ms. Chambers self palpated a mass in the left breast in early August, 2015. A diagnostic mammogram and ultrasound showed 2 ill-defined spiculated masses at the 4:30 and 2:00 positions of the left breast. There was surrounding architectural distortion and the total area of involvement measured 6.5 cm. Targeted left breast ultrasound showed a multilobulated hypoechoic mass at the 4:00 position measuring 2.5 cm and an ill-defined hypoechoic mass at the 2:00 position measuring 2.4 cm. There was no suspicious left axillary lymphadenopathy. Biopsy of the mass at the 4:00 position showed grade II invasive pleomorphic lobular carcinoma. Biopsy of the mass at the 2:00 position showed a grade II invasive lobular carcinoma. No angiolymphatic invasion or associated LCIS was seen in either specimen. Estrogen receptor staining was positive in >80% of tumor cell nuclei. Progesterone receptor staining was positive in <5% of tumor cell nuclei. HER2 was non-amplifed in both specimens by FISH.     Ms. Chambers elected to screen for ISPY-2. Mammaprint testing returned high-risk.  She received 12 weeks of ganetespib and Taxol and 4 cycles of dose  "dense AC from 9/29/15 - 2/1/16. On 2/24/16 she underwent left breast mastectomy and sentinel lymph node procedure under the care of Dr. Betts. Pathology showed a residual grade II 4.8 cm, pleomorphic invasive lobular carcinoma with associated LCIS. Invasive tumor cellularity was 30%.  There was evidence of perineural invasion, however, no lymphvascular space invasion. Two sentinel lymph nodes were negative.  She has been on adjuvant letrozole since 03/2016.    Ms. Chambers had genetic testing on 9/15/15.  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  Of note she had a number of alterations in RUFINA, BARD1, BRCA1/2, MUTYH, and TP53 that were classified as \"likely benign\" and \"benign\".    Interim History:   Ms. Chambers comes in to clinic today for routine breast cancer followup.  She continues on treatment with letrozole.  She is tolerating it well.  She has hot flashes, both day and night.  She reports them as tolerable and declines medication for them.  She also has insomnia associated with letrozole.  She takes gabapentin prior to going to bed.  She has not taken any other sleep aids.  She does not feel excessively fatigued during the day.  She denies symptoms of depression or anxiety.  She has joint stiffness, especially in the hands and the feet.  These do improve with movement.  She is doing Pilates twice a week as well as doing strengthening and cardiovascular exercise on a regular basis.  She denies concerning lumps or masses of either breast.  She has no cough, shortness of breath or chest pain.  No current abdominal complaints.  No swelling of her lower extremities.  She denies headaches, visual changes or focal neurologic complaints.  She has no new bone or joint aches or pains.  She and her family recently went to [place].  She enjoyed the time with her family.  The remainder of a complete 12-point review of systems is otherwise negative.     PAST MEDICAL HISTORY:   Past Medical History:   Diagnosis " "Date     Basal cell carcinoma      Breast cancer, right breast (H)      History of blood transfusion      Hypothyroid      Lung cancer (H) 2012    right     Uncontrolled hypertension 11/15/2017       PAST SURGICAL HISTORY:  Past Surgical History:   Procedure Laterality Date     ABDOMEN SURGERY       BIOPSY, LUNG NODULE Right 2012    + cancer     COLONOSCOPY       ECTOPIC PREGNANCY SURGERY Left 1988     GENITOURINARY SURGERY       GYN SURGERY       INSERT PORT VASCULAR ACCESS       LUMPECTOMY BREAST Right 2010     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     MASTECTOMY SIMPLE, SENTINEL NODE, COMBINED Left 2/24/2016    Procedure: COMBINED MASTECTOMY SIMPLE, SENTINEL NODE;  Surgeon: Satnam Betts MD;  Location: [facility] OR     REMOVE PORT VASCULAR ACCESS N/A 2/24/2016    Procedure: REMOVE PORT VASCULAR ACCESS;  Surgeon: Satnam Betts MD;  Location: [facility] OR     THORACIC SURGERY       MEDS:  Current Outpatient Prescriptions   Medication     Acetaminophen (TYLENOL PO)     biotin 1000 MCG TABS tablet     calcium carbonate (OS-OCTAVIANO 500 MG [place]. [place]) 500 MG tablet     gabapentin (NEURONTIN) 300 MG capsule     hydrochlorothiazide 12.5 MG TABS tablet     letrozole (FEMARA) 2.5 MG tablet     levothyroxine (SYNTHROID/LEVOTHROID) 50 MCG tablet     multivitamin, therapeutic with minerals (MULTI-VITAMIN) TABS     order for DME     order for DME     order for DME     VITAMIN D, CHOLECALCIFEROL, PO     zolpidem (AMBIEN) 5 MG tablet     No current facility-administered medications for this visit.      ALLERGIES:  Allergies   Allergen Reactions     Latex      SKIN REACTION       Sulfa Drugs Itching and Rash        PHYSICAL EXAM:  /82  Pulse 102  Temp 97.2  F (36.2  C) (Oral)  Resp 16  Ht 1.638 m (5' 4.5\")  Wt 69.4 kg (153 lb 1 oz)  SpO2 100%  Breastfeeding? No  BMI 25.87 kg/m2  KPS: 100%  GENERAL: AAOx3, well appearing adult female in no acute distress  HEENT: Normocephalic, atraumatic, MMM.  No lesions of the " oropharynx  LYMPH: No palpable cervical, supraclavicular, or axillary lymphadenopathy  CV: RRR, normal S1/S2, No murmurs, gallops, or rubs  LUNGS: CTA B/L, no wheezing or rhonchi  Breast:  Right breast is of increased fibroglandular density.  Left mastectomy.  There are no discretely palpable masses of either the right breast or left chest wall.  Right nipple is everted.  Exam is overall unchanged from prior.  ABDOMEN: soft, nontender, non-distended, no hepatosplenomegaly. Bowel sounds present.  EXTREMITIES: no pitting edema of the bilateral lower extremies  NEURO: Cranial nerves II-XII grossly intact.  Gait stable.  INTEGUMENT: No visible rashes or skin lesions.    LABS REVIEWED THIS VISIT:  No interim imaging or laboratory studies pertinent to today's visit.    IMPRESSION/PLAN: 69 yo female with stage II, T2N0M0, grade II, ER positive, HI negative, HER2 non-amplified invasive lobular carcinoma of the left breast. She is s/p treatment with taxol and ganetespib followed by dose dense adriamycin and cyclophosphamide and left breast mastectomy. She has been on letrozole since 03/2016.    1.  Left breast ER-positive carcinoma:  Ms. Chambers is now approximately 2 years 2 months out from completion of neoadjuvant chemotherapy and surgical excision of a left breast cancer.  She continues on letrozole.  She has hot flashes and arthralgias secondary to the medication.  Despite these, she is willing to continue it at this time.  We plan to treat her for a total of 10 years (through 03/2026).  There is no evidence of disease recurrence on my clinical breast examination performed today.  I will see her back in mid-August at the time she is due for right breast screening mammogram.     2.  Hypertriglyceridemia:  Triglycerides 206 in 03/2017.  May be elevated due to letrozole.  PCP is managing.  Attempting management with diet low in saturated fat and aerobic exercise at this time.  Recommended repeat triglycerides at this  time.    3.  Hot flashes:  Continue gabapentin 300 mg PO QHS.  Declines increase in dosing due to drowsiness with the medication.  Declines an SSRI.     3.  Neuropathy:  Advised to stop vitamin B6 at this point.     4.  Arthralgias:  Secondary to osteoarthritis and exacerbated by letrozole.  Continue to take daily vitamin D supplementation and daily cardiovascular activity.  Ibu profen as needed for pain.     5.  Bone health:  DEXA bone density in 04/2016 showed a lowest T-score of -0.7.  She is due for  DEXA at this time.  We will schedule it for 5/7 (the same day as planned chest CT).  She continues on calcium and vitamin D supplementation as well as daily weightbearing activity.      6.  H/o lung cancer:  In 2012 treated with RUL wedge resection.  She is overdue for chest CT at this time; it is currently scheduled for 5/7/18.     7.  Followup:  DEXA bone density scan and CT chest on 5/7/18.  Return to clinic mid-August for right breast screening mammogram and visit with me.    It was a pleasure to see Ms. Chambers in clinic today.  A total of 20 minutes of our 25 minute face to face visit was spent in counseling.        Again, thank you for allowing me to participate in the care of your patient.      Sincerely,    Brittany Villegas MD       Hemostasis: Pressure

## 2021-01-15 ENCOUNTER — HEALTH MAINTENANCE LETTER (OUTPATIENT)
Age: 71
End: 2021-01-15

## 2021-02-07 ENCOUNTER — ANCILLARY PROCEDURE (OUTPATIENT)
Dept: MRI IMAGING | Facility: CLINIC | Age: 71
End: 2021-02-07
Attending: INTERNAL MEDICINE
Payer: MEDICARE

## 2021-02-07 DIAGNOSIS — Z91.89 AT HIGH RISK FOR BREAST CANCER: ICD-10-CM

## 2021-02-07 DIAGNOSIS — Z85.3 PERSONAL HISTORY OF MALIGNANT NEOPLASM OF BREAST: ICD-10-CM

## 2021-02-07 LAB
CREAT BLD-MCNC: 1 MG/DL (ref 0.52–1.04)
GFR SERPL CREATININE-BSD FRML MDRD: 55 ML/MIN/{1.73_M2}

## 2021-02-07 PROCEDURE — 36415 COLL VENOUS BLD VENIPUNCTURE: CPT | Performed by: PATHOLOGY

## 2021-02-07 PROCEDURE — 82565 ASSAY OF CREATININE: CPT | Performed by: PATHOLOGY

## 2021-02-07 PROCEDURE — 77049 MRI BREAST C-+ W/CAD BI: CPT | Performed by: RADIOLOGY

## 2021-02-07 PROCEDURE — A9585 GADOBUTROL INJECTION: HCPCS | Performed by: RADIOLOGY

## 2021-02-07 RX ORDER — GADOBUTROL 604.72 MG/ML
7.5 INJECTION INTRAVENOUS ONCE
Status: COMPLETED | OUTPATIENT
Start: 2021-02-07 | End: 2021-02-07

## 2021-02-07 RX ADMIN — GADOBUTROL 6.5 ML: 604.72 INJECTION INTRAVENOUS at 10:05

## 2021-02-09 ENCOUNTER — VIRTUAL VISIT (OUTPATIENT)
Dept: ONCOLOGY | Facility: CLINIC | Age: 71
End: 2021-02-09
Attending: NURSE PRACTITIONER
Payer: MEDICARE

## 2021-02-09 DIAGNOSIS — Z71.89 EDUCATED ABOUT COVID-19 VIRUS INFECTION: ICD-10-CM

## 2021-02-09 DIAGNOSIS — C50.512 MALIGNANT NEOPLASM OF LOWER-OUTER QUADRANT OF LEFT BREAST OF FEMALE, ESTROGEN RECEPTOR POSITIVE (H): Primary | ICD-10-CM

## 2021-02-09 DIAGNOSIS — Z17.0 MALIGNANT NEOPLASM OF LOWER-OUTER QUADRANT OF LEFT BREAST OF FEMALE, ESTROGEN RECEPTOR POSITIVE (H): Primary | ICD-10-CM

## 2021-02-09 DIAGNOSIS — Z79.811 LONG TERM CURRENT USE OF AROMATASE INHIBITOR: ICD-10-CM

## 2021-02-09 DIAGNOSIS — R23.2 HOT FLASHES: ICD-10-CM

## 2021-02-09 PROCEDURE — 999N001193 HC VIDEO/TELEPHONE VISIT; NO CHARGE

## 2021-02-09 PROCEDURE — 99215 OFFICE O/P EST HI 40 MIN: CPT | Mod: 95 | Performed by: NURSE PRACTITIONER

## 2021-02-09 RX ORDER — ASPIRIN 81 MG/1
81 TABLET ORAL DAILY
COMMUNITY

## 2021-02-09 NOTE — LETTER
2/9/2021         RE: Sushila Chambers  [address] [place] [address]        Dear Colleague,    Thank you for referring your patient, Sushila Chambers, to the [facility]. Please see a copy of my visit note below.    Sushila is a 70 year old who is being evaluated via a billable video visit.      How would you like to obtain your AVS? MyChart  If the video visit is dropped, the invitation should be resent by: Send to e-mail at: [email or web address]  Will anyone else be joining your video visit? No      Video Start Time: 12:23 pm  Video-Visit Details    Type of service:  Video Visit    Video End Time: 12:38 pm    Originating Location (pt. Location): Home    Distant Location (provider location):  [facility]     Platform used for Video Visit: [employer]      Reason for Visit: f/u Multifocal, stage IIa, T2N0M0, ER positive, [place] negative, HER2 non-amplified invasive lobular carcinoma of the left breast    Oncology HPI:   Ms. Chambers is a 70 year old female with a history of lung cancer and right breast cancer with more recent invasive lobular carcinoma of the left breast.     Ms. Chambers was treated for right sided breast cancer in 2009 with right breast lumpectomy, radiation, and endocrine therapy.     She self palpated a mass in the left breast in early August, 2015. Diagnostic mammogram and ultrasound showed 2 ill-defined spiculated masses at the 4:30 and 2:00 positions of the left breast. There was surrounding architectural distortion and the total area of involvement measured 6.5 cm. Targeted left breast ultrasound showed a multilobulated hypoechoic mass at the 4:00 position measuring 2.5 cm and an ill-defined hypoechoic mass at the 2:00 position measuring 2.4 cm.  Biopsy of the mass at the 4:00 position showed grade II invasive pleomorphic lobular carcinoma. Biopsy of the mass at the 2:00 position showed a grade II invasive lobular carcinoma. No  "angiolymphatic invasion or associated LCIS was seen in either specimen. Estrogen receptor staining was positive in >80% of tumor cell nuclei. Progesterone receptor staining was positive in <5% of tumor cell nuclei. HER2 was non-amplifed by FISH in both specimens.      Ms. Chambers enrolled on the ISPY2 clinical trial.  She received 12 weeks of ganetespib and Taxol and 4 cycles of dose dense AC from 9/29/15 - 2/1/16. On 2/24/16 she underwent left breast mastectomy and sentinel lymph node procedure under the care of Dr. Betts. Pathology showed a residual grade II 4.8 cm, pleomorphic invasive lobular carcinoma with associated LCIS. Invasive tumor cellularity was 30%.  There was evidence of perineural invasion, however, no lymphvascular space invasion. Two sentinel lymph nodes were negative.  She has been on adjuvant letrozole since 03/2016.    Interval history:   -Patient reports she has been doing great. Energy is good.   -Letrozole has been going well. Denies myalgias/arthalgias. Denies night sweats. Has some hot flashes although reports they are not bothersome   -She denies vaginal complaints.    -Patient reports she lives alone. Has been mostly \"isolated\" during the pandemic. Doing video calls with friends and family, staying active with workouts on zoom and spending time with her dog, Max have kept her mind busy.   -Appetite is \"too good\". Weight stable.   -She is exercising at least 3 days a week via zoom with a - piliates, cardio, and strength  -Denies fever, chills, new lumps/bumps, breast/chest wall discomfort, redness to chest wall, headaches, vision changes, cough, dyspnea, chest pain, peripheral edema, bladder or bowel concerns, weakness, dizziness, and unusual bleeding    Exam:  No vitals taken during this visit    -Constitutional: well appearing without acute distress   -Integumentary: color appropriate for ethnicity. Visualized skin is intact without rashes, petechiae, or bruising "   -Respiratory: Normal breathing effort. Equal chest rise and fall   -Neurological: Alert and Oriented. Follows commands   -MSK: moves extremities without difficulty   -Psychiatric: patient is cooperative, pleasant and alert. Appropriate affect. Demonstrates understanding of health choices and their consequences     Labs:   None to review    Imaging:   Exam: Breast MRI, with and without Contrast, and with color encoding      History/Indication: High-risk Breast Screening. History of left-sided  breast cancer status post left mastectomy. History of right-sided  breast cancer status post breast conserving therapy.     Comparison: MR 5/15/2019 and 2/16/2016     Technique: Precontrast gradient recall axial nonfat sat T1.Precontrast  gradient recall axial fat-sat T1. Precontrast STIR axial fat  sat.Postcontrast gradient recall axial fat-sat T1 with dynamic  postcontrast acquisitions at 2, 4 and 6 minutes with subtractions.  Delayed high-resolution gradient recall sagittal fat-sat T1. MIP of  subtractions, axial coronal and sagittal. Color encoding of post  contrast dynamic acquisitions. IV contrast: 6.5 mL Gadavist     Breast composition: Scattered fibroglandular tissue     Background parenchymal enhancement 1st post C image: Minimal     FINDINGS: Stable postoperative changes of left mastectomy and right  breast conserving therapy. No suspicious enhancement in either breast.  Unchanged left axillary lymph nodes compared to prior. Stable  prominent lymph node superior to the right hemidiaphragm (series 4,  image 8). No new suspicious lymphadenopathy.                                                                      IMPRESSION: BI-RADS CATEGORY: 2 - Benign.     RECOMMENDED FOLLOW-UP: Annual Mammography.  Continued age and risk-based breast cancer screening.     I have personally reviewed the examination and initial interpretation  and I agree with the findings.     PARAM DOMÍNGUEZ MD    Assessment/plan:   68 yo female  with a h/o T2N0M0, grade II, ER positive, [place] negative, HER2 non-amplified invasive lobular carcinoma of the left breast. She is s/p treatment with taxol and ganetespib followed by dose dense adriamycin and cyclophosphamide, left breast mastectomy, and has been on letrozole since 03/2016.     #Left breast ER-positive carcinoma   -Ms. Chambers is now approximately 5 years out from excision of a left breast cancer.  She continues on letrozole.  She is overall tolerating the medication well.  We plan to treat her for a total of 10 years (through 03/2026).  There is no evidence of disease recurrence today on MRI.   -Due to h/o bilateral breast cancers and multiple gene mutations, we are performing high risk screening with both annual mammogram and breast MRI. Plan to stop breast MRI at 75 yo. She did miss her mammogram in August. She would prefer to wait until August to repeat- I will defer to Dr. Villegas's recommendation on getting it now vs waiting until next visit  -Will continue q6 month visits while on AI therapy    #Hot flashes  -Improving, continue gabapentin 300 mg PO QHS.         #Osteopenia  -DEXA bone density in 05/2018 showed a lowest T-score of -1.4, which is consistent with osteopenia. She is due for another given that we typically do on a q2 yr basis while on AI therapy but would prefer to wait until she has her COVID vaccine- again I will defer to Dr. Villegas's recommendation on timing   -Continue calcium and vitamin D supplementation as well as daily weightbearing activity.     #COVID 19 Recommendations  -Recommended she get her COVID vaccination as soon as available   -Recommended she continue to exercise regularly and do video calls with family and friends to stay supported and connected to others     A total of 50 minutes was spent in chart review, video visit, care coordination, and charting activities on day of encounter    BREANNE Rosales, CNP  2/9/2021

## 2021-02-09 NOTE — PROGRESS NOTES
Sushila is a 70 year old who is being evaluated via a billable video visit.      How would you like to obtain your AVS? MyChart  If the video visit is dropped, the invitation should be resent by: Send to e-mail at: [email or web address]  Will anyone else be joining your video visit? No      Video Start Time: 12:23 pm  Video-Visit Details    Type of service:  Video Visit    Video End Time: 12:38 pm    Originating Location (pt. Location): Home    Distant Location (provider location):  [facility]     Platform used for Video Visit: [employer]      Reason for Visit: f/u Multifocal, stage IIa, T2N0M0, ER positive, [place] negative, HER2 non-amplified invasive lobular carcinoma of the left breast    Oncology HPI:   Ms. Chambers is a 70 year old female with a history of lung cancer and right breast cancer with more recent invasive lobular carcinoma of the left breast.     Ms. Chambers was treated for right sided breast cancer in 2009 with right breast lumpectomy, radiation, and endocrine therapy.     She self palpated a mass in the left breast in early August, 2015. Diagnostic mammogram and ultrasound showed 2 ill-defined spiculated masses at the 4:30 and 2:00 positions of the left breast. There was surrounding architectural distortion and the total area of involvement measured 6.5 cm. Targeted left breast ultrasound showed a multilobulated hypoechoic mass at the 4:00 position measuring 2.5 cm and an ill-defined hypoechoic mass at the 2:00 position measuring 2.4 cm.  Biopsy of the mass at the 4:00 position showed grade II invasive pleomorphic lobular carcinoma. Biopsy of the mass at the 2:00 position showed a grade II invasive lobular carcinoma. No angiolymphatic invasion or associated LCIS was seen in either specimen. Estrogen receptor staining was positive in >80% of tumor cell nuclei. Progesterone receptor staining was positive in <5% of tumor cell nuclei. HER2 was non-amplifed by FISH in both specimens.  "     Ms. Chambers enrolled on the ISPY2 clinical trial.  She received 12 weeks of ganetespib and Taxol and 4 cycles of dose dense AC from 9/29/15 - 2/1/16. On 2/24/16 she underwent left breast mastectomy and sentinel lymph node procedure under the care of Dr. Betts. Pathology showed a residual grade II 4.8 cm, pleomorphic invasive lobular carcinoma with associated LCIS. Invasive tumor cellularity was 30%.  There was evidence of perineural invasion, however, no lymphvascular space invasion. Two sentinel lymph nodes were negative.  She has been on adjuvant letrozole since 03/2016.    Interval history:   -Patient reports she has been doing great. Energy is good.   -Letrozole has been going well. Denies myalgias/arthalgias. Denies night sweats. Has some hot flashes although reports they are not bothersome   -She denies vaginal complaints.    -Patient reports she lives alone. Has been mostly \"isolated\" during the pandemic. Doing video calls with friends and family, staying active with workouts on zoom and spending time with her dog, Max have kept her mind busy.   -Appetite is \"too good\". Weight stable.   -She is exercising at least 3 days a week via zoom with a - piliates, cardio, and strength  -Denies fever, chills, new lumps/bumps, breast/chest wall discomfort, redness to chest wall, headaches, vision changes, cough, dyspnea, chest pain, peripheral edema, bladder or bowel concerns, weakness, dizziness, and unusual bleeding    Exam:  No vitals taken during this visit    -Constitutional: well appearing without acute distress   -Integumentary: color appropriate for ethnicity. Visualized skin is intact without rashes, petechiae, or bruising   -Respiratory: Normal breathing effort. Equal chest rise and fall   -Neurological: Alert and Oriented. Follows commands   -MSK: moves extremities without difficulty   -Psychiatric: patient is cooperative, pleasant and alert. Appropriate affect. Demonstrates understanding " of health choices and their consequences     Labs:   None to review    Imaging:   Exam: Breast MRI, with and without Contrast, and with color encoding      History/Indication: High-risk Breast Screening. History of left-sided  breast cancer status post left mastectomy. History of right-sided  breast cancer status post breast conserving therapy.     Comparison: MR 5/15/2019 and 2/16/2016     Technique: Precontrast gradient recall axial nonfat sat T1.Precontrast  gradient recall axial fat-sat T1. Precontrast STIR axial fat  sat.Postcontrast gradient recall axial fat-sat T1 with dynamic  postcontrast acquisitions at 2, 4 and 6 minutes with subtractions.  Delayed high-resolution gradient recall sagittal fat-sat T1. MIP of  subtractions, axial coronal and sagittal. Color encoding of post  contrast dynamic acquisitions. IV contrast: 6.5 mL Gadavist     Breast composition: Scattered fibroglandular tissue     Background parenchymal enhancement 1st post C image: Minimal     FINDINGS: Stable postoperative changes of left mastectomy and right  breast conserving therapy. No suspicious enhancement in either breast.  Unchanged left axillary lymph nodes compared to prior. Stable  prominent lymph node superior to the right hemidiaphragm (series 4,  image 8). No new suspicious lymphadenopathy.                                                                      IMPRESSION: BI-RADS CATEGORY: 2 - Benign.     RECOMMENDED FOLLOW-UP: Annual Mammography.  Continued age and risk-based breast cancer screening.     I have personally reviewed the examination and initial interpretation  and I agree with the findings.     PARAM DOMÍNGUEZ MD    Assessment/plan:   70 yo female with a h/o T2N0M0, grade II, ER positive, [place] negative, HER2 non-amplified invasive lobular carcinoma of the left breast. She is s/p treatment with taxol and ganetespib followed by dose dense adriamycin and cyclophosphamide, left breast mastectomy, and has been on letrozole  since 03/2016.     #Left breast ER-positive carcinoma   -Ms. Chambers is now approximately 5 years out from excision of a left breast cancer.  She continues on letrozole.  She is overall tolerating the medication well.  We plan to treat her for a total of 10 years (through 03/2026).  There is no evidence of disease recurrence today on MRI.   -Due to h/o bilateral breast cancers and multiple gene mutations, we are performing high risk screening with both annual mammogram and breast MRI. Plan to stop breast MRI at 73 yo. She did miss her mammogram in August. She would prefer to wait until August to repeat- I will defer to Dr. Villegas's recommendation on getting it now vs waiting until next visit  -Will continue q6 month visits while on AI therapy    #Hot flashes  -Improving, continue gabapentin 300 mg PO QHS.         #Osteopenia  -DEXA bone density in 05/2018 showed a lowest T-score of -1.4, which is consistent with osteopenia. She is due for another given that we typically do on a q2 yr basis while on AI therapy but would prefer to wait until she has her COVID vaccine- again I will defer to Dr. Villegas's recommendation on timing   -Continue calcium and vitamin D supplementation as well as daily weightbearing activity.     #COVID 19 Recommendations  -Recommended she get her COVID vaccination as soon as available   -Recommended she continue to exercise regularly and do video calls with family and friends to stay supported and connected to others     A total of 50 minutes was spent in chart review, video visit, care coordination, and charting activities on day of encounter    BREANNE Rosales, CNP  2/9/2021

## 2021-03-04 DIAGNOSIS — C50.512 MALIGNANT NEOPLASM OF LOWER-OUTER QUADRANT OF LEFT FEMALE BREAST (H): ICD-10-CM

## 2021-03-04 NOTE — TELEPHONE ENCOUNTER
Received refill request from [facility] Rosenthal, called pt to confirm if she wanted just a one time refill to this pharmacy.

## 2021-03-09 RX ORDER — LETROZOLE 2.5 MG/1
TABLET, FILM COATED ORAL
Qty: 90 TABLET | Refills: 3 | Status: SHIPPED | OUTPATIENT
Start: 2021-03-09 | End: 2022-02-14

## 2021-03-09 NOTE — TELEPHONE ENCOUNTER
Last OV 2/9/21 with Ursula Wood ANA, next follow-up not yet scheduled    Per last OV notes: She continues on letrozole.  She is overall tolerating the medication well.  We plan to treat her for a total of 10 years (through 03/2026).     Routed to provider for approval

## 2021-04-15 NOTE — MR AVS SNAPSHOT
After Visit Summary   8/14/2017    Sushila Chambers    MRN: [id number]           Patient Information     Date Of Birth          1950        Visit Information        Provider Department      8/14/2017 2:45 PM Brittany Villegas MD [facility]        Today's Diagnoses     Malignant neoplasm of lower-outer quadrant of left breast of female, estrogen receptor positive (H)    -  1       Follow-ups after your visit        Your next 10 appointments already scheduled     Nov 06, 2017 10:15 AM CST   (Arrive by 10:00 AM)   Return Visit with Zahida Zambrano MD   [facility] ([facility])    [address]  3rd Floor  [place] [facility] [place] [address] [phone]              Who to contact     If you have questions or need follow up information about today's clinic visit or your schedule please contact [facility] directly at [phone].  Normal or non-critical lab and imaging results will be communicated to you by MyChart, letter or phone within 4 business days after the clinic has received the results. If you do not hear from us within 7 days, please contact the clinic through [employer] or phone. If you have a critical or abnormal lab result, we will notify you by phone as soon as possible.  Submit refill requests through [employer] or call your pharmacy and they will forward the refill request to us. Please allow 3 business days for your refill to be completed.          Additional Information About Your Visit        [employer] Information     [employer] gives you secure access to your electronic health record. If you see a primary care provider, you can also send messages to your care team and make appointments. If you have questions, please call your primary care clinic.  If you do not have a primary care provider, please call [phone] and they will assist you.        Care EveryWhere ID     This is your Care EveryWhere ID.  "This could be used by other organizations to access your [place] medical records  [id number]        Your Vitals Were     Pulse Temperature Respirations Height Pulse Oximetry BMI (Body Mass Index)    73 98  F (36.7  C) (Tympanic) 16 1.65 m (5' 4.96\") 99% 25.02 kg/m2       Blood Pressure from Last 3 Encounters:   08/14/17 156/83   04/17/17 140/87   03/21/17 131/78    Weight from Last 3 Encounters:   08/14/17 68.1 kg (150 lb 3.2 oz)   04/17/17 66.2 kg (145 lb 14.4 oz)   03/21/17 65.4 kg (144 lb 3.2 oz)              Today, you had the following     No orders found for display       Primary Care Provider Office Phone #    [facility] [phone] [address] [place] [address]        Equal Access to Services     SONAM SANDOVAL : Hadii pop medina Soaugustine, waaxda luqadaha, qaybta kaalmada adeaniyada, bakari chavez . So [facility] [phone].    ATENCIÓN: Si habla español, tiene a hinojosa disposición servicios gratuitos de asistencia lingüística. Cesar al [phone].    We comply with applicable federal civil rights laws and [place] laws. We do not discriminate on the basis of race, color, national origin, age, disability sex, sexual orientation or gender identity.            Thank you!     Thank you for choosing [facility]  for your care. Our goal is always to provide you with excellent care. Hearing back from our patients is one way we can continue to improve our services. Please take a few minutes to complete the written survey that you may receive in the mail after your visit with us. Thank you!             Your Updated Medication List - Protect others around you: Learn how to safely use, store and throw away your medicines at www.disposemymeds.org.          This list is accurate as of: 8/14/17 11:59 PM.  Always use your most recent med list.                   Brand Name Dispense Instructions for use Diagnosis    biotin 1000 MCG Tabs tablet "      Take by mouth daily        calcium carbonate 1250 MG tablet    OS-OCTAVIANO 500 mg [place]. Ca     Take by mouth 2 times daily        clotrimazole 1 % cream    LOTRIMIN    60 g    Apply topically 2 times daily    Tinea pedis of both feet       gabapentin 300 MG capsule    NEURONTIN    90 capsule    Take 1 tablet (300 mg) every night    Neuropathy (H), Menopausal syndrome (hot flashes)       letrozole 2.5 MG tablet    FEMARA    90 tablet    TAKE 1 TABLET(2.5 MG) BY MOUTH DAILY    Malignant neoplasm of lower-outer quadrant of left female breast (H)       * levothyroxine 50 MCG tablet    SYNTHROID/LEVOTHROID    90 tablet    Take 1 tablet (50 mcg) by mouth daily    Hypothyroidism, unspecified type       * levothyroxine 50 MCG tablet    SYNTHROID/LEVOTHROID    90 tablet    TAKE 1 TABLET(50 MCG) BY MOUTH DAILY    Hypothyroidism, unspecified type       Multi-vitamin Tabs tablet      Take 1 tablet by mouth daily        * order for DME     1 Device    Equipment being ordered: Cranial Prosthesis    Malignant neoplasm of left breast (H), Alopecia, Encounter for antineoplastic chemotherapy       * order for DME     1 Units    Two post mastectomy bra's and prosthesis twice a year as insurance allows .    Malignant neoplasm of left breast (H)       * order for DME     1 Units    Please allow 12 bras and prosthesis yearly as insurance covers.    Malignant neoplasm of lower-outer quadrant of left female breast (H)       * order for DME     1 Units    Four bras as allowed by insurance.    Breast cancer, right breast (H)       pyridOXINE 100 MG Tabs    VITAMIN B6    30 tablet    Take 1 tablet (100 mg) by mouth daily    Neuropathy (H)       TYLENOL PO      Take 500 mg by mouth every 4 hours as needed for mild pain or fever Reported on 3/21/2017        VITAMIN D (CHOLECALCIFEROL) PO      Take by mouth daily        zolpidem 5 MG tablet    AMBIEN    30 tablet    Take 1 tablet (5 mg) by mouth nightly as needed for sleep    Drug induced  insomnia (H)       * Notice:  This list has 6 medication(s) that are the same as other medications prescribed for you. Read the directions carefully, and ask your doctor or other care provider to review them with you.       Topical Sulfur Applications Pregnancy And Lactation Text: This medication is Pregnancy Category C and has an unknown safety profile during pregnancy. It is unknown if this topical medication is excreted in breast milk.

## 2021-05-09 ENCOUNTER — HEALTH MAINTENANCE LETTER (OUTPATIENT)
Age: 71
End: 2021-05-09

## 2021-05-25 ENCOUNTER — MYC MEDICAL ADVICE (OUTPATIENT)
Dept: FAMILY MEDICINE | Facility: CLINIC | Age: 71
End: 2021-05-25

## 2021-06-29 ENCOUNTER — OFFICE VISIT (OUTPATIENT)
Dept: FAMILY MEDICINE | Facility: CLINIC | Age: 71
End: 2021-06-29
Payer: MEDICARE

## 2021-06-29 VITALS
HEART RATE: 101 BPM | OXYGEN SATURATION: 99 % | WEIGHT: 138 LBS | TEMPERATURE: 97.7 F | RESPIRATION RATE: 16 BRPM | BODY MASS INDEX: 24.45 KG/M2 | HEIGHT: 63 IN | SYSTOLIC BLOOD PRESSURE: 132 MMHG | DIASTOLIC BLOOD PRESSURE: 78 MMHG

## 2021-06-29 DIAGNOSIS — Z00.00 ENCOUNTER FOR MEDICARE ANNUAL WELLNESS EXAM: Primary | ICD-10-CM

## 2021-06-29 DIAGNOSIS — N18.30 STAGE 3 CHRONIC KIDNEY DISEASE, UNSPECIFIED WHETHER STAGE 3A OR 3B CKD (H): ICD-10-CM

## 2021-06-29 DIAGNOSIS — G62.9 NEUROPATHY: ICD-10-CM

## 2021-06-29 DIAGNOSIS — Z13.220 SCREENING FOR HYPERLIPIDEMIA: ICD-10-CM

## 2021-06-29 DIAGNOSIS — F19.982 DRUG INDUCED INSOMNIA (H): ICD-10-CM

## 2021-06-29 DIAGNOSIS — Z13.1 SCREENING FOR DIABETES MELLITUS: ICD-10-CM

## 2021-06-29 DIAGNOSIS — Z12.11 SCREENING FOR COLON CANCER: ICD-10-CM

## 2021-06-29 DIAGNOSIS — E03.9 HYPOTHYROIDISM, UNSPECIFIED TYPE: ICD-10-CM

## 2021-06-29 DIAGNOSIS — G62.0 DRUG-INDUCED POLYNEUROPATHY (H): ICD-10-CM

## 2021-06-29 DIAGNOSIS — I10 HYPERTENSION GOAL BP (BLOOD PRESSURE) < 140/90: ICD-10-CM

## 2021-06-29 DIAGNOSIS — C34.11 MALIGNANT NEOPLASM OF UPPER LOBE OF RIGHT LUNG (H): ICD-10-CM

## 2021-06-29 PROCEDURE — 84443 ASSAY THYROID STIM HORMONE: CPT | Performed by: NURSE PRACTITIONER

## 2021-06-29 PROCEDURE — G0439 PPPS, SUBSEQ VISIT: HCPCS | Performed by: NURSE PRACTITIONER

## 2021-06-29 PROCEDURE — 36415 COLL VENOUS BLD VENIPUNCTURE: CPT | Performed by: NURSE PRACTITIONER

## 2021-06-29 PROCEDURE — 99214 OFFICE O/P EST MOD 30 MIN: CPT | Mod: 25 | Performed by: NURSE PRACTITIONER

## 2021-06-29 PROCEDURE — 82043 UR ALBUMIN QUANTITATIVE: CPT | Performed by: NURSE PRACTITIONER

## 2021-06-29 PROCEDURE — 80061 LIPID PANEL: CPT | Performed by: NURSE PRACTITIONER

## 2021-06-29 PROCEDURE — 80053 COMPREHEN METABOLIC PANEL: CPT | Performed by: NURSE PRACTITIONER

## 2021-06-29 RX ORDER — GABAPENTIN 300 MG/1
CAPSULE ORAL
Qty: 90 CAPSULE | Refills: 3 | Status: CANCELLED | OUTPATIENT
Start: 2021-06-29

## 2021-06-29 RX ORDER — LEVOTHYROXINE SODIUM 50 UG/1
50 TABLET ORAL DAILY
Qty: 90 TABLET | Refills: 3 | Status: SHIPPED | OUTPATIENT
Start: 2021-06-29 | End: 2022-09-26

## 2021-06-29 RX ORDER — HYDROCHLOROTHIAZIDE 12.5 MG/1
12.5 TABLET ORAL DAILY
Qty: 90 TABLET | Refills: 3 | Status: SHIPPED | OUTPATIENT
Start: 2021-06-29 | End: 2021-09-07

## 2021-06-29 ASSESSMENT — ENCOUNTER SYMPTOMS
MYALGIAS: 0
COUGH: 0
SORE THROAT: 0
SHORTNESS OF BREATH: 0
HEADACHES: 0
CONSTIPATION: 0
HEMATURIA: 0
ARTHRALGIAS: 0
EYE PAIN: 0
NAUSEA: 0
HEARTBURN: 0
PALPITATIONS: 0
DIZZINESS: 0
JOINT SWELLING: 0
CHILLS: 0
FREQUENCY: 0
DYSURIA: 0
DIARRHEA: 0
HEMATOCHEZIA: 0
BREAST MASS: 0
FEVER: 0
PARESTHESIAS: 0
NERVOUS/ANXIOUS: 0
ABDOMINAL PAIN: 0
WEAKNESS: 0

## 2021-06-29 ASSESSMENT — MIFFLIN-ST. JEOR: SCORE: 1105.09

## 2021-06-29 ASSESSMENT — ACTIVITIES OF DAILY LIVING (ADL): CURRENT_FUNCTION: NO ASSISTANCE NEEDED

## 2021-06-29 NOTE — PROGRESS NOTES
"SUBJECTIVE:   Sushila Chambers is a 71 year old female who presents for Preventive Visit.      Are you in the first 12 months of your Medicare coverage?  No    Healthy Habits:     In general, how would you rate your overall health?  Excellent    Frequency of exercise:  2-3 days/week    Duration of exercise:  45-60 minutes    Do you usually eat at least 4 servings of fruit and vegetables a day, include whole grains    & fiber and avoid regularly eating high fat or \"junk\" foods?  Yes    Taking medications regularly:  Yes    Medication side effects:  None    Ability to successfully perform activities of daily living:  No assistance needed    Home Safety:  No safety concerns identified    Hearing Impairment:  No hearing concerns    In the past 6 months, have you been bothered by leaking of urine?  No    In general, how would you rate your overall mental or emotional health?  Excellent      PHQ-2 Total Score: 0    Additional concerns today:  No    Do you feel safe in your environment? Yes    Have you ever done Advance Care Planning? (For example, a Health Directive, POLST, or a discussion with a medical provider or your loved ones about your wishes): Yes, patient states has an Advance Care Planning document and will bring a copy to the clinic.      Fall risk  Fallen 2 or more times in the past year?: No  Any fall with injury in the past year?: No    Cognitive Screening   1) Repeat 3 items (Leader, Season, Table)    2) Clock draw: NORMAL  3) 3 item recall: Recalls 3 objects  Results: NORMAL clock, 1-2 items recalled: COGNITIVE IMPAIRMENT LESS LIKELY    Mini-CogTM Copyright EMRE Williamson. Licensed by the author for use in [facility]; reprinted with permission (ave@.[email or web address]). All rights reserved.      Do you have sleep apnea, excessive snoring or daytime drowsiness?: no    Reviewed and updated as needed this visit by clinical staff  Tobacco  Allergies  Meds   Med Hx  Surg Hx  Fam Hx  Soc Hx        Reviewed and " updated as needed this visit by Provider                Social History     Tobacco Use     Smoking status: Never Smoker     Smokeless tobacco: Never Used   Substance Use Topics     Alcohol use: Yes     Comment: Occasionally     If you drink alcohol do you typically have >3 drinks per day or >7 drinks per week? No    Alcohol Use 6/29/2021   Prescreen: >3 drinks/day or >7 drinks/week? No   Prescreen: >3 drinks/day or >7 drinks/week? -   No flowsheet data found.      Current providers sharing in care for this patient include:   Patient Care Team:  Meryl Muro APRN CNP as PCP - General (Nurse Practitioner)  Zahida Zambrano MD as MD (Dermatology)  Meryl Muro APRN CNP as Assigned PCP    The following health maintenance items are reviewed in Epic and correct as of today:  Health Maintenance Due   Topic Date Due     ANNUAL REVIEW OF HM ORDERS  Never done     COLORECTAL CANCER SCREENING  Never done     Lab work is in process  Labs reviewed in EPIC  BP Readings from Last 3 Encounters:   06/29/21 132/78   10/15/19 (!) 144/78   04/15/19 148/73    Wt Readings from Last 3 Encounters:   06/29/21 62.6 kg (138 lb)   04/09/20 62.1 kg (137 lb)   10/15/19 63.2 kg (139 lb 4.8 oz)              Patient Active Problem List   Diagnosis     Hypothyroidism     Lung cancer (H)     Breast cancer, right breast (H)     Hyperlipidemia LDL goal <130     Grief     Breast cancer, left breast (H)     Encounter for antineoplastic chemotherapy     Malignant neoplasm of lower-outer quadrant of left female breast (H)     Diarrhea     Non-intractable vomiting with nausea, vomiting of unspecified type     Other specified anemias     Uncontrolled hypertension     Hypertension goal BP (blood pressure) < 140/90     Low bone density     Drug-induced polyneuropathy (H)     Drug induced insomnia (H)     CKD (chronic kidney disease) stage 3, GFR 30-59 ml/min     Past Surgical History:   Procedure Laterality Date     ABDOMEN  SURGERY       BIOPSY, LUNG NODULE Right 2012    + cancer     COLONOSCOPY       ECTOPIC PREGNANCY SURGERY Left 1988     GENITOURINARY SURGERY       GYN SURGERY       INSERT PORT VASCULAR ACCESS       LUMPECTOMY BREAST Right 2010     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     MASTECTOMY SIMPLE, SENTINEL NODE, COMBINED Left 2/24/2016    Procedure: COMBINED MASTECTOMY SIMPLE, SENTINEL NODE;  Surgeon: Satnam Betts MD;  Location: [facility] OR     REMOVE PORT VASCULAR ACCESS N/A 2/24/2016    Procedure: REMOVE PORT VASCULAR ACCESS;  Surgeon: Satnam Betts MD;  Location: [facility] OR     THORACIC SURGERY         Social History     Tobacco Use     Smoking status: Never Smoker     Smokeless tobacco: Never Used   Substance Use Topics     Alcohol use: Yes     Comment: Occasionally     Family History   Problem Relation Age of Onset     Breast Cancer Mother      Cancer Father         bone cancer     Vertigo Sister      Melanoma No family hx of      Skin Cancer No family hx of          Current Outpatient Medications   Medication Sig Dispense Refill     Acetaminophen (TYLENOL PO) Take 500 mg by mouth every 4 hours as needed for mild pain or fever Reported on 3/21/2017       aspirin 81 MG EC tablet Take 81 mg by mouth daily       biotin 1000 MCG TABS tablet Take by mouth daily       calcium carbonate (OS-OCTAVIANO 500 MG [place]) 500 MG tablet Take by mouth 2 times daily       hydrochlorothiazide (HYDRODIURIL) 12.5 MG tablet Take 1 tablet (12.5 mg) by mouth daily 90 tablet 3     letrozole (FEMARA) 2.5 MG tablet TAKE 1 TABLET(2.5 MG) BY MOUTH DAILY 90 tablet 3     levothyroxine (SYNTHROID/LEVOTHROID) 50 MCG tablet Take 1 tablet (50 mcg) by mouth daily 90 tablet 3     multivitamin, therapeutic with minerals (MULTI-VITAMIN) TABS Take 1 tablet by mouth daily       VITAMIN D, CHOLECALCIFEROL, PO Take by mouth daily       Allergies   Allergen Reactions     Latex      SKIN REACTION       Sulfa Drugs Itching and Rash     Recent Labs   Lab Test  02/07/21  0929 10/15/19  1023 01/23/19  1054 05/22/18  1130 03/21/17  1244 03/21/17  1244 09/09/15  0857 09/09/15  0857 06/22/15  0924 09/09/14  1019   A1C  --   --   --   --   --   --   --  5.3  --   --    LDL  --   --   --   --   --  171*  --   --  67 103   HDL  --   --   --   --   --  54  --   --  72 62   TRIG  --   --   --   --   --  206*  --   --  105 187*   ALT  --  29 29  --   --  24   < > 20  --  20   CR  --  0.96 0.93 1.00   < > 0.90   < > 0.88  --   --    GFRESTIMATED 55* 59* 62 55*   < > 63   < > 64  --   --    GFRESTBLACK 66 69 72 66   < > 76   < > 78  --   --    POTASSIUM  --  3.2* 4.3 3.8   < > 4.1   < > 3.7  --   --    TSH  --   --   --  3.48  --  1.81   < >  --  2.34  --     < > = values in this interval not displayed.        Hypothyroid:  Going well.  Taking her meds as prescribed.  Needing labs and refills today.      Hydrochlorothiazide:  For blood pressure.  Gong great.  No side effects or problems.   Needing refills today.       History of breast cancer.  Oncology every 6 months.  Doing great.        Donald lab, Max, 1 1/2 years old.      Review of Systems   Constitutional: Negative for chills and fever.   HENT: Negative for congestion, ear pain, hearing loss and sore throat.    Eyes: Negative for pain and visual disturbance.   Respiratory: Negative for cough and shortness of breath.    Cardiovascular: Negative for chest pain, palpitations and peripheral edema.   Gastrointestinal: Negative for abdominal pain, constipation, diarrhea, heartburn, hematochezia and nausea.   Breasts:  Negative for tenderness, breast mass and discharge.   Genitourinary: Negative for dysuria, frequency, genital sores, hematuria, pelvic pain, urgency, vaginal bleeding and vaginal discharge.   Musculoskeletal: Negative for arthralgias, joint swelling and myalgias.   Skin: Negative for rash.   Neurological: Negative for dizziness, weakness, headaches and paresthesias.   Psychiatric/Behavioral: Negative for mood changes.  "The patient is not nervous/anxious.        OBJECTIVE:   /78 (BP Location: Left arm, Patient Position: Sitting, Cuff Size: Adult Regular)   Pulse 101   Temp 97.7  F (36.5  C) (Temporal)   Resp 16   Ht 1.6 m (5' 3\")   Wt 62.6 kg (138 lb)   SpO2 99%   BMI 24.45 kg/m   Estimated body mass index is 24.45 kg/m  as calculated from the following:    Height as of this encounter: 1.6 m (5' 3\").    Weight as of this encounter: 62.6 kg (138 lb).  Physical Exam  GENERAL: healthy, alert and no distress  EYES: Eyes grossly normal to inspection, PERRL and conjunctivae and sclerae normal  HENT: ear canals and TM's normal, nose and mouth without ulcers or lesions  NECK: no adenopathy, no asymmetry, masses, or scars and thyroid normal to palpation  RESP: lungs clear to auscultation - no rales, rhonchi or wheezes  CV: regular rate and rhythm, normal S1 S2, no S3 or S4, no murmur, click or rub, no peripheral edema and peripheral pulses strong  ABDOMEN: soft, nontender, no hepatosplenomegaly, no masses and bowel sounds normal  MS: no gross musculoskeletal defects noted, no edema  SKIN: no suspicious lesions or rashes  NEURO: Normal strength and tone, mentation intact and speech normal  PSYCH: mentation appears normal, affect normal/bright    Diagnostic Test Results:  Labs reviewed in Epic  Labs in process, esults pending.    ASSESSMENT / PLAN:   (Z00.00) Encounter for Medicare annual wellness exam  (primary encounter diagnosis)  Comment:   Plan: Comprehensive metabolic panel, TSH with free T4        reflex, Albumin Random Urine Quantitative with         Creat Ratio, Lipid panel reflex to direct LDL         Fasting, GASTROENTEROLOGY ADULT REF PROCEDURE         ONLY            (C34.11) Malignant neoplasm of upper lobe of right lung (H)  Comment:   Plan: continue care with oncology    (G62.0) Drug-induced polyneuropathy (H)  Comment:   Plan: continue care with oncology    (F19.982) Drug induced insomnia (H)  Comment:   Plan: " "continue care with oncology    (N18.30) Stage 3 chronic kidney disease, unspecified whether stage 3a or 3b CKD  Comment: chronic  Plan: Comprehensive metabolic panel, Albumin Random         Urine Quantitative with Creat Ratio        Labs done today, results pending.  I did review with the patient her history of a low GFR, albeit fairly abnormal.  The remainder of her previous BMP was normal.  She was appreciative of the information and she will monitor her kidney functions.    (G62.9) Neuropathy  Comment: improved  Plan: she is no longer on gabapentin    (I10) Hypertension goal BP (blood pressure) < 140/90  Comment: chronic/controlled  Plan: hydrochlorothiazide (HYDRODIURIL) 12.5 MG         tablet, Comprehensive metabolic panel, Albumin         Random Urine Quantitative with Creat Ratio        Refills given, labs pending.  Continue HTN management.  Yearly clinic appts for rechecks, sooner if problems.     (E03.9) Hypothyroidism, unspecified type  Comment: chronic  Plan: levothyroxine (SYNTHROID/LEVOTHROID) 50 MCG         tablet, TSH with free T4 reflex        Labs done today, refills given.  If the TSH is abnormal, her med will be adjusted and follow up would be recommended in 2-3 months.  If her lab is normal, next follow up in one year.     (Z13.1) Screening for diabetes mellitus  Comment:   Plan: done today    (Z13.220) Screening for hyperlipidemia  Comment:   Plan: Lipid panel reflex to direct LDL Fasting        Done today    (Z12.11) Screening for colon cancer  Comment:   Plan: GASTROENTEROLOGY ADULT REF PROCEDURE ONLY        Routine screening colonoscopy at earliest convenience      Patient has been advised of split billing requirements and indicates understanding: Yes  COUNSELING:  Reviewed preventive health counseling, as reflected in patient instructions    Estimated body mass index is 24.45 kg/m  as calculated from the following:    Height as of this encounter: 1.6 m (5' 3\").    Weight as of this " encounter: 62.6 kg (138 lb).        She reports that she has never smoked. She has never used smokeless tobacco.      Appropriate preventive services were discussed with this patient, including applicable screening as appropriate for cardiovascular disease, diabetes, osteopenia/osteoporosis, and glaucoma.  As appropriate for age/gender, discussed screening for colorectal cancer, prostate cancer, breast cancer, and cervical cancer. Checklist reviewing preventive services available has been given to the patient.    Reviewed patients plan of care and provided an AVS. The Basic Care Plan (routine screening as documented in Health Maintenance) for Sushila meets the Care Plan requirement. This Care Plan has been established and reviewed with the Patient.    Counseling Resources:  ATP IV Guidelines  Pooled Cohorts Equation Calculator  Breast Cancer Risk Calculator  Breast Cancer: Medication to Reduce Risk  FRAX Risk Assessment  ICSI Preventive Guidelines  Dietary Guidelines for Americans, 2010  USDA's MyPlate  ASA Prophylaxis  Lung CA Screening    BREANNE [facility]    Identified Health Risks:

## 2021-06-29 NOTE — PATIENT INSTRUCTIONS
Patient Education   Personalized Prevention Plan  You are due for the preventive services outlined below.  Your care team is available to assist you in scheduling these services.  If you have already completed any of these items, please share that information with your care team to update in your medical record.  Health Maintenance Due   Topic Date Due     ANNUAL REVIEW OF HM ORDERS  Never done     Colorectal Cancer Screening  Never done

## 2021-06-30 LAB
ALBUMIN SERPL-MCNC: 3.7 G/DL (ref 3.4–5)
ALP SERPL-CCNC: 49 U/L (ref 40–150)
ALT SERPL W P-5'-P-CCNC: 32 U/L (ref 0–50)
ANION GAP SERPL CALCULATED.3IONS-SCNC: 7 MMOL/L (ref 3–14)
AST SERPL W P-5'-P-CCNC: 17 U/L (ref 0–45)
BILIRUB SERPL-MCNC: 1 MG/DL (ref 0.2–1.3)
BUN SERPL-MCNC: 18 MG/DL (ref 7–30)
CALCIUM SERPL-MCNC: 9.7 MG/DL (ref 8.5–10.1)
CHLORIDE SERPL-SCNC: 101 MMOL/L (ref 94–109)
CHOLEST SERPL-MCNC: 285 MG/DL
CO2 SERPL-SCNC: 29 MMOL/L (ref 20–32)
CREAT SERPL-MCNC: 1.03 MG/DL (ref 0.52–1.04)
CREAT UR-MCNC: 100 MG/DL
GFR SERPL CREATININE-BSD FRML MDRD: 54 ML/MIN/{1.73_M2}
GLUCOSE SERPL-MCNC: 113 MG/DL (ref 70–99)
HDLC SERPL-MCNC: 64 MG/DL
LDLC SERPL CALC-MCNC: 197 MG/DL
MICROALBUMIN UR-MCNC: <5 MG/L
MICROALBUMIN/CREAT UR: NORMAL MG/G CR (ref 0–25)
NONHDLC SERPL-MCNC: 221 MG/DL
POTASSIUM SERPL-SCNC: 3.7 MMOL/L (ref 3.4–5.3)
PROT SERPL-MCNC: 7.4 G/DL (ref 6.8–8.8)
SODIUM SERPL-SCNC: 137 MMOL/L (ref 133–144)
TRIGL SERPL-MCNC: 119 MG/DL
TSH SERPL DL<=0.005 MIU/L-ACNC: 2.44 MU/L (ref 0.4–4)

## 2021-06-30 NOTE — RESULT ENCOUNTER NOTE
Lavon Conti,    This note is to let you know the results of your lab studies.    Your blood sugar did come back slightly above the normal limit. I am not overly concerned about this at this time, but I do recommend that you eat a diet low and simple sugars, simple carbohydrates (such as pasta, rice, cereals,chips, crackers and starchy vegetables). An increase in aerobic activity maintaining a normal body weight can also be helpful for the treatment of elevated blood sugars and the prevention of type II diabetes.  I would like to check your blood sugar in one year.    Your thyroidis normal.    On hour metabolic panel, your electrolytes, kidney function, and calcium are all normal. One of your kidney tests, the GFR, is slightly low but it is stable. I do recommend that you avoid NSAID's (ibuprofen, aspirin, aleve) and recheck your labs once a year.    Your cholesterol levels are elevated and it is higher than last year. I recommend a diet low in fat and cholesterol as well as regular aerobic activity. I would like to recheck your cholesterol in one year.    Let me know if you have any questions.

## 2021-09-05 DIAGNOSIS — I10 HYPERTENSION GOAL BP (BLOOD PRESSURE) < 140/90: ICD-10-CM

## 2021-09-07 RX ORDER — HYDROCHLOROTHIAZIDE 12.5 MG/1
TABLET ORAL
Qty: 90 TABLET | Refills: 0 | Status: SHIPPED | OUTPATIENT
Start: 2021-09-07 | End: 2021-12-02

## 2021-09-07 NOTE — TELEPHONE ENCOUNTER
Duplicate see script sent   hydrochlorothiazide (HYDRODIURIL) 12.5 MG tablet 90 tablet 3 6/29/2021

## 2021-09-15 ENCOUNTER — ANCILLARY PROCEDURE (OUTPATIENT)
Dept: MAMMOGRAPHY | Facility: CLINIC | Age: 71
End: 2021-09-15
Payer: MEDICARE

## 2021-09-15 DIAGNOSIS — C50.512 MALIGNANT NEOPLASM OF LOWER-OUTER QUADRANT OF LEFT BREAST OF FEMALE, ESTROGEN RECEPTOR POSITIVE (H): ICD-10-CM

## 2021-09-15 DIAGNOSIS — Z17.0 MALIGNANT NEOPLASM OF LOWER-OUTER QUADRANT OF LEFT BREAST OF FEMALE, ESTROGEN RECEPTOR POSITIVE (H): ICD-10-CM

## 2021-09-15 PROCEDURE — 77061 BREAST TOMOSYNTHESIS UNI: CPT | Mod: RT | Performed by: RADIOLOGY

## 2021-09-15 PROCEDURE — 77065 DX MAMMO INCL CAD UNI: CPT | Mod: RT | Performed by: RADIOLOGY

## 2021-09-28 ENCOUNTER — ANCILLARY PROCEDURE (OUTPATIENT)
Dept: BONE DENSITY | Facility: CLINIC | Age: 71
End: 2021-09-28
Attending: NURSE PRACTITIONER
Payer: MEDICARE

## 2021-09-28 DIAGNOSIS — Z79.811 LONG TERM CURRENT USE OF AROMATASE INHIBITOR: ICD-10-CM

## 2021-09-28 PROCEDURE — 77080 DXA BONE DENSITY AXIAL: CPT | Performed by: INTERNAL MEDICINE

## 2021-09-29 ENCOUNTER — VIRTUAL VISIT (OUTPATIENT)
Dept: ONCOLOGY | Facility: CLINIC | Age: 71
End: 2021-09-29
Attending: PHYSICIAN ASSISTANT
Payer: MEDICARE

## 2021-09-29 DIAGNOSIS — Z17.0 MALIGNANT NEOPLASM OF LOWER-OUTER QUADRANT OF LEFT BREAST OF FEMALE, ESTROGEN RECEPTOR POSITIVE (H): Primary | ICD-10-CM

## 2021-09-29 DIAGNOSIS — C50.512 MALIGNANT NEOPLASM OF LOWER-OUTER QUADRANT OF LEFT BREAST OF FEMALE, ESTROGEN RECEPTOR POSITIVE (H): Primary | ICD-10-CM

## 2021-09-29 PROCEDURE — 999N001193 HC VIDEO/TELEPHONE VISIT; NO CHARGE

## 2021-09-29 PROCEDURE — 99214 OFFICE O/P EST MOD 30 MIN: CPT | Mod: 95 | Performed by: PHYSICIAN ASSISTANT

## 2021-09-29 NOTE — NURSING NOTE
Sushila Chambers verified meds and allergies are correct via patients echeck in.  No changes at this time.    Shelly Butt, Virtual Facilitator

## 2021-09-29 NOTE — PROGRESS NOTES
Sushila is a 71 year old who is being evaluated via a billable video visit.      Sushila Chambers states she is currently in the state [facility] for her visit today.    How would you like to obtain your AVS? MyChart  If the video visit is dropped, the invitation should be resent by: Send to e-mail at: [email or web address]  Will anyone else be joining your video visit? No      Video Start Time: 2:36 PM  Video-Visit Details    Type of service:  Video Visit    Video End Time:3:05 PM    Originating Location (pt. Location): Home    Distant Location (provider location):  [facility]     Platform used for Video Visit: Alexander Butt, Virtual Visit Facilitator              Reason for Visit: f/u Multifocal, stage IIa, T2N0M0, ER positive, VT negative, HER2 non-amplified invasive lobular carcinoma of the left breast    Oncology HPI:   Ms. Chambers is a 71 year old female with a history of lung cancer and right breast cancer with more recent invasive lobular carcinoma of the left breast.     Ms. Chambers was treated for right sided breast cancer in 2009 with right breast lumpectomy, radiation, and endocrine therapy.     She self palpated a mass in the left breast in early August, 2015. Diagnostic mammogram and ultrasound showed 2 ill-defined spiculated masses at the 4:30 and 2:00 positions of the left breast. There was surrounding architectural distortion and the total area of involvement measured 6.5 cm. Targeted left breast ultrasound showed a multilobulated hypoechoic mass at the 4:00 position measuring 2.5 cm and an ill-defined hypoechoic mass at the 2:00 position measuring 2.4 cm.  Biopsy of the mass at the 4:00 position showed grade II invasive pleomorphic lobular carcinoma. Biopsy of the mass at the 2:00 position showed a grade II invasive lobular carcinoma. No angiolymphatic invasion or associated LCIS was seen in either specimen. Estrogen receptor staining was positive in >80% of tumor cell  nuclei. Progesterone receptor staining was positive in <5% of tumor cell nuclei. HER2 was non-amplifed by FISH in both specimens.      Ms. Chambers enrolled on the ISPY2 clinical trial.  She received 12 weeks of ganetespib and Taxol and 4 cycles of dose dense AC from 9/29/15 - 2/1/16. On 2/24/16 she underwent left breast mastectomy and sentinel lymph node procedure under the care of Dr. Betts. Pathology showed a residual grade II 4.8 cm, pleomorphic invasive lobular carcinoma with associated LCIS. Invasive tumor cellularity was 30%.  There was evidence of perineural invasion, however, no lymphvascular space invasion. Two sentinel lymph nodes were negative.  She has been on adjuvant letrozole since 03/2016.    Interval history: Sushila is feeling well. She remains on letrozole and is tolerating this well. She continues to be active, working with a  twice per week. Does pilates for 60 minutes one day and cardio/weights for 60 minutes the second day. She has good stamina with this. Also remains active with her dog.     No recent fevers/chills or infections. No cough, SOB, abdominal pain, or bowel issues.     She is planning a colonoscopy next month and will schedule DERM follow-up for skin check.     No breast concerns or lumps/bumps.     Exam:    Video physical exam  General: Patient appears well in no acute distress.   Skin: No visualized rash or lesions on visualized skin  Eyes: EOMI, no erythema, sclera icterus or discharge noted  Resp: Appears to be breathing comfortably without accessory muscle usage, speaking in full sentences, no cough  MSK: Appears to have normal range of motion based on visualized movements  Neurologic: No apparent tremors, facial movements symmetric  Psych: affect bright, alert and oriented    The rest of a comprehensive physical examination is deferred due to PHE (public health emergency) video restrictions      Imaging:   DEXA 9/28 --osteopenia       Mammo R 9/28  Benign                                                             Assessment/plan:   72 yo female with a h/o T2N0M0, grade II, ER positive, OK negative, HER2 non-amplified invasive lobular carcinoma of the left breast. She is s/p treatment with taxol and ganetespib followed by dose dense adriamycin and cyclophosphamide, left breast mastectomy, and has been on letrozole since 03/2016.     #Left breast ER-positive carcinoma   -Ms. Chambers is now approximately 5.5 years out from excision of a left breast cancer.  She continues on letrozole.  She is overall tolerating the medication well.  We plan to treat her for a total of 10 years (through 03/2026). She had mammogram yesterday which is benign.   -Due to h/o bilateral breast cancers and multiple gene mutations, we are performing high risk screening with both annual mammogram and breast MRI. Plan to stop breast MRI at 73 yo.   -Will continue q6 month visits while on AI therapy.         #Osteopenia  -DEXA showed osteopenia. Stable from 2018 study.   -Continue calcium and vitamin D supplementation as well as daily weightbearing activity.     35 minutes spent on the date of the encounter doing chart review, review of test results, interpretation of tests, patient visit and documentation     Le Sanchez PA-C

## 2021-09-29 NOTE — LETTER
9/29/2021         RE: Sushila Chambers  [address] [place] [address]        Dear Colleague,    Thank you for referring your patient, Sushila Chambers, to the [facility]. Please see a copy of my visit note below.    Sushila is a 71 year old who is being evaluated via a billable video visit.      Sushila Chambers states she is currently in the state of [place] for her visit today.    How would you like to obtain your AVS? MyChart  If the video visit is dropped, the invitation should be resent by: Send to e-mail at: [email or web address]  Will anyone else be joining your video visit? No      Video Start Time: 2:36 PM  Video-Visit Details    Type of service:  Video Visit    Video End Time:3:05 PM    Originating Location (pt. Location): Home    Distant Location (provider location):  [facility]     Platform used for Video Visit: Alexander Btut, Virtual Visit Facilitator              Reason for Visit: f/u Multifocal, stage IIa, T2N0M0, ER positive, LA negative, HER2 non-amplified invasive lobular carcinoma of the left breast    Oncology HPI:   Ms. Chambers is a 71 year old female with a history of lung cancer and right breast cancer with more recent invasive lobular carcinoma of the left breast.     Ms. Chambers was treated for right sided breast cancer in 2009 with right breast lumpectomy, radiation, and endocrine therapy.     She self palpated a mass in the left breast in early August, 2015. Diagnostic mammogram and ultrasound showed 2 ill-defined spiculated masses at the 4:30 and 2:00 positions of the left breast. There was surrounding architectural distortion and the total area of involvement measured 6.5 cm. Targeted left breast ultrasound showed a multilobulated hypoechoic mass at the 4:00 position measuring 2.5 cm and an ill-defined hypoechoic mass at the 2:00 position measuring 2.4 cm.  Biopsy of the mass at the 4:00 position showed grade II  invasive pleomorphic lobular carcinoma. Biopsy of the mass at the 2:00 position showed a grade II invasive lobular carcinoma. No angiolymphatic invasion or associated LCIS was seen in either specimen. Estrogen receptor staining was positive in >80% of tumor cell nuclei. Progesterone receptor staining was positive in <5% of tumor cell nuclei. HER2 was non-amplifed by FISH in both specimens.      Ms. Chambers enrolled on the ISPY2 clinical trial.  She received 12 weeks of ganetespib and Taxol and 4 cycles of dose dense AC from 9/29/15 - 2/1/16. On 2/24/16 she underwent left breast mastectomy and sentinel lymph node procedure under the care of Dr. Betts. Pathology showed a residual grade II 4.8 cm, pleomorphic invasive lobular carcinoma with associated LCIS. Invasive tumor cellularity was 30%.  There was evidence of perineural invasion, however, no lymphvascular space invasion. Two sentinel lymph nodes were negative.  She has been on adjuvant letrozole since 03/2016.    Interval history: Sushila is feeling well. She remains on letrozole and is tolerating this well. She continues to be active, working with a  twice per week. Does pilates for 60 minutes one day and cardio/weights for 60 minutes the second day. She has good stamina with this. Also remains active with her dog.     No recent fevers/chills or infections. No cough, SOB, abdominal pain, or bowel issues.     She is planning a colonoscopy next month and will schedule DERM follow-up for skin check.     No breast concerns or lumps/bumps.     Exam:    Video physical exam  General: Patient appears well in no acute distress.   Skin: No visualized rash or lesions on visualized skin  Eyes: EOMI, no erythema, sclera icterus or discharge noted  Resp: Appears to be breathing comfortably without accessory muscle usage, speaking in full sentences, no cough  MSK: Appears to have normal range of motion based on visualized movements  Neurologic: No apparent  tremors, facial movements symmetric  Psych: affect bright, alert and oriented    The rest of a comprehensive physical examination is deferred due to PHE (public health emergency) video restrictions      Imaging:   DEXA 9/28 --osteopenia       Mammo R 9/28  Benign                                                            Assessment/plan:   70 yo female with a h/o T2N0M0, grade II, ER positive, NH negative, HER2 non-amplified invasive lobular carcinoma of the left breast. She is s/p treatment with taxol and ganetespib followed by dose dense adriamycin and cyclophosphamide, left breast mastectomy, and has been on letrozole since 03/2016.     #Left breast ER-positive carcinoma   -Ms. Chambers is now approximately 5.5 years out from excision of a left breast cancer.  She continues on letrozole.  She is overall tolerating the medication well.  We plan to treat her for a total of 10 years (through 03/2026). She had mammogram yesterday which is benign.   -Due to h/o bilateral breast cancers and multiple gene mutations, we are performing high risk screening with both annual mammogram and breast MRI. Plan to stop breast MRI at 75 yo.   -Will continue q6 month visits while on AI therapy.         #Osteopenia  -DEXA showed osteopenia. Stable from 2018 study.   -Continue calcium and vitamin D supplementation as well as daily weightbearing activity.     35 minutes spent on the date of the encounter doing chart review, review of test results, interpretation of tests, patient visit and documentation     Le Sanchez PA-C       Again, thank you for allowing me to participate in the care of your patient.        Sincerely,        Le Sanchez PA-C

## 2021-10-24 ENCOUNTER — HEALTH MAINTENANCE LETTER (OUTPATIENT)
Age: 71
End: 2021-10-24

## 2022-02-13 DIAGNOSIS — C50.512 MALIGNANT NEOPLASM OF LOWER-OUTER QUADRANT OF LEFT FEMALE BREAST (H): ICD-10-CM

## 2022-02-13 NOTE — CONFIDENTIAL NOTE
Letrozole refill   Last prescribing provider: Ursula Wood     Last clinic visit date: 9/29/21 Le Sanchez     Any missed appointments or no-shows since last clinic visit?: No     Recommendations for requested medication (if none, N/A): Copied from chart note 9/29/21 Le Sanchez   #Left breast ER-positive carcinoma   -Ms. Chambers is now approximately 5.5 years out from excision of a left breast cancer. She continues on letrozole. She is overall tolerating the medication well. We plan to treat her for a total of 10 years (through 03/2026). She had mammogram yesterday which is benign.       Next clinic visit date: 4/4/22 Dr Villegas     Any other pertinent information (if none, N/A): N/A

## 2022-02-14 RX ORDER — LETROZOLE 2.5 MG/1
2.5 TABLET, FILM COATED ORAL DAILY
Qty: 90 TABLET | Refills: 3 | Status: SHIPPED | OUTPATIENT
Start: 2022-02-14 | End: 2023-01-20

## 2022-03-24 DIAGNOSIS — I10 HYPERTENSION GOAL BP (BLOOD PRESSURE) < 140/90: ICD-10-CM

## 2022-03-24 RX ORDER — HYDROCHLOROTHIAZIDE 12.5 MG/1
TABLET ORAL
Qty: 30 TABLET | Refills: 0 | Status: SHIPPED | OUTPATIENT
Start: 2022-03-24 | End: 2022-08-17

## 2022-03-24 NOTE — TELEPHONE ENCOUNTER
Passes protocol but pt has now cancelled three appointments to establish care with new provider.    Last Written Prescription Date:  2/24/22  Last Fill Quantity: 30,  # refills: 0   Last office visit: 6/29/2021 with prescribing provider Bhaskar  Future Office Visit:      Shelly Smith RN  [facility]

## 2022-03-29 ENCOUNTER — ANCILLARY PROCEDURE (OUTPATIENT)
Dept: MRI IMAGING | Facility: CLINIC | Age: 72
End: 2022-03-29
Attending: PHYSICIAN ASSISTANT
Payer: MEDICARE

## 2022-03-29 DIAGNOSIS — Z17.0 MALIGNANT NEOPLASM OF LOWER-OUTER QUADRANT OF LEFT BREAST OF FEMALE, ESTROGEN RECEPTOR POSITIVE (H): ICD-10-CM

## 2022-03-29 DIAGNOSIS — C50.512 MALIGNANT NEOPLASM OF LOWER-OUTER QUADRANT OF LEFT BREAST OF FEMALE, ESTROGEN RECEPTOR POSITIVE (H): ICD-10-CM

## 2022-03-29 PROCEDURE — 77049 MRI BREAST C-+ W/CAD BI: CPT | Mod: MG | Performed by: RADIOLOGY

## 2022-03-29 PROCEDURE — A9585 GADOBUTROL INJECTION: HCPCS | Performed by: RADIOLOGY

## 2022-03-29 PROCEDURE — G1004 CDSM NDSC: HCPCS | Performed by: RADIOLOGY

## 2022-03-29 RX ORDER — GADOBUTROL 604.72 MG/ML
7.5 INJECTION INTRAVENOUS ONCE
Status: COMPLETED | OUTPATIENT
Start: 2022-03-29 | End: 2022-03-29

## 2022-03-29 RX ADMIN — GADOBUTROL 6 ML: 604.72 INJECTION INTRAVENOUS at 10:12

## 2022-03-29 NOTE — DISCHARGE INSTRUCTIONS
MRI Contrast Discharge Instructions    The IV contrast you received today will pass out of your body in your  urine. This will happen in the next 24 hours. You will not feel this process.  Your urine will not change color.    Drink at least 4 extra glasses of water or juice today (unless your doctor  has restricted your fluids). This reduces the stress on your kidneys.  You may take your regular medicines.    If you are on dialysis: It is best to have dialysis today.    If you have a reaction: Most reactions happen right away. If you have  any new symptoms after leaving the hospital (such as hives or swelling),  call your hospital at the correct number below. Or call your family doctor.  If you have breathing distress or wheezing, call 911.    Special instructions: ***    I have read and understand the above information.    Signature:______________________________________ Date:___________    Staff:__________________________________________ Date:___________     Time:__________    [facility] Departments:    ___[place]: [phone]  ___[facility]: [phone]  ___[place]: [phone] ___[facility]: [phone]  ___[facility]: [phone]  ___[facility]: [phone]  ___[phone]  ___[facility]: [phone]  ___[phone]

## 2022-04-04 ENCOUNTER — VIRTUAL VISIT (OUTPATIENT)
Dept: ONCOLOGY | Facility: CLINIC | Age: 72
End: 2022-04-04
Attending: INTERNAL MEDICINE
Payer: MEDICARE

## 2022-04-04 DIAGNOSIS — Z12.31 VISIT FOR SCREENING MAMMOGRAM: Primary | ICD-10-CM

## 2022-04-04 PROCEDURE — 99214 OFFICE O/P EST MOD 30 MIN: CPT | Mod: 95 | Performed by: INTERNAL MEDICINE

## 2022-04-04 PROCEDURE — G0463 HOSPITAL OUTPT CLINIC VISIT: HCPCS | Mod: PN,RTG | Performed by: INTERNAL MEDICINE

## 2022-04-04 NOTE — LETTER
4/4/2022     RE: Sushila Chambers  [address] [place] [address]    Dear Colleague,    Thank you for referring your patient, Sushila Chambers, to the [facility]. Please see a copy of my visit note below.    Sushila is a 72 year old who is being evaluated via a billable video visit.      How would you like to obtain your AVS? MyChart  If the video visit is dropped, the invitation should be resent by: Text to cell phone: [phone]  Will anyone else be joining your video visit? Tayla    Isela Titus    Video-Visit Details    Type of service:  Video Visit    Originating Location (pt. Location): Home    Distant Location (provider location):  [facility]     Platform used for Video Visit: [employer]     MEDICAL ONCOLOGY FOLLOW-UP PATIENT VISIT     NAME: Sushila Chambers     DATE: 4/4/22    PRIMARY CARE PHYSICIAN: Meryl Muro    PATIENT ID: Multifocal, stage IIa, T2N0M0, ER positive, MA negative, HER2 non-amplified invasive lobular carcinoma of the left breast.     Oncology History: Ms. Chambers is a 72 year old female with a history of lung cancer and right breast cancer with more recent invasive lobular carcinoma of the left breast. Ms. Chambers was treated for right sided breast cancer in 2009 with right breast lumpectomy, radiation, and endocrine therapy. She self palpated a mass in the left breast in early August, 2015. Diagnostic mammogram and ultrasound showed 2 ill-defined spiculated masses at the 4:30 and 2:00 positions of the left breast. There was surrounding architectural distortion and the total area of involvement measured 6.5 cm. Targeted left breast ultrasound showed a multilobulated hypoechoic mass at the 4:00 position measuring 2.5 cm and an ill-defined hypoechoic mass at the 2:00 position measuring 2.4 cm.  Biopsy of the mass at the 4:00 position showed grade II invasive pleomorphic lobular carcinoma. Biopsy of the mass at the 2:00  "position showed a grade II invasive lobular carcinoma. No angiolymphatic invasion or associated LCIS was seen in either specimen. Estrogen receptor staining was positive in >80% of tumor cell nuclei. Progesterone receptor staining was positive in <5% of tumor cell nuclei. HER2 was non-amplifed by FISH in both specimens.     Ms. Chambers enrolled on the ISPY2 clinical trial.  She received 12 weeks of ganetespib and Taxol and 4 cycles of dose dense AC from 9/29/15 - 2/1/16. On 2/24/16 she underwent left breast mastectomy and sentinel lymph node procedure under the care of Dr. Betts. Pathology showed a residual grade II 4.8 cm, pleomorphic invasive lobular carcinoma with associated LCIS. Invasive tumor cellularity was 30%.  There was evidence of perineural invasion, however, no lymphvascular space invasion. Two sentinel lymph nodes were negative.  She has been on adjuvant letrozole since 03/2016.    Ms. Chambers had genetic testing on 9/15/15.  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  Of note she had a number of alterations in RUFINA, BARD1, BRCA1/2, MUTYH, and TP53 that were classified as \"likely benign\" and \"benign\".    Interim History:   Ms. Chambers was seen via video visit today for routine breast cancer follow-up.  She continues on treatment with letrozole.  In general, her health has been good.  She is exercising on a regular basis.  At the beginning of Covid she got a black Labrador retriever puppy.  He is now approximately 2 years old.  She walks him on a daily basis.  In addition, she is doing Pilates, lifting weights, and working with a .  She just had her 72rd birthday and feels quite well.  She has intermittent joint pains, nothing persistent.  Nothing bothersome enough for which she has been taking pain medicine.  She denies fevers, chills, or infectious complaints.  She continues to follow Covid precautions and in fact, will be receiving her fourth Covid vaccine next week.  She denies " current cough, shortness of breath, or chest pains.  She has no abdominal complaints.  She denies new bone or joint aches or pains.  She is overdue for colon cancer screening.  She has an appointment with Dr. Brittany Goodwin to establish primary care as her previous provider retired.  She will discuss colon cancer screening options at that appointment.  The remainder of a complete 12 point review of systems was reviewed with patient was negative with exception that mentioned above.    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     MASTECTOMY SIMPLE, SENTINEL NODE, COMBINED Left 2/24/2016    Procedure: COMBINED MASTECTOMY SIMPLE, SENTINEL NODE;  Surgeon: Satnam Betts MD;  Location: [facility] OR     REMOVE PORT VASCULAR ACCESS N/A 2/24/2016    Procedure: REMOVE PORT VASCULAR ACCESS;  Surgeon: Satnam Betts MD;  Location: [facility] OR     THORACIC SURGERY       MEDS:  Current Outpatient Medications   Medication     Acetaminophen (TYLENOL PO)     aspirin 81 MG EC tablet     biotin 1000 MCG TABS tablet     calcium carbonate (OS-OCTAVIANO 500 MG [place]. [place]) 500 MG tablet     hydrochlorothiazide (HYDRODIURIL) 12.5 MG tablet     letrozole (FEMARA) 2.5 MG tablet     levothyroxine (SYNTHROID/LEVOTHROID) 50 MCG tablet     multivitamin, therapeutic with minerals (MULTI-VITAMIN) TABS     VITAMIN D, CHOLECALCIFEROL, PO     No current facility-administered medications for this  visit.     ALLERGIES:  Allergies   Allergen Reactions     Latex      SKIN REACTION       Sulfa Drugs Itching and Rash        PHYSICAL EXAM:  KPS: 100%  General:  Well appearing adult female in NAD.  Alert and oriented.  Eyes:  No erythema or discharge  Respiratory:  Breathing comfortably on room air.  No wheezing or distress.  Musculoskeletal:  Full ROM of the bilateral upper extremities.  Skin:  No visible concerning skin rashes or lesions  Neuro:  No notable tremor and dyskinetic movements.  Psych:  Mood and affect appear normal.    The rest of a comprehensive physical examination is deferred due to PHE (public health emergency) video visit restrictions    LABS REVIEWED THIS VISIT:  3/29/2022 Breast MRI:  No suspicious enhancement in either breast.  Unchanged prominent lymph node, right hemidiaphragm.  No new suspicious lymphadenopathy.    9/28/2021 DEXA bone density scan:  Lowest T-score of -1.1 c/w osteopenia.    IMPRESSION/PLAN: 73 yo female with a h/o T2N0M0, grade II, ER positive, CA negative, HER2 non-amplified invasive lobular carcinoma of the left breast. She is s/p treatment with taxol and ganetespib followed by dose dense adriamycin and cyclophosphamide, left breast mastectomy, and has been on letrozole since 03/2016.    1.  Left breast ER-positive carcinoma:  Ms. Chambers is >6 years out from excision of a left breast cancer.  She continues on letrozole.  She is tolerating the medication well.  We plan to treat her for a total of 10 years (through 03/2026).  She is asymptomatic of disease recurrence on history taken today.  I have not personally done a physical exam on her since prior to the Covid pandemic.  I will see her back in clinic in 6 months.  I would like this to be an in person visit, so that a breast and lymph node exam can be performed.  She is in agreement with this.    Due to h/o bilateral breast cancers and multiple gene mutations, we are performing high risk screening with both annual  mammogram and breast MRI.  Images from breast MRI performed on 3/29 were reviewed and are without suspicious findings.  Plan to stop breast MRIs at 75 yo.  Next screening mammogram of the right breast is due around 9/15/2022.    2.  COVID vaccination:  She will receive her 2nd booster vaccination (4th Covid vaccine overall) next week.    3.  Cancer screening:  High risk breast screening with alternating breast MRI and mammogram as above.  No need for further pap smears.  I recommend annual skin examination by a dermatologist.  In the past with colonoscopies, the prep has been inadequate.  We discussed alternate options for colon cancer screening such as FIT testing and ColoGuard.  She has an appointment with Dr. Brittany Goodwin next week to establish primary care and will discuss these options in more details at that appointment.     4.  Osteopenia:  DEXA bone density 09/28/2021 with a lowest T-score of -1.1, c/w osteopenia, but improved from prior (previously -1.4).  Continue calcium and vitamin D supplementation as well as daily weightbearing activity.     5.  Followup:  Right breast screening mammogram and in person visit with me 9/15/2022 or later.    30 minutes spent on the date of the encounter doing chart review, review of test results, interpretation of tests, patient visit and documentation     Again, thank you for allowing me to participate in the care of your patient.      Sincerely,    Brittany Villegas MD

## 2022-04-04 NOTE — PROGRESS NOTES
Sushila is a 72 year old who is being evaluated via a billable video visit.      How would you like to obtain your AVS? MyChart  If the video visit is dropped, the invitation should be resent by: Text to cell phone: [phone]  Will anyone else be joining your video visit? Tayla Titus    Video-Visit Details    Type of service:  Video Visit    Originating Location (pt. Location): Home    Distant Location (provider location):  [facility] CANCER [facility]     Platform used for Video Visit: [facility] ONCOLOGY FOLLOW-UP PATIENT VISIT     NAME: Sushila Chambers     DATE: 4/4/22    PRIMARY CARE PHYSICIAN: Meryl Muro    PATIENT ID: Multifocal, stage IIa, T2N0M0, ER positive, WI negative, HER2 non-amplified invasive lobular carcinoma of the left breast.     Oncology History: Ms. Chambers is a 72 year old female with a history of lung cancer and right breast cancer with more recent invasive lobular carcinoma of the left breast. Ms. Chambers was treated for right sided breast cancer in 2009 with right breast lumpectomy, radiation, and endocrine therapy. She self palpated a mass in the left breast in early August, 2015. Diagnostic mammogram and ultrasound showed 2 ill-defined spiculated masses at the 4:30 and 2:00 positions of the left breast. There was surrounding architectural distortion and the total area of involvement measured 6.5 cm. Targeted left breast ultrasound showed a multilobulated hypoechoic mass at the 4:00 position measuring 2.5 cm and an ill-defined hypoechoic mass at the 2:00 position measuring 2.4 cm.  Biopsy of the mass at the 4:00 position showed grade II invasive pleomorphic lobular carcinoma. Biopsy of the mass at the 2:00 position showed a grade II invasive lobular carcinoma. No angiolymphatic invasion or associated LCIS was seen in either specimen. Estrogen receptor staining was positive in >80% of tumor cell nuclei. Progesterone receptor staining was positive in <5% of  "tumor cell nuclei. HER2 was non-amplifed by FISH in both specimens.     Ms. Chambers enrolled on the ISPY2 clinical trial.  She received 12 weeks of ganetespib and Taxol and 4 cycles of dose dense AC from 9/29/15 - 2/1/16. On 2/24/16 she underwent left breast mastectomy and sentinel lymph node procedure under the care of Dr. Betts. Pathology showed a residual grade II 4.8 cm, pleomorphic invasive lobular carcinoma with associated LCIS. Invasive tumor cellularity was 30%.  There was evidence of perineural invasion, however, no lymphvascular space invasion. Two sentinel lymph nodes were negative.  She has been on adjuvant letrozole since 03/2016.    Ms. Chambers had genetic testing on 9/15/15.  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  Of note she had a number of alterations in RUFINA, BARD1, BRCA1/2, MUTYH, and TP53 that were classified as \"likely benign\" and \"benign\".    Interim History:   Ms. Chambers was seen via video visit today for routine breast cancer follow-up.  She continues on treatment with letrozole.  In general, her health has been good.  She is exercising on a regular basis.  At the beginning of Covid she got a black Labrador retriever puppy.  He is now approximately 2 years old.  She walks him on a daily basis.  In addition, she is doing Pilates, lifting weights, and working with a .  She just had her 72rd birthday and feels quite well.  She has intermittent joint pains, nothing persistent.  Nothing bothersome enough for which she has been taking pain medicine.  She denies fevers, chills, or infectious complaints.  She continues to follow Covid precautions and in fact, will be receiving her fourth Covid vaccine next week.  She denies current cough, shortness of breath, or chest pains.  She has no abdominal complaints.  She denies new bone or joint aches or pains.  She is overdue for colon cancer screening.  She has an appointment with Dr. Brittany Goodwin to establish primary " care as her previous provider retired.  She will discuss colon cancer screening options at that appointment.  The remainder of a complete 12 point review of systems was reviewed with patient was negative with exception that mentioned above.    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     MASTECTOMY SIMPLE, SENTINEL NODE, COMBINED Left 2/24/2016    Procedure: COMBINED MASTECTOMY SIMPLE, SENTINEL NODE;  Surgeon: Satnam Betts MD;  Location: [facility] OR     REMOVE PORT VASCULAR ACCESS N/A 2/24/2016    Procedure: REMOVE PORT VASCULAR ACCESS;  Surgeon: Satnam Betts MD;  Location: [facility] OR     THORACIC SURGERY       MEDS:  Current Outpatient Medications   Medication     Acetaminophen (TYLENOL PO)     aspirin 81 MG EC tablet     biotin 1000 MCG TABS tablet     calcium carbonate (OS-OCTAVIANO 500 MG [place]. CA) 500 MG tablet     hydrochlorothiazide (HYDRODIURIL) 12.5 MG tablet     letrozole (FEMARA) 2.5 MG tablet     levothyroxine (SYNTHROID/LEVOTHROID) 50 MCG tablet     multivitamin, therapeutic with minerals (MULTI-VITAMIN) TABS     VITAMIN D, CHOLECALCIFEROL, PO     No current facility-administered medications for this visit.     ALLERGIES:  Allergies   Allergen Reactions     Latex      SKIN REACTION       Sulfa Drugs Itching and Rash        PHYSICAL EXAM:  KPS: 100%  General:  Well appearing adult female in NAD.  Alert and oriented.  Eyes:  No erythema or  discharge  Respiratory:  Breathing comfortably on room air.  No wheezing or distress.  Musculoskeletal:  Full ROM of the bilateral upper extremities.  Skin:  No visible concerning skin rashes or lesions  Neuro:  No notable tremor and dyskinetic movements.  Psych:  Mood and affect appear normal.    The rest of a comprehensive physical examination is deferred due to PHE (public health emergency) video visit restrictions    LABS REVIEWED THIS VISIT:  3/29/2022 Breast MRI:  No suspicious enhancement in either breast.  Unchanged prominent lymph node, right hemidiaphragm.  No new suspicious lymphadenopathy.    9/28/2021 DEXA bone density scan:  Lowest T-score of -1.1 c/w osteopenia.    IMPRESSION/PLAN: 73 yo female with a h/o T2N0M0, grade II, ER positive, CA negative, HER2 non-amplified invasive lobular carcinoma of the left breast. She is s/p treatment with taxol and ganetespib followed by dose dense adriamycin and cyclophosphamide, left breast mastectomy, and has been on letrozole since 03/2016.    1.  Left breast ER-positive carcinoma:  Ms. Chambers is >6 years out from excision of a left breast cancer.  She continues on letrozole.  She is tolerating the medication well.  We plan to treat her for a total of 10 years (through 03/2026).  She is asymptomatic of disease recurrence on history taken today.  I have not personally done a physical exam on her since prior to the Covid pandemic.  I will see her back in clinic in 6 months.  I would like this to be an in person visit, so that a breast and lymph node exam can be performed.  She is in agreement with this.    Due to h/o bilateral breast cancers and multiple gene mutations, we are performing high risk screening with both annual mammogram and breast MRI.  Images from breast MRI performed on 3/29 were reviewed and are without suspicious findings.  Plan to stop breast MRIs at 73 yo.  Next screening mammogram of the right breast is due around 9/15/2022.    2.  COVID  vaccination:  She will receive her 2nd booster vaccination (4th Covid vaccine overall) next week.    3.  Cancer screening:  High risk breast screening with alternating breast MRI and mammogram as above.  No need for further pap smears.  I recommend annual skin examination by a dermatologist.  In the past with colonoscopies, the prep has been inadequate.  We discussed alternate options for colon cancer screening such as FIT testing and ColoGuard.  She has an appointment with Dr. Brittany Goodwin next week to establish primary care and will discuss these options in more details at that appointment.     4.  Osteopenia:  DEXA bone density 09/28/2021 with a lowest T-score of -1.1, c/w osteopenia, but improved from prior (previously -1.4).  Continue calcium and vitamin D supplementation as well as daily weightbearing activity.     5.  Followup:  Right breast screening mammogram and in person visit with me 9/15/2022 or later.    30 minutes spent on the date of the encounter doing chart review, review of test results, interpretation of tests, patient visit and documentation

## 2022-04-12 ASSESSMENT — ACTIVITIES OF DAILY LIVING (ADL)
IN_THE_PAST_7_DAYS,_DID_YOU_NEED_HELP_FROM_OTHERS_TO_TAKE_CARE_OF_THINGS_SUCH_AS_LAUNDRY_AND_HOUSEKEEPING,_BANKING,_SHOPPING,_USING_THE_TELEPHONE,_FOOD_PREPARATION,_TRANSPORTATION,_OR_TAKING_YOUR_OWN_MEDICATIONS?: N
IN_THE_PAST_7_DAYS,_DID_YOU_NEED_HELP_FROM_OTHERS_TO_PERFORM_EVERYDAY_ACTIVITIES_SUCH_AS_EATING,_GETTING_DRESSED,_GROOMING,_BATHING,_WALKING,_OR_USING_THE_TOILET: N

## 2022-04-12 ASSESSMENT — ENCOUNTER SYMPTOMS
EYE PAIN: 0
EYE IRRITATION: 0
EYE WATERING: 0
DOUBLE VISION: 0
EYE REDNESS: 0

## 2022-04-13 ENCOUNTER — OFFICE VISIT (OUTPATIENT)
Dept: INTERNAL MEDICINE | Facility: CLINIC | Age: 72
End: 2022-04-13
Payer: MEDICARE

## 2022-04-13 VITALS
BODY MASS INDEX: 26.17 KG/M2 | HEIGHT: 63 IN | HEART RATE: 86 BPM | OXYGEN SATURATION: 99 % | DIASTOLIC BLOOD PRESSURE: 92 MMHG | WEIGHT: 147.7 LBS | SYSTOLIC BLOOD PRESSURE: 160 MMHG

## 2022-04-13 DIAGNOSIS — Z12.11 SPECIAL SCREENING FOR MALIGNANT NEOPLASMS, COLON: ICD-10-CM
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
## 2022-04-13 DIAGNOSIS — H25.9 SENILE CATARACT, UNSPECIFIED AGE-RELATED CATARACT TYPE, UNSPECIFIED LATERALITY: Primary | ICD-10-CM

## 2022-04-13 PROCEDURE — 99215 OFFICE O/P EST HI 40 MIN: CPT | Performed by: INTERNAL MEDICINE

## 2022-04-13 RX ORDER — RIBOFLAVIN (VITAMIN B2) 100 MG
TABLET ORAL
COMMUNITY

## 2022-04-13 ASSESSMENT — PAIN SCALES - GENERAL: PAINLEVEL: NO PAIN (0)

## 2022-04-13 NOTE — PATIENT INSTRUCTIONS
Dr. Ion Puente has experience with cataracts in the Department of Ophthalmology.    Schedule a follow up preop exam around the time of cataract surgery, as this can also serve as your routine physical.

## 2022-04-13 NOTE — PROGRESS NOTES
"Sushila was seen today for establish care.  She is a very pleasant 72 year old female with a past medical history of Basal cell carcinoma, Breast cancer, right breast (H), History of blood transfusion, Hypothyroid, Lung cancer (H) (2012), and Uncontrolled hypertension (11/15/2017).    She has no past medical history of Arthritis, Congestive heart failure, unspecified, COPD (chronic obstructive pulmonary disease) (H), Coronary artery disease, CVA (cerebral infarction), Depressive disorder, Diabetes (H), or Uncomplicated asthma.    Her former PCP retired; Dr. Villegas gave her my name.  She is overall doing very well, had just a few questions/concerns today:    1.  Cataracts worsening, will likely need surgery, asking for a referral to Optho here    2.  Lots of sun exposure as a youth, with some skin changes noted    3.  Covid booster: discussed getting a fourth dose at her earliest convenience    4.  Weight gain:  She recently gained weight, around 8 pounds, in part due to eating out while her kitchen was being remodeled.  We discussed intermittent fasting, following the mediterranean diet, and tracking calories via my fitness pal.    No changes to past, family, or social history and  ROS: 10 point ROS neg other than the symptoms noted above in the HPI.    In terms of HCM, recently breast MRI was negative.  She is due for colon cancer screening but did not tolerate the prep.  I would recommend cologuard, as she's had extensive genetic testing and has no indication of being at higher risk for colon cancer per se.      BP (!) 160/92 (BP Location: Right arm, Patient Position: Sitting, Cuff Size: Adult Regular)   Pulse 86   Ht 1.6 m (5' 3\")   Wt 67 kg (147 lb 11.2 oz)   SpO2 99%   BMI 26.16 kg/m     Cor RRR  Lungs CTA  Ext no edema       A/P 72 year old here to establish care.  Reviewed the following concerns, as above.  RTC in 3 months or whenever she decides to schedule cataract surgery, we can complete the preop " and her routine physical at the same time.    I also noted the elevated BP today, but her readings the recent past have been normal, with just one borderline result over the past one year.  Since she is going to embark on a weight loss journey I choose to wait and recheck this at her next visit.     I spent 30 minutes with Sushila today and another 15 minutes on chart review and documentation same day.    Senile cataract, unspecified age-related cataract type, unspecified laterality  -     Adult Eye Referral; Future    Moderate sun exposure, initial encounter  -     Adult Dermatology Referral    Special screening for malignant neoplasms, colon  -     COLOGUAZORA(EX[employer] SCIENCES)      Brittany Goodwin MD

## 2022-04-13 NOTE — NURSING NOTE
Chief Complaint   Patient presents with     Establish Care     Physical       SHARI Leyva at 12:56 PM on 4/13/2022.

## 2022-06-03 ENCOUNTER — TELEPHONE (OUTPATIENT)
Dept: OPHTHALMOLOGY | Facility: CLINIC | Age: 72
End: 2022-06-03
Payer: MEDICARE

## 2022-06-03 NOTE — TELEPHONE ENCOUNTER
LVM for patient explaining 6/6/22 appointment is cancelled and for them to call back and schedule next available appointment with Dr. Grady ([facility]/Cataract Eval).  DEMETRIO Oconnell 6/3/2022 3:16 PM

## 2022-06-16 ENCOUNTER — OFFICE VISIT (OUTPATIENT)
Dept: DERMATOLOGY | Facility: CLINIC | Age: 72
End: 2022-06-16
Payer: MEDICARE
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
## 2022-06-16 DIAGNOSIS — D48.5 NEOPLASM OF UNCERTAIN BEHAVIOR OF SKIN: ICD-10-CM

## 2022-06-16 DIAGNOSIS — Z85.828 HISTORY OF NONMELANOMA SKIN CANCER: ICD-10-CM

## 2022-06-16 DIAGNOSIS — L57.0 AK (ACTINIC KERATOSIS): ICD-10-CM

## 2022-06-16 DIAGNOSIS — Z12.83 SKIN CANCER SCREENING: ICD-10-CM

## 2022-06-16 PROCEDURE — 17003 DESTRUCT PREMALG LES 2-14: CPT | Mod: XS | Performed by: DERMATOLOGY

## 2022-06-16 PROCEDURE — 11102 TANGNTL BX SKIN SINGLE LES: CPT | Performed by: DERMATOLOGY

## 2022-06-16 PROCEDURE — 88305 TISSUE EXAM BY PATHOLOGIST: CPT | Mod: 26 | Performed by: PATHOLOGY

## 2022-06-16 PROCEDURE — 99204 OFFICE O/P NEW MOD 45 MIN: CPT | Mod: 25 | Performed by: DERMATOLOGY

## 2022-06-16 PROCEDURE — 17000 DESTRUCT PREMALG LESION: CPT | Mod: XS | Performed by: DERMATOLOGY

## 2022-06-16 PROCEDURE — 88305 TISSUE EXAM BY PATHOLOGIST: CPT | Mod: TC | Performed by: DERMATOLOGY

## 2022-06-16 RX ORDER — TRIAMCINOLONE ACETONIDE 1 MG/G
CREAM TOPICAL
Qty: 30 G | Refills: 1 | Status: CANCELLED | OUTPATIENT
Start: 2022-06-16

## 2022-06-16 RX ORDER — TRIAMCINOLONE ACETONIDE 1 MG/G
OINTMENT TOPICAL 2 TIMES DAILY
Qty: 30 G | Refills: 1 | Status: ON HOLD | OUTPATIENT
Start: 2022-06-16 | End: 2024-07-15

## 2022-06-16 ASSESSMENT — PAIN SCALES - GENERAL: PAINLEVEL: NO PAIN (0)

## 2022-06-16 NOTE — NURSING NOTE
Dermatology Rooming Note    Sushila Chambers's goals for this visit include:   Chief Complaint   Patient presents with     Skin Check     Gela Gilbert, Visit Facilitator

## 2022-06-16 NOTE — PATIENT INSTRUCTIONS
"Sunscreen   What does \"broad spectrum mean\"?   The best sunscreens protect against all UVB (Burning) rays and UVA (Aging, cAncer, tAnning) rays.    What does SPF mean?   SPF stands for  Sun Protection Factor  and represents the ability to screen only UVB (burning) rays. UVB rays are mostly blocked in all sunscreens, but only those that contain titanium dioxide, zinc oxide, mexoryl or Parsol 1789 (avobenzone) block the UVA spectrum. Zinc oxide is the best of all. Even though a sunscreen is labeled  UVA/UVB Protection  that is not entirely accurate because even partial protection allows this label!    What SPF should I chose?   Aim to get a sunscreen that is at least sun protection factor (SPF) 30. SPF 15 provides about 92-93% coverage, SPF 30 about 95-97% coverage, and SPF 45 about 98% coverage. That is to say, SPF 30 is not twice as good as SPF 15; think of it as a curve graph. If covering your whole body, you should be using 30 grams, or one ounce, which is how much is in one shot glass! That s a THICK layer!    UVA BLOCKERS:   Make sure your sunscreen has one of these active ingredients!   Everything else in the  active ingredients  box of a sunscreen label blocks UVB only.   Zinc Oxide (preferred)   Titanium dioxide   Parsol 1789 (avobenzone)   Elta MD: available at [facility]  Mexoryl   Examples of some good sunscreens*   [email or web address]-[email or web address]   Aveeno   Baby [place] sunscreens   Davon   Blue Lizard   BullFrog   CaliforniaBaby   Coppertone Spectra3   [facility]   Roly   Fallene/TotalBlock   Neutrogena   NoAd   Vanicream   WaterBabies  Sticks work great, like Neutrogena pure and free baby SPF 60 stick    Darker Skin Types & Sunscreen  Patients with darker skin colors tend to like tinted sunscreens better as they appear less chalky on the skin. Many BB Creams are now available but make sure the sunscreen SPF is at least 30! Here are some brands of tinted sunscreens:  Neutrogenia " Tinted  EltaMD  La Roche Posay Anthelios 60 Ultra Light Sunscreen Fluid  Cerave Sunscreen SPF 50 Face Lotion Invisible Zinc  Clarins UV Plus sunscreen Multiprotection Tint  Dr Melo + Every Sun Day sunscreen  Coppertone ClearlySheer Lotion SPF 50  Up & Up (Target) Sheer Dry-touch Lotion SPF 30  Palmers Cocoa Butter Formula Evertone Suncare Sunscreen Stick SPF 30  Per-fect Beauty skin Perfection Plus with SPF 30  Junetics Pure Energy Brightening Day Cream with Broad Spectrum SPF 50  Clinque SPF 30 Mineral Sunscreen Fluid for Face  Chase Multicorrection 5 in 1 Chest, Neck and Face Cream with SPF 30  Cover FX Clear Cover Invisible Sunscreen Broad Spectrum SPF 30  Angie Maribell Age Perfect Hydra-Nutrition Facial Oil SPF 30  Solbar Shield SPF 40  Tropical Sands All Natural SPF 50  * Note, this list is not meant to be comprehensive, just trying to pull together some names that might be helpful to you. If they are not at a local store, they can be found on-line for order. EACH NAMEBRAND MAKES MULTIPLE TYPES SO YOU STILL HAVE TO CHECK FOR ONE OF THE FOUR INGREDIENTS LISTED IN THE LEFT COLUMN!!!   Combination sunscreen-insect repellants are not recommended as sunscreen needs to be reapplied every 2 hours; insect repellant does not, and that would lead to too much DEET exposure.   Sunscreen is not recommended for infants under the age of 6 months. Use clothing, shade and sun avoidance for small infants. Sunscreen clothing and hats are also important for people of all ages. Note that Prixing* is a company based out of [place], [place]! Other good items can be found in stores and on-line.   Sunscreen sprays are okay for RE-APPLICATION only. Most people do not spray enough on to get good enough protection. Recommendation: Use a lotion-based sunscreen to get a good first/base layer.   Vitamin D: We get vitamin D through the skin. If you do not get enough sun in the summer to get tan lines, you should take a vitamin D supplement:  400units for children and 1000units for adults per day.   Good daily facial moisturizers with sunscreen:   [facility] Block Sheer   Anthelios by LaRochePosay   Oil of Olay Complete Defense for sensitive skin   Sunscreen Recommendations for Sensitive Skin    The following sunscreens may be better for your child's sensitive skin. The main active ingredients are inert, either titanium dioxide or zinc oxide. These ingredients are less irritating than chemical sunscreens.   1) Aveeno Active Natural Protection Mineral Block Lotion SPF 30   2) Aveeno Baby Natural Protection Face Stick SPF 50+   3) Banana Boat Natural Reflect (baby or kids) SPF 50+   4) [place]'s Bees Chemical-Free Sunscreen SPF 30   5) Blue Lizard Baby SPF 30+   6) Blue Lizard for Sensitive Skin SPF 30+   7) Cotz Pure SPF 30   8) Cotz Face SPF 40   9) Cotz 20% Zinc SPF 35   10) CVS Sensitive Skin SPF 30   11) CVS Baby Lotion Sunscreen SPF 60+   12) Mustella Broad Spectrum SPF 50+/Mineral Sunscreen Stick   13) Neutrogena Sensitive Skin SPF 30   14) Neutrogena Sensitive Skin SPF 60+   15) PreSun Sensitive Sunblock SPF 28   16) Vanicream Sunscreen for Sensitive Skin SPF 60   17) Walgreen's Sensitive Skin SPF 70   Many local pharmacies and Button stores carry some of these options or you may purchase them online at [email or web address] or [email or web address].     Sun protective Clothing:  [email or web address]  [email or web address]  [email or web address]

## 2022-06-16 NOTE — PROGRESS NOTES
[facility] Dermatology Note  Encounter Date: Jun 16, 2022  Office Visit     Dermatology Problem List:  Last FBSE: 6/2022  1. Hx of NMSC  -Back, unknown type  2. NUB-mid back  S/p shave bx 6/16/2022  3. Lobular breast cancer  -S/P chemo, radiation, and surgery  ____________________________________________    Assessment & Plan:    # History of NMSC  - no evidence of recurrence at prior surgical excision sites  - Reassurance  - sun protection reviewed with SPF30+ and sun protective clothing    # Actinic keratoses  * chronic uncomplicated  - location(s): bilateral cheeks  - number: 3  - cryotherapy performed (see procedure note)  - sun protection reviewed with SPF30+ and sun protective clothing    #History of chest radiation- no history of lymphedema  -Discussed increased risk of angiosarcoma and its presentation (discoloration, bruising, blood vessels)    # Benign skin findings include seborrheic keratosis, lentigines, benign melanocytic nevi and cherry angiomas  * chronic uncomplicated  - lesions benign nature, no treatment is indicated at this time, will monitor for any clinical changes    #NUB- Pigmented pink papule on back  Likely pigmented BCC  - inferior to linear NMSC  S/p shave biopsy today     #Exuberant Arthropod bite reaction  - Start triamcinolone 0.1% ointment to affected areas BID  -Not to be used on the face    Procedures Performed:   - Shave biopsy procedure note, location(s): midback. After discussion of benefits and risks including but not limited to bleeding, infection, scar, incomplete removal, recurrence, and non-diagnostic biopsy, written consent and photographs were obtained. The area was cleaned with isopropyl alcohol. 0.5mL of 1% lidocaine with epinephrine was injected to obtain adequate anesthesia of lesion(s). Shave biopsy at site(s) performed. Hemostasis was achieved with aluminium chloride. Petrolatum ointment and a sterile dressing were applied. The patient tolerated  the procedure and no complications were noted. The patient was provided with verbal and written post care instructions.   - Procedure(s) performed by medical student with close faculty supervision.   - Cryotherapy procedure note, location(s): left cheek x2, right cheek. After verbal consent and discussion of risks and benefits including, but not limited to, dyspigmentation/scar, blister, and pain, 3 lesion(s) was(were) treated with 1-2 mm freeze border for 1-2 cycles with liquid nitrogen. Post cryotherapy instructions were provided.  - Procedure(s) performed by faculty.     Follow-up: pending pathology or 1 year(s) in-person, or earlier for new or changing lesions    Staff and Medical Student:     RAMO POSEY, MS4  [employer]    I was present with the medical student who participated in the service and in the documentation.  I have verified the history and personally performed the physical exam and medical decision making.  I agree with the assessment and plan of care as documented in the note.  Shave biopsy performed by medical student with my assistance and direct supervision throughout the entirety of the procedure.  I personally performed all cryotherapy.    Zack Dinh MD  Dermatology Attending    ____________________________________________    CC: Skin Check    HPI:  Ms. Sushila Chambers is a(n) 72 year old female who presents today as a return patient for annual skin check. She was last seen in clinic by Dr. Vaz on 1/22/19 at which time she had an ISK treated with cryotherapy and started on tretinoin 0.05% cream for photoaging.     Today, patient is here for an annual skin check and has no areas of concern for skin cancer.  She does have a very itchy spot on her arm she believes is a bug bite, bothersome.    Patient is otherwise feeling well, without additional skin concerns.    Labs:  None reviewed.    Physical Exam:  Vitals: There were no vitals taken for this  visit.  SKIN: Full skin, which includes the head/face, both arms, chest, back, abdomen, both legs, genitalia and/or groin buttocks, digits and/or nails, was examined.  - trunk: scattered waxy stuck-on tan to brown macules and papules  - extremities and trunk: well-defined, symmetric brown pigmented macules and papules with a reassuring pattern on dermoscopy  - diffuse scattered bright red vascular papules  - 3.5 cm horizontal linear well healed scar on the back  - pink thin flat topped papule with pigmented possibly maple leaf pattern on dermoscopy on the superior aspect of the lesion  - smooth pink papule on the right forearm; consistent with bug bite   - well healed scar on right inferior shoulder  - 2 pink/red smooth oblong macules on the left cheek, 1 on the right medial cheek  - No other lesions of concern on areas examined.     Medications:  Current Outpatient Medications   Medication     Acetaminophen (TYLENOL PO)     aspirin 81 MG EC tablet     biotin 1000 MCG TABS tablet     calcium carbonate (OS-OCTAVIANO 500 MG [place]. [place]) 500 MG tablet     hydrochlorothiazide (HYDRODIURIL) 12.5 MG tablet     letrozole (FEMARA) 2.5 MG tablet     levothyroxine (SYNTHROID/LEVOTHROID) 50 MCG tablet     multivitamin w/minerals (THERA-VIT-M) tablet     vitamin C (ASCORBIC ACID) 100 MG tablet     VITAMIN D, CHOLECALCIFEROL, PO     No current facility-administered medications for this visit.      Past Medical History:   Patient Active Problem List   Diagnosis     Hypothyroidism     Lung cancer (H)     Breast cancer, right breast (H)     Hyperlipidemia LDL goal <130     Grief     Breast cancer, left breast (H)     Encounter for antineoplastic chemotherapy     Malignant neoplasm of lower-outer quadrant of left female breast (H)     Diarrhea     Non-intractable vomiting with nausea, vomiting of unspecified type     Other specified anemias     Uncontrolled hypertension     Hypertension goal BP (blood pressure) < 140/90     Low bone density      Drug-induced polyneuropathy (H)     Drug induced insomnia (H)     CKD (chronic kidney disease) stage 3, GFR 30-59 ml/min (H)     Past Medical History:   Diagnosis Date     Basal cell carcinoma      Breast cancer, right breast (H)      History of blood transfusion      Hypothyroid      Lung cancer (H) 2012    right     Uncontrolled hypertension 11/15/2017        CC Referred Self, MD  No address on file on close of this encounter.

## 2022-06-16 NOTE — LETTER
6/16/2022       RE: Sushila Chambers  [address] [place] [address]     Dear Colleague,    Thank you for referring your patient, Sushila Chambers, to the [facility]ERMAT[facility] [place] at [facility]. Please see a copy of my visit note below.    [facility] Dermatology Note  Encounter Date: Jun 16, 2022  Office Visit     Dermatology Problem List:  Last FBSE: 6/2022  1. Hx of NMSC  -Back, unknown type  2. NUB-mid back  S/p shave bx 6/16/2022  3. Lobular breast cancer  -S/P chemo, radiation, and surgery  ____________________________________________    Assessment & Plan:    # History of NMSC  - no evidence of recurrence at prior surgical excision sites  - Reassurance  - sun protection reviewed with SPF30+ and sun protective clothing    # Actinic keratoses  * chronic uncomplicated  - location(s): bilateral cheeks  - number: 3  - cryotherapy performed (see procedure note)  - sun protection reviewed with SPF30+ and sun protective clothing    #History of chest radiation- no history of lymphedema  -Discussed increased risk of angiosarcoma and its presentation (discoloration, bruising, blood vessels)    # Benign skin findings include seborrheic keratosis, lentigines, benign melanocytic nevi and cherry angiomas  * chronic uncomplicated  - lesions benign nature, no treatment is indicated at this time, will monitor for any clinical changes    #NUB- Pigmented pink papule on back  Likely pigmented BCC  - inferior to linear NMSC  S/p shave biopsy today     #Exuberant Arthropod bite reaction  - Start triamcinolone 0.1% ointment to affected areas BID  -Not to be used on the face    Procedures Performed:   - Shave biopsy procedure note, location(s): midback. After discussion of benefits and risks including but not limited to bleeding, infection, scar, incomplete removal, recurrence, and non-diagnostic biopsy, written consent and photographs were  obtained. The area was cleaned with isopropyl alcohol. 0.5mL of 1% lidocaine with epinephrine was injected to obtain adequate anesthesia of lesion(s). Shave biopsy at site(s) performed. Hemostasis was achieved with aluminium chloride. Petrolatum ointment and a sterile dressing were applied. The patient tolerated the procedure and no complications were noted. The patient was provided with verbal and written post care instructions.   - Procedure(s) performed by medical student with close faculty supervision.   - Cryotherapy procedure note, location(s): left cheek x2, right cheek. After verbal consent and discussion of risks and benefits including, but not limited to, dyspigmentation/scar, blister, and pain, 3 lesion(s) was(were) treated with 1-2 mm freeze border for 1-2 cycles with liquid nitrogen. Post cryotherapy instructions were provided.  - Procedure(s) performed by faculty.     Follow-up: pending pathology or 1 year(s) in-person, or earlier for new or changing lesions    Staff and Medical Student:     RAMO POSEY, MS4  [employer]    I was present with the medical student who participated in the service and in the documentation.  I have verified the history and personally performed the physical exam and medical decision making.  I agree with the assessment and plan of care as documented in the note.  Shave biopsy performed by medical student with my assistance and direct supervision throughout the entirety of the procedure.  I personally performed all cryotherapy.    Zack Dinh MD  Dermatology Attending    ____________________________________________    CC: Skin Check    HPI:  Ms. Sushila Chambers is a(n) 72 year old female who presents today as a return patient for annual skin check. She was last seen in clinic by Dr. Vaz on 1/22/19 at which time she had an ISK treated with cryotherapy and started on tretinoin 0.05% cream for photoaging.     Today, patient is here for  an annual skin check and has no areas of concern for skin cancer.  She does have a very itchy spot on her arm she believes is a bug bite, bothersome.    Patient is otherwise feeling well, without additional skin concerns.    Labs:  None reviewed.    Physical Exam:  Vitals: There were no vitals taken for this visit.  SKIN: Full skin, which includes the head/face, both arms, chest, back, abdomen, both legs, genitalia and/or groin buttocks, digits and/or nails, was examined.  - trunk: scattered waxy stuck-on tan to brown macules and papules  - extremities and trunk: well-defined, symmetric brown pigmented macules and papules with a reassuring pattern on dermoscopy  - diffuse scattered bright red vascular papules  - 3.5 cm horizontal linear well healed scar on the back  - pink thin flat topped papule with pigmented possibly maple leaf pattern on dermoscopy on the superior aspect of the lesion  - smooth pink papule on the right forearm; consistent with bug bite   - well healed scar on right inferior shoulder  - 2 pink/red smooth oblong macules on the left cheek, 1 on the right medial cheek  - No other lesions of concern on areas examined.     Medications:  Current Outpatient Medications   Medication     Acetaminophen (TYLENOL PO)     aspirin 81 MG EC tablet     biotin 1000 MCG TABS tablet     calcium carbonate (OS-OCTAVIANO 500 MG [place]. [place]) 500 MG tablet     hydrochlorothiazide (HYDRODIURIL) 12.5 MG tablet     letrozole (FEMARA) 2.5 MG tablet     levothyroxine (SYNTHROID/LEVOTHROID) 50 MCG tablet     multivitamin w/minerals (THERA-VIT-M) tablet     vitamin C (ASCORBIC ACID) 100 MG tablet     VITAMIN D, CHOLECALCIFEROL, PO     No current facility-administered medications for this visit.      Past Medical History:   Patient Active Problem List   Diagnosis     Hypothyroidism     Lung cancer (H)     Breast cancer, right breast (H)     Hyperlipidemia LDL goal <130     Grief     Breast cancer, left breast (H)     Encounter for  antineoplastic chemotherapy     Malignant neoplasm of lower-outer quadrant of left female breast (H)     Diarrhea     Non-intractable vomiting with nausea, vomiting of unspecified type     Other specified anemias     Uncontrolled hypertension     Hypertension goal BP (blood pressure) < 140/90     Low bone density     Drug-induced polyneuropathy (H)     Drug induced insomnia (H)     CKD (chronic kidney disease) stage 3, GFR 30-59 ml/min (H)     Past Medical History:   Diagnosis Date     Basal cell carcinoma      Breast cancer, right breast (H)      History of blood transfusion      Hypothyroid      Lung cancer (H) 2012    right     Uncontrolled hypertension 11/15/2017        CC Referred Self, MD  No address on file on close of this encounter.

## 2022-06-20 LAB
PATH REPORT.COMMENTS IMP SPEC: NORMAL
PATH REPORT.COMMENTS IMP SPEC: NORMAL
PATH REPORT.FINAL DX SPEC: NORMAL
PATH REPORT.GROSS SPEC: NORMAL
PATH REPORT.MICROSCOPIC SPEC OTHER STN: NORMAL
PATH REPORT.RELEVANT HX SPEC: NORMAL

## 2022-06-29 ENCOUNTER — TELEPHONE (OUTPATIENT)
Dept: DERMATOLOGY | Facility: CLINIC | Age: 72
End: 2022-06-29

## 2022-06-29 NOTE — TELEPHONE ENCOUNTER
[facility] Call Center    Phone Message    May a detailed message be left on voicemail: yes     Reason for Call: Appointment Intake    Referring Provider Name: NA  Diagnosis and/or Symptoms: removal/procedure   Pt was told to schedule for procedure and schedule for a f/u after procedure for results.   Please review and call pt to schedule the two Appt's   Thanks       Action Taken: Message routed to:  [facility] Derm    Travel Screening: Not Applicable

## 2022-07-13 ENCOUNTER — OFFICE VISIT (OUTPATIENT)
Dept: INTERNAL MEDICINE | Facility: CLINIC | Age: 72
End: 2022-07-13
Payer: MEDICARE

## 2022-07-13 VITALS
BODY MASS INDEX: 25.15 KG/M2 | DIASTOLIC BLOOD PRESSURE: 84 MMHG | OXYGEN SATURATION: 97 % | WEIGHT: 142 LBS | SYSTOLIC BLOOD PRESSURE: 164 MMHG | HEART RATE: 94 BPM

## 2022-07-13 DIAGNOSIS — I10 UNCONTROLLED HYPERTENSION: Primary | ICD-10-CM

## 2022-07-13 DIAGNOSIS — E78.2 MIXED HYPERLIPIDEMIA: ICD-10-CM

## 2022-07-13 DIAGNOSIS — Z00.00 LABORATORY EXAMINATION ORDERED AS PART OF A ROUTINE GENERAL MEDICAL EXAMINATION: ICD-10-CM

## 2022-07-13 DIAGNOSIS — N18.31 STAGE 3A CHRONIC KIDNEY DISEASE (H): ICD-10-CM

## 2022-07-13 PROCEDURE — 99214 OFFICE O/P EST MOD 30 MIN: CPT | Performed by: INTERNAL MEDICINE

## 2022-07-13 RX ORDER — CHLORTHALIDONE 25 MG/1
25 TABLET ORAL DAILY
Qty: 30 TABLET | Refills: 3 | Status: SHIPPED | OUTPATIENT
Start: 2022-07-13 | End: 2022-09-10

## 2022-07-13 NOTE — PROGRESS NOTES
PRIMARY CARE CENTER       SUBJECTIVE:  Sushila Chambers is a 72 year old female with a PMHx of Basal cell carcinoma, Breast cancer, right breast (H), History of blood transfusion, Hypothyroid, Lung cancer (H) (2012), and Uncontrolled hypertension (11/15/2017), coming in for a f/u visit. Patient had a recent mid-back shaved biopsy, showing Basal cell carcinoma. Scheduled for removal of basal cell carcinoma with dermatology later this month. Patient's BP is 164/94. Patient states her bp is 130/70s at home, check BP several times a week at home. She is also scheduled for a cataract removal surgery evaluation next week. Otherwise, patient is doing well, denies any fever, chills, chest pain, shortness of breath, urinary symptoms.       Medications and allergies reviewed by me today.     ROS:   Constitutional, neuro, ENT, endocrine, pulmonary, cardiac, gastrointestinal, genitourinary, musculoskeletal, integument and psychiatric systems are negative, except as otherwise noted.    OBJECTIVE:    BP (!) 164/84 (BP Location: Right arm, Patient Position: Sitting, Cuff Size: Adult Regular)   Pulse 94   Wt 64.4 kg (142 lb)   SpO2 97%   BMI 25.15 kg/m     Wt Readings from Last 1 Encounters:   07/13/22 64.4 kg (142 lb)       GENERAL APPEARANCE: healthy, alert and no distress     NECK: no adenopathy, no asymmetry, masses, or scars and thyroid normal to palpation     RESP: lungs clear to auscultation - no rales, rhonchi or wheezes     CV: regular rates and rhythm, normal S1 S2, no S3 or S4 and no murmur, click or rub     ABDOMEN:  soft, nontender, no HSM or masses and bowel sounds normal     MS: extremities normal- no gross deformities noted, no evidence of inflammation in joints, FROM in all extremities.     NEURO: Normal strength and tone, sensory exam grossly normal, mentation intact and speech normal     PSYCH: mentation appears normal. and affect normal/bright     LYMPHATICS: No cervical adenopathy      ASSESSMENT/PLAN:    Sushila was seen today for recheck medication.    Diagnoses and all orders for this visit:    Uncontrolled hypertension  -     chlorthalidone (HYGROTON) 25 MG tablet; Take 1 tablet (25 mg) by mouth daily    Stage 3a chronic kidney disease (H)  -     Comprehensive metabolic panel; Future    Laboratory examination ordered as part of a routine general medical examination  -     CBC with platelets differential; Future    Mixed hyperlipidemia  -     Lipid panel reflex to direct LDL Fasting; Future         Essential HTN, uncontrolled  - Start Chlorthalidone 25 mg every day  - Instructed patient to bring in BP diary and BP cuff at next clinic visit. Educated patient on mediterranean diet, intermittent fasting.     Hx of Dislipidemia:   - Lipid panel ordered today, consider Rosuvastatin therapy upon panel review.   - Continue mediterranean diet and intermittent fasting.    Basal cell carcinoma, mid-back  - Shave biopsy on 6/16/22. Pathology report shows small Basal cell carcinoma. Pt will follow up with dermatology for lesion removal.     Actinic Keratoses, b/l cheeks  - Cryotherapy performed on 6/16/22.   - f/u with Derm    Hypothyroidism  - Continue Levothyroxine 50 mcg every day    Hx of breast cancer, right breast  - Continue annual mammogram    Hx of senile cataract  - Patient scheduled for cataract removal surgery. Patient was evaluated preoperatively, she is medically stable, cleared for upcoming procedure    - Patient will return Cologuard sample this week. Will f/u once results available.      Pt is scheduled for a virtual visit for f/u with Dr. Morel in 1 month     Rodolfo Walden MD  Jul 13, 2022    Pt was seen and plan of care discussed with Dr. Morel.

## 2022-07-13 NOTE — NURSING NOTE
Chief Complaint   Patient presents with     Recheck Medication     Pt here to follow up       Idalmis Pena CMA, EMT at 12:43 PM on 7/13/2022.

## 2022-07-17 PROBLEM — L57.0 AK (ACTINIC KERATOSIS): Status: ACTIVE | Noted: 2022-07-17

## 2022-07-17 PROBLEM — Z12.83 SKIN CANCER SCREENING: Status: ACTIVE | Noted: 2022-07-17
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
## 2022-07-17 PROBLEM — Z85.828 HISTORY OF NONMELANOMA SKIN CANCER: Status: ACTIVE | Noted: 2022-07-17

## 2022-07-17 PROBLEM — D48.5 NEOPLASM OF UNCERTAIN BEHAVIOR OF SKIN: Status: ACTIVE | Noted: 2022-07-17

## 2022-07-18 NOTE — PROGRESS NOTES
HPI:  Sushila Chambers is a 72 year old female presenting for cataract evaluation.    Getting lots of haloes around lights both eyes, had to stop nighttime driving.  Does a lot of textile work (embroidery, weaving). No eye pain. No flashes. Has occasional floaters, none new.  Both eyes seem about the same.    Past Ocular History:  Glasses    PMH:  HTN  Hypothyroidism  Basal cell carcinoma  Breast cancer x2 - s/p lumpectomy and radiation; recurrence treated with mastectomy and chemo  Lung cancer - s/p surgical excision  CKD    SH:  Never smoker. Worked in [employer], helped start [employer] in [facility].    FH:  No known family history of blindness, glaucoma, macular degeneration    ASSESSMENT and PLAN:  1. Combined form of age-related cataract, right eye  - visually significant with symptomatic glare, BAT to HM  - We discussed the risks, benefits and alternatives of cataract surgery, including risk of bleeding, infection, postoperative changes in intraocular pressure, postoperative inflammation, possibility of retinal detachment or vision loss.  Discussed need for follow up appointments after surgery and the need for postoperative drops.  Informed consent was obtained.  The patient decided to proceed with CE/IOL OD.    We discussed lens options and refractive target. We discussed that by aiming for near, will need glasses for distance. If aim for distance would need glasses for near. She does a lot of embroidery and would like to be able to do this without glasses, understands that would be more myopia than she is used to. Thinks she would like a near target but wants to consider further. Not interested in multifocal lenses.    Target: likely near target (-2.5) -- she would like to consider further    Dilates to: 9 mm  Alpha blockers/Flomax: None  Trauma/Pseudoxfoliation: None  Fuchs dystrophy/guttae: None    Diabetes: No  Anticoagulation: None    Cyl: 1.1D     Proceed with CE/IOL OD.    Surgical  plan:  Topical    2. Combined form of age-related cataract, left eye  - visually significant with symptomatic glare, BAT to 20/100   - address 2 weeks after right eye  - she thinks she would like near aim (as above), not interested in multifocal lenses but would be interested in toric if indicated  --> 1.3-1.5D cyl on topography today and slightly irregular --> plan to repeat prior to lens selection for left eye; will repeat topography at postop visit s/p right eye cataract    3. PVD (posterior vitreous detachment), bilateral  - retinas attached  - Reviewed signs/symptoms of retinal tear/detachment including shower of new floaters, flashes, curtain/veil, decreased vision, etc and patient understands to call/come in immediately for these       Follow up for CE/IOL right eye -- plan to repeat corneal topography at postop visit; or sooner PRN        -----------------------------------------------------------------------------------    Attestation:  Complete documentation of historical and exam elements from today's encounter can be found in the full encounter summary report (not reduplicated in this progress note). I personally obtained the chief complaint(s) and history of present illness.  I confirmed and edited as necessary the review of systems, past medical/surgical history, family history, social history, and examination findings as documented by others; and I examined the patient myself. I personally reviewed the relevant tests, images, and reports as documented above.     I formulated and edited as necessary the assessment and plan and discussed the findings and management plan with the patient and family.      Shantel Grady MD

## 2022-07-19 ENCOUNTER — MYC MEDICAL ADVICE (OUTPATIENT)
Dept: INTERNAL MEDICINE | Facility: CLINIC | Age: 72
End: 2022-07-19

## 2022-07-19 ENCOUNTER — OFFICE VISIT (OUTPATIENT)
Dept: OPHTHALMOLOGY | Facility: CLINIC | Age: 72
End: 2022-07-19
Attending: OPHTHALMOLOGY
Payer: MEDICARE

## 2022-07-19 DIAGNOSIS — H43.813 PVD (POSTERIOR VITREOUS DETACHMENT), BILATERAL: ICD-10-CM

## 2022-07-19 DIAGNOSIS — H25.812 COMBINED FORM OF AGE-RELATED CATARACT, LEFT EYE: ICD-10-CM

## 2022-07-19 DIAGNOSIS — H25.811 COMBINED FORM OF AGE-RELATED CATARACT, RIGHT EYE: Primary | ICD-10-CM

## 2022-07-19 PROCEDURE — 99204 OFFICE O/P NEW MOD 45 MIN: CPT | Performed by: OPHTHALMOLOGY

## 2022-07-19 PROCEDURE — 92015 DETERMINE REFRACTIVE STATE: CPT | Mod: GY

## 2022-07-19 PROCEDURE — G0463 HOSPITAL OUTPT CLINIC VISIT: HCPCS | Mod: 25

## 2022-07-19 ASSESSMENT — VISUAL ACUITY
OD_BAT_LOW: 20/50
OD_BAT_MED: 20/50
OD_CC: J1
OD_CC+: -2
OS_BAT_HIGH: 20/100
METHOD: SNELLEN - LINEAR
CORRECTION_TYPE: GLASSES
OS_BAT_LOW: 20/50-2
OS_CC: 20/25
OS_BAT_MED: 20/80
OS_CC+: -2
OS_CC: J1
OD_BAT_HIGH: HM
OD_CC: 20/25

## 2022-07-19 ASSESSMENT — REFRACTION_MANIFEST
OD_ADD: +2.50
OD_AXIS: 006
OD_SPHERE: -0.75
OS_ADD: +2.50
OS_CYLINDER: +2.75
OS_AXIS: 008
OD_CYLINDER: +0.75
OS_SPHERE: -3.00

## 2022-07-19 ASSESSMENT — CONF VISUAL FIELD
OD_NORMAL: 1
OS_NORMAL: 1

## 2022-07-19 ASSESSMENT — REFRACTION_WEARINGRX
OS_CYLINDER: +2.75
OS_AXIS: 008
OS_SPHERE: -3.00
OD_CYLINDER: +0.75
OD_SPHERE: -0.75
OS_ADD: +2.50
OD_AXIS: 006
OD_ADD: +2.50
SPECS_TYPE: PAL

## 2022-07-19 ASSESSMENT — CUP TO DISC RATIO
OD_RATIO: 0.3
OS_RATIO: 0.3

## 2022-07-19 ASSESSMENT — TONOMETRY
OS_IOP_MMHG: 15
IOP_METHOD: TONOPEN
OD_IOP_MMHG: 19

## 2022-07-19 ASSESSMENT — SLIT LAMP EXAM - LIDS
COMMENTS: NORMAL
COMMENTS: NORMAL

## 2022-07-19 ASSESSMENT — EXTERNAL EXAM - LEFT EYE: OS_EXAM: NORMAL

## 2022-07-19 ASSESSMENT — EXTERNAL EXAM - RIGHT EYE: OD_EXAM: NORMAL

## 2022-07-19 NOTE — NURSING NOTE
Chief Complaints and History of Present Illnesses   Patient presents with     Cataract Evaluation     Chief Complaint(s) and History of Present Illness(es)     Cataract Evaluation               Comments     Last eye exam in Jan, 2022 with optometrist where received current PG. Told has cataracts. Would like evaluation to pursue if needs cataract surgery at this time.  Pt states before cataracts manifested she was able to obtain 20/20 VA in BE with correction.  Pt states no past ocular history of trauma, amd, glaucoma, corneal disease, CTL, infections, strabismus or amblyopia.  Pt states no known remarkable FOH.  Pt has had lung cancer and breast cancer twice.  CKD Stage 3, no treatment.  Both parents cancer.    Ada Velazquez, COT COT 10:25 AM 07/19/2022

## 2022-07-21 ENCOUNTER — LAB (OUTPATIENT)
Dept: LAB | Facility: CLINIC | Age: 72
End: 2022-07-21
Payer: MEDICARE

## 2022-07-21 DIAGNOSIS — E78.2 MIXED HYPERLIPIDEMIA: ICD-10-CM

## 2022-07-21 DIAGNOSIS — N18.31 STAGE 3A CHRONIC KIDNEY DISEASE (H): ICD-10-CM

## 2022-07-21 DIAGNOSIS — Z00.00 LABORATORY EXAMINATION ORDERED AS PART OF A ROUTINE GENERAL MEDICAL EXAMINATION: ICD-10-CM

## 2022-07-21 LAB
ALBUMIN SERPL-MCNC: 3.8 G/DL (ref 3.4–5)
ALP SERPL-CCNC: 56 U/L (ref 40–150)
ALT SERPL W P-5'-P-CCNC: 25 U/L (ref 0–50)
ANION GAP SERPL CALCULATED.3IONS-SCNC: 8 MMOL/L (ref 3–14)
AST SERPL W P-5'-P-CCNC: 21 U/L (ref 0–45)
BASOPHILS # BLD AUTO: 0.1 10E3/UL (ref 0–0.2)
BASOPHILS NFR BLD AUTO: 1 %
BILIRUB SERPL-MCNC: 1.1 MG/DL (ref 0.2–1.3)
BUN SERPL-MCNC: 16 MG/DL (ref 7–30)
CALCIUM SERPL-MCNC: 10 MG/DL (ref 8.5–10.1)
CHLORIDE BLD-SCNC: 93 MMOL/L (ref 94–109)
CHOLEST SERPL-MCNC: 272 MG/DL
CO2 SERPL-SCNC: 31 MMOL/L (ref 20–32)
CREAT SERPL-MCNC: 0.85 MG/DL (ref 0.52–1.04)
EOSINOPHIL # BLD AUTO: 0.3 10E3/UL (ref 0–0.7)
EOSINOPHIL NFR BLD AUTO: 4 %
ERYTHROCYTE [DISTWIDTH] IN BLOOD BY AUTOMATED COUNT: 12.3 % (ref 10–15)
FASTING STATUS PATIENT QL REPORTED: YES
GFR SERPL CREATININE-BSD FRML MDRD: 72 ML/MIN/1.73M2
GLUCOSE BLD-MCNC: 114 MG/DL (ref 70–99)
HCT VFR BLD AUTO: 37.9 % (ref 35–47)
HDLC SERPL-MCNC: 63 MG/DL
HGB BLD-MCNC: 13.4 G/DL (ref 11.7–15.7)
IMM GRANULOCYTES # BLD: 0 10E3/UL
IMM GRANULOCYTES NFR BLD: 1 %
LDLC SERPL CALC-MCNC: 175 MG/DL
LYMPHOCYTES # BLD AUTO: 1.7 10E3/UL (ref 0.8–5.3)
LYMPHOCYTES NFR BLD AUTO: 26 %
MCH RBC QN AUTO: 30.3 PG (ref 26.5–33)
MCHC RBC AUTO-ENTMCNC: 35.4 G/DL (ref 31.5–36.5)
MCV RBC AUTO: 86 FL (ref 78–100)
MONOCYTES # BLD AUTO: 0.4 10E3/UL (ref 0–1.3)
MONOCYTES NFR BLD AUTO: 6 %
NEUTROPHILS # BLD AUTO: 4 10E3/UL (ref 1.6–8.3)
NEUTROPHILS NFR BLD AUTO: 62 %
NONHDLC SERPL-MCNC: 209 MG/DL
NRBC # BLD AUTO: 0 10E3/UL
NRBC BLD AUTO-RTO: 0 /100
PLATELET # BLD AUTO: 212 10E3/UL (ref 150–450)
POTASSIUM BLD-SCNC: 3.7 MMOL/L (ref 3.4–5.3)
PROT SERPL-MCNC: 7.4 G/DL (ref 6.8–8.8)
RBC # BLD AUTO: 4.42 10E6/UL (ref 3.8–5.2)
SODIUM SERPL-SCNC: 132 MMOL/L (ref 133–144)
TRIGL SERPL-MCNC: 168 MG/DL
WBC # BLD AUTO: 6.4 10E3/UL (ref 4–11)

## 2022-07-21 PROCEDURE — 80053 COMPREHEN METABOLIC PANEL: CPT | Performed by: PATHOLOGY

## 2022-07-21 PROCEDURE — 85025 COMPLETE CBC W/AUTO DIFF WBC: CPT | Performed by: PATHOLOGY

## 2022-07-21 PROCEDURE — 80061 LIPID PANEL: CPT | Performed by: PATHOLOGY

## 2022-07-21 PROCEDURE — 36415 COLL VENOUS BLD VENIPUNCTURE: CPT | Performed by: PATHOLOGY

## 2022-07-22 ENCOUNTER — TELEPHONE (OUTPATIENT)
Dept: OPHTHALMOLOGY | Facility: CLINIC | Age: 72
End: 2022-07-22

## 2022-07-22 PROBLEM — H25.812 COMBINED FORM OF AGE-RELATED CATARACT, LEFT EYE: Status: ACTIVE | Noted: 2022-07-22

## 2022-07-22 PROBLEM — H25.811 COMBINED FORM OF AGE-RELATED CATARACT, RIGHT EYE: Status: ACTIVE | Noted: 2022-07-22

## 2022-07-22 NOTE — TELEPHONE ENCOUNTER
Called patient to schedule surgery with Dr. Puente    Date of Surgery: 9/12,9/26    Location of surgery: [facility]    Pre-Op H&P: PCP    Imaging Scheduled:  No    Discussed COVID-19 Testing: Yes    Post-Op Appt Date: 10/4,10/18    Surgery Packet Mailed: yes      Additional comments: Spoke with patient they are aware of above dates, they will take a covid test prior to each procedure, review packet and call with any questions.          Anna C. Schoenecker on 7/22/2022 at 8:58 AM

## 2022-07-26 ENCOUNTER — OFFICE VISIT (OUTPATIENT)
Dept: DERMATOLOGY | Facility: CLINIC | Age: 72
End: 2022-07-26
Payer: MEDICARE

## 2022-07-26 ENCOUNTER — MYC MEDICAL ADVICE (OUTPATIENT)
Dept: INTERNAL MEDICINE | Facility: CLINIC | Age: 72
End: 2022-07-26

## 2022-07-26 DIAGNOSIS — C44.91 SUPERFICIAL BASAL CELL CARCINOMA: Primary | ICD-10-CM

## 2022-07-26 PROCEDURE — 17261 DSTRJ MAL LES T/A/L .6-1.0CM: CPT | Performed by: DERMATOLOGY

## 2022-07-26 ASSESSMENT — PAIN SCALES - GENERAL: PAINLEVEL: NO PAIN (0)

## 2022-07-26 NOTE — NURSING NOTE
Dermatology Rooming Note    Sushila Chambers's goals for this visit include:   Chief Complaint   Patient presents with     Derm Problem     Sushila is here for ED&C for BCC on her back.     Lynn Victoria, Facilitator

## 2022-07-26 NOTE — LETTER
7/26/2022       RE: Sushila Chambers  [address] [place] [address]     Dear Colleague,    Thank you for referring your patient, Sushila Chambers, to the [facility] [place] [facility]. Please see a copy of my visit note below.    Dermatology Followup Note    Chief complaint: Follow-up on biopsy.    Subjective:  Sushila is a very pleasant 72-year-old female who we saw in dermatology clinic on 6/16/2022.  She had a biopsy at that time of the mid back which demonstrated a superficial basal cell carcinoma.  She is here today for treatment follow-up.  She reports that the biopsy site has healed well without problems.    Review of systems: No recent fevers.    Objective:  General: This is a well-appearing well-nourished female with normal mood and affect who is alert and oriented.  Skin: A focused exam of the back was performed.  On the mid back immediately lateral to the midline on the left, there is a pink 6 mm atrophic papule consistent with a healed biopsy site.    Assessment and plan:   Biopsy-proven superficial basal cell carcinoma of the mid back.  We discussed potential treatments today including topical chemotherapy, electrodesiccation and curettage, simple excision and Mohs surgery.  We agreed that ED&C would be the most appropriate treatment, given the low risk tumor and the anatomic site.  This was performed today.  Please see the procedure note below.  Otherwise we would like her to follow-up in clinic in 6 months time for a repeat full body skin check.    Procedure note:  After signed informed consent, the affected area was swabbed with an alcohol pad and injected with 1% lidocaine with epinephrine.  A #3 curette was used to superficially abrade lesional skin with a 3 mm surrounding margin and hemostasis was subsequently achieved with electrocautery at a level of 12.  This procedure was performed in a stepwise fashion 3  consecutive times.  Final defect size was 8 x 8 mm.  The defect was covered with petrolatum and a bandage.  The patient tolerated this procedure without complication.      Zack Dinh MD  Dermatology Attending

## 2022-07-26 NOTE — PROGRESS NOTES
Dermatology Followup Note    Chief complaint: Follow-up on biopsy.    Subjective:  Sushila is a very pleasant 72-year-old female who we saw in dermatology clinic on 6/16/2022.  She had a biopsy at that time of the mid back which demonstrated a superficial basal cell carcinoma.  She is here today for treatment follow-up.  She reports that the biopsy site has healed well without problems.    Review of systems: No recent fevers.    Objective:  General: This is a well-appearing well-nourished female with normal mood and affect who is alert and oriented.  Skin: A focused exam of the back was performed.  On the mid back immediately lateral to the midline on the left, there is a pink 6 mm atrophic papule consistent with a healed biopsy site.    Assessment and plan:   Biopsy-proven superficial basal cell carcinoma of the mid back.  We discussed potential treatments today including topical chemotherapy, electrodesiccation and curettage, simple excision and Mohs surgery.  We agreed that ED&C would be the most appropriate treatment, given the low risk tumor and the anatomic site.  This was performed today.  Please see the procedure note below.  Otherwise we would like her to follow-up in clinic in 6 months time for a repeat full body skin check.    Procedure note:  After signed informed consent, the affected area was swabbed with an alcohol pad and injected with 1% lidocaine with epinephrine.  A #3 curette was used to superficially abrade lesional skin with a 3 mm surrounding margin and hemostasis was subsequently achieved with electrocautery at a level of 12.  This procedure was performed in a stepwise fashion 3 consecutive times.  Final defect size was 8 x 8 mm.  The defect was covered with petrolatum and a bandage.  The patient tolerated this procedure without complication.      Zack Dinh MD  Dermatology Attending

## 2022-07-27 NOTE — TELEPHONE ENCOUNTER
Patient returned call from Phuong, per patient her surgery scheduled 9/12 and she needs Pre-op prior. Please review and assist patient.

## 2022-07-27 NOTE — TELEPHONE ENCOUNTER
LVM w/ pcc number for pt to schedule preop w/ Dr. Adriel Goodwin DOS 9/12 & 9/26 - jenny gregg has preop/hos visits open use those or MASHA

## 2022-07-28 ENCOUNTER — TELEPHONE (OUTPATIENT)
Dept: OPHTHALMOLOGY | Facility: CLINIC | Age: 72
End: 2022-07-28

## 2022-07-28 NOTE — TELEPHONE ENCOUNTER
[facility] Call Center    Phone Message    May a detailed message be left on voicemail: yes     Reason for Call: Other:   Pt was seeing Miguel A but her surgery is being done by Cindi because Miguel A is leaving. Pt inquiring if it's possible to speak to Cindi or have a virtual appt to talk about lens for the procedure. Please follow-up to advise.     Action Taken: Other:  eye    Travel Screening: Not Applicable

## 2022-07-31 ENCOUNTER — HEALTH MAINTENANCE LETTER (OUTPATIENT)
Age: 72
End: 2022-07-31

## 2022-08-03 ENCOUNTER — TELEPHONE (OUTPATIENT)
Dept: OPHTHALMOLOGY | Facility: CLINIC | Age: 72
End: 2022-08-03

## 2022-08-03 NOTE — TELEPHONE ENCOUNTER
Called and LVM for Sushila     Per Dr. Puente     do not offer virtual visits.  I am happy to schedule to see her in clinic prior to surgery, or can call her the week before surgery to confirm lens selection over the phone.       Corinna Marcano Communication Facilitator on 8/3/2022 at 12:54 PM

## 2022-08-03 NOTE — TELEPHONE ENCOUNTER
Sushila would be ok with a call from Dr. Puente a week prior to her surgery to discuss the lens     Corinna Marcano Communication Facilitator on 8/3/2022 at 1:34 PM

## 2022-08-03 NOTE — TELEPHONE ENCOUNTER
[facility] Call Center    Phone Message    May a detailed message be left on voicemail: yes     Reason for Call: Other: Pt is returning Corinna's call. She states she would be fine with a phone call from Dr. Puente a week prior to surgery to confirm lens selection over the phone. Please call pt if any additional questions. Thank you.     Action Taken: Message routed to:  [facility] EYE    Travel Screening: Not Applicable

## 2022-08-17 ENCOUNTER — MYC REFILL (OUTPATIENT)
Dept: FAMILY MEDICINE | Facility: CLINIC | Age: 72
End: 2022-08-17

## 2022-08-17 ENCOUNTER — MYC MEDICAL ADVICE (OUTPATIENT)
Dept: INTERNAL MEDICINE | Facility: CLINIC | Age: 72
End: 2022-08-17

## 2022-08-17 DIAGNOSIS — I10 HYPERTENSION GOAL BP (BLOOD PRESSURE) < 140/90: ICD-10-CM

## 2022-08-17 NOTE — TELEPHONE ENCOUNTER
hydrochlorothiazide (HYDRODIURIL) 12.5 MG tablet      Last Written Prescription Date:  3-24-22  Last Fill Quantity: 30,   # refills: 0  Last Office Visit : 7-13-22  Future Office visit:  8-29-22    BP Readings from Last 3 Encounters:   07/13/22 (!) 164/84   04/13/22 (!) 160/92   06/29/21 132/78     Sodium   Date Value Ref Range Status   07/21/2022 132 (L) 133 - 144 mmol/L Final   06/29/2021 137 133 - 144 mmol/L Final     Routing refill request to provider for review/approval because:  BP above protocol - noted in last clinic   Abnormal Na- reviewed by provider  Last rx by other provider/clinic  Last rx limited quantity  ? rf provider

## 2022-08-18 RX ORDER — HYDROCHLOROTHIAZIDE 12.5 MG/1
12.5 TABLET ORAL DAILY
Qty: 90 TABLET | Refills: 1 | Status: SHIPPED | OUTPATIENT
Start: 2022-08-18 | End: 2022-08-23

## 2022-08-19 ENCOUNTER — MYC MEDICAL ADVICE (OUTPATIENT)
Dept: INTERNAL MEDICINE | Facility: CLINIC | Age: 72
End: 2022-08-19

## 2022-08-19 DIAGNOSIS — I10 HYPERTENSION GOAL BP (BLOOD PRESSURE) < 140/90: ICD-10-CM

## 2022-08-19 RX ORDER — HYDROCHLOROTHIAZIDE 12.5 MG/1
12.5 TABLET ORAL DAILY
Qty: 30 TABLET | Refills: 0 | OUTPATIENT
Start: 2022-08-19

## 2022-08-19 NOTE — TELEPHONE ENCOUNTER
Duplicate request. Order sent yesterday 8/18/22.    08/18/22 Sent (none) Sheron Johnson RN [facility]      Disp Refills Start End LEIF   hydrochlorothiazide (HYDRODIURIL) 12.5 MG tablet 90 tablet 1 8/18/2022  No   Sig - Route: Take 1 tablet (12.5 mg) by mouth daily - Oral   Sent to pharmacy as: hydroCHLOROthiazide 12.5 MG Oral Tablet (HYDRODIURIL)   Class: E-Prescribe   Order: [id number]   E-Prescribing Status: Receipt confirmed by pharmacy (8/18/2022 12:22 PM CDT)       JODY CARSON, [place] - [address]

## 2022-08-21 PROBLEM — C44.91 SUPERFICIAL BASAL CELL CARCINOMA: Status: ACTIVE | Noted: 2022-08-21

## 2022-08-23 RX ORDER — HYDROCHLOROTHIAZIDE 12.5 MG/1
12.5 TABLET ORAL DAILY
Qty: 90 TABLET | Refills: 1 | Status: SHIPPED | OUTPATIENT
Start: 2022-08-23 | End: 2022-08-29

## 2022-08-29 ENCOUNTER — VIRTUAL VISIT (OUTPATIENT)
Dept: INTERNAL MEDICINE | Facility: CLINIC | Age: 72
End: 2022-08-29
Payer: MEDICARE

## 2022-08-29 DIAGNOSIS — E03.9 HYPOTHYROIDISM, UNSPECIFIED TYPE: ICD-10-CM

## 2022-08-29 DIAGNOSIS — I10 HYPERTENSION GOAL BP (BLOOD PRESSURE) < 140/90: Primary | ICD-10-CM

## 2022-08-29 PROCEDURE — 99213 OFFICE O/P EST LOW 20 MIN: CPT | Mod: 95 | Performed by: INTERNAL MEDICINE

## 2022-08-29 NOTE — PROGRESS NOTES
Sushila is a very pleasant 72 year old female who is being evaluated via a billable video visit.      -126 systolic, 66-76 diastolic after starting chlorthalidone.  She is still taking hydrochlorothiazide, and given these readings I think she should stop that drug and continue on chlorthalidone alone.      I did mention she will need a metabolic panel to check potassium and also, a TSH is due.    She already has an appt with me on 9/7 for a preop and we can recheck BP in the office as well as review her home readings.    On exam, she is alert and in NAD.    A/P Sushila was seen today for video visit to f/u HTN and hypothyroidism    Hypertension goal BP (blood pressure) < 140/90  -     Basic metabolic panel; Future    Hypothyroidism, unspecified type  -     TSH with free T4 reflex; Future    Brittany Goodwin MD        How would you like to obtain your AVS? MyChart  If the video visit is dropped, the invitation should be resent by: Text to cell phone: [phone]  Will anyone else be joining your video visit? No        Video-Visit Details    Type of service:  Video Visit started 12:00 ended 12:10 with another 10 minutes chart review and documentation same day    Originating Location (pt. Location): Home    Distant Location (provider location):  [facility] [place]     Platform used for Video Visit: [employer]

## 2022-08-29 NOTE — PATIENT INSTRUCTIONS
Goal BP is less than 140 on top and less than 90 on the bottom, and you are well under that goal.  Let's stop the hydrochlorothiazide and continue on chlorthalidone alone as it seemed to have a better effect on your numbers.  We will need to schedule a lab draw in the coming days to check metabolic panel (for potassium) and also your thyroid.  I will see you soon for your preop!  Dr. Morel

## 2022-09-06 ENCOUNTER — MYC MEDICAL ADVICE (OUTPATIENT)
Dept: INTERNAL MEDICINE | Facility: CLINIC | Age: 72
End: 2022-09-06

## 2022-09-07 ENCOUNTER — OFFICE VISIT (OUTPATIENT)
Dept: INTERNAL MEDICINE | Facility: CLINIC | Age: 72
End: 2022-09-07
Payer: MEDICARE

## 2022-09-07 ENCOUNTER — LAB (OUTPATIENT)
Dept: LAB | Facility: CLINIC | Age: 72
End: 2022-09-07
Payer: MEDICARE

## 2022-09-07 VITALS
HEART RATE: 107 BPM | HEIGHT: 63 IN | DIASTOLIC BLOOD PRESSURE: 77 MMHG | SYSTOLIC BLOOD PRESSURE: 163 MMHG | OXYGEN SATURATION: 97 % | BODY MASS INDEX: 24.43 KG/M2 | WEIGHT: 137.9 LBS

## 2022-09-07 DIAGNOSIS — I10 HYPERTENSION GOAL BP (BLOOD PRESSURE) < 140/90: ICD-10-CM

## 2022-09-07 DIAGNOSIS — E03.9 HYPOTHYROIDISM, UNSPECIFIED TYPE: ICD-10-CM

## 2022-09-07 DIAGNOSIS — Z12.11 SPECIAL SCREENING FOR MALIGNANT NEOPLASMS, COLON: Primary | ICD-10-CM

## 2022-09-07 LAB
ANION GAP SERPL CALCULATED.3IONS-SCNC: 14 MMOL/L (ref 7–15)
BUN SERPL-MCNC: 16.4 MG/DL (ref 8–23)
CALCIUM SERPL-MCNC: 10.3 MG/DL (ref 8.8–10.2)
CHLORIDE SERPL-SCNC: 89 MMOL/L (ref 98–107)
CREAT SERPL-MCNC: 0.89 MG/DL (ref 0.51–0.95)
DEPRECATED HCO3 PLAS-SCNC: 27 MMOL/L (ref 22–29)
GFR SERPL CREATININE-BSD FRML MDRD: 68 ML/MIN/1.73M2
GLUCOSE SERPL-MCNC: 101 MG/DL (ref 70–99)
POTASSIUM SERPL-SCNC: 3.3 MMOL/L (ref 3.4–5.3)
SODIUM SERPL-SCNC: 130 MMOL/L (ref 136–145)
TSH SERPL DL<=0.005 MIU/L-ACNC: 2.57 UIU/ML (ref 0.3–4.2)

## 2022-09-07 PROCEDURE — 36415 COLL VENOUS BLD VENIPUNCTURE: CPT | Performed by: PATHOLOGY

## 2022-09-07 PROCEDURE — 80048 BASIC METABOLIC PNL TOTAL CA: CPT | Performed by: PATHOLOGY

## 2022-09-07 PROCEDURE — 99214 OFFICE O/P EST MOD 30 MIN: CPT | Performed by: INTERNAL MEDICINE

## 2022-09-07 PROCEDURE — 84443 ASSAY THYROID STIM HORMONE: CPT | Performed by: INTERNAL MEDICINE

## 2022-09-07 NOTE — NURSING NOTE
"Sushila Chambers is a 72 year old female patient that presents today in clinic for the following:    Chief Complaint   Patient presents with     Pre-Op Exam     Pre op checking (cataract surgery)     The patient's allergies and medications were reviewed as noted. A set of vitals were recorded as noted without incident: BP (!) 163/77 (BP Location: Right arm, Patient Position: Sitting, Cuff Size: Adult Regular)   Pulse 107   Ht 1.6 m (5' 2.99\")   Wt 62.6 kg (137 lb 14.4 oz)   SpO2 97%   BMI 24.43 kg/m  . The patient does not have any other questions for the provider.    Lilly Chavez, EMT at 2:41 PM on 9/7/2022.  Primary care clinic: [phone]  "

## 2022-09-08 NOTE — H&P (VIEW-ONLY)
"Sushila was seen today for a pre-op exam for upcoming cataract surgery, bilateral (one then the other within a 30 day window).  She noticed visual changes and is looking forward to getting this corrected.    This is a low risk procedure and she can proceed as planned, no further testing or medication management is indicated.    Meanwhile, she is due for a basic metabolic panel after starting chlorthalidone for high blood pressure.  BP today is higher than at home; she brought her [employer] home BP cuff and obtained a similar reading to ours, so, I believe this is more the white coat syndrome and I would not add or increase her medication based on home BP readings of 120-130 systolic over 70s.      She has a cologuard test at home, she just hasn't turned it in yet, I reminded her to do that and to let us know if it is  or she needs a new kit.    No changes to past, family or social history and  ROS: 10 point ROS neg other than the symptoms noted above in the HPI.    PHYSICAL EXAM:  BP (!) 163/77 (BP Location: Right arm, Patient Position: Sitting, Cuff Size: Adult Regular)   Pulse 107   Ht 1.6 m (5' 2.99\")   Wt 62.6 kg (137 lb 14.4 oz)   SpO2 97%   BMI 24.43 kg/m    Constitutional: no distress, comfortable, pleasant   Eyes: anicteric, normal extra-ocular movements   Ears, Nose and Throat: tympanic membranes clear, neck supple with full range of motion, no thyromegaly.   Cardiovascular: regular rate and rhythm, normal S1 and S2, no murmurs, rubs or gallops, peripheral pulses full and symmetric   Respiratory: clear to auscultation, no wheezes or crackles, normal breath sounds   Gastrointestinal: positive bowel sounds, nontender, no hepatosplenomegaly, no masses   Musculoskeletal: knees full range of motion, no edema   Skin: no concerning lesions, no jaundice   Neurological: normal gait, no tremor   Psychological: appropriate mood   Lymphatic: no cervical lymphadenopathy and no pedal edema      A/P 72 year old here " for preop and follow up    Special screening for malignant neoplasms, colon  -     COLOGUARD(EXACT SCIENCES)    Hypothyroidism, unspecified type  -     TSH with free T4 reflex; Future    Hypertension goal BP (blood pressure) < 140/90  -     Basic metabolic panel; Future    Brittany Goodwin MD

## 2022-09-09 ENCOUNTER — ANESTHESIA EVENT (OUTPATIENT)
Dept: SURGERY | Facility: AMBULATORY SURGERY CENTER | Age: 72
End: 2022-09-09
Payer: MEDICARE

## 2022-09-09 ENCOUNTER — MYC MEDICAL ADVICE (OUTPATIENT)
Dept: INTERNAL MEDICINE | Facility: CLINIC | Age: 72
End: 2022-09-09

## 2022-09-09 DIAGNOSIS — I10 UNCONTROLLED HYPERTENSION: ICD-10-CM

## 2022-09-10 RX ORDER — CHLORTHALIDONE 25 MG/1
25 TABLET ORAL DAILY
Qty: 90 TABLET | Refills: 0 | Status: SHIPPED | OUTPATIENT
Start: 2022-09-10 | End: 2022-12-09

## 2022-09-10 NOTE — TELEPHONE ENCOUNTER
chlorthalidone (HYGROTON) 25 MG tablet  Last Written Prescription Date: 7/13/22  Last Fill Quantity: 30,   # refills: 3  Last Office Visit : 9/7/22  Future Office visit:  None    VALENTINO Goodwin MD   9/8/2022  9:39 AM CDT      Routing refill request to provider for review/approval because: my chart request, bp > 140/90

## 2022-09-12 ENCOUNTER — OFFICE VISIT (OUTPATIENT)
Dept: OPHTHALMOLOGY | Facility: CLINIC | Age: 72
End: 2022-09-12

## 2022-09-12 ENCOUNTER — ANESTHESIA (OUTPATIENT)
Dept: SURGERY | Facility: AMBULATORY SURGERY CENTER | Age: 72
End: 2022-09-12
Payer: MEDICARE

## 2022-09-12 ENCOUNTER — HOSPITAL ENCOUNTER (OUTPATIENT)
Facility: AMBULATORY SURGERY CENTER | Age: 72
Discharge: HOME | End: 2022-09-12
Attending: OPHTHALMOLOGY
Payer: MEDICARE

## 2022-09-12 VITALS
DIASTOLIC BLOOD PRESSURE: 77 MMHG | HEART RATE: 55 BPM | TEMPERATURE: 97.7 F | OXYGEN SATURATION: 100 % | WEIGHT: 137 LBS | RESPIRATION RATE: 16 BRPM | BODY MASS INDEX: 24.27 KG/M2 | SYSTOLIC BLOOD PRESSURE: 141 MMHG | HEIGHT: 63 IN

## 2022-09-12 DIAGNOSIS — H25.811 COMBINED FORM OF AGE-RELATED CATARACT, RIGHT EYE: ICD-10-CM

## 2022-09-12 DIAGNOSIS — Z98.890 POSTOPERATIVE EYE STATE: Primary | ICD-10-CM

## 2022-09-12 DIAGNOSIS — Z96.1 PSEUDOPHAKIA OF RIGHT EYE: ICD-10-CM

## 2022-09-12 PROCEDURE — V2599 CONTACT LENS/ES OTHER TYPE: HCPCS | Mod: DBP,RT

## 2022-09-12 PROCEDURE — 66984 XCAPSL CTRC RMVL W/O ECP: CPT | Mod: RT

## 2022-09-12 PROCEDURE — 66984 XCAPSL CTRC RMVL W/O ECP: CPT | Mod: RT | Performed by: OPHTHALMOLOGY

## 2022-09-12 PROCEDURE — 99024 POSTOP FOLLOW-UP VISIT: CPT | Mod: GC | Performed by: OPHTHALMOLOGY

## 2022-09-12 DEVICE — TEC EYHANCE TOR II SMPLCTY 21.5D CYL1.50
Type: IMPLANTABLE DEVICE | Site: EYE | Status: FUNCTIONAL
Brand: TECNIS IOL

## 2022-09-12 RX ORDER — CYCLOPENTOLAT/TROPIC/PHENYLEPH 1%-1%-2.5%
1 DROPS (EA) OPHTHALMIC (EYE)
Status: COMPLETED | OUTPATIENT
Start: 2022-09-12 | End: 2022-09-12

## 2022-09-12 RX ORDER — FENTANYL CITRATE 50 UG/ML
INJECTION, SOLUTION INTRAMUSCULAR; INTRAVENOUS PRN
Status: DISCONTINUED | OUTPATIENT
Start: 2022-09-12 | End: 2022-09-12

## 2022-09-12 RX ORDER — HYDRALAZINE HYDROCHLORIDE 20 MG/ML
2.5-5 INJECTION INTRAMUSCULAR; INTRAVENOUS EVERY 10 MIN PRN
Status: DISCONTINUED | OUTPATIENT
Start: 2022-09-12 | End: 2022-09-12 | Stop reason: HOSPADM

## 2022-09-12 RX ORDER — TIMOLOL MALEATE 5 MG/ML
SOLUTION/ DROPS OPHTHALMIC PRN
Status: DISCONTINUED | OUTPATIENT
Start: 2022-09-12 | End: 2022-09-12 | Stop reason: HOSPADM

## 2022-09-12 RX ORDER — KETOROLAC TROMETHAMINE 30 MG/ML
15 INJECTION, SOLUTION INTRAMUSCULAR; INTRAVENOUS EVERY 6 HOURS PRN
Status: DISCONTINUED | OUTPATIENT
Start: 2022-09-12 | End: 2022-09-13 | Stop reason: HOSPADM

## 2022-09-12 RX ORDER — TETRACAINE HYDROCHLORIDE 5 MG/ML
SOLUTION OPHTHALMIC PRN
Status: DISCONTINUED | OUTPATIENT
Start: 2022-09-12 | End: 2022-09-12 | Stop reason: HOSPADM

## 2022-09-12 RX ORDER — LORAZEPAM 2 MG/ML
.5-1 INJECTION INTRAMUSCULAR
Status: DISCONTINUED | OUTPATIENT
Start: 2022-09-12 | End: 2022-09-12 | Stop reason: HOSPADM

## 2022-09-12 RX ORDER — OXYCODONE HYDROCHLORIDE 5 MG/1
5 TABLET ORAL EVERY 4 HOURS PRN
Status: DISCONTINUED | OUTPATIENT
Start: 2022-09-12 | End: 2022-09-13 | Stop reason: HOSPADM

## 2022-09-12 RX ORDER — LIDOCAINE 40 MG/G
CREAM TOPICAL
Status: DISCONTINUED | OUTPATIENT
Start: 2022-09-12 | End: 2022-09-12 | Stop reason: HOSPADM

## 2022-09-12 RX ORDER — ACETAMINOPHEN 325 MG/1
975 TABLET ORAL ONCE
Status: COMPLETED | OUTPATIENT
Start: 2022-09-12 | End: 2022-09-12

## 2022-09-12 RX ORDER — PREDNISOLONE ACETATE 10 MG/ML
1 SUSPENSION/ DROPS OPHTHALMIC 4 TIMES DAILY
Qty: 10 ML | Refills: 1 | Status: SHIPPED | OUTPATIENT
Start: 2022-09-12 | End: 2022-10-31

## 2022-09-12 RX ORDER — SODIUM CHLORIDE, SODIUM LACTATE, POTASSIUM CHLORIDE, CALCIUM CHLORIDE 600; 310; 30; 20 MG/100ML; MG/100ML; MG/100ML; MG/100ML
INJECTION, SOLUTION INTRAVENOUS CONTINUOUS
Status: DISCONTINUED | OUTPATIENT
Start: 2022-09-12 | End: 2022-09-13 | Stop reason: HOSPADM

## 2022-09-12 RX ORDER — MOXIFLOXACIN IN NACL,ISO-OS/PF 0.3MG/0.3
SYRINGE (ML) INTRAOCULAR PRN
Status: DISCONTINUED | OUTPATIENT
Start: 2022-09-12 | End: 2022-09-12 | Stop reason: HOSPADM

## 2022-09-12 RX ORDER — MEPERIDINE HYDROCHLORIDE 25 MG/ML
12.5 INJECTION INTRAMUSCULAR; INTRAVENOUS; SUBCUTANEOUS
Status: DISCONTINUED | OUTPATIENT
Start: 2022-09-12 | End: 2022-09-13 | Stop reason: HOSPADM

## 2022-09-12 RX ORDER — MOXIFLOXACIN 5 MG/ML
1 SOLUTION/ DROPS OPHTHALMIC 3 TIMES DAILY
Qty: 3 ML | Refills: 1 | Status: SHIPPED | OUTPATIENT
Start: 2022-09-12 | End: 2022-10-31

## 2022-09-12 RX ORDER — BALANCED SALT SOLUTION 6.4; .75; .48; .3; 3.9; 1.7 MG/ML; MG/ML; MG/ML; MG/ML; MG/ML; MG/ML
SOLUTION OPHTHALMIC PRN
Status: DISCONTINUED | OUTPATIENT
Start: 2022-09-12 | End: 2022-09-12 | Stop reason: HOSPADM

## 2022-09-12 RX ORDER — ONDANSETRON 4 MG/1
4 TABLET, ORALLY DISINTEGRATING ORAL EVERY 30 MIN PRN
Status: DISCONTINUED | OUTPATIENT
Start: 2022-09-12 | End: 2022-09-13 | Stop reason: HOSPADM

## 2022-09-12 RX ORDER — ONDANSETRON 2 MG/ML
4 INJECTION INTRAMUSCULAR; INTRAVENOUS EVERY 30 MIN PRN
Status: DISCONTINUED | OUTPATIENT
Start: 2022-09-12 | End: 2022-09-13 | Stop reason: HOSPADM

## 2022-09-12 RX ORDER — PROPARACAINE HYDROCHLORIDE 5 MG/ML
1 SOLUTION/ DROPS OPHTHALMIC ONCE
Status: COMPLETED | OUTPATIENT
Start: 2022-09-12 | End: 2022-09-12

## 2022-09-12 RX ORDER — ALBUTEROL SULFATE 0.83 MG/ML
2.5 SOLUTION RESPIRATORY (INHALATION) EVERY 4 HOURS PRN
Status: DISCONTINUED | OUTPATIENT
Start: 2022-09-12 | End: 2022-09-12 | Stop reason: HOSPADM

## 2022-09-12 RX ORDER — FENTANYL CITRATE 50 UG/ML
25 INJECTION, SOLUTION INTRAMUSCULAR; INTRAVENOUS
Status: DISCONTINUED | OUTPATIENT
Start: 2022-09-12 | End: 2022-09-13 | Stop reason: HOSPADM

## 2022-09-12 RX ORDER — HYDROMORPHONE HYDROCHLORIDE 1 MG/ML
0.2 INJECTION, SOLUTION INTRAMUSCULAR; INTRAVENOUS; SUBCUTANEOUS EVERY 5 MIN PRN
Status: DISCONTINUED | OUTPATIENT
Start: 2022-09-12 | End: 2022-09-12 | Stop reason: HOSPADM

## 2022-09-12 RX ORDER — SODIUM CHLORIDE, SODIUM LACTATE, POTASSIUM CHLORIDE, CALCIUM CHLORIDE 600; 310; 30; 20 MG/100ML; MG/100ML; MG/100ML; MG/100ML
INJECTION, SOLUTION INTRAVENOUS CONTINUOUS
Status: DISCONTINUED | OUTPATIENT
Start: 2022-09-12 | End: 2022-09-12 | Stop reason: HOSPADM

## 2022-09-12 RX ORDER — DEXAMETHASONE SODIUM PHOSPHATE 4 MG/ML
INJECTION, SOLUTION INTRA-ARTICULAR; INTRALESIONAL; INTRAMUSCULAR; INTRAVENOUS; SOFT TISSUE PRN
Status: DISCONTINUED | OUTPATIENT
Start: 2022-09-12 | End: 2022-09-12 | Stop reason: HOSPADM

## 2022-09-12 RX ORDER — FENTANYL CITRATE 50 UG/ML
25 INJECTION, SOLUTION INTRAMUSCULAR; INTRAVENOUS EVERY 5 MIN PRN
Status: DISCONTINUED | OUTPATIENT
Start: 2022-09-12 | End: 2022-09-12 | Stop reason: HOSPADM

## 2022-09-12 RX ADMIN — Medication 1 DROP: at 11:06

## 2022-09-12 RX ADMIN — SODIUM CHLORIDE, SODIUM LACTATE, POTASSIUM CHLORIDE, CALCIUM CHLORIDE: 600; 310; 30; 20 INJECTION, SOLUTION INTRAVENOUS at 11:11

## 2022-09-12 RX ADMIN — PROPARACAINE HYDROCHLORIDE 1 DROP: 5 SOLUTION/ DROPS OPHTHALMIC at 11:00

## 2022-09-12 RX ADMIN — FENTANYL CITRATE 50 MCG: 50 INJECTION, SOLUTION INTRAMUSCULAR; INTRAVENOUS at 12:02

## 2022-09-12 RX ADMIN — Medication 1 DROP: at 11:12

## 2022-09-12 RX ADMIN — Medication 1 DROP: at 11:00

## 2022-09-12 RX ADMIN — ACETAMINOPHEN 975 MG: 325 TABLET ORAL at 11:07

## 2022-09-12 ASSESSMENT — SLIT LAMP EXAM - LIDS: COMMENTS: NORMAL

## 2022-09-12 ASSESSMENT — VISUAL ACUITY
METHOD: SNELLEN - LINEAR
OD_SC: 20/40

## 2022-09-12 ASSESSMENT — TONOMETRY: OD_IOP_MMHG: 19

## 2022-09-12 NOTE — OP NOTE
PREOPERATIVE DIAGNOSIS: Combined cataract, Right eye   POSTOPERATIVE DIAGNOSIS: Same   PROCEDURES:   1. Cataract extraction with toric intraocular lens implant Right eye.  SURGEON: Ion Puente M.D.  Assistant: Richard Young MD   INDICATIONS: The patient Sushila Chambers presented to the eye clinic with decreased vision secondary to cataract in the Right eye. The risks, benefits and alternatives to cataract extraction were discussed. The patient elected to proceed. All questions were answered to the patient's satisfaction.   DESCRIPTION OF PROCEDURE:   Prior to the procedure, appropriate cardiac and respiratory monitors were applied to the patient.  In the pre-operative holding area, a drop of topical tetracaine followed by lidocaine gel followed by povidone iodine.  Pre-operative toric markings were placed at the 90-degree axis using a gentian violet marker while the patient was seated upright.  The patient was brought to the operating room where a surgical pause was carried out to identify with all members of the surgical team the correct surgical site.  With adequate anesthesia, the Right eye was prepped and draped in the usual sterile fashion. A lid speculum was placed, and the operating microscope was rotated into position.  Toric axis was reviewed and marked according to pre-operative calculations and the 90-degree marking that had previously been placed.  A paracentesis was created.  Through this limbal paracentesis, the anterior chamber was filled with preservative-free lidocaine followed by viscoelastic.  A temporal wound was created at the limbus using a 2.6 mm blade. A capsulorrhexis was initiated using a bent 25-gauge needle and was completed in continuous and circular fashion using the capsulorrhexis forceps. The lens nucleus was hydrodissected using balanced salt solution.  The lens nucleus was rotated and removed using phacoemulsification in a stop and chop technique.  Residual cortical material  was removed using irrigation-aspiration.  The capsular bag was reinflated to its maximal extent with cohesive viscoelastic.  A 21.5 diopter [id number] inserted into the capsular bag.  The lens power selected was reviewed using the intraocular lens power measurements that were obtained preoperatively to confirm that the correct lens was selected for the desired post-operative refractive state. The residual viscoelastic was removed in its entirety, the wound were hydrated and found to be self-sealing.  Intracameral moxifloxacin was administered. Tactile pressure was confirmed to be in a normal range.  Subconjunctival Dexamethasone (2 mg) was injected. The lid speculum was removed and a patch and shield were applied.   The patient tolerated the procedure well, and there were no complications.    PLAN: The patient will be discharged to home and will follow up tomorrow morning in the eye clinic.  EBL:  None  Complications:  None  Implant Name Type Inv. Item Serial No.  Lot No. LRB No. Used Action   [id number] 21.5 D EYHANCE TORIC 2 LENS   [id number]   Right 1 Implanted       .jose

## 2022-09-12 NOTE — ANESTHESIA PREPROCEDURE EVALUATION
Anesthesia Pre-Procedure Evaluation    Patient: Sushila Chambers   MRN: [id number] : 1950        Procedure : Procedure(s):  RIGHT EYE PHACOEMULSIFICATION, CATARACT, WITH  INTRAOCULAR LENS IMPLANT INSERTION TORIC LENS          Past Medical History:   Diagnosis Date     Basal cell carcinoma      Breast cancer, right breast (H)      History of blood transfusion      Hypothyroid      Lung cancer (H) 2012    right     Uncontrolled hypertension 11/15/2017      Past Surgical History:   Procedure Laterality Date     ABDOMEN SURGERY       BIOPSY, LUNG NODULE Right     + cancer     COLONOSCOPY       ECTOPIC PREGNANCY SURGERY Left      GENITOURINARY SURGERY       GYN SURGERY       INSERT PORT VASCULAR ACCESS       LUMPECTOMY BREAST Right 2010     LUNG SURGERY Right     histoplasmosis     MASTECTOMY SIMPLE, SENTINEL NODE, COMBINED Left 2016    Procedure: COMBINED MASTECTOMY SIMPLE, SENTINEL NODE;  Surgeon: Satnam Betts MD;  Location: [facility] OR     REMOVE PORT VASCULAR ACCESS N/A 2016    Procedure: REMOVE PORT VASCULAR ACCESS;  Surgeon: Satnam Betts MD;  Location: [facility] OR     THORACIC SURGERY        Allergies   Allergen Reactions     Latex Rash     SKIN REACTION    SKIN REACTION     Sulfa Drugs Itching and Rash      Social History     Tobacco Use     Smoking status: Never Smoker     Smokeless tobacco: Never Used   Substance Use Topics     Alcohol use: Yes     Comment: Occasionally      Wt Readings from Last 1 Encounters:   22 62.1 kg (137 lb)              OUTSIDE LABS:  CBC:   Lab Results   Component Value Date    WBC 6.4 2022    WBC 5.7 10/15/2019    HGB 13.4 2022    HGB 12.9 10/15/2019    HCT 37.9 2022    HCT 38.1 10/15/2019     2022     10/15/2019     BMP:   Lab Results   Component Value Date     (L) 2022     (L) 2022    POTASSIUM 3.3 (L) 2022    POTASSIUM 3.7 2022    CHLORIDE 89 (L) 2022    CHLORIDE 93 (L)  07/21/2022    CO2 27 09/07/2022    CO2 31 07/21/2022    BUN 16.4 09/07/2022    BUN 16 07/21/2022    CR 0.89 09/07/2022    CR 0.85 07/21/2022     (H) 09/07/2022     (H) 07/21/2022     COAGS:   Lab Results   Component Value Date    PTT 27 09/09/2015    INR 1.02 09/09/2015    FIBR 482 (H) 09/09/2015     POC: No results found for: BGM, HCG, HCGS  HEPATIC:   Lab Results   Component Value Date    ALBUMIN 3.8 07/21/2022    PROTTOTAL 7.4 07/21/2022    ALT 25 07/21/2022    AST 21 07/21/2022    ALKPHOS 56 07/21/2022    BILITOTAL 1.1 07/21/2022     OTHER:   Lab Results   Component Value Date    A1C 5.3 09/09/2015    OCTAVIANO 10.3 (H) 09/07/2022    MAG 2.0 12/07/2015    TSH 2.57 09/07/2022       Anesthesia Plan    ASA Status:  3      Anesthesia Type: MAC.      Maintenance: TIVA.        Consents    Anesthesia Plan(s) and associated risks, benefits, and realistic alternatives discussed. Questions answered and patient/representative(s) expressed understanding.    - Discussed:     - Discussed with:  Patient         Postoperative Care    Pain management: Multi-modal analgesia.   PONV prophylaxis: Ondansetron (or other 5HT-3)     Comments:                Sophia Mohan MD

## 2022-09-12 NOTE — DISCHARGE INSTRUCTIONS
[facility]  Home Care Following Anesthesia  For 24 hours after surgery:  Get plenty of rest.  A responsible adult must stay with you for at least 24 hours after you leave the surgery center.  Do not drive or use heavy equipment.  If you have weakness or tingling, don't drive or use heavy equipment until this feeling goes away.   Do not drink alcohol.   Avoid strenuous or risky activities.  Ask for help when climbing stairs.  You may feel lightheaded.  IF so, sit for a few minutes before standing.  Have someone help you get up.   If you have nausea (feel sick to your stomach): Drink only clear liquids such as apple juice, ginger ale, broth or 7-Up.  Rest may also help.  Be sure to drink enough fluids.  Move to a regular diet as you feel able.   You may have a slight fever.  Call the doctor if your fever is over 100 F (37.7 C) (taken under the tongue) or lasts longer than 24 hours.  You may have a dry mouth, a sore throat, muscle aches or trouble sleeping. These should go away after 24 hours.  Do not make important or legal decisions.   It is recommended to avoid smoking.               Tips for taking pain medications  To get the best pain relief possible, remember these points:  Take pain medications as directed, before pain becomes severe.  Pain medication can upset your stomach: taking it with food may help.  Constipation is a common side effect of pain medication. Drink plenty of  fluids.  Eat foods high in fiber. Take a stool softener if recommended by your doctor or pharmacist.  Do not drink alcohol, drive or operate machinery while taking pain medications.  Ask about other ways to control pain, such as with heat, ice or relaxation.    Tylenol/Acetaminophen Consumption  To help encourage the safe use of acetaminophen, the makers of TYLENOL  have lowered the maximum daily dose for single-ingredient Extra Strength TYLENOL  (acetaminophen) products sold in the [place] from 8 pills  per day (4,000 mg) to 6 pills per day (3,000 mg). The dosing interval has also changed from 2 pills every 4-6 hours to 2 pills every 6 hours.  If you feel your pain relief is insufficient, you may take Tylenol/Acetaminophen in addition to your narcotic pain medication.   Be careful not to exceed 3,000 mg of Tylenol/Acetaminophen in a 24 hour period from all sources.  If you are taking extra strength Tylenol/acetaminophen (500 mg), the maximum dose is 6 tablets in 24 hours.  If you are taking regular strength acetaminophen (325 mg), the maximum dose is 9 tablets in 24 hours.    Call a doctor for any of the following:  Signs of infection (fever, growing tenderness at the surgery site, a large amount of drainage or bleeding, severe pain, foul-smelling drainage, redness, swelling).  It has been over 8 to 10 hours since surgery and you are still not able to urinate (pass water).  Headache for over 24 hours.  Signs of Covid-19 infection (temperature over 100 degrees, shortness of breath, cough, loss of taste/smell, generalized body aches, persistent headache, chills, sore throat, nausea/vomiting/diarrhea)  Your doctor is:       Dr. Ion Puente, Ophthalmology: [phone]               Or dial [phone] and ask for the resident on call for:  Ophthalmology  For emergency care, call the:  [facility]:  [phone] (TTY for hearing impaired: [phone])

## 2022-09-12 NOTE — ANESTHESIA POSTPROCEDURE EVALUATION
Patient: Sushila Chambers    Procedure: Procedure(s):  RIGHT EYE PHACOEMULSIFICATION, CATARACT, WITH  INTRAOCULAR LENS IMPLANT INSERTION TORIC LENS       Anesthesia Type:  MAC    Note:  Disposition: Outpatient   Postop Pain Control: Uneventful            Sign Out: Well controlled pain   PONV: No   Neuro/Psych: Uneventful            Sign Out: Acceptable/Baseline neuro status   Airway/Respiratory: Uneventful            Sign Out: Acceptable/Baseline resp. status   CV/Hemodynamics: Uneventful            Sign Out: Acceptable CV status; No obvious hypovolemia; No obvious fluid overload   Other NRE: NONE   DID A NON-ROUTINE EVENT OCCUR? No           Last vitals:  Vitals Value Taken Time   /77 09/12/22 1315   Temp 36.5  C (97.7  F) 09/12/22 1315   Pulse 55 09/12/22 1315   Resp 16 09/12/22 1315   SpO2 100 % 09/12/22 1315       Electronically Signed By: Sophia Mohan MD  September 12, 2022  3:55 PM

## 2022-09-12 NOTE — PROGRESS NOTES
POD#0, status post cataract surgery, right eye     No complaints.  Denies eye pain.    Va sc 20/40     IOP 19 mm Hg by applanation      Impression/Plan:  Pseudophakia, OD: POD0, good post-operative appearance. IOP reasonable.    Eye protection at all times and eye shield at night for 1 week.    Limited activities with no exercise or heavy lifting for 1 week.    Instructed patient to contact us for decreasing vision, eye pain, new floaters or flashes of light or other concerning symptoms.    Written instructions given    Return to clinic 10-4-22.    Richard Young MD - PGY4  Department of Ophthalmology  Pager: [phone]    Attending Physician Attestation:  Complete documentation of historical and exam elements from today's encounter can be found in the full encounter summary report (not reduplicated in this progress note).  I personally obtained the chief complaint(s) and history of present illness.  I confirmed and edited as necessary the review of systems, past medical/surgical history, family history, social history, and examination findings as documented by others; and I examined the patient myself.  I personally reviewed the relevant tests, images, and reports as documented above.  I formulated and edited as necessary the assessment and plan and discussed the findings and management plan with the patient and family. . - Ion Puente MD

## 2022-09-12 NOTE — ANESTHESIA CARE TRANSFER NOTE
Patient: Sushila Chambers    Procedure: Procedure(s):  RIGHT EYE PHACOEMULSIFICATION, CATARACT, WITH  INTRAOCULAR LENS IMPLANT INSERTION TORIC LENS       Diagnosis: Combined form of age-related cataract, right eye [H25.811]  Diagnosis Additional Information: No value filed.    Anesthesia Type:   No value filed.     Note:    Oropharynx: oropharynx clear of all foreign objects and spontaneously breathing  Level of Consciousness: awake  Oxygen Supplementation: room air    Independent Airway: airway patency satisfactory and stable  Dentition: dentition unchanged  Vital Signs Stable: post-procedure vital signs reviewed and stable  Report to RN Given: handoff report given  Patient transferred to: Phase II  Comments: Report to Phase II RN. Resps easy and regular.   Handoff Report: Identifed the Patient, Identified the Reponsible Provider, Reviewed the pertinent medical history, Discussed the surgical course, Reviewed Intra-OP anesthesia mangement and issues during anesthesia, Set expectations for post-procedure period and Allowed opportunity for questions and acknowledgement of understanding      Vitals:  Vitals Value Taken Time   /80 09/12/22 1248   Temp 36.3  C (97.4  F) 09/12/22 1248   Pulse 66 09/12/22 1248   Resp 16 09/12/22 1248   SpO2 100 % 09/12/22 1248       Electronically Signed By: BREANNE SIBLEY CRNA  September 12, 2022  12:58 PM

## 2022-09-16 ENCOUNTER — MYC MEDICAL ADVICE (OUTPATIENT)
Dept: INTERNAL MEDICINE | Facility: CLINIC | Age: 72
End: 2022-09-16

## 2022-09-22 DIAGNOSIS — E03.9 HYPOTHYROIDISM, UNSPECIFIED TYPE: ICD-10-CM

## 2022-09-23 ENCOUNTER — ANESTHESIA EVENT (OUTPATIENT)
Dept: SURGERY | Facility: AMBULATORY SURGERY CENTER | Age: 72
End: 2022-09-23
Payer: MEDICARE

## 2022-09-23 ENCOUNTER — MYC MEDICAL ADVICE (OUTPATIENT)
Dept: INTERNAL MEDICINE | Facility: CLINIC | Age: 72
End: 2022-09-23

## 2022-09-26 ENCOUNTER — HOSPITAL ENCOUNTER (OUTPATIENT)
Facility: AMBULATORY SURGERY CENTER | Age: 72
Discharge: HOME | End: 2022-09-26
Attending: OPHTHALMOLOGY
Payer: MEDICARE

## 2022-09-26 ENCOUNTER — OFFICE VISIT (OUTPATIENT)
Dept: OPHTHALMOLOGY | Facility: CLINIC | Age: 72
End: 2022-09-26

## 2022-09-26 ENCOUNTER — ANESTHESIA (OUTPATIENT)
Dept: SURGERY | Facility: AMBULATORY SURGERY CENTER | Age: 72
End: 2022-09-26
Payer: MEDICARE

## 2022-09-26 VITALS
SYSTOLIC BLOOD PRESSURE: 138 MMHG | OXYGEN SATURATION: 98 % | TEMPERATURE: 97.6 F | HEIGHT: 63 IN | RESPIRATION RATE: 17 BRPM | WEIGHT: 134 LBS | BODY MASS INDEX: 23.74 KG/M2 | DIASTOLIC BLOOD PRESSURE: 76 MMHG

## 2022-09-26 DIAGNOSIS — H25.812 COMBINED FORM OF AGE-RELATED CATARACT, LEFT EYE: ICD-10-CM

## 2022-09-26 DIAGNOSIS — Z98.890 POSTOPERATIVE EYE STATE: Primary | ICD-10-CM

## 2022-09-26 PROCEDURE — 99024 POSTOP FOLLOW-UP VISIT: CPT | Mod: GC | Performed by: OPHTHALMOLOGY

## 2022-09-26 PROCEDURE — V2599 CONTACT LENS/ES OTHER TYPE: HCPCS | Mod: DBP,LT

## 2022-09-26 PROCEDURE — 66984 XCAPSL CTRC RMVL W/O ECP: CPT | Mod: LT

## 2022-09-26 PROCEDURE — 66984 XCAPSL CTRC RMVL W/O ECP: CPT | Mod: 79 | Performed by: OPHTHALMOLOGY

## 2022-09-26 RX ORDER — LIDOCAINE 40 MG/G
CREAM TOPICAL
Status: DISCONTINUED | OUTPATIENT
Start: 2022-09-26 | End: 2022-09-26 | Stop reason: HOSPADM

## 2022-09-26 RX ORDER — MEPERIDINE HYDROCHLORIDE 25 MG/ML
12.5 INJECTION INTRAMUSCULAR; INTRAVENOUS; SUBCUTANEOUS
Status: DISCONTINUED | OUTPATIENT
Start: 2022-09-26 | End: 2022-09-27 | Stop reason: HOSPADM

## 2022-09-26 RX ORDER — TETRACAINE HYDROCHLORIDE 5 MG/ML
SOLUTION OPHTHALMIC PRN
Status: DISCONTINUED | OUTPATIENT
Start: 2022-09-26 | End: 2022-09-26 | Stop reason: HOSPADM

## 2022-09-26 RX ORDER — MOXIFLOXACIN 5 MG/ML
1 SOLUTION/ DROPS OPHTHALMIC 3 TIMES DAILY
Qty: 3 ML | Refills: 1 | Status: SHIPPED | OUTPATIENT
Start: 2022-09-26 | End: 2022-10-31

## 2022-09-26 RX ORDER — LIDOCAINE HYDROCHLORIDE 10 MG/ML
INJECTION, SOLUTION EPIDURAL; INFILTRATION; INTRACAUDAL; PERINEURAL PRN
Status: DISCONTINUED | OUTPATIENT
Start: 2022-09-26 | End: 2022-09-26 | Stop reason: HOSPADM

## 2022-09-26 RX ORDER — TIMOLOL MALEATE 5 MG/ML
SOLUTION/ DROPS OPHTHALMIC PRN
Status: DISCONTINUED | OUTPATIENT
Start: 2022-09-26 | End: 2022-09-26 | Stop reason: HOSPADM

## 2022-09-26 RX ORDER — FENTANYL CITRATE 50 UG/ML
25 INJECTION, SOLUTION INTRAMUSCULAR; INTRAVENOUS EVERY 5 MIN PRN
Status: DISCONTINUED | OUTPATIENT
Start: 2022-09-26 | End: 2022-09-26 | Stop reason: HOSPADM

## 2022-09-26 RX ORDER — HYDROMORPHONE HYDROCHLORIDE 1 MG/ML
0.2 INJECTION, SOLUTION INTRAMUSCULAR; INTRAVENOUS; SUBCUTANEOUS EVERY 5 MIN PRN
Status: DISCONTINUED | OUTPATIENT
Start: 2022-09-26 | End: 2022-09-26 | Stop reason: HOSPADM

## 2022-09-26 RX ORDER — FENTANYL CITRATE 50 UG/ML
INJECTION, SOLUTION INTRAMUSCULAR; INTRAVENOUS PRN
Status: DISCONTINUED | OUTPATIENT
Start: 2022-09-26 | End: 2022-09-26

## 2022-09-26 RX ORDER — BALANCED SALT SOLUTION 6.4; .75; .48; .3; 3.9; 1.7 MG/ML; MG/ML; MG/ML; MG/ML; MG/ML; MG/ML
SOLUTION OPHTHALMIC PRN
Status: DISCONTINUED | OUTPATIENT
Start: 2022-09-26 | End: 2022-09-26 | Stop reason: HOSPADM

## 2022-09-26 RX ORDER — SODIUM CHLORIDE, SODIUM LACTATE, POTASSIUM CHLORIDE, CALCIUM CHLORIDE 600; 310; 30; 20 MG/100ML; MG/100ML; MG/100ML; MG/100ML
INJECTION, SOLUTION INTRAVENOUS CONTINUOUS
Status: DISCONTINUED | OUTPATIENT
Start: 2022-09-26 | End: 2022-09-27 | Stop reason: HOSPADM

## 2022-09-26 RX ORDER — PROPARACAINE HYDROCHLORIDE 5 MG/ML
1 SOLUTION/ DROPS OPHTHALMIC ONCE
Status: COMPLETED | OUTPATIENT
Start: 2022-09-26 | End: 2022-09-26

## 2022-09-26 RX ORDER — PREDNISOLONE ACETATE 10 MG/ML
1 SUSPENSION/ DROPS OPHTHALMIC 4 TIMES DAILY
Qty: 10 ML | Refills: 1 | Status: SHIPPED | OUTPATIENT
Start: 2022-09-26 | End: 2022-10-04

## 2022-09-26 RX ORDER — ONDANSETRON 2 MG/ML
4 INJECTION INTRAMUSCULAR; INTRAVENOUS EVERY 30 MIN PRN
Status: DISCONTINUED | OUTPATIENT
Start: 2022-09-26 | End: 2022-09-27 | Stop reason: HOSPADM

## 2022-09-26 RX ORDER — ACETAMINOPHEN 325 MG/1
975 TABLET ORAL ONCE
Status: COMPLETED | OUTPATIENT
Start: 2022-09-26 | End: 2022-09-26

## 2022-09-26 RX ORDER — LEVOTHYROXINE SODIUM 50 UG/1
50 TABLET ORAL DAILY
Qty: 90 TABLET | Refills: 3 | Status: SHIPPED | OUTPATIENT
Start: 2022-09-26 | End: 2023-11-08

## 2022-09-26 RX ORDER — ONDANSETRON 4 MG/1
4 TABLET, ORALLY DISINTEGRATING ORAL EVERY 30 MIN PRN
Status: DISCONTINUED | OUTPATIENT
Start: 2022-09-26 | End: 2022-09-27 | Stop reason: HOSPADM

## 2022-09-26 RX ORDER — OXYCODONE HYDROCHLORIDE 5 MG/1
5 TABLET ORAL EVERY 4 HOURS PRN
Status: DISCONTINUED | OUTPATIENT
Start: 2022-09-26 | End: 2022-09-27 | Stop reason: HOSPADM

## 2022-09-26 RX ORDER — MOXIFLOXACIN IN NACL,ISO-OS/PF 0.3MG/0.3
SYRINGE (ML) INTRAOCULAR PRN
Status: DISCONTINUED | OUTPATIENT
Start: 2022-09-26 | End: 2022-09-26 | Stop reason: HOSPADM

## 2022-09-26 RX ORDER — FENTANYL CITRATE 50 UG/ML
25 INJECTION, SOLUTION INTRAMUSCULAR; INTRAVENOUS
Status: DISCONTINUED | OUTPATIENT
Start: 2022-09-26 | End: 2022-09-27 | Stop reason: HOSPADM

## 2022-09-26 RX ORDER — CYCLOPENTOLAT/TROPIC/PHENYLEPH 1%-1%-2.5%
1 DROPS (EA) OPHTHALMIC (EYE)
Status: COMPLETED | OUTPATIENT
Start: 2022-09-26 | End: 2022-09-26

## 2022-09-26 RX ORDER — SODIUM CHLORIDE, SODIUM LACTATE, POTASSIUM CHLORIDE, CALCIUM CHLORIDE 600; 310; 30; 20 MG/100ML; MG/100ML; MG/100ML; MG/100ML
INJECTION, SOLUTION INTRAVENOUS CONTINUOUS
Status: DISCONTINUED | OUTPATIENT
Start: 2022-09-26 | End: 2022-09-26 | Stop reason: HOSPADM

## 2022-09-26 RX ORDER — DEXAMETHASONE SODIUM PHOSPHATE 4 MG/ML
INJECTION, SOLUTION INTRA-ARTICULAR; INTRALESIONAL; INTRAMUSCULAR; INTRAVENOUS; SOFT TISSUE PRN
Status: DISCONTINUED | OUTPATIENT
Start: 2022-09-26 | End: 2022-09-26 | Stop reason: HOSPADM

## 2022-09-26 RX ADMIN — SODIUM CHLORIDE, SODIUM LACTATE, POTASSIUM CHLORIDE, CALCIUM CHLORIDE: 600; 310; 30; 20 INJECTION, SOLUTION INTRAVENOUS at 07:10

## 2022-09-26 RX ADMIN — FENTANYL CITRATE 50 MCG: 50 INJECTION, SOLUTION INTRAMUSCULAR; INTRAVENOUS at 08:11

## 2022-09-26 RX ADMIN — Medication 1 DROP: at 07:10

## 2022-09-26 RX ADMIN — Medication 1 DROP: at 07:15

## 2022-09-26 RX ADMIN — PROPARACAINE HYDROCHLORIDE 1 DROP: 5 SOLUTION/ DROPS OPHTHALMIC at 07:01

## 2022-09-26 RX ADMIN — Medication 1 DROP: at 07:02

## 2022-09-26 RX ADMIN — ACETAMINOPHEN 975 MG: 325 TABLET ORAL at 07:03

## 2022-09-26 ASSESSMENT — VISUAL ACUITY: METHOD: SNELLEN - LINEAR

## 2022-09-26 ASSESSMENT — SLIT LAMP EXAM - LIDS: COMMENTS: PROTECTIVE PTOSIS

## 2022-09-26 ASSESSMENT — TONOMETRY
OS_IOP_MMHG: 15
IOP_METHOD: TONOPEN

## 2022-09-26 ASSESSMENT — EXTERNAL EXAM - LEFT EYE: OS_EXAM: NORMAL

## 2022-09-26 NOTE — PROGRESS NOTES
POD#0, status post cataract surgery, LEFT eye     Impression/Plan:  Pseudophakia, LEFT EYE: POD0, good post-operative appearance. IOP reasonable.     Eye protection at all times and eye shield at night for 1 week.     Limited activities with no exercise or heavy lifting for 1 week.     Instructed patient to contact us for decreasing vision, eye pain, new floaters or flashes of light or other concerning symptoms.     Written instructions given    Drops:  Ofloxacin QID for 7 days in operative eye  Prednisolone 4/3/2/1 taper every 7 days in operative eye    Follow up scheduled on 10/4/2022    All questions were answered    Adore Verdin MD  Ophthalmology Resident, PGY-4  [facility]     Attending Physician Attestation:  Complete documentation of historical and exam elements from today's encounter can be found in the full encounter summary report (not reduplicated in this progress note).  I personally obtained the chief complaint(s) and history of present illness.  I confirmed and edited as necessary the review of systems, past medical/surgical history, family history, social history, and examination findings as documented by others; and I examined the patient myself.  I personally reviewed the relevant tests, images, and reports as documented above.  I formulated and edited as necessary the assessment and plan and discussed the findings and management plan with the patient and family. . - Ion Puente MD

## 2022-09-26 NOTE — TELEPHONE ENCOUNTER
--Last visit:  9/7/22 Dr. Adriel Goodwin.  --I do not know the address for refills at [facility] internal Medicine so will route to provider.      Thank you,    Deepika Michaud RN  [facility]

## 2022-09-26 NOTE — ANESTHESIA CARE TRANSFER NOTE
Patient: Sushila Chambers    Procedure: Procedure(s):  LEFT PHACOEMULSIFICATION, CATARACT, WITH STANDARD INTRAOCULAR LENS IMPLANT INSERTION       Diagnosis: Combined form of age-related cataract, left eye [H25.812]  Diagnosis Additional Information: No value filed.    Anesthesia Type:   MAC     Note:    Oropharynx: oropharynx clear of all foreign objects and spontaneously breathing  Level of Consciousness: awake  Oxygen Supplementation: room air    Independent Airway: airway patency satisfactory and stable  Dentition: dentition unchanged  Vital Signs Stable: post-procedure vital signs reviewed and stable  Report to RN Given: handoff report given            Vitals:  Vitals Value Taken Time   /76 09/26/22 0847   Temp 36.4  C (97.6  F) 09/26/22 0847   Pulse     Resp 17 09/26/22 0847   SpO2         Electronically Signed By: BREANNE Szymanski CRNA  September 26, 2022  8:58 AM

## 2022-09-26 NOTE — ANESTHESIA CARE TRANSFER NOTE
Patient: Sushila Chambers    Procedure: Procedure(s):  LEFT PHACOEMULSIFICATION, CATARACT, WITH STANDARD INTRAOCULAR LENS IMPLANT INSERTION       Diagnosis: Combined form of age-related cataract, left eye [H25.812]  Diagnosis Additional Information: No value filed.    Anesthesia Type:   MAC     Note:  Anesthesia Care Transfer Notewriter  Vitals:  Vitals Value Taken Time   /76 09/26/22 0847   Temp 36.4  C (97.6  F) 09/26/22 0847   Pulse     Resp 17 09/26/22 0847   SpO2         Electronically Signed By: BREANNE Szymanski CRNA  September 26, 2022  8:58 AM

## 2022-09-26 NOTE — OP NOTE
PREOPERATIVE DIAGNOSIS: Visually significant cataract, Left eye   POSTOPERATIVE DIAGNOSIS: Same   PROCEDURES:   1. Cataract extraction with intraocular lens implant Left eye.  SURGEON: Ion Puente M.D.  Assistant: Adore Verdin MD   INDICATIONS: The patient Sushila Chambers presented to the eye clinic with decreased vision secondary to cataract in the Left eye. The risks, benefits and alternatives to cataract extraction were discussed. The patient elected to proceed. All questions were answered to the patient's satisfaction.   DESCRIPTION OF PROCEDURE:   Prior to the procedure, appropriate cardiac and respiratory monitors were applied to the patient.  In the pre-operative holding area, a drop of topical tetracaine followed by lidocaine gel followed by povidone iodine.  Pre-operative toric markings were placed at the 90-degree axis using a gentian violet marker while the patient was seated upright.  The patient was brought to the operating room where a surgical pause was carried out to identify with all members of the surgical team the correct surgical site.  With adequate anesthesia, the Left eye was prepped and draped in the usual sterile fashion. A lid speculum was placed, and the operating microscope was rotated into position.  Toric axis was reviewed and marked according to pre-operative calculations and the 90-degree marking that had previously been placed.  A paracentesis was created.  Through this limbal paracentesis, the anterior chamber was filled with preservative-free lidocaine followed by viscoelastic.  A temporal wound was created at the limbus using a 2.6 mm blade. A capsulorrhexis was initiated using a bent 25-gauge needle and was completed in continuous and circular fashion using the capsulorrhexis forceps. The lens nucleus was hydrodissected using balanced salt solution.  The lens nucleus was rotated and removed using phacoemulsification in a stop and chop technique.  Residual cortical  material was removed using irrigation-aspiration.  The capsular bag was reinflated to its maximal extent with cohesive viscoelastic.  A 20.0 diopter [id number] inserted into the capsular bag.  The lens power selected was reviewed using the intraocular lens power measurements that were obtained preoperatively to confirm that the correct lens was selected for the desired post-operative refractive state. The residual viscoelastic was removed in its entirety, the wound were hydrated and found to be self-sealing.  Intracameral moxifloxacin was administered. Tactile pressure was confirmed to be in a normal range.  Subconjunctival Dexamethasone (2 mg) was injected.. The lid speculum was removed and a patch and shield were applied.   The patient tolerated the procedure well, and there were no complications.    PLAN: The patient will be discharged to home and will follow up tomorrow morning in the eye clinic.  EBL:  None  Complications:  None  Implant Name Type Inv. Item Serial No.  Lot No. LRB No. Used Action   Tecnis Eyhance Toric Intraocular Lens Implant   [id number]   Left 1 Implanted           Attending Physician Procedure Attestation: I was present for the entire procedure       Ion Puente MD  , Comprehensive Ophthalmology  Department of Ophthalmology and Visual Neurosciences  [facility]

## 2022-09-26 NOTE — ANESTHESIA POSTPROCEDURE EVALUATION
Patient: Sushila Chambers    Procedure: Procedure(s):  LEFT PHACOEMULSIFICATION, CATARACT, WITH STANDARD INTRAOCULAR LENS IMPLANT INSERTION       Anesthesia Type:  MAC    Note:  Disposition: Outpatient   Postop Pain Control: Uneventful            Sign Out: Well controlled pain   PONV: No   Neuro/Psych: Uneventful            Sign Out: Acceptable/Baseline neuro status   Airway/Respiratory: Uneventful            Sign Out: Acceptable/Baseline resp. status   CV/Hemodynamics: Uneventful            Sign Out: Acceptable CV status   Other NRE: NONE   DID A NON-ROUTINE EVENT OCCUR? No           Last vitals:  Vitals Value Taken Time   /76 09/26/22 0847   Temp 36.4  C (97.6  F) 09/26/22 0847   Pulse     Resp 17 09/26/22 0847   SpO2         Electronically Signed By: Glenda Faria MD  September 26, 2022  11:04 AM

## 2022-09-26 NOTE — INTERVAL H&P NOTE
I have reviewed the surgical (or preoperative) H&P that is linked to this encounter, and examined the patient. There are no significant changes    Clinical Conditions Present on Arrival:  Clinically Significant Risk Factors Present on Admission         # Hyponatremia: Na = N/A on admission, will monitor as appropriate  # Hypercalcemia: Ca = N/A and/or iCa = N/A on admission, will monitor as appropriate

## 2022-09-26 NOTE — ANESTHESIA PREPROCEDURE EVALUATION
Anesthesia Pre-Procedure Evaluation    Patient: Sushila Chambers   MRN: [id number] : 1950        Procedure : Procedure(s):  LEFT PHACOEMULSIFICATION, CATARACT, WITH STANDARD INTRAOCULAR LENS IMPLANT INSERTION          Past Medical History:   Diagnosis Date     Basal cell carcinoma      Breast cancer, right breast (H)      History of blood transfusion      Hypothyroid      Lung cancer (H) 2012    right     Uncontrolled hypertension 11/15/2017      Past Surgical History:   Procedure Laterality Date     ABDOMEN SURGERY       BIOPSY, LUNG NODULE Right     + cancer     COLONOSCOPY       ECTOPIC PREGNANCY SURGERY Left      GENITOURINARY SURGERY       GYN SURGERY       INSERT PORT VASCULAR ACCESS       LUMPECTOMY BREAST Right      LUNG SURGERY Right     histoplasmosis     MASTECTOMY SIMPLE, SENTINEL NODE, COMBINED Left 2016    Procedure: COMBINED MASTECTOMY SIMPLE, SENTINEL NODE;  Surgeon: Satnam Betts MD;  Location: [facility] OR     PHACOEMULSIFICATION WITH STANDARD INTRAOCULAR LENS IMPLANT Right 2022    Procedure: RIGHT EYE PHACOEMULSIFICATION, CATARACT, WITH  INTRAOCULAR LENS IMPLANT INSERTION TORIC LENS;  Surgeon: Ion Puente MD;  Location: [facility] OR     REMOVE PORT VASCULAR ACCESS N/A 2016    Procedure: REMOVE PORT VASCULAR ACCESS;  Surgeon: Satnam Betts MD;  Location: [facility] OR     THORACIC SURGERY        Allergies   Allergen Reactions     Latex Rash     SKIN REACTION    SKIN REACTION     Sulfa Drugs Itching and Rash      Social History     Tobacco Use     Smoking status: Never Smoker     Smokeless tobacco: Never Used   Substance Use Topics     Alcohol use: Yes     Comment: Occasionally      Wt Readings from Last 1 Encounters:   22 60.8 kg (134 lb)        Anesthesia Evaluation   Pt has had prior anesthetic.     No history of anesthetic complications       ROS/MED HX  ENT/Pulmonary:       Neurologic:       Cardiovascular:     (+) hypertension-----    METS/Exercise  Tolerance:     Hematologic:       Musculoskeletal:       GI/Hepatic:       Renal/Genitourinary:     (+) renal disease, type: CRI,     Endo:     (+) thyroid problem, hypothyroidism,     Psychiatric/Substance Use:       Infectious Disease:       Malignancy:   (+) Malignancy, History of Breast.Breast CA Remission status post.        Other:               OUTSIDE LABS:  CBC:   Lab Results   Component Value Date    WBC 6.4 07/21/2022    WBC 5.7 10/15/2019    HGB 13.4 07/21/2022    HGB 12.9 10/15/2019    HCT 37.9 07/21/2022    HCT 38.1 10/15/2019     07/21/2022     10/15/2019     BMP:   Lab Results   Component Value Date     (L) 09/07/2022     (L) 07/21/2022    POTASSIUM 3.3 (L) 09/07/2022    POTASSIUM 3.7 07/21/2022    CHLORIDE 89 (L) 09/07/2022    CHLORIDE 93 (L) 07/21/2022    CO2 27 09/07/2022    CO2 31 07/21/2022    BUN 16.4 09/07/2022    BUN 16 07/21/2022    CR 0.89 09/07/2022    CR 0.85 07/21/2022     (H) 09/07/2022     (H) 07/21/2022     COAGS:   Lab Results   Component Value Date    PTT 27 09/09/2015    INR 1.02 09/09/2015    FIBR 482 (H) 09/09/2015     POC: No results found for: BGM, HCG, HCGS  HEPATIC:   Lab Results   Component Value Date    ALBUMIN 3.8 07/21/2022    PROTTOTAL 7.4 07/21/2022    ALT 25 07/21/2022    AST 21 07/21/2022    ALKPHOS 56 07/21/2022    BILITOTAL 1.1 07/21/2022     OTHER:   Lab Results   Component Value Date    A1C 5.3 09/09/2015    OCTAVIANO 10.3 (H) 09/07/2022    MAG 2.0 12/07/2015    TSH 2.57 09/07/2022       Anesthesia Plan    ASA Status:  2   NPO Status:  NPO Appropriate    Anesthesia Type: MAC.     - Reason for MAC: straight local not clinically adequate   Induction: Intravenous.   Maintenance: TIVA.        Consents    Anesthesia Plan(s) and associated risks, benefits, and realistic alternatives discussed. Questions answered and patient/representative(s) expressed understanding.    - Discussed:     - Discussed with:  Patient         Postoperative  Care    Pain management: IV analgesics.   PONV prophylaxis: Ondansetron (or other 5HT-3)     Comments:                Glenda Faria MD

## 2022-09-26 NOTE — DISCHARGE INSTRUCTIONS
"[facility]  Home Care Following Anesthesia  For 24 hours after surgery:  Get plenty of rest.  A responsible adult must stay with you for at least 24 hours after you leave the surgery center.  Do not drive or use heavy equipment.  If you have weakness or tingling, don't drive or use heavy equipment until this feeling goes away.   Do not drink alcohol.   Avoid strenuous or risky activities.  Ask for help when climbing stairs.  You may feel lightheaded.  IF so, sit for a few minutes before standing.  Have someone help you get up.   If you have nausea (feel sick to your stomach): Drink only clear liquids such as apple juice, ginger ale, broth or 7-Up.  Rest may also help.  Be sure to drink enough fluids.  Move to a regular diet as you feel able.   You may have a slight fever.  Call the doctor if your fever is over 100 F (37.7 C) (taken under the tongue) or lasts longer than 24 hours.  You may have a dry mouth, a sore throat, muscle aches or trouble sleeping. These should go away after 24 hours.  Do not make important or legal decisions.   It is recommended to avoid smoking.        Today you received a Marcaine or bupivacaine block to numb the nerves near your surgery site.  This is a block using local anesthetic or \"numbing\" medication injected around the nerves to anesthetize or \"numb\" the area supplied by those nerves.  This block is injected into the muscle layer near your surgical site.  The medication may numb the location where you had surgery for 6-18 hours, but may last up to 24 hours.  If your surgical site is an arm or leg you should be careful with your affected limb, since it is possible to injure your limb without being aware of it due to the numbing.  Until full feeling returns, you should guard against bumping or hitting your limb, and avoid extreme hot or cold temperatures on the skin.  As the block wears off, the feeling will return as a tingling or prickly " sensation near your surgical site.  You will experience more discomfort from your incision as the feeling returns.  You may want to take a pain pill (a narcotic or Tylenol if this was prescribed by your surgeon) when you start to experience mild pain before the pain beccomes more severe.  If your pain medications do not control your pain you should notifiy your surgeon.    Tips for taking pain medications  To get the best pain relief possible, remember these points:  Take pain medications as directed, before pain becomes severe.  Pain medication can upset your stomach: taking it with food may help.  Constipation is a common side effect of pain medication. Drink plenty of  fluids.  Eat foods high in fiber. Take a stool softener if recommended by your doctor or pharmacist.  Do not drink alcohol, drive or operate machinery while taking pain medications.  Ask about other ways to control pain, such as with heat, ice or relaxation.    Tylenol/Acetaminophen Consumption  To help encourage the safe use of acetaminophen, the makers of TYLENOL  have lowered the maximum daily dose for single-ingredient Extra Strength TYLENOL  (acetaminophen) products sold in the [place] from 8 pills per day (4,000 mg) to 6 pills per day (3,000 mg). The dosing interval has also changed from 2 pills every 4-6 hours to 2 pills every 6 hours.  If you feel your pain relief is insufficient, you may take Tylenol/Acetaminophen in addition to your narcotic pain medication.   Be careful not to exceed 3,000 mg of Tylenol/Acetaminophen in a 24 hour period from all sources.  If you are taking extra strength Tylenol/acetaminophen (500 mg), the maximum dose is 6 tablets in 24 hours.  If you are taking regular strength acetaminophen (325 mg), the maximum dose is 9 tablets in 24 hours.    Call a doctor for any of the following:  Signs of infection (fever, growing tenderness at the surgery site, a large amount of drainage or bleeding, severe pain, foul-smelling  drainage, redness, swelling).  It has been over 8 to 10 hours since surgery and you are still not able to urinate (pass water).  Headache for over 24 hours.  Numbness, tingling or weakness the day after surgery (if you had spinal anesthesia).  Signs of Covid-19 infection (temperature over 100 degrees, shortness of breath, cough, loss of taste/smell, generalized body aches, persistent headache, chills, sore throat, nausea/vomiting/diarrhea)  Your doctor is:       Dr. Ion Puente, Ophthalmology: [phone]               Or dial [phone] and ask for the resident on call for:  Ophthalmology  For emergency care, call the:  [facility]:  [phone] (TTY for hearing impaired: [phone])

## 2022-10-04 ENCOUNTER — OFFICE VISIT (OUTPATIENT)
Dept: OPHTHALMOLOGY | Facility: CLINIC | Age: 72
End: 2022-10-04
Attending: OPHTHALMOLOGY
Payer: MEDICARE

## 2022-10-04 DIAGNOSIS — H25.812 COMBINED FORM OF AGE-RELATED CATARACT, LEFT EYE: ICD-10-CM

## 2022-10-04 DIAGNOSIS — Z96.1 PSEUDOPHAKIA OF BOTH EYES: Primary | ICD-10-CM

## 2022-10-04 PROCEDURE — 92015 DETERMINE REFRACTIVE STATE: CPT | Mod: GY

## 2022-10-04 PROCEDURE — G0463 HOSPITAL OUTPT CLINIC VISIT: HCPCS | Mod: 25

## 2022-10-04 PROCEDURE — 99024 POSTOP FOLLOW-UP VISIT: CPT | Performed by: OPHTHALMOLOGY

## 2022-10-04 RX ORDER — PREDNISOLONE ACETATE 10 MG/ML
1 SUSPENSION/ DROPS OPHTHALMIC 4 TIMES DAILY
Qty: 10 ML | Refills: 1 | Status: SHIPPED | OUTPATIENT
Start: 2022-10-04 | End: 2022-10-31

## 2022-10-04 ASSESSMENT — REFRACTION_MANIFEST
OD_SPHERE: -1.00
OD_CYLINDER: +0.25
OD_ADD: +2.50
OS_SPHERE: -0.50
OD_AXIS: 180
OS_CYLINDER: SPHERE
OS_ADD: +2.50

## 2022-10-04 ASSESSMENT — REFRACTION_WEARINGRX
SPECS_TYPE: PAL
OD_SPHERE: -0.75
OD_ADD: +2.50
OS_AXIS: 008
OS_CYLINDER: +2.75
OD_CYLINDER: +0.75
OD_AXIS: 006
OS_SPHERE: -3.00
OS_ADD: +2.50

## 2022-10-04 ASSESSMENT — EXTERNAL EXAM - LEFT EYE: OS_EXAM: NORMAL

## 2022-10-04 ASSESSMENT — SLIT LAMP EXAM - LIDS
COMMENTS: NORMAL
COMMENTS: NORMAL

## 2022-10-04 ASSESSMENT — VISUAL ACUITY
OD_SC: 20/40
OD_PH_SC+: -2
METHOD: SNELLEN - LINEAR
OS_SC: 20/25
OD_PH_SC: 20/25

## 2022-10-04 ASSESSMENT — CONF VISUAL FIELD
OD_NORMAL: 1
OS_NORMAL: 1
METHOD: COUNTING FINGERS

## 2022-10-04 ASSESSMENT — EXTERNAL EXAM - RIGHT EYE: OD_EXAM: NORMAL

## 2022-10-04 ASSESSMENT — TONOMETRY
OS_IOP_MMHG: 16
OD_IOP_MMHG: 19
IOP_METHOD: TONOPEN

## 2022-10-04 NOTE — PROGRESS NOTES
Chief Complaint(s) and History of Present Illness(es)     Post Op (Ophthalmology) Left Eye     Comments: POW#1 s/p CE/IOL LE 09/26/2022              Comments     Pt states vision is good since last visit. No eye pain today. No new   flashes or floaters.   Some redness in LE since this morning. No dryness.    Josh Gee LEONID October 4, 2022 8:57 AM                  Review of systems for the eyes was negative other than the pertinent positives/negatives listed in the HPI.      Assessment & Plan      Sushila Chambers is a 72 year old female with the following diagnoses:   1. Pseudophakia of both eyes    2. Combined form of age-related cataract, left eye       Doing well, very happy  Provided Mrx today   Continue postoperative drops  Ok to stop shield and lift activity restrictions  RTC 10/18/22 dilation    Patient disposition:   Return in about 2 weeks (around 10/18/2022) for DFE, Refraction.           Attending Physician Attestation:  Complete documentation of historical and exam elements from today's encounter can be found in the full encounter summary report (not reduplicated in this progress note).  I personally obtained the chief complaint(s) and history of present illness.  I confirmed and edited as necessary the review of systems, past medical/surgical history, family history, social history, and examination findings as documented by others; and I examined the patient myself.  I personally reviewed the relevant tests, images, and reports as documented above.  I formulated and edited as necessary the assessment and plan and discussed the findings and management plan with the patient and family. . - Ion Puente MD

## 2022-10-15 ENCOUNTER — HEALTH MAINTENANCE LETTER (OUTPATIENT)
Age: 72
End: 2022-10-15

## 2022-10-18 ENCOUNTER — OFFICE VISIT (OUTPATIENT)
Dept: OPHTHALMOLOGY | Facility: CLINIC | Age: 72
End: 2022-10-18
Attending: OPHTHALMOLOGY
Payer: MEDICARE

## 2022-10-18 DIAGNOSIS — Z96.1 PSEUDOPHAKIA OF BOTH EYES: Primary | ICD-10-CM

## 2022-10-18 DIAGNOSIS — H43.813 PVD (POSTERIOR VITREOUS DETACHMENT), BILATERAL: ICD-10-CM

## 2022-10-18 PROCEDURE — 99024 POSTOP FOLLOW-UP VISIT: CPT | Performed by: OPHTHALMOLOGY

## 2022-10-18 PROCEDURE — G0463 HOSPITAL OUTPT CLINIC VISIT: HCPCS

## 2022-10-18 ASSESSMENT — VISUAL ACUITY
CORRECTION_TYPE: GLASSES
OD_CC+: -2
OD_CC: 20/40
OS_CC: 20/25
METHOD: SNELLEN - LINEAR
OS_CC+: -1

## 2022-10-18 ASSESSMENT — CUP TO DISC RATIO
OD_RATIO: 0.3
OS_RATIO: 0.3

## 2022-10-18 ASSESSMENT — REFRACTION_MANIFEST
OD_SPHERE: -1.00
OD_CYLINDER: +0.75
OD_AXIS: 180
OS_CYLINDER: SPHERE
OD_ADD: +2.50
OS_SPHERE: -0.50
OS_ADD: +2.50

## 2022-10-18 ASSESSMENT — CONF VISUAL FIELD
OS_SUPERIOR_TEMPORAL_RESTRICTION: 0
OS_INFERIOR_NASAL_RESTRICTION: 0
METHOD: COUNTING FINGERS
OD_SUPERIOR_TEMPORAL_RESTRICTION: 0
OD_SUPERIOR_NASAL_RESTRICTION: 0
OD_NORMAL: 1
OS_NORMAL: 1
OD_INFERIOR_TEMPORAL_RESTRICTION: 0
OS_SUPERIOR_NASAL_RESTRICTION: 0
OD_INFERIOR_NASAL_RESTRICTION: 0
OS_INFERIOR_TEMPORAL_RESTRICTION: 0

## 2022-10-18 ASSESSMENT — TONOMETRY
IOP_METHOD: TONOPEN
OD_IOP_MMHG: 18
OS_IOP_MMHG: 16

## 2022-10-18 ASSESSMENT — EXTERNAL EXAM - RIGHT EYE: OD_EXAM: NORMAL

## 2022-10-18 ASSESSMENT — EXTERNAL EXAM - LEFT EYE: OS_EXAM: NORMAL

## 2022-10-18 ASSESSMENT — SLIT LAMP EXAM - LIDS
COMMENTS: NORMAL
COMMENTS: NORMAL

## 2022-10-18 NOTE — NURSING NOTE
Chief Complaints and History of Present Illnesses   Patient presents with     Post Op (Ophthalmology) Left Eye     s/p CE/IOL LE 09/26/2022   S/p CE/IOL RE 9/112/22     Chief Complaint(s) and History of Present Illness(es)     Post Op (Ophthalmology) Left Eye            Comments: s/p CE/IOL LE 09/26/2022   S/p CE/IOL RE 9/112/22          Comments    Pt doing well, no complaints  No eye pain    Kathy Puente COT 12:00 PM October 18, 2022

## 2022-10-18 NOTE — PROGRESS NOTES
Chief Complaint(s) and History of Present Illness(es)     Post Op (Ophthalmology) Left Eye            Comments: s/p CE/IOL LE 09/26/2022   S/p CE/IOL RE 9/112/22          Comments    Pt doing well, no complaints  No eye pain    Kathy Puente COT 12:00 PM October 18, 2022                  Review of systems for the eyes was negative other than the pertinent positives/negatives listed in the HPI.      Assessment & Plan      Sushila Chambers is a 72 year old female with the following diagnoses:   1. Pseudophakia of both eyes    2. PVD (posterior vitreous detachment), bilateral         Doing well  Ok to resume normal activities  Taper Predforte as directed  Glasses prescription updated  Artificial tears as needed        Patient disposition:   Return in about 1 year (around 10/18/2023) for DFE.           Attending Physician Attestation:  Complete documentation of historical and exam elements from today's encounter can be found in the full encounter summary report (not reduplicated in this progress note).  I personally obtained the chief complaint(s) and history of present illness.  I confirmed and edited as necessary the review of systems, past medical/surgical history, family history, social history, and examination findings as documented by others; and I examined the patient myself.  I personally reviewed the relevant tests, images, and reports as documented above.  I formulated and edited as necessary the assessment and plan and discussed the findings and management plan with the patient and family. . - Ion Puente MD

## 2022-10-30 NOTE — PROGRESS NOTES
MEDICAL ONCOLOGY FOLLOW-UP PATIENT VISIT     NAME: Sushila Chambers     DATE: 10/31/2022    PRIMARY CARE PHYSICIAN: Meryl Muro    PATIENT ID: Multifocal, stage IIa, T2N0M0, ER positive, NM negative, HER2 non-amplified invasive lobular carcinoma of the left breast.     Oncology History: Ms. Chambers is a 72 year old female with a history of lung cancer and right breast cancer with more recent invasive lobular carcinoma of the left breast. Ms. Chambers was treated for right sided breast cancer in 2009 with right breast lumpectomy, radiation, and endocrine therapy. She self palpated a mass in the left breast in early August, 2015. Diagnostic mammogram and ultrasound showed 2 ill-defined spiculated masses at the 4:30 and 2:00 positions of the left breast. There was surrounding architectural distortion and the total area of involvement measured 6.5 cm. Targeted left breast ultrasound showed a multilobulated hypoechoic mass at the 4:00 position measuring 2.5 cm and an ill-defined hypoechoic mass at the 2:00 position measuring 2.4 cm.  Biopsy of the mass at the 4:00 position showed grade II invasive pleomorphic lobular carcinoma. Biopsy of the mass at the 2:00 position showed a grade II invasive lobular carcinoma. No angiolymphatic invasion or associated LCIS was seen in either specimen. Estrogen receptor staining was positive in >80% of tumor cell nuclei. Progesterone receptor staining was positive in <5% of tumor cell nuclei. HER2 was non-amplifed by FISH in both specimens.     Ms. Chambers enrolled on the ISPY2 clinical trial.  She received 12 weeks of ganetespib and Taxol and 4 cycles of dose dense AC from 9/29/15 - 2/1/16. On 2/24/16 she underwent left breast mastectomy and sentinel lymph node procedure under the care of Dr. Betts. Pathology showed a residual grade II 4.8 cm, pleomorphic invasive lobular carcinoma with associated LCIS. Invasive tumor cellularity was 30%.  There was evidence of perineural invasion,  "however, no lymphvascular space invasion. Two sentinel lymph nodes were negative.  She has been on adjuvant letrozole since 03/2016.    Ms. Chambers had genetic testing on 9/15/15.  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  Of note she had a number of alterations in RUFINA, BARD1, BRCA1/2, MUTYH, and TP53 that were classified as \"likely benign\" and \"benign\".    Interim History:   Ms. Garcia comes into clinic today for routine breast cancer follow-up.  In general, her health has been good.  She denies concerning breast lumps, pain, or swelling.  She has no issues with the left chest wall.  She reports full range of motion of the bilateral upper extremities.  She confirms that she continues on treatment with letrozole and is tolerating the medication well.  She has no new bone or joint aches or pains.  She continues to exercise on a regular basis.  In fact, she is working with a  2 days/week.  She does both cardiovascular and strength training.  She has had no recent fevers, chills, sore throat, or other symptoms of infection.  She denies cough, shortness of breath, or chest pain.  She has no abdominal pain, bloating or other abdominal complaints.  She states her stepdaughter was diagnosed with breast cancer approximately a year ago.  Her stepdaughter has fully completed her treatment.  The remainder of a complete 12 point review of systems was reviewed with patient was negative with exception that mentioned above.    PAST MEDICAL HISTORY:   Past Medical History:   Diagnosis Date     Basal cell carcinoma      Breast cancer, right breast (H)      History of blood transfusion      Hypothyroid      Lung cancer (H) 2012    right     Uncontrolled hypertension 11/15/2017       PAST SURGICAL HISTORY:  Past Surgical History:   Procedure Laterality Date     ABDOMEN SURGERY       BIOPSY, LUNG NODULE Right 2012    + cancer     COLONOSCOPY       ECTOPIC PREGNANCY SURGERY Left 1988     GENITOURINARY SURGERY   "     GYN SURGERY       INSERT PORT VASCULAR ACCESS       LUMPECTOMY BREAST Right 2010     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     MASTECTOMY SIMPLE, SENTINEL NODE, COMBINED Left 2/24/2016    Procedure: COMBINED MASTECTOMY SIMPLE, SENTINEL NODE;  Surgeon: Satnam Betts MD;  Location: [facility] OR     PHACOEMULSIFICATION WITH STANDARD INTRAOCULAR LENS IMPLANT Right 9/12/2022    Procedure: RIGHT EYE PHACOEMULSIFICATION, CATARACT, WITH  INTRAOCULAR LENS IMPLANT INSERTION TORIC LENS;  Surgeon: Ion Puente MD;  Location: [facility] OR     PHACOEMULSIFICATION WITH STANDARD INTRAOCULAR LENS IMPLANT Left 9/26/2022    Procedure: LEFT PHACOEMULSIFICATION, CATARACT, WITH STANDARD INTRAOCULAR LENS IMPLANT INSERTION;  Surgeon: Ion Puente MD;  Location: [facility] OR     REMOVE PORT VASCULAR ACCESS N/A 2/24/2016    Procedure: REMOVE PORT VASCULAR ACCESS;  Surgeon: Satnam Betts MD;  Location: [facility] OR     THORACIC SURGERY       MEDS:  Current Outpatient Medications   Medication     Acetaminophen (TYLENOL PO)     aspirin 81 MG EC tablet     biotin 1000 MCG TABS tablet     calcium carbonate (OS-OCTAVIANO 500 MG [place]. [place]) 500 MG tablet     chlorthalidone (HYGROTON) 25 MG tablet     letrozole (FEMARA) 2.5 MG tablet     levothyroxine (SYNTHROID/LEVOTHROID) 50 MCG tablet     moxifloxacin (VIGAMOX) 0.5 % ophthalmic solution     moxifloxacin (VIGAMOX) 0.5 % ophthalmic solution     multivitamin w/minerals (THERA-VIT-M) tablet     prednisoLONE acetate (PRED FORTE) 1 % ophthalmic suspension     prednisoLONE acetate (PRED FORTE) 1 % ophthalmic suspension     triamcinolone (KENALOG) 0.1 % external ointment     vitamin C (ASCORBIC ACID) 100 MG tablet     VITAMIN D, CHOLECALCIFEROL, PO     No current facility-administered medications for this visit.     ALLERGIES:  Allergies   Allergen Reactions     Latex Rash     SKIN REACTION    SKIN REACTION     Sulfa Drugs Itching and Rash        PHYSICAL EXAM:  BP (!) 141/82   Pulse 95   Temp 98  F  (36.7  C) (Oral)   Resp 16   Wt 63 kg (139 lb)   SpO2 100%   BMI 24.62 kg/m    KPS: 100%  General:  Well appearing adult female in NAD.  Alert and oriented x 3.  HEENT:  Normocephalic.  Sclera anicteric.  MMM.  No lesions of the oropharynx.  Lymph:  No palpable cervical, supraclavicular, or axillary LAD.  Chest:  CTA bilaterally.  No wheezes or crackles.  CV:  RRR.  Nl S1 and S2.    Breast:  Right breast lumpectomy changes.  Left mastectomy.  There are no discretely palpable masses of either the right breast or the left chest wall.  Abd:  Soft/ND.    Ext:  No pitting edema of the bilateral lower extremities.    Musculo:  Full ROM of the bilateral upper extremities.  Neuro:  Cranial nerves grossly intact.  Psych:  Mood and affect appear normal.    LABS REVIEWED THIS VISIT:  10/31/2022 Right breast screening mammogram:  I personally reviewed these images in clinic with the patient today.  There is no significant change when compared to prior.  No evidence of malignancy.    IMPRESSION/PLAN: 73 yo female with a h/o T2N0M0, grade II, ER positive, NM negative, HER2 non-amplified invasive lobular carcinoma of the left breast. She is s/p treatment with taxol and ganetespib followed by dose dense adriamycin and cyclophosphamide, left breast mastectomy, and has been on letrozole since 03/2016.    1.  Left breast ER-positive carcinoma:  Ms. Chambers is 6 years, 8 months out from excision of a left breast cancer.  She continues on letrozole.  She is tolerating the medication well.  We plan to treat her for a total of 10 years (through 03/2026).      She is asymptomatic of disease recurrence on history taken today.  Due to h/o bilateral breast cancers and multiple gene mutations, we are performing high risk screening with both annual mammogram and breast MRI.  I personally reviewed the images of right breast screening mammogram performed today which are without concerning findings.  She will be due for breast MRI at the time  of her return visit in 6 months.  Plan to stop breast MRIs at 73 yo.      2.  Cancer screening:  High risk breast screening with alternating breast MRI and mammogram as above.  Colon cancer screening per recommendations of PCP.     3.  Osteopenia:  DEXA bone density 09/28/2021 with a lowest T-score of -1.1, c/w osteopenia.  Continue calcium and vitamin D supplementation as well as daily weightbearing activity.     4.  Followup:  Breast MRI and visit with me in 6 months.    30 minutes spent on the date of the encounter doing chart review, review of test results, interpretation of tests, patient visit and documentation.

## 2022-10-31 ENCOUNTER — ANCILLARY PROCEDURE (OUTPATIENT)
Dept: MAMMOGRAPHY | Facility: CLINIC | Age: 72
End: 2022-10-31
Attending: INTERNAL MEDICINE
Payer: MEDICARE

## 2022-10-31 ENCOUNTER — ONCOLOGY VISIT (OUTPATIENT)
Dept: ONCOLOGY | Facility: CLINIC | Age: 72
End: 2022-10-31
Attending: INTERNAL MEDICINE
Payer: MEDICARE

## 2022-10-31 VITALS
RESPIRATION RATE: 16 BRPM | BODY MASS INDEX: 24.62 KG/M2 | WEIGHT: 139 LBS | TEMPERATURE: 98 F | HEART RATE: 95 BPM | OXYGEN SATURATION: 100 % | SYSTOLIC BLOOD PRESSURE: 141 MMHG | DIASTOLIC BLOOD PRESSURE: 82 MMHG

## 2022-10-31 DIAGNOSIS — Z12.31 VISIT FOR SCREENING MAMMOGRAM: ICD-10-CM

## 2022-10-31 DIAGNOSIS — C50.512 MALIGNANT NEOPLASM OF LOWER-OUTER QUADRANT OF LEFT BREAST OF FEMALE, ESTROGEN RECEPTOR POSITIVE (H): Primary | ICD-10-CM

## 2022-10-31 DIAGNOSIS — Z17.0 MALIGNANT NEOPLASM OF LOWER-OUTER QUADRANT OF LEFT BREAST OF FEMALE, ESTROGEN RECEPTOR POSITIVE (H): Primary | ICD-10-CM

## 2022-10-31 DIAGNOSIS — Z85.3 PERSONAL HISTORY OF MALIGNANT NEOPLASM OF BREAST: ICD-10-CM

## 2022-10-31 DIAGNOSIS — Z12.39 BREAST CANCER SCREENING, HIGH RISK PATIENT: ICD-10-CM

## 2022-10-31 PROCEDURE — 77063 BREAST TOMOSYNTHESIS BI: CPT | Mod: 52 | Performed by: RADIOLOGY

## 2022-10-31 PROCEDURE — 99214 OFFICE O/P EST MOD 30 MIN: CPT | Performed by: INTERNAL MEDICINE

## 2022-10-31 PROCEDURE — 77067 SCR MAMMO BI INCL CAD: CPT | Mod: 52 | Performed by: RADIOLOGY

## 2022-10-31 PROCEDURE — G0463 HOSPITAL OUTPT CLINIC VISIT: HCPCS

## 2022-10-31 ASSESSMENT — PAIN SCALES - GENERAL: PAINLEVEL: NO PAIN (0)

## 2022-10-31 NOTE — NURSING NOTE
"Oncology Rooming Note    October 31, 2022 3:02 PM   Sushila Chambers is a 72 year old female who presents for:    Chief Complaint   Patient presents with     Oncology Clinic Visit     Breast cancer     Initial Vitals: BP (!) 141/82   Pulse 95   Temp 98  F (36.7  C) (Oral)   Resp 16   Wt 63 kg (139 lb)   SpO2 100%   BMI 24.62 kg/m   Estimated body mass index is 24.62 kg/m  as calculated from the following:    Height as of 9/26/22: 1.6 m (5' 3\").    Weight as of this encounter: 63 kg (139 lb). Body surface area is 1.67 meters squared.  No Pain (0) Comment: Data Unavailable   No LMP recorded. Patient is postmenopausal.  Allergies reviewed: Yes  Medications reviewed: Yes    Medications: Medication refills not needed today.  Pharmacy name entered into [facility]:    [facility] - [place], [place] - [address]  [id number] - [place] [address] [facility] [address]  [facility]  [facility] [place] [place] - [address]    Clinical concerns: none       Kate Betancur CMA            "

## 2022-10-31 NOTE — LETTER
10/31/2022         RE: Sushila Chambers  [address] [place] [address]        Dear Colleague,    Thank you for referring your patient, Sushila Chambers, to the [facility]. Please see a copy of my visit note below.    MEDICAL ONCOLOGY FOLLOW-UP PATIENT VISIT     NAME: Sushila Chambers     DATE: 10/31/2022    PRIMARY CARE PHYSICIAN: Meryl Muro    PATIENT ID: Multifocal, stage IIa, T2N0M0, ER positive, OR negative, HER2 non-amplified invasive lobular carcinoma of the left breast.     Oncology History: Ms. Chambers is a 72 year old female with a history of lung cancer and right breast cancer with more recent invasive lobular carcinoma of the left breast. Ms. Chambers was treated for right sided breast cancer in 2009 with right breast lumpectomy, radiation, and endocrine therapy. She self palpated a mass in the left breast in early August, 2015. Diagnostic mammogram and ultrasound showed 2 ill-defined spiculated masses at the 4:30 and 2:00 positions of the left breast. There was surrounding architectural distortion and the total area of involvement measured 6.5 cm. Targeted left breast ultrasound showed a multilobulated hypoechoic mass at the 4:00 position measuring 2.5 cm and an ill-defined hypoechoic mass at the 2:00 position measuring 2.4 cm.  Biopsy of the mass at the 4:00 position showed grade II invasive pleomorphic lobular carcinoma. Biopsy of the mass at the 2:00 position showed a grade II invasive lobular carcinoma. No angiolymphatic invasion or associated LCIS was seen in either specimen. Estrogen receptor staining was positive in >80% of tumor cell nuclei. Progesterone receptor staining was positive in <5% of tumor cell nuclei. HER2 was non-amplifed by FISH in both specimens.     Ms. Chambers enrolled on the ISPY2 clinical trial.  She received 12 weeks of ganetespib and Taxol and 4 cycles of dose dense AC from 9/29/15 - 2/1/16. On 2/24/16 she underwent left breast  "mastectomy and sentinel lymph node procedure under the care of Dr. Betts. Pathology showed a residual grade II 4.8 cm, pleomorphic invasive lobular carcinoma with associated LCIS. Invasive tumor cellularity was 30%.  There was evidence of perineural invasion, however, no lymphvascular space invasion. Two sentinel lymph nodes were negative.  She has been on adjuvant letrozole since 03/2016.    Ms. Chambers had genetic testing on 9/15/15.  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  Of note she had a number of alterations in RUFINA, BARD1, BRCA1/2, MUTYH, and TP53 that were classified as \"likely benign\" and \"benign\".    Interim History:   Ms. Garcia comes into clinic today for routine breast cancer follow-up.  In general, her health has been good.  She denies concerning breast lumps, pain, or swelling.  She has no issues with the left chest wall.  She reports full range of motion of the bilateral upper extremities.  She confirms that she continues on treatment with letrozole and is tolerating the medication well.  She has no new bone or joint aches or pains.  She continues to exercise on a regular basis.  In fact, she is working with a  2 days/week.  She does both cardiovascular and strength training.  She has had no recent fevers, chills, sore throat, or other symptoms of infection.  She denies cough, shortness of breath, or chest pain.  She has no abdominal pain, bloating or other abdominal complaints.  She states her stepdaughter was diagnosed with breast cancer approximately a year ago.  Her stepdaughter has fully completed her treatment.  The remainder of a complete 12 point review of systems was reviewed with patient was negative with exception that mentioned above.    PAST MEDICAL HISTORY:   Past Medical History:   Diagnosis Date     Basal cell carcinoma      Breast cancer, right breast (H)      History of blood transfusion      Hypothyroid      Lung cancer (H) 2012    right     " Uncontrolled hypertension 11/15/2017       PAST SURGICAL HISTORY:  Past Surgical History:   Procedure Laterality Date     ABDOMEN SURGERY       BIOPSY, LUNG NODULE Right 2012    + cancer     COLONOSCOPY       ECTOPIC PREGNANCY SURGERY Left 1988     GENITOURINARY SURGERY       GYN SURGERY       INSERT PORT VASCULAR ACCESS       LUMPECTOMY BREAST Right 2010     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     MASTECTOMY SIMPLE, SENTINEL NODE, COMBINED Left 2/24/2016    Procedure: COMBINED MASTECTOMY SIMPLE, SENTINEL NODE;  Surgeon: Satnam Betts MD;  Location: [facility] OR     PHACOEMULSIFICATION WITH STANDARD INTRAOCULAR LENS IMPLANT Right 9/12/2022    Procedure: RIGHT EYE PHACOEMULSIFICATION, CATARACT, WITH  INTRAOCULAR LENS IMPLANT INSERTION TORIC LENS;  Surgeon: Ion Puente MD;  Location: [facility] OR     PHACOEMULSIFICATION WITH STANDARD INTRAOCULAR LENS IMPLANT Left 9/26/2022    Procedure: LEFT PHACOEMULSIFICATION, CATARACT, WITH STANDARD INTRAOCULAR LENS IMPLANT INSERTION;  Surgeon: Ion Puente MD;  Location: [facility] OR     REMOVE PORT VASCULAR ACCESS N/A 2/24/2016    Procedure: REMOVE PORT VASCULAR ACCESS;  Surgeon: Satnam Betts MD;  Location: [facility] OR     THORACIC SURGERY       MEDS:  Current Outpatient Medications   Medication     Acetaminophen (TYLENOL PO)     aspirin 81 MG EC tablet     biotin 1000 MCG TABS tablet     calcium carbonate (OS-OCTAVIANO 500 MG [place]. [place]) 500 MG tablet     chlorthalidone (HYGROTON) 25 MG tablet     letrozole (FEMARA) 2.5 MG tablet     levothyroxine (SYNTHROID/LEVOTHROID) 50 MCG tablet     moxifloxacin (VIGAMOX) 0.5 % ophthalmic solution     moxifloxacin (VIGAMOX) 0.5 % ophthalmic solution     multivitamin w/minerals (THERA-VIT-M) tablet     prednisoLONE acetate (PRED FORTE) 1 % ophthalmic suspension     prednisoLONE acetate (PRED FORTE) 1 % ophthalmic suspension     triamcinolone (KENALOG) 0.1 % external ointment     vitamin C (ASCORBIC ACID) 100 MG tablet     VITAMIN D,  CHOLECALCIFEROL, PO     No current facility-administered medications for this visit.     ALLERGIES:  Allergies   Allergen Reactions     Latex Rash     SKIN REACTION    SKIN REACTION     Sulfa Drugs Itching and Rash        PHYSICAL EXAM:  BP (!) 141/82   Pulse 95   Temp 98  F (36.7  C) (Oral)   Resp 16   Wt 63 kg (139 lb)   SpO2 100%   BMI 24.62 kg/m    KPS: 100%  General:  Well appearing adult female in NAD.  Alert and oriented x 3.  HEENT:  Normocephalic.  Sclera anicteric.  MMM.  No lesions of the oropharynx.  Lymph:  No palpable cervical, supraclavicular, or axillary LAD.  Chest:  CTA bilaterally.  No wheezes or crackles.  CV:  RRR.  Nl S1 and S2.    Breast:  Right breast lumpectomy changes.  Left mastectomy.  There are no discretely palpable masses of either the right breast or the left chest wall.  Abd:  Soft/ND.    Ext:  No pitting edema of the bilateral lower extremities.    Musculo:  Full ROM of the bilateral upper extremities.  Neuro:  Cranial nerves grossly intact.  Psych:  Mood and affect appear normal.    LABS REVIEWED THIS VISIT:  10/31/2022 Right breast screening mammogram:  I personally reviewed these images in clinic with the patient today.  There is no significant change when compared to prior.  No evidence of malignancy.    IMPRESSION/PLAN: 73 yo female with a h/o T2N0M0, grade II, ER positive, IA negative, HER2 non-amplified invasive lobular carcinoma of the left breast. She is s/p treatment with taxol and ganetespib followed by dose dense adriamycin and cyclophosphamide, left breast mastectomy, and has been on letrozole since 03/2016.    1.  Left breast ER-positive carcinoma:  Ms. Chambers is 6 years, 8 months out from excision of a left breast cancer.  She continues on letrozole.  She is tolerating the medication well.  We plan to treat her for a total of 10 years (through 03/2026).      She is asymptomatic of disease recurrence on history taken today.  Due to h/o bilateral breast cancers  and multiple gene mutations, we are performing high risk screening with both annual mammogram and breast MRI.  I personally reviewed the images of right breast screening mammogram performed today which are without concerning findings.  She will be due for breast MRI at the time of her return visit in 6 months.  Plan to stop breast MRIs at 73 yo.      2.  Cancer screening:  High risk breast screening with alternating breast MRI and mammogram as above.  Colon cancer screening per recommendations of PCP.     3.  Osteopenia:  DEXA bone density 09/28/2021 with a lowest T-score of -1.1, c/w osteopenia.  Continue calcium and vitamin D supplementation as well as daily weightbearing activity.     4.  Followup:  Breast MRI and visit with me in 6 months.    30 minutes spent on the date of the encounter doing chart review, review of test results, interpretation of tests, patient visit and documentation.      Brittany Villegas MD

## 2022-12-09 ENCOUNTER — MYC MEDICAL ADVICE (OUTPATIENT)
Dept: INTERNAL MEDICINE | Facility: CLINIC | Age: 72
End: 2022-12-09

## 2022-12-09 ENCOUNTER — MYC REFILL (OUTPATIENT)
Dept: INTERNAL MEDICINE | Facility: CLINIC | Age: 72
End: 2022-12-09

## 2022-12-09 DIAGNOSIS — I10 UNCONTROLLED HYPERTENSION: ICD-10-CM

## 2022-12-09 RX ORDER — CHLORTHALIDONE 25 MG/1
25 TABLET ORAL DAILY
Qty: 90 TABLET | Refills: 0 | Status: CANCELLED | OUTPATIENT
Start: 2022-12-09

## 2023-01-20 DIAGNOSIS — C50.512 MALIGNANT NEOPLASM OF LOWER-OUTER QUADRANT OF LEFT FEMALE BREAST (H): ICD-10-CM

## 2023-01-20 RX ORDER — LETROZOLE 2.5 MG/1
2.5 TABLET, FILM COATED ORAL DAILY
Qty: 90 TABLET | Refills: 3 | Status: SHIPPED | OUTPATIENT
Start: 2023-01-20 | End: 2024-02-06

## 2023-01-20 NOTE — TELEPHONE ENCOUNTER
"Letrozole 2.5mg tablet   Last prescribing provider: Angela Arteaga on 2/14/22    Last clinic visit date: 10/31/22    Recommendations for requested medication (if none, N/A): from 10/31/22 note, \"She continues on letrozole.  She is tolerating the medication well.  We plan to treat her for a total of 10 years (through 03/2026).\"    Any other pertinent information (if none, N/A): NA    Refilled: Y/N, if NO, why?    Routed to Dr. Brittany Villegas, who saw pt in clinic last.   "

## 2023-03-02 ENCOUNTER — MYC MEDICAL ADVICE (OUTPATIENT)
Dept: INTERNAL MEDICINE | Facility: CLINIC | Age: 73
End: 2023-03-02
Payer: MEDICARE

## 2023-03-28 DIAGNOSIS — I10 UNCONTROLLED HYPERTENSION: ICD-10-CM

## 2023-03-28 RX ORDER — CHLORTHALIDONE 25 MG/1
25 TABLET ORAL DAILY
Qty: 90 TABLET | Refills: 0 | Status: SHIPPED | OUTPATIENT
Start: 2023-03-28 | End: 2023-06-29

## 2023-03-28 NOTE — TELEPHONE ENCOUNTER
chlorthalidone (HYGROTON) 25 MG tablet      Last Written Prescription Date:  12/6/22  Last Fill Quantity: 90,   # refills: 0  Last Office Visit : 9/7/22  Future Office visit:  NONE    Routing refill request to provider for review/approval because:  Blood pressure out of range   Abnormal labs: creatinine, sodium

## 2023-04-21 ENCOUNTER — MYC MEDICAL ADVICE (OUTPATIENT)
Dept: INTERNAL MEDICINE | Facility: CLINIC | Age: 73
End: 2023-04-21
Payer: MEDICARE

## 2023-05-25 ENCOUNTER — OFFICE VISIT (OUTPATIENT)
Dept: DERMATOLOGY | Facility: CLINIC | Age: 73
End: 2023-05-25
Payer: MEDICARE

## 2023-05-25 DIAGNOSIS — D22.9 MULTIPLE MELANOCYTIC NEVI: Primary | ICD-10-CM

## 2023-05-25 DIAGNOSIS — Z85.828 HISTORY OF NONMELANOMA SKIN CANCER: ICD-10-CM

## 2023-05-25 PROCEDURE — 99213 OFFICE O/P EST LOW 20 MIN: CPT | Performed by: DERMATOLOGY

## 2023-05-25 ASSESSMENT — PAIN SCALES - GENERAL: PAINLEVEL: NO PAIN (0)

## 2023-05-25 NOTE — PROGRESS NOTES
[facility] Dermatology Note  Encounter Date: May 25, 2023  Office Visit     Dermatology Problem List:  1. Hx superficial BCC mid back, ED&C 07/26/22  2. Lobular breast cancer, s/p chemo, radiation, surgery  ____________________________________________    Assessment & Plan:    # Hx superficial BCC mid back  -well healed scar, no evidence of recurrence today  - reassurance    # Benign lesions: Multiple benign nevi, seborrheic keratoses, cherry angiomas. Explained to patient benign nature of lesion. No treatment is necessary at this time unless the lesion changes or becomes symptomatic.   - ABCDs of melanoma were discussed and self skin checks were advised.  - Sun precaution was advised including the use of sun screens of SPF 30 or higher, sun protective clothing, and avoidance of tanning beds.       Procedures Performed:   N/A    Follow-up: 1 year(s) in-person, or earlier for new or changing lesions    Staff and Medical Student:     Sophia Aariza, medical student, [employer]    Seen and staffed with Dr. Dinh    I was present with the medical student who participated in the service and in the documentation.  I have verified the history and personally performed the physical exam and medical decision making.  I agree with the assessment and plan of care as documented in the note.      Zack Dinh MD  Dermatology Attending  ____________________________________________    CC: Skin Check (6m FBSE. Has no knew areas of concern )    HPI:  Ms. Sushila Chambers is a(n) 73 year old female who presents today as a return patient for FBSE. She was last seen 07/26/22 for ED&C of superficial BCC.     Today, she states she has no lesions of concern. Nothing that is pruritic, tender, bleeding or changing. She tries to be careful with sun protection and uses sunscreen and hats.    Patient is otherwise feeling well, without additional skin concerns.    Labs:  N/A    Physical Exam:  Vitals: There were no  vitals taken for this visit.  SKIN: Full skin, which includes the head/face, both arms, chest, back, abdomen,both legs, genitalia and/or groin buttocks, digits and/or nails, was examined.  - multiple scattered red papules on the back, trunk, extremities  -multiple stuck on, tan papules on the back  -scattered brown macules and papules on the back, trunk and extremities  -well healed scar at site of ED&C    - No other lesions of concern on areas examined.     Medications:  Current Outpatient Medications   Medication     Acetaminophen (TYLENOL PO)     aspirin 81 MG EC tablet     biotin 1000 MCG TABS tablet     calcium carbonate (OS-OCTAVIANO 500 MG [place]. [place]) 500 MG tablet     chlorthalidone (HYGROTON) 25 MG tablet     letrozole (FEMARA) 2.5 MG tablet     levothyroxine (SYNTHROID/LEVOTHROID) 50 MCG tablet     multivitamin w/minerals (THERA-VIT-M) tablet     vitamin C (ASCORBIC ACID) 100 MG tablet     VITAMIN D, CHOLECALCIFEROL, PO     triamcinolone (KENALOG) 0.1 % external ointment     No current facility-administered medications for this visit.      Past Medical History:   Patient Active Problem List   Diagnosis     Hypothyroidism     Lung cancer (H)     Breast cancer, right breast (H)     Hyperlipidemia LDL goal <130     Grief     Breast cancer, left breast (H)     Encounter for antineoplastic chemotherapy     Malignant neoplasm of lower-outer quadrant of left female breast (H)     Diarrhea     Non-intractable vomiting with nausea, vomiting of unspecified type     Other specified anemias     Uncontrolled hypertension     Hypertension goal BP (blood pressure) < 140/90     Low bone density     Drug-induced polyneuropathy (H)     Drug induced insomnia (H)     CKD (chronic kidney disease) stage 3, GFR 30-59 ml/min (H)     Arthropod bite, initial encounter     Neoplasm of uncertain behavior of skin     AK (actinic keratosis)     History of nonmelanoma skin cancer     Skin cancer screening     Combined form of age-related  cataract, left eye     Combined form of age-related cataract, right eye     Superficial basal cell carcinoma     Past Medical History:   Diagnosis Date     Basal cell carcinoma      Breast cancer, right breast (H)      History of blood transfusion      Hypothyroid      Lung cancer (H) 2012    right     Uncontrolled hypertension 11/15/2017        CC Brittany Goodwin MD  [address] [facility] [address]  [place] [address] on close of this encounter.

## 2023-05-25 NOTE — NURSING NOTE
Dermatology Rooming Note    Sushila Chambers's goals for this visit include:   Chief Complaint   Patient presents with     Skin Check     6m FBSE. Has no knew areas of concern      Simran Franklin, Visit Facilitator     No

## 2023-05-25 NOTE — LETTER
5/25/2023       RE: Sushila Chambers  [address] [place] [address]     Dear Colleague,    Thank you for referring your patient, Sushila Chambers, to the [facility] DERMATOLOGY CLINIC [place] [facility]. Please see a copy of my visit note below.    [facility] Dermatology Note  Encounter Date: May 25, 2023  Office Visit     Dermatology Problem List:  1. Hx superficial BCC mid back, ED&C 07/26/22  2. Lobular breast cancer, s/p chemo, radiation, surgery  ____________________________________________    Assessment & Plan:    # Hx superficial BCC mid back  -well healed scar, no evidence of recurrence today  - reassurance    # Benign lesions: Multiple benign nevi, seborrheic keratoses, cherry angiomas. Explained to patient benign nature of lesion. No treatment is necessary at this time unless the lesion changes or becomes symptomatic.   - ABCDs of melanoma were discussed and self skin checks were advised.  - Sun precaution was advised including the use of sun screens of SPF 30 or higher, sun protective clothing, and avoidance of tanning beds.       Procedures Performed:   N/A    Follow-up: 1 year(s) in-person, or earlier for new or changing lesions    Staff and Medical Student:     Sophia Araiza, medical student, [facility]    Seen and staffed with Dr. Dinh    I was present with the medical student who participated in the service and in the documentation.  I have verified the history and personally performed the physical exam and medical decision making.  I agree with the assessment and plan of care as documented in the note.      Zack Dinh MD  Dermatology Attending  ____________________________________________    CC: Skin Check (6m FBSE. Has no knew areas of concern )    HPI:  Ms. Sushila Chambers is a(n) 73 year old female who presents today as a return patient for FBSE. She was last seen 07/26/22 for ED&C of  superficial BCC.     Today, she states she has no lesions of concern. Nothing that is pruritic, tender, bleeding or changing. She tries to be careful with sun protection and uses sunscreen and hats.    Patient is otherwise feeling well, without additional skin concerns.    Labs:  N/A    Physical Exam:  Vitals: There were no vitals taken for this visit.  SKIN: Full skin, which includes the head/face, both arms, chest, back, abdomen,both legs, genitalia and/or groin buttocks, digits and/or nails, was examined.  - multiple scattered red papules on the back, trunk, extremities  -multiple stuck on, tan papules on the back  -scattered brown macules and papules on the back, trunk and extremities  -well healed scar at site of ED&C    - No other lesions of concern on areas examined.     Medications:  Current Outpatient Medications   Medication    Acetaminophen (TYLENOL PO)    aspirin 81 MG EC tablet    biotin 1000 MCG TABS tablet    calcium carbonate (OS-OCTAVIANO 500 MG [place]. [place]) 500 MG tablet    chlorthalidone (HYGROTON) 25 MG tablet    letrozole (FEMARA) 2.5 MG tablet    levothyroxine (SYNTHROID/LEVOTHROID) 50 MCG tablet    multivitamin w/minerals (THERA-VIT-M) tablet    vitamin C (ASCORBIC ACID) 100 MG tablet    VITAMIN D, CHOLECALCIFEROL, PO    triamcinolone (KENALOG) 0.1 % external ointment     No current facility-administered medications for this visit.      Past Medical History:   Patient Active Problem List   Diagnosis    Hypothyroidism    Lung cancer (H)    Breast cancer, right breast (H)    Hyperlipidemia LDL goal <130    Grief    Breast cancer, left breast (H)    Encounter for antineoplastic chemotherapy    Malignant neoplasm of lower-outer quadrant of left female breast (H)    Diarrhea    Non-intractable vomiting with nausea, vomiting of unspecified type    Other specified anemias    Uncontrolled hypertension    Hypertension goal BP (blood pressure) < 140/90    Low bone density    Drug-induced polyneuropathy (H)     Drug induced insomnia (H)    CKD (chronic kidney disease) stage 3, GFR 30-59 ml/min (H)    Arthropod bite, initial encounter    Neoplasm of uncertain behavior of skin    AK (actinic keratosis)    History of nonmelanoma skin cancer    Skin cancer screening    Combined form of age-related cataract, left eye    Combined form of age-related cataract, right eye    Superficial basal cell carcinoma     Past Medical History:   Diagnosis Date    Basal cell carcinoma     Breast cancer, right breast (H)     History of blood transfusion     Hypothyroid     Lung cancer (H) 2012    right    Uncontrolled hypertension 11/15/2017        CC Brittany Goodwin MD  [address] [address] on close of this encounter.

## 2023-05-31 ENCOUNTER — MYC MEDICAL ADVICE (OUTPATIENT)
Dept: INTERNAL MEDICINE | Facility: CLINIC | Age: 73
End: 2023-05-31

## 2023-05-31 ENCOUNTER — ANCILLARY PROCEDURE (OUTPATIENT)
Dept: MRI IMAGING | Facility: CLINIC | Age: 73
End: 2023-05-31
Attending: INTERNAL MEDICINE
Payer: MEDICARE

## 2023-05-31 ENCOUNTER — ONCOLOGY VISIT (OUTPATIENT)
Dept: ONCOLOGY | Facility: CLINIC | Age: 73
End: 2023-05-31
Attending: INTERNAL MEDICINE
Payer: MEDICARE

## 2023-05-31 VITALS
DIASTOLIC BLOOD PRESSURE: 92 MMHG | WEIGHT: 139.9 LBS | HEART RATE: 89 BPM | OXYGEN SATURATION: 98 % | TEMPERATURE: 98.1 F | SYSTOLIC BLOOD PRESSURE: 134 MMHG | BODY MASS INDEX: 24.78 KG/M2

## 2023-05-31 DIAGNOSIS — Z17.0 MALIGNANT NEOPLASM OF LOWER-OUTER QUADRANT OF LEFT BREAST OF FEMALE, ESTROGEN RECEPTOR POSITIVE (H): Primary | ICD-10-CM

## 2023-05-31 DIAGNOSIS — Z85.3 PERSONAL HISTORY OF MALIGNANT NEOPLASM OF BREAST: ICD-10-CM

## 2023-05-31 DIAGNOSIS — M94.9 DISORDER OF BONE AND CARTILAGE: ICD-10-CM

## 2023-05-31 DIAGNOSIS — C50.512 MALIGNANT NEOPLASM OF LOWER-OUTER QUADRANT OF LEFT BREAST OF FEMALE, ESTROGEN RECEPTOR POSITIVE (H): Primary | ICD-10-CM

## 2023-05-31 DIAGNOSIS — Z79.811 LONG TERM (CURRENT) USE OF AROMATASE INHIBITORS: ICD-10-CM

## 2023-05-31 DIAGNOSIS — Z12.31 ENCOUNTER FOR SCREENING MAMMOGRAM FOR BREAST CANCER: ICD-10-CM

## 2023-05-31 DIAGNOSIS — M89.9 DISORDER OF BONE AND CARTILAGE: ICD-10-CM

## 2023-05-31 DIAGNOSIS — Z12.39 BREAST CANCER SCREENING, HIGH RISK PATIENT: ICD-10-CM

## 2023-05-31 PROCEDURE — G1010 CDSM STANSON: HCPCS | Performed by: RADIOLOGY

## 2023-05-31 PROCEDURE — G0463 HOSPITAL OUTPT CLINIC VISIT: HCPCS | Performed by: INTERNAL MEDICINE

## 2023-05-31 PROCEDURE — A9585 GADOBUTROL INJECTION: HCPCS | Performed by: RADIOLOGY

## 2023-05-31 PROCEDURE — 77049 MRI BREAST C-+ W/CAD BI: CPT | Mod: MG | Performed by: RADIOLOGY

## 2023-05-31 PROCEDURE — 99214 OFFICE O/P EST MOD 30 MIN: CPT | Performed by: INTERNAL MEDICINE

## 2023-05-31 RX ORDER — GADOBUTROL 604.72 MG/ML
7.5 INJECTION INTRAVENOUS ONCE
Status: COMPLETED | OUTPATIENT
Start: 2023-05-31 | End: 2023-05-31

## 2023-05-31 RX ADMIN — GADOBUTROL 6.5 ML: 604.72 INJECTION INTRAVENOUS at 12:36

## 2023-05-31 ASSESSMENT — PAIN SCALES - GENERAL: PAINLEVEL: NO PAIN (0)

## 2023-05-31 NOTE — LETTER
5/31/2023         RE: Sushila Chambers  [address] [place] [address]        Dear Colleague,    Thank you for referring your patient, Sushila Chambers, to the [facility] CANCER [facility]INIC. Please see a copy of my visit note below.    MEDICAL ONCOLOGY FOLLOW-UP PATIENT VISIT     NAME: Sushila Chambers     DATE: 5/31/2023    PRIMARY CARE PHYSICIAN: Meryl Muro    PATIENT ID: Multifocal, stage IIa, T2N0M0, ER positive, HI negative, HER2 non-amplified invasive lobular carcinoma of the left breast.     Oncology History: Ms. Chambers is a 72 year old female with a history of lung cancer and right breast cancer with more recent invasive lobular carcinoma of the left breast. Ms. Chambers was treated for right sided breast cancer in 2009 with right breast lumpectomy, radiation, and endocrine therapy. She self palpated a mass in the left breast in early August, 2015. Diagnostic mammogram and ultrasound showed 2 ill-defined spiculated masses at the 4:30 and 2:00 positions of the left breast. There was surrounding architectural distortion and the total area of involvement measured 6.5 cm. Targeted left breast ultrasound showed a multilobulated hypoechoic mass at the 4:00 position measuring 2.5 cm and an ill-defined hypoechoic mass at the 2:00 position measuring 2.4 cm.  Biopsy of the mass at the 4:00 position showed grade II invasive pleomorphic lobular carcinoma. Biopsy of the mass at the 2:00 position showed a grade II invasive lobular carcinoma. No angiolymphatic invasion or associated LCIS was seen in either specimen. Estrogen receptor staining was positive in >80% of tumor cell nuclei. Progesterone receptor staining was positive in <5% of tumor cell nuclei. HER2 was non-amplifed by FISH in both specimens.     Ms. Chambers enrolled on the ISPY2 clinical trial.  She received 12 weeks of ganetespib and Taxol and 4 cycles of dose dense AC from 9/29/15 - 2/1/16. On 2/24/16 she underwent left breast  "mastectomy and sentinel lymph node procedure under the care of Dr. Betts. Pathology showed a residual grade II 4.8 cm, pleomorphic invasive lobular carcinoma with associated LCIS. Invasive tumor cellularity was 30%.  There was evidence of perineural invasion, however, no lymphvascular space invasion. Two sentinel lymph nodes were negative.  She has been on adjuvant letrozole since 03/2016.    Ms. Chambers had genetic testing on 9/15/15.  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  Of note she had a number of alterations in RUFINA, BARD1, BRCA1/2, MUTYH, and TP53 that were classified as \"likely benign\" and \"benign\".    Interim History:   Ms. Chambers comes into clinic today for routine breast cancer follow-up.  She continues on treatment with letrozole.  She tolerates the medication well.  Since her last visit, her health has been very good.  She continues to do routine exercise including cardiovascular exercise as well as Pilates.  She denies recent illnesses.  She has no new bone or joint aches or pains.  She states any aches that she does have, seem to be normal for her age.  She has had no cough, shortness of breath, or chest pains.  She has no abdominal complaints.  She denies concerning breast lumps, pain, or swelling.  Occasionally, she will get a twinge of discomfort following certain movements.  She stretches daily and maintains full range of movement of her bilateral upper extremities.    PAST MEDICAL HISTORY:   Past Medical History:   Diagnosis Date    Basal cell carcinoma     Breast cancer, right breast (H)     History of blood transfusion     Hypothyroid     Lung cancer (H) 2012    right    Uncontrolled hypertension 11/15/2017       PAST SURGICAL HISTORY:  Past Surgical History:   Procedure Laterality Date    ABDOMEN SURGERY      BIOPSY, LUNG NODULE Right 2012    + cancer    COLONOSCOPY      ECTOPIC PREGNANCY SURGERY Left 1988    GENITOURINARY SURGERY      GYN SURGERY      INSERT PORT VASCULAR ACCESS   "    LUMPECTOMY BREAST Right 2010    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    MASTECTOMY SIMPLE, SENTINEL NODE, COMBINED Left 2/24/2016    Procedure: COMBINED MASTECTOMY SIMPLE, SENTINEL NODE;  Surgeon: Satnam Betts MD;  Location: [facility] OR    PHACOEMULSIFICATION WITH STANDARD INTRAOCULAR LENS IMPLANT Right 9/12/2022    Procedure: RIGHT EYE PHACOEMULSIFICATION, CATARACT, WITH  INTRAOCULAR LENS IMPLANT INSERTION TORIC LENS;  Surgeon: Ion Puente MD;  Location: [facility] OR    PHACOEMULSIFICATION WITH STANDARD INTRAOCULAR LENS IMPLANT Left 9/26/2022    Procedure: LEFT PHACOEMULSIFICATION, CATARACT, WITH STANDARD INTRAOCULAR LENS IMPLANT INSERTION;  Surgeon: Ion Puente MD;  Location: [facility] OR    REMOVE PORT VASCULAR ACCESS N/A 2/24/2016    Procedure: REMOVE PORT VASCULAR ACCESS;  Surgeon: Satnam Betts MD;  Location: [facility] OR    THORACIC SURGERY       MEDS:  Current Outpatient Medications   Medication    Acetaminophen (TYLENOL PO)    aspirin 81 MG EC tablet    biotin 1000 MCG TABS tablet    calcium carbonate (OS-OCTAVIANO 500 MG [place]. [place]) 500 MG tablet    chlorthalidone (HYGROTON) 25 MG tablet    letrozole (FEMARA) 2.5 MG tablet    levothyroxine (SYNTHROID/LEVOTHROID) 50 MCG tablet    multivitamin w/minerals (THERA-VIT-M) tablet    triamcinolone (KENALOG) 0.1 % external ointment    vitamin C (ASCORBIC ACID) 100 MG tablet    VITAMIN D, CHOLECALCIFEROL, PO     No current facility-administered medications for this visit.     ALLERGIES:  Allergies   Allergen Reactions    Latex Rash     SKIN REACTION    SKIN REACTION    Sulfa Antibiotics Itching and Rash        PHYSICAL EXAM:  BP (!) 134/92 (BP Location: Right arm, Patient Position: Sitting, Cuff Size: Adult Regular)   Pulse 89   Temp 98.1  F (36.7  C) (Oral)   Wt 63.5 kg (139 lb 14.4 oz)   SpO2 98%   BMI 24.78 kg/m    General:  Well appearing adult female in NAD.  Alert and oriented x 3.  HEENT:  Normocephalic.  Sclera anicteric.  MMM.  No lesions of the  oropharynx.  Lymph:  No palpable cervical, supraclavicular, or axillary LAD.  Chest:  CTA bilaterally.  No wheezes or crackles.  CV:  RRR.  Nl S1 and S2.    Breast:  Right breast is of increased fibroglandular density.  Right breast lumpectomy changes.  Left mastectomy.  There are no discretely palpable masses of either the right breast or the left chest wall.  Abd:  Soft/ND.    Ext:  No pitting edema of the bilateral lower extremities.    Musculo:  Full ROM of the bilateral upper extremities.  Neuro:  Cranial nerves grossly intact.  Gait is stable.  Able to climb on the exam table unaided.  Psych:  Mood and affect appear normal.    LABS REVIEWED THIS VISIT:  5/31/2023 Breast MRI:  I reviewed the images with the breast radiologist in clinic today.  Radiology read pending at the time of our visit.  - Small area of enhancement lateral right breast is unchanged compared to prior MRIs  - Normal appearing lymph nodes in the left axilla  - No suspicious enhancement or lymphadenopathy.    IMPRESSION/PLAN: 74 yo female with a h/o T2N0M0, grade II, ER positive, AK negative, HER2 non-amplified invasive lobular carcinoma of the left breast. She is s/p treatment with taxol and ganetespib followed by dose dense adriamycin and cyclophosphamide, left breast mastectomy, and has been on letrozole since 03/2016.    1.  Left breast ER-positive carcinoma:  Ms. Chambers is 7 years, 3 months out from excision of a left breast cancer.  She continues on letrozole.  She is tolerating the medication well.  We plan to treat her for a total of 10 years (through 03/2026).      She is asymptomatic of disease recurrence on history taken today.  Due to h/o bilateral breast cancers and multiple gene mutations, we are performing high risk screening with both annual mammogram and breast MRI.  I personally reviewed the images of the breast MRI performed today which are without suspicious enhancement or lymphadenopathy.  Radiology read was pending at the  time of our visit and formal result will be released to the patient in MyChart when available.  She will be due for right breast screening at the time of her return visit in 6 months.    2.  Cancer screening:  No change since last visit.  High risk breast screening with alternating breast MRI and mammogram as above.  Colon cancer screening per recommendations of PCP.     3.  Osteopenia:  DEXA bone density 09/28/2021 with a lowest T-score of -1.1, c/w osteopenia.  Continue calcium and vitamin D supplementation as well as daily weightbearing activity.  Will repeat a DEXA at the time of her return visit.     4.  Followup:    DEXA bone density scan, right breast screening mammogram, and visit with me 10/31/2023 or later.    30 minutes was spent on the date of the encounter doing chart review, review of test results, interpretation of tests, patient visit and documentation         Brittany Villegas MD

## 2023-05-31 NOTE — PROGRESS NOTES
MEDICAL ONCOLOGY FOLLOW-UP PATIENT VISIT     NAME: Sushila Chambers     DATE: 5/31/2023    PRIMARY CARE PHYSICIAN: Meryl Muro    PATIENT ID: Multifocal, stage IIa, T2N0M0, ER positive, OK negative, HER2 non-amplified invasive lobular carcinoma of the left breast.     Oncology History: Ms. Chambers is a 72 year old female with a history of lung cancer and right breast cancer with more recent invasive lobular carcinoma of the left breast. Ms. Chambers was treated for right sided breast cancer in 2009 with right breast lumpectomy, radiation, and endocrine therapy. She self palpated a mass in the left breast in early August, 2015. Diagnostic mammogram and ultrasound showed 2 ill-defined spiculated masses at the 4:30 and 2:00 positions of the left breast. There was surrounding architectural distortion and the total area of involvement measured 6.5 cm. Targeted left breast ultrasound showed a multilobulated hypoechoic mass at the 4:00 position measuring 2.5 cm and an ill-defined hypoechoic mass at the 2:00 position measuring 2.4 cm.  Biopsy of the mass at the 4:00 position showed grade II invasive pleomorphic lobular carcinoma. Biopsy of the mass at the 2:00 position showed a grade II invasive lobular carcinoma. No angiolymphatic invasion or associated LCIS was seen in either specimen. Estrogen receptor staining was positive in >80% of tumor cell nuclei. Progesterone receptor staining was positive in <5% of tumor cell nuclei. HER2 was non-amplifed by FISH in both specimens.     Ms. Chambers enrolled on the ISPY2 clinical trial.  She received 12 weeks of ganetespib and Taxol and 4 cycles of dose dense AC from 9/29/15 - 2/1/16. On 2/24/16 she underwent left breast mastectomy and sentinel lymph node procedure under the care of Dr. Betts. Pathology showed a residual grade II 4.8 cm, pleomorphic invasive lobular carcinoma with associated LCIS. Invasive tumor cellularity was 30%.  There was evidence of perineural invasion,  "however, no lymphvascular space invasion. Two sentinel lymph nodes were negative.  She has been on adjuvant letrozole since 03/2016.    Ms. Chambers had genetic testing on 9/15/15.  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  Of note she had a number of alterations in RUFINA, BARD1, BRCA1/2, MUTYH, and TP53 that were classified as \"likely benign\" and \"benign\".    Interim History:   Ms. Chambers comes into clinic today for routine breast cancer follow-up.  She continues on treatment with letrozole.  She tolerates the medication well.  Since her last visit, her health has been very good.  She continues to do routine exercise including cardiovascular exercise as well as Pilates.  She denies recent illnesses.  She has no new bone or joint aches or pains.  She states any aches that she does have, seem to be normal for her age.  She has had no cough, shortness of breath, or chest pains.  She has no abdominal complaints.  She denies concerning breast lumps, pain, or swelling.  Occasionally, she will get a twinge of discomfort following certain movements.  She stretches daily and maintains full range of movement of her bilateral upper extremities.    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     MASTECTOMY SIMPLE, SENTINEL NODE, COMBINED Left 2/24/2016    Procedure: COMBINED MASTECTOMY SIMPLE, SENTINEL NODE;  Surgeon: Satnam Betts MD;  Location:  OR "     PHACOEMULSIFICATION WITH STANDARD INTRAOCULAR LENS IMPLANT Right 9/12/2022    Procedure: RIGHT EYE PHACOEMULSIFICATION, CATARACT, WITH  INTRAOCULAR LENS IMPLANT INSERTION TORIC LENS;  Surgeon: Ion Puente MD;  Location: [facility] OR     PHACOEMULSIFICATION WITH STANDARD INTRAOCULAR LENS IMPLANT Left 9/26/2022    Procedure: LEFT PHACOEMULSIFICATION, CATARACT, WITH STANDARD INTRAOCULAR LENS IMPLANT INSERTION;  Surgeon: Ion Puente MD;  Location: [facility] OR     REMOVE PORT VASCULAR ACCESS N/A 2/24/2016    Procedure: REMOVE PORT VASCULAR ACCESS;  Surgeon: Satnam Betts MD;  Location: [facility] OR     THORACIC SURGERY       MEDS:  Current Outpatient Medications   Medication     Acetaminophen (TYLENOL PO)     aspirin 81 MG EC tablet     biotin 1000 MCG TABS tablet     calcium carbonate (OS-OCTAVIANO 500 MG [place]. [place]) 500 MG tablet     chlorthalidone (HYGROTON) 25 MG tablet     letrozole (FEMARA) 2.5 MG tablet     levothyroxine (SYNTHROID/LEVOTHROID) 50 MCG tablet     multivitamin w/minerals (THERA-VIT-M) tablet     triamcinolone (KENALOG) 0.1 % external ointment     vitamin C (ASCORBIC ACID) 100 MG tablet     VITAMIN D, CHOLECALCIFEROL, PO     No current facility-administered medications for this visit.     ALLERGIES:  Allergies   Allergen Reactions     Latex Rash     SKIN REACTION    SKIN REACTION     Sulfa Antibiotics Itching and Rash        PHYSICAL EXAM:  BP (!) 134/92 (BP Location: Right arm, Patient Position: Sitting, Cuff Size: Adult Regular)   Pulse 89   Temp 98.1  F (36.7  C) (Oral)   Wt 63.5 kg (139 lb 14.4 oz)   SpO2 98%   BMI 24.78 kg/m    General:  Well appearing adult female in NAD.  Alert and oriented x 3.  HEENT:  Normocephalic.  Sclera anicteric.  MMM.  No lesions of the oropharynx.  Lymph:  No palpable cervical, supraclavicular, or axillary LAD.  Chest:  CTA bilaterally.  No wheezes or crackles.  CV:  RRR.  Nl S1 and S2.    Breast:  Right breast is of increased fibroglandular density.  Right  breast lumpectomy changes.  Left mastectomy.  There are no discretely palpable masses of either the right breast or the left chest wall.  Abd:  Soft/ND.    Ext:  No pitting edema of the bilateral lower extremities.    Musculo:  Full ROM of the bilateral upper extremities.  Neuro:  Cranial nerves grossly intact.  Gait is stable.  Able to climb on the exam table unaided.  Psych:  Mood and affect appear normal.    LABS REVIEWED THIS VISIT:  5/31/2023 Breast MRI:  I reviewed the images with the breast radiologist in clinic today.  Radiology read pending at the time of our visit.  - Small area of enhancement lateral right breast is unchanged compared to prior MRIs  - Normal appearing lymph nodes in the left axilla  - No suspicious enhancement or lymphadenopathy.    IMPRESSION/PLAN: 74 yo female with a h/o T2N0M0, grade II, ER positive, [place] negative, HER2 non-amplified invasive lobular carcinoma of the left breast. She is s/p treatment with taxol and ganetespib followed by dose dense adriamycin and cyclophosphamide, left breast mastectomy, and has been on letrozole since 03/2016.    1.  Left breast ER-positive carcinoma:  Ms. Chambers is 7 years, 3 months out from excision of a left breast cancer.  She continues on letrozole.  She is tolerating the medication well.  We plan to treat her for a total of 10 years (through 03/2026).      She is asymptomatic of disease recurrence on history taken today.  Due to h/o bilateral breast cancers and multiple gene mutations, we are performing high risk screening with both annual mammogram and breast MRI.  I personally reviewed the images of the breast MRI performed today which are without suspicious enhancement or lymphadenopathy.  Radiology read was pending at the time of our visit and formal result will be released to the patient in 9You when available.  She will be due for right breast screening at the time of her return visit in 6 months.    2.  Cancer screening:  No change since  last visit.  High risk breast screening with alternating breast MRI and mammogram as above.  Colon cancer screening per recommendations of PCP.     3.  Osteopenia:  DEXA bone density 09/28/2021 with a lowest T-score of -1.1, c/w osteopenia.  Continue calcium and vitamin D supplementation as well as daily weightbearing activity.  Will repeat a DEXA at the time of her return visit.     4.  Followup:    DEXA bone density scan, right breast screening mammogram, and visit with me 10/31/2023 or later.    30 minutes was spent on the date of the encounter doing chart review, review of test results, interpretation of tests, patient visit and documentation

## 2023-05-31 NOTE — NURSING NOTE
"Oncology Rooming Note    May 31, 2023 12:55 PM   Sushila Chambers is a 73 year old female who presents for:    Chief Complaint   Patient presents with     Oncology Clinic Visit     Malignant neoplasm of lower-outer quadrant of left breast      Initial Vitals: BP (!) 134/92 (BP Location: Right arm, Patient Position: Sitting, Cuff Size: Adult Regular)   Pulse 89   Temp 98.1  F (36.7  C) (Oral)   Wt 63.5 kg (139 lb 14.4 oz)   SpO2 98%   BMI 24.78 kg/m   Estimated body mass index is 24.78 kg/m  as calculated from the following:    Height as of 9/26/22: 1.6 m (5' 3\").    Weight as of this encounter: 63.5 kg (139 lb 14.4 oz). Body surface area is 1.68 meters squared.  No Pain (0) Comment: Data Unavailable   No LMP recorded. Patient is postmenopausal.  Allergies reviewed: Yes  Medications reviewed: Yes    Medications: Medication refills not needed today.  Pharmacy name entered into [facility]:    [facility] - [place], [place] - [address] [id number] - [place] [address] [facility] [address]  [facility]  [facility] [place] [place] - [address]    Clinical concerns: none.       Ion John            "

## 2023-06-24 PROBLEM — D22.9 MULTIPLE MELANOCYTIC NEVI: Status: ACTIVE | Noted: 2023-06-24

## 2023-06-27 DIAGNOSIS — I10 UNCONTROLLED HYPERTENSION: ICD-10-CM

## 2023-06-29 ENCOUNTER — MYC MEDICAL ADVICE (OUTPATIENT)
Dept: INTERNAL MEDICINE | Facility: CLINIC | Age: 73
End: 2023-06-29
Payer: MEDICARE

## 2023-06-29 RX ORDER — CHLORTHALIDONE 25 MG/1
25 TABLET ORAL DAILY
Qty: 90 TABLET | Refills: 0 | Status: SHIPPED | OUTPATIENT
Start: 2023-06-29 | End: 2023-09-15

## 2023-06-29 NOTE — TELEPHONE ENCOUNTER
CHLORTHALIDONE 25MG TABLETS      Last Written Prescription Date:  3/28/23  Last Fill Quantity: 90,   # refills: 0  Last Office Visit : 9/7/22  Future Office visit:  9/15/23    Routing refill request to provider for review/approval because:  Blood pressure out of range   Abnormal labs: K+, Na  Already given minesh refill    Blood pressure under 140/90 in past 12 months        BP Readings from Last 3 Encounters:   05/31/23 (!) 134/92   10/31/22 (!) 141/82   09/26/22 138/76           Normal serum potassium on file in past 12 months        Recent Labs   Lab Test 09/07/22  1513   POTASSIUM 3.3*                 Normal serum sodium on file in past 12 months        Recent Labs   Lab Test 09/07/22  1513   *

## 2023-06-30 NOTE — TELEPHONE ENCOUNTER
chlorthalidone (HYGROTON) 25 MG tablet: rf available        chlorthalidone (HYGROTON) 25 MG tablet   90 tablet 0 6/29/2023  No  Sig - Route: Take 1 tablet (25 mg) by mouth daily - Oral  E-Prescribing Status: Receipt confirmed by pharmacy (6/29/2023  4:02 PM CDT)  [id number] - [place], [place] - [facility] [address] AT [facility] [address]  [phone]

## 2023-08-20 ENCOUNTER — HEALTH MAINTENANCE LETTER (OUTPATIENT)
Age: 73
End: 2023-08-20

## 2023-09-15 ENCOUNTER — LAB (OUTPATIENT)
Dept: INTERNAL MEDICINE | Facility: CLINIC | Age: 73
End: 2023-09-15

## 2023-09-15 ENCOUNTER — OFFICE VISIT (OUTPATIENT)
Dept: INTERNAL MEDICINE | Facility: CLINIC | Age: 73
End: 2023-09-15
Payer: MEDICARE

## 2023-09-15 VITALS
HEART RATE: 99 BPM | WEIGHT: 140.5 LBS | OXYGEN SATURATION: 99 % | SYSTOLIC BLOOD PRESSURE: 161 MMHG | DIASTOLIC BLOOD PRESSURE: 82 MMHG | BODY MASS INDEX: 24.89 KG/M2

## 2023-09-15 DIAGNOSIS — Z12.11 SPECIAL SCREENING FOR MALIGNANT NEOPLASMS, COLON: ICD-10-CM

## 2023-09-15 DIAGNOSIS — I10 HYPERTENSION GOAL BP (BLOOD PRESSURE) < 140/90: ICD-10-CM

## 2023-09-15 DIAGNOSIS — E03.9 HYPOTHYROIDISM, UNSPECIFIED TYPE: ICD-10-CM

## 2023-09-15 DIAGNOSIS — R73.09 ABNORMAL GLUCOSE: ICD-10-CM

## 2023-09-15 DIAGNOSIS — I10 UNCONTROLLED HYPERTENSION: ICD-10-CM

## 2023-09-15 DIAGNOSIS — Z12.11 SPECIAL SCREENING FOR MALIGNANT NEOPLASMS, COLON: Primary | ICD-10-CM

## 2023-09-15 PROCEDURE — 99214 OFFICE O/P EST MOD 30 MIN: CPT | Performed by: INTERNAL MEDICINE

## 2023-09-15 RX ORDER — LISINOPRIL 5 MG/1
5 TABLET ORAL DAILY
Qty: 90 TABLET | Refills: 3 | Status: SHIPPED | OUTPATIENT
Start: 2023-09-15 | End: 2023-12-21

## 2023-09-15 RX ORDER — CHLORTHALIDONE 25 MG/1
25 TABLET ORAL DAILY
Qty: 90 TABLET | Refills: 3 | Status: SHIPPED | OUTPATIENT
Start: 2023-09-15 | End: 2024-02-06

## 2023-09-15 RX ORDER — LISINOPRIL 5 MG/1
5 TABLET ORAL DAILY
Qty: 90 TABLET | Refills: 3 | Status: SHIPPED | OUTPATIENT
Start: 2023-09-15 | End: 2023-09-15

## 2023-09-15 NOTE — PROGRESS NOTES
Medicare Annual Wellness Questionnaire:  This 73 year old year old female presents for a Medicare Wellness Exam.    Providers/clinics involved in care:  Patient Care Team         Relationship Specialty Notifications Start End    Brittany Wills MD PCP - General Internal Medicine  8/29/22     Phone: [phone] Pager: [phone] Fax: [phone]        [address] [place] [address]    Zaihda Zambrano MD MD Dermatology  7/18/16     Phone: [phone] Fax: [phone]         [address] [place] [address]    Brittany Villegas MD Assigned Cancer Care Provider   4/10/22     Phone: [phone] Fax: [phone]         [address] [place] [address]    Zack Dinh MD MD Dermapathology  4/28/22     Phone: [phone] Fax: [phone]         [address] [place] [address]    Shantel Grady MD (Inactive) MD Ophthalmology  4/28/22     Phone: [phone] Fax: [phone]         [address] [place] [address]    Brittany Wills MD Referring Physician Internal Medicine  4/28/22     Referring to EYE    Phone: [phone] Pager: [phone] Fax: [phone]        [address] [place] [address]    Brittany Wills MD Assigned PCP   9/3/22     Phone: [phone] Pager: [phone] Fax: [phone]        [address] [place] [address]    Ion Puente MD MD Ophthalmology  9/7/22     Phone: [phone] Fax: [phone]         [address] [place] [address]    Zack Dinh MD Assigned Surgical Provider   7/1/23     Phone: [phone] Fax: [phone]         [address] [facility] [address]            Fall Risk Assessment:    Have you fallen 2 or more times in the last year?  No    How many times were you injured due to a fall in the last year?  M/a    PHQ-2:    Over the last 2 weeks, how often have you been  bothered by feeling down, depressed, or hopeless?     Not at all (0)     Over the last 2 weeks, how often have you had little interest or pleasure in doing things?     Not at all (0)     Social History:    What is your marital status?      Who lives in your household?  Dog and me    Does your home have loose rugs in the hallway:     Yes     Does your home have grab bars in the bathroom:    No    Does your home have handrails on the stairs?  Yes     Does your home have poorly lit areas?    No    Do you feel threatened or controlled by a partner, ex-partner or anyone in your life?   No    Has anyone hurt you physically, for example by pushing, hitting, slapping or kicking you   or forcing you to have sex?   No    Do you need help with the phone, transportation, shopping, preparing meals, housework, laundry, medications or managing money?   No    Sexual Health:    Are you sexually active?    No    Have you had any sexually transmitted infections in the last year?   No    Do you have any sexual concerns?    No    Women Only:    Women: What year did you stop having periods (approximate age)?  1990s    Women: Any vaginal bleeding in the last year?    No    Women: Have you ever had an abnormal Pap smear?    Yes 2010    General Health Assessment:    Have you noticed any hearing difficulties?   No    Do you wear hearing aids?   No    Have you seen a hearing professional such as an audiologist in the last 1 year?   No    Do you have vision difficulty?    No    Do you wear glasses or contacts?   Yes     Have you seen an eye doctor in the last 1 year?   Yes     How many servings of fruits and vegetables do you eat a day?  2-4    How often do you exercise in a week?  5    How long and what kind of exercise do you do?  Cardio, strength, pilates    Tobacco and Alcohol History:    Do you use tobacco/nicotine products?    No    If yes, please list the method of use and average weekly consumption?  N/a    Do you use any other  drugs?   No         Do you drink alcohol?   Yes     If you drink alcohol, how many drinks per week?  2      Advance Directive:    Have you completed an Advance Directives document?  Yes       If yes, have you given a copy to the clinic?   No    Do you need information on Advance Directives?   Yes

## 2023-09-15 NOTE — PATIENT INSTRUCTIONS
To schedule a lab appointment at the [facility], please call [phone].

## 2023-09-15 NOTE — NURSING NOTE
Sushila Chambers is a 73 year old female that presents in clinic today for the following:     Chief Complaint   Patient presents with    Physical     Pt here for physical/wellness       The patient's allergies and medications were reviewed. The patient's vitals were obtained without incident. The patient does not have any other questions for the provider.     Gamaliel Heath, EMT at 9:14 AM on 9/15/2023.  Primary Care Clinic: [phone]

## 2023-09-15 NOTE — PROGRESS NOTES
Sushila Chambers is a pleasant 73 year old year old female coming in today for her annual physical and wellness visit.    She continues to take biotin, calcium, vitamin C, vitamin D, and vitamin with minerals. I advised the patient of the interaction of biotin and TSH lab results and she is fine continuing her supplement regiment. CMP recheck.     The patient denies fatigue, weight gain, or other thyroid disorder symptoms.     Sushila is hypertensive in clinic at 161/82. Stated she had car trouble prior to coming to clinic but has been elevated for the last year. She has plans to start a cardio program with a  and will monitor her BP at home. Endorsed Fhx of hypertension, denies history of asthma or dry cough. Decision made to initiate lisinopril. Chlorthalidone also refilled today.     She is due for colon cancer screening, she has opted for cologuard. Endorsed history of vomiting with Golytely prep with inadequate emptying causing colonoscopies to be canceled. .     The patient is due for routine labs. Sushila will receive the most up to date COVID-19 and influenza vaccinations at their local pharmacy. Up to date on all other health care maintenance.       BP (!) 161/82 (BP Location: Right arm, Patient Position: Sitting, Cuff Size: Adult Regular)   Pulse 99   Wt 63.7 kg (140 lb 8 oz)   SpO2 99%   BMI 24.89 kg/m      PHYSICAL EXAM    Constitutional: no distress, comfortable, pleasant   Eyes: anicteric, normal extra-ocular movements   Ears, Nose and Throat: tympanic membranes clear, neck supple with full range of motion, no thyromegaly.   Cardiovascular: regular rate and rhythm, normal S1 and S2, no murmurs, rubs or gallops, peripheral pulses full and symmetric   Respiratory: clear to auscultation, no wheezes or crackles, normal breath sounds   Gastrointestinal: positive bowel sounds, nontender, no hepatosplenomegaly, no masses   Musculoskeletal: knees full range of motion, no effusion  Skin: no  concerning lesions, no jaundice   Neurological:  normal gait, no tremor   Psychological: appropriate mood   Lymphatic: no cervical lymphadenopathy and no pedal edema      ASSESSMENT & PLAN    Sushila was seen today for physical.    Diagnoses and all orders for this visit:    Special screening for malignant neoplasms, colon  -     COLOGUARD(EXACT SCIENCES); Future    Hypothyroidism, unspecified type  -     TSH with free T4 reflex; Future    Hypertension goal BP (blood pressure) < 140/90  -     Comprehensive metabolic panel (BMP + Alb, Alk Phos, ALT, AST, Total. Bili, TP); Future  -     Discontinue: lisinopril (ZESTRIL) 5 MG tablet; Take 1 tablet (5 mg) by mouth daily  -     lisinopril (ZESTRIL) 5 MG tablet; Take 1 tablet (5 mg) by mouth daily    Abnormal glucose  -     Lipid Profile FASTING; Future  -     Hemoglobin A1c; Future    Uncontrolled hypertension  -     chlorthalidone (HYGROTON) 25 MG tablet; Take 1 tablet (25 mg) by mouth daily      RTC in four weeks on video visit to review home BP readings    Brittany Goodwin MD        Scribe Disclosure:   I, Ralph Bush, am serving as a scribe; to document services personally performed by Dr. Brittany Goodwin- -based on data collection and the provider's statements to me.     Provider Disclosure:  I agree with above History, Review of Systems, Physical exam and Plan.  I have reviewed the content of the documentation and have edited it as needed. I have personally performed the services documented here and the documentation accurately represents those services and the decisions I have made.      Electronically signed by:  Dr. Brittany Goodwin

## 2023-09-18 ENCOUNTER — MYC MEDICAL ADVICE (OUTPATIENT)
Dept: INTERNAL MEDICINE | Facility: CLINIC | Age: 73
End: 2023-09-18
Payer: MEDICARE

## 2023-10-04 ENCOUNTER — LAB (OUTPATIENT)
Dept: LAB | Facility: CLINIC | Age: 73
End: 2023-10-04
Payer: MEDICARE

## 2023-10-04 DIAGNOSIS — I10 HYPERTENSION GOAL BP (BLOOD PRESSURE) < 140/90: ICD-10-CM

## 2023-10-04 DIAGNOSIS — R73.09 ABNORMAL GLUCOSE: ICD-10-CM

## 2023-10-04 DIAGNOSIS — E03.9 HYPOTHYROIDISM, UNSPECIFIED TYPE: ICD-10-CM

## 2023-10-04 LAB
ALBUMIN SERPL BCG-MCNC: 4.4 G/DL (ref 3.5–5.2)
ALP SERPL-CCNC: 47 U/L (ref 35–104)
ALT SERPL W P-5'-P-CCNC: 16 U/L (ref 0–50)
ANION GAP SERPL CALCULATED.3IONS-SCNC: 12 MMOL/L (ref 7–15)
AST SERPL W P-5'-P-CCNC: 18 U/L (ref 0–45)
BILIRUB SERPL-MCNC: 0.9 MG/DL
BUN SERPL-MCNC: 13.8 MG/DL (ref 8–23)
CALCIUM SERPL-MCNC: 10.6 MG/DL (ref 8.8–10.2)
CHLORIDE SERPL-SCNC: 93 MMOL/L (ref 98–107)
CHOLEST SERPL-MCNC: 290 MG/DL
CREAT SERPL-MCNC: 0.85 MG/DL (ref 0.51–0.95)
DEPRECATED HCO3 PLAS-SCNC: 28 MMOL/L (ref 22–29)
EGFRCR SERPLBLD CKD-EPI 2021: 71 ML/MIN/1.73M2
GLUCOSE SERPL-MCNC: 109 MG/DL (ref 70–99)
HBA1C MFR BLD: 5.6 %
HDLC SERPL-MCNC: 66 MG/DL
LDLC SERPL CALC-MCNC: 191 MG/DL
NONHDLC SERPL-MCNC: 224 MG/DL
POTASSIUM SERPL-SCNC: 3.5 MMOL/L (ref 3.4–5.3)
PROT SERPL-MCNC: 7.1 G/DL (ref 6.4–8.3)
SODIUM SERPL-SCNC: 133 MMOL/L (ref 135–145)
TRIGL SERPL-MCNC: 164 MG/DL
TSH SERPL DL<=0.005 MIU/L-ACNC: 3.76 UIU/ML (ref 0.3–4.2)

## 2023-10-04 PROCEDURE — 84443 ASSAY THYROID STIM HORMONE: CPT | Performed by: PATHOLOGY

## 2023-10-04 PROCEDURE — 99000 SPECIMEN HANDLING OFFICE-LAB: CPT | Performed by: PATHOLOGY

## 2023-10-04 PROCEDURE — 83036 HEMOGLOBIN GLYCOSYLATED A1C: CPT | Performed by: INTERNAL MEDICINE

## 2023-10-04 PROCEDURE — 80061 LIPID PANEL: CPT | Performed by: PATHOLOGY

## 2023-10-04 PROCEDURE — 80053 COMPREHEN METABOLIC PANEL: CPT | Performed by: PATHOLOGY

## 2023-10-04 PROCEDURE — 36415 COLL VENOUS BLD VENIPUNCTURE: CPT | Performed by: PATHOLOGY

## 2023-10-16 ENCOUNTER — VIRTUAL VISIT (OUTPATIENT)
Dept: INTERNAL MEDICINE | Facility: CLINIC | Age: 73
End: 2023-10-16
Payer: MEDICARE

## 2023-10-16 ENCOUNTER — MYC MEDICAL ADVICE (OUTPATIENT)
Dept: INTERNAL MEDICINE | Facility: CLINIC | Age: 73
End: 2023-10-16

## 2023-10-16 DIAGNOSIS — E78.5 HYPERLIPIDEMIA LDL GOAL <130: Primary | ICD-10-CM

## 2023-10-16 DIAGNOSIS — E55.9 VITAMIN D DEFICIENCY: ICD-10-CM

## 2023-10-16 DIAGNOSIS — E03.9 HYPOTHYROIDISM, UNSPECIFIED TYPE: ICD-10-CM

## 2023-10-16 DIAGNOSIS — I10 BENIGN ESSENTIAL HYPERTENSION: ICD-10-CM

## 2023-10-16 PROCEDURE — 99214 OFFICE O/P EST MOD 30 MIN: CPT | Mod: 95 | Performed by: INTERNAL MEDICINE

## 2023-10-16 RX ORDER — ROSUVASTATIN CALCIUM 5 MG/1
5 TABLET, COATED ORAL DAILY
Qty: 90 TABLET | Refills: 3 | Status: SHIPPED | OUTPATIENT
Start: 2023-10-16 | End: 2024-02-06

## 2023-10-16 NOTE — NURSING NOTE
Is the patient currently in the state of [place]? YES    Visit mode:VIDEO    If the visit is dropped, the patient can be reconnected by: VIDEO VISIT: Text to cell phone:   Telephone Information:   Mobile [phone]       Will anyone else be joining the visit? NO  (If patient encounters technical issues they should call [phone])    How would you like to obtain your AVS? MyChart    Are changes needed to the allergy or medication list? Pt stated no changes to allergies and Pt stated no med changes    Reason for visit: RECHECK and Results (Blood test results)    FOREST MIRANDA

## 2023-10-16 NOTE — PROGRESS NOTES
Sushila was started on lisinopril about four weeks ago.   No cough or side effects noted; may have caught a cold from her granddaughter.  Had loose stools at the very beginning.     BP readings have been more favorable: 120s systolic, over 70s.      We also discussed statin use given her recent cholesterol panel.  She is willing to start rosuvastatin.  Risks, side effects, benefits were discussed.    Asking about what supplements to take; I checked on her vitamin D supplement, she states she is still taking it and will recheck lab value.    Current Outpatient Medications:     Acetaminophen (TYLENOL PO), Take 500 mg by mouth every 4 hours as needed for mild pain or fever Reported on 3/21/2017, Disp: , Rfl:     aspirin 81 MG EC tablet, Take 81 mg by mouth daily, Disp: , Rfl:     biotin 1000 MCG TABS tablet, Take by mouth daily, Disp: , Rfl:     calcium carbonate (OS-OCTAVIANO 500 MG [place]. [place]) 500 MG tablet, Take by mouth 2 times daily, Disp: , Rfl:     chlorthalidone (HYGROTON) 25 MG tablet, Take 1 tablet (25 mg) by mouth daily, Disp: 90 tablet, Rfl: 3    letrozole (FEMARA) 2.5 MG tablet, Take 1 tablet (2.5 mg) by mouth daily, Disp: 90 tablet, Rfl: 3    levothyroxine (SYNTHROID/LEVOTHROID) 50 MCG tablet, Take 1 tablet (50 mcg) by mouth daily, Disp: 90 tablet, Rfl: 3    lisinopril (ZESTRIL) 5 MG tablet, Take 1 tablet (5 mg) by mouth daily, Disp: 90 tablet, Rfl: 3    multivitamin w/minerals (THERA-VIT-M) tablet, Take 1 tablet by mouth daily, Disp: , Rfl:     triamcinolone (KENALOG) 0.1 % external ointment, Apply topically 2 times daily To areas of bug bites.  Avoid use on face and neck. (Patient not taking: Reported on 10/31/2022), Disp: 30 g, Rfl: 1    vitamin C (ASCORBIC ACID) 100 MG tablet, , Disp: , Rfl:     VITAMIN D, CHOLECALCIFEROL, PO, Take by mouth daily, Disp: , Rfl:      On exam, she is alert, in NAD.  No cough or wheezing noted.  Visible skin is without rash or erythema.  Mood/affect seem upbeat.    A/P Sushila was  seen today for recheck and results.    Diagnoses and all orders for this visit:    Hyperlipidemia LDL goal <130  -     rosuvastatin (CRESTOR) 5 MG tablet; Take 1 tablet (5 mg) by mouth daily  -     Lipid Profile FASTING; Future    Benign essential hypertension  -     Comprehensive metabolic panel (BMP + Alb, Alk Phos, ALT, AST, Total. Bili, TP); Future    Hypothyroidism, unspecified type  -     TSH with free T4 reflex; Future    Vitamin D deficiency  -     Vitamin D Deficiency; Future     Brittany Goodwin MD      Virtual Visit Details    Type of service:  Video Visit started 12:15 ended 12:30 with another 15 minutes chart review and documentation same day    Originating Location (pt. Location): Home    Distant Location (provider location):  On-site  Platform used for Video Visit: Alexander

## 2023-10-16 NOTE — PATIENT INSTRUCTIONS
Thank you for visiting the Primary Care Center today at the [facility]! The following is some information about our clinic:     Primary Care Center Frequently-Asked Questions    (1) How do I schedule appointments at the [facility]?     Primary Care--to schedule or make changes to an existing appointment, please call our primary care line at [phone].    Labs--to schedule a lab appointment at the [facility] you can use Totsy or call [phone]. If you have a [place] location that is closer to home, you can reach out to that location for scheduling options.     Imaging--if you need to schedule a CT, X-ray, MRI, ultrasound, or other imaging study you can call [phone] to schedule at the [facility] or any other [facility] imaging location.     Referrals--if a referral to another specialty was ordered you can expect a phone call from their scheduling team. If you have not heard from them in a week, please call us or send us a Totsy message to check the status or get a scheduling number. Please note that this only applies to internal [facility] referrals. If the referral is external you would need to contact their office for scheduling.     (2) I have a question about my visit, who do I contact?     You can call us at the primary care line at [phone] to ask questions about your visit. You can also send a secure message through Totsy, which is reviewed by clinic staff. Please note that Totsy messages have a twenty-four to forty-eight business hour turnaround time and should not be used for urgent concerns.    (3) How will I get the results of my tests?    If you are signed up for [facility] all tests will be released to you within twenty-four hours of resulting. Please allow three to five days for your doctor to review your results and place a note interpreting the results. If you do not have [email or web address] you will receive your  results through mail seven to ten business days following the return of the tests. Please note that if there should be any urgent or concerning results that your doctor or their registered nurse will reach out to you the same day as the tests come back. If you have follow up questions about your results or would like to discuss the results in detail please schedule a follow up with your provider either in person or virtually.     (4) How do I get refills of my prescriptions?     You should always first contact your pharmacy for refills of your medications. If submitting a refill request on [facility], please be sure to submit the request only once--repeat requests can cause delays in refill. If you are requesting a NEW medication or a medication related to new symptoms you will need to schedule an appointment with a provider prior to approval. Please note: Routine medication refills have up to one to three business day turnaround whereas controlled substances refills have up to five to seven business day turnaround.    (5) I have new symptoms, what do I do?     If you are having an immediate medical emergency, you should dial 911 for assistance.   For anything urgent that needs to be seen within a few hours to one day you should visit a local urgent care for assistance.  For non-urgent symptoms that need to be seen within a few days to a week you can schedule with an available provider in primary care by going to Plink or calling [phone].   If you are not sure how serious your symptoms are or you would like to receive medical advice you can always call [phone] to speak with a triage nurse.

## 2023-11-06 DIAGNOSIS — E03.9 HYPOTHYROIDISM, UNSPECIFIED TYPE: ICD-10-CM

## 2023-11-08 RX ORDER — LEVOTHYROXINE SODIUM 50 UG/1
50 TABLET ORAL DAILY
Qty: 90 TABLET | Refills: 3 | Status: SHIPPED | OUTPATIENT
Start: 2023-11-08 | End: 2024-02-06

## 2023-11-08 NOTE — TELEPHONE ENCOUNTER
levothyroxine (SYNTHROID/LEVOTHROID) 50 MCG tablet 90 tablet 3 9/26/2022  Last Office Visit : 10/16/2023   Future Office visit:  0    TSH   Date Value Ref Range Status   10/04/2023 3.76 0.30 - 4.20 uIU/mL Final   06/29/2021 2.44 0.40 - 4.00 mU/L Final

## 2023-11-13 ENCOUNTER — ANCILLARY PROCEDURE (OUTPATIENT)
Dept: MAMMOGRAPHY | Facility: CLINIC | Age: 73
End: 2023-11-13
Attending: INTERNAL MEDICINE
Payer: MEDICARE

## 2023-11-13 ENCOUNTER — ONCOLOGY VISIT (OUTPATIENT)
Dept: ONCOLOGY | Facility: CLINIC | Age: 73
End: 2023-11-13
Attending: INTERNAL MEDICINE
Payer: MEDICARE

## 2023-11-13 ENCOUNTER — ANCILLARY PROCEDURE (OUTPATIENT)
Dept: BONE DENSITY | Facility: CLINIC | Age: 73
End: 2023-11-13
Attending: INTERNAL MEDICINE
Payer: MEDICARE

## 2023-11-13 VITALS
SYSTOLIC BLOOD PRESSURE: 164 MMHG | DIASTOLIC BLOOD PRESSURE: 83 MMHG | HEART RATE: 50 BPM | RESPIRATION RATE: 12 BRPM | BODY MASS INDEX: 25.21 KG/M2 | TEMPERATURE: 98.2 F | WEIGHT: 142.3 LBS | OXYGEN SATURATION: 99 %

## 2023-11-13 DIAGNOSIS — M94.9 DISORDER OF BONE AND CARTILAGE: ICD-10-CM

## 2023-11-13 DIAGNOSIS — Z79.811 LONG TERM (CURRENT) USE OF AROMATASE INHIBITORS: ICD-10-CM

## 2023-11-13 DIAGNOSIS — M85.851 OSTEOPENIA OF NECK OF RIGHT FEMUR: ICD-10-CM

## 2023-11-13 DIAGNOSIS — C50.512 MALIGNANT NEOPLASM OF LOWER-OUTER QUADRANT OF LEFT BREAST OF FEMALE, ESTROGEN RECEPTOR POSITIVE (H): Primary | ICD-10-CM

## 2023-11-13 DIAGNOSIS — Z17.0 MALIGNANT NEOPLASM OF LOWER-OUTER QUADRANT OF LEFT BREAST OF FEMALE, ESTROGEN RECEPTOR POSITIVE (H): Primary | ICD-10-CM

## 2023-11-13 DIAGNOSIS — Z12.31 ENCOUNTER FOR SCREENING MAMMOGRAM FOR BREAST CANCER: ICD-10-CM

## 2023-11-13 DIAGNOSIS — M89.9 DISORDER OF BONE AND CARTILAGE: ICD-10-CM

## 2023-11-13 PROCEDURE — 99214 OFFICE O/P EST MOD 30 MIN: CPT | Performed by: INTERNAL MEDICINE

## 2023-11-13 PROCEDURE — 77080 DXA BONE DENSITY AXIAL: CPT | Performed by: INTERNAL MEDICINE

## 2023-11-13 PROCEDURE — G0463 HOSPITAL OUTPT CLINIC VISIT: HCPCS | Performed by: INTERNAL MEDICINE

## 2023-11-13 PROCEDURE — 77063 BREAST TOMOSYNTHESIS BI: CPT | Mod: 52 | Performed by: RADIOLOGY

## 2023-11-13 PROCEDURE — 77067 SCR MAMMO BI INCL CAD: CPT | Mod: 52 | Performed by: RADIOLOGY

## 2023-11-13 ASSESSMENT — PAIN SCALES - GENERAL: PAINLEVEL: NO PAIN (0)

## 2023-11-13 NOTE — NURSING NOTE
"Oncology Rooming Note    November 13, 2023 2:56 PM   Sushila Chambers is a 73 year old female who presents for:    Chief Complaint   Patient presents with    Oncology Clinic Visit     Malignant neoplasm of lower-outer quadrant of left breast      Initial Vitals: BP (!) 164/83 (BP Location: Right arm, Patient Position: Sitting, Cuff Size: Adult Regular)   Pulse 50   Temp 98.2  F (36.8  C) (Oral)   Resp 12   Wt 64.5 kg (142 lb 4.8 oz)   SpO2 99%   BMI 25.21 kg/m   Estimated body mass index is 25.21 kg/m  as calculated from the following:    Height as of 9/26/22: 1.6 m (5' 3\").    Weight as of this encounter: 64.5 kg (142 lb 4.8 oz). Body surface area is 1.69 meters squared.  No Pain (0) Comment: Data Unavailable   No LMP recorded. Patient is postmenopausal.  Allergies reviewed: Yes  Medications reviewed: Yes    Medications: Medication refills not needed today.  Pharmacy name entered into [facility]:    [facility] - [place], [place] - [address] [id number] - [place] [address] [facility] [address]  [address]  [facility]  [employer].[facility] - [place], [place] - [address]    Clinical concerns: none.       Ion John"

## 2023-11-13 NOTE — LETTER
11/13/2023         RE: Sushila Chambers  [address] [place] [address]        Dear Colleague,    Thank you for referring your patient, Sushila Chambers, to the [facility]. Please see a copy of my visit note below.    MEDICAL ONCOLOGY FOLLOW-UP PATIENT VISIT     NAME: Sushila Chambers     DATE: 11/13/2023    PRIMARY CARE PHYSICIAN: Meryl Muro    PATIENT ID: Multifocal, stage IIa, T2N0M0, ER positive, [place] negative, HER2 non-amplified invasive lobular carcinoma of the left breast.     Oncology History: Ms. Chambers is a 73 year old female with a history of lung cancer and right breast cancer with more recent invasive lobular carcinoma of the left breast. Ms. Chambers was treated for right sided breast cancer in 2009 with right breast lumpectomy, radiation, and endocrine therapy. She self palpated a mass in the left breast in early August, 2015. Diagnostic mammogram and ultrasound showed 2 ill-defined spiculated masses at the 4:30 and 2:00 positions of the left breast. There was surrounding architectural distortion and the total area of involvement measured 6.5 cm. Targeted left breast ultrasound showed a multilobulated hypoechoic mass at the 4:00 position measuring 2.5 cm and an ill-defined hypoechoic mass at the 2:00 position measuring 2.4 cm.  Biopsy of the mass at the 4:00 position showed grade II invasive pleomorphic lobular carcinoma. Biopsy of the mass at the 2:00 position showed a grade II invasive lobular carcinoma. No angiolymphatic invasion or associated LCIS was seen in either specimen. Estrogen receptor staining was positive in >80% of tumor cell nuclei. Progesterone receptor staining was positive in <5% of tumor cell nuclei. HER2 was non-amplifed by FISH in both specimens.     Ms. Chambers enrolled on the ISPY2 clinical trial.  She received 12 weeks of ganetespib and Taxol and 4 cycles of dose dense AC from 9/29/15 - 2/1/16. On 2/24/16 she underwent left breast  "mastectomy and sentinel lymph node procedure under the care of Dr. Betts. Pathology showed a residual grade II 4.8 cm, pleomorphic invasive lobular carcinoma with associated LCIS. Invasive tumor cellularity was 30%.  There was evidence of perineural invasion, however, no lymphvascular space invasion. Two sentinel lymph nodes were negative.  She has been on adjuvant letrozole since 03/2016.    Ms. Chambers had genetic testing on 9/15/15.  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  Of note she had a number of alterations in RUFINA, BARD1, BRCA1/2, MUTYH, and TP53 that were classified as \"likely benign\" and \"benign\".    Interim History:   Ms. Chambers comes into clinic for routine breast cancer follow up.  She is in good health and is without concerns or complaints.  She will be 74 years old in February.  She feels remarkably well.  She is doing pilates once a week.  She is working with a  once a week and does routine cardiovascular exercise as well as strength training.  She states she has had a prolonged cold, which she believes she got from her 5 yo granddaughter.  She has residual cough.  She denies shortness of breath or chest pain.  She has no abdominal complaints.  She has no new bone or joint pains.  She denies lumps, nodularity, pain or swelling of either the right breast or the left chest wall.      PAST MEDICAL HISTORY:   Past Medical History:   Diagnosis Date    Basal cell carcinoma     Breast cancer, right breast (H)     History of blood transfusion     Hypothyroid     Lung cancer (H) 2012    right    Uncontrolled hypertension 11/15/2017       PAST SURGICAL HISTORY:  Past Surgical History:   Procedure Laterality Date    ABDOMEN SURGERY      BIOPSY, LUNG NODULE Right 2012    + cancer    COLONOSCOPY      ECTOPIC PREGNANCY SURGERY Left 1988    GENITOURINARY SURGERY      GYN SURGERY      INSERT PORT VASCULAR ACCESS      LUMPECTOMY BREAST Right 2010    LUNG SURGERY Right 2001    " histoplasmosis    MASTECTOMY SIMPLE, SENTINEL NODE, COMBINED Left 2/24/2016    Procedure: COMBINED MASTECTOMY SIMPLE, SENTINEL NODE;  Surgeon: Satnam Betts MD;  Location:  OR    PHACOEMULSIFICATION WITH STANDARD INTRAOCULAR LENS IMPLANT Right 9/12/2022    Procedure: RIGHT EYE PHACOEMULSIFICATION, CATARACT, WITH  INTRAOCULAR LENS IMPLANT INSERTION TORIC LENS;  Surgeon: Ion Puente MD;  Location: [facility] OR    PHACOEMULSIFICATION WITH STANDARD INTRAOCULAR LENS IMPLANT Left 9/26/2022    Procedure: LEFT PHACOEMULSIFICATION, CATARACT, WITH STANDARD INTRAOCULAR LENS IMPLANT INSERTION;  Surgeon: Ion Puente MD;  Location: [facility] OR    REMOVE PORT VASCULAR ACCESS N/A 2/24/2016    Procedure: REMOVE PORT VASCULAR ACCESS;  Surgeon: Satnam Betts MD;  Location:  OR    THORACIC SURGERY       MEDS:  Current Outpatient Medications   Medication    Acetaminophen (TYLENOL PO)    aspirin 81 MG EC tablet    biotin 1000 MCG TABS tablet    calcium carbonate (OS-OCTAVIANO 500 MG [place]. [place]) 500 MG tablet    chlorthalidone (HYGROTON) 25 MG tablet    letrozole (FEMARA) 2.5 MG tablet    levothyroxine (SYNTHROID/LEVOTHROID) 50 MCG tablet    lisinopril (ZESTRIL) 5 MG tablet    multivitamin w/minerals (THERA-VIT-M) tablet    rosuvastatin (CRESTOR) 5 MG tablet    triamcinolone (KENALOG) 0.1 % external ointment    vitamin C (ASCORBIC ACID) 100 MG tablet    VITAMIN D, CHOLECALCIFEROL, PO     No current facility-administered medications for this visit.     ALLERGIES:  Allergies   Allergen Reactions    Latex Rash     SKIN REACTION    SKIN REACTION    Sulfa Antibiotics Itching and Rash        PHYSICAL EXAM:  BP (!) 164/83 (BP Location: Right arm, Patient Position: Sitting, Cuff Size: Adult Regular)   Pulse 50   Temp 98.2  F (36.8  C) (Oral)   Resp 12   Wt 64.5 kg (142 lb 4.8 oz)   SpO2 99%   BMI 25.21 kg/m    General:  Well appearing adult female in NAD.  Alert and oriented x 3.  HEENT:  Normocephalic.  Sclera anicteric.  MMM.   No lesions of the oropharynx.  Lymph:  No palpable cervical, supraclavicular, or axillary LAD.  Chest:  CTA bilaterally.  No wheezes or crackles.  CV:  RRR.  Nl S1 and S2.  No audible m/r/g.  Breast:  Right breast is of increased fibroglandular density.  Right breast periareolar lumpectomy changes.  Left mastectomy.  There are no discretely palpable masses or nodularity of either the right breast or the left chest wall.  Abd:  Soft/ND.    Ext:  No pitting edema of the bilateral lower extremities.    Musculo:  Full ROM of the bilateral upper extremities.  Neuro:  Cranial nerves grossly intact.  Gait is stable.  Able to climb on the exam table unaided.  Psych:  Mood and affect appear normal.    LABS REVIEWED THIS VISIT:  11/13/2023 Right breast screening mammogram:  I personally reviewed these images, there are no suspicious areas of asymmetry or enhancement when compared to prior.      11/13/2023 DEXA bone density scan:  Results   Lumbar Spine  T-score 0.3 (L1-4), BMD is 1.211 g/cm2.         Left femoral neck  T-score -0.8      Right femoral neck  T-score -1.2      Left Total hip  T-score 0 , BMD is 1.009 g/cm2.     Right total hip  T-score 0.1, BMD is 1.019 g/cm2.    IMPRESSION/PLAN: 72 yo female with a h/o T2N0M0, grade II, ER positive, [place] negative, HER2 non-amplified invasive lobular carcinoma of the left breast. She is s/p treatment with taxol and ganetespib followed by dose dense adriamycin and cyclophosphamide, left breast mastectomy, and has been on letrozole since 03/2016.    1.  Left breast ER-positive carcinoma:  Ms. Chambers is 7 years, 8.5 months out from excision of a left breast cancer.  She continues on letrozole.  She is tolerating the medication well.  We plan to treat for a total of 10 years (through 03/2026).      She is asymptomatic of disease recurrence on history taken today.  Due to h/o bilateral breast cancers and multiple gene mutations, we were previously performing high risk screening with  both annual mammogram and breast MRI. We reviewed the most of the genetic mutations she had were classified as likely benign.  Review of her mammogram today shows the breast to be almost entirely fat and therefore mammograms are easy to read.  Of note, on my view of her mammogram images there are no concerning findings.  She will be 74 years old in February.  Okay to forgo further mammograms.    - Return to clinic in 6 months.    2.  Cancer screening:  No change since last visit.  High risk breast screening with alternating breast MRI and mammogram as above.  Colon cancer screening per recommendations of PCP.     3.  Osteopenia:  I reviewed the DEXA performed today which shows a lowest T-score of -1.2  (Last DEXA bone with a lowest T-score of -1.1, two years ago).  Continue calcium and vitamin D supplementation as well as daily weightbearing activity.       4.  Followup:    Visit with me in 6 months.      Sincerely,        Brittany Villegas MD

## 2023-11-13 NOTE — PROGRESS NOTES
MEDICAL ONCOLOGY FOLLOW-UP PATIENT VISIT     NAME: Sushila Chambers     DATE: 11/13/2023    PRIMARY CARE PHYSICIAN: Meryl Muro    PATIENT ID: Multifocal, stage IIa, T2N0M0, ER positive, [place] negative, HER2 non-amplified invasive lobular carcinoma of the left breast.     Oncology History: Ms. Chambers is a 73 year old female with a history of lung cancer and right breast cancer with more recent invasive lobular carcinoma of the left breast. Ms. Chambers was treated for right sided breast cancer in 2009 with right breast lumpectomy, radiation, and endocrine therapy. She self palpated a mass in the left breast in early August, 2015. Diagnostic mammogram and ultrasound showed 2 ill-defined spiculated masses at the 4:30 and 2:00 positions of the left breast. There was surrounding architectural distortion and the total area of involvement measured 6.5 cm. Targeted left breast ultrasound showed a multilobulated hypoechoic mass at the 4:00 position measuring 2.5 cm and an ill-defined hypoechoic mass at the 2:00 position measuring 2.4 cm.  Biopsy of the mass at the 4:00 position showed grade II invasive pleomorphic lobular carcinoma. Biopsy of the mass at the 2:00 position showed a grade II invasive lobular carcinoma. No angiolymphatic invasion or associated LCIS was seen in either specimen. Estrogen receptor staining was positive in >80% of tumor cell nuclei. Progesterone receptor staining was positive in <5% of tumor cell nuclei. HER2 was non-amplifed by FISH in both specimens.     Ms. Chambers enrolled on the ISPY2 clinical trial.  She received 12 weeks of ganetespib and Taxol and 4 cycles of dose dense AC from 9/29/15 - 2/1/16. On 2/24/16 she underwent left breast mastectomy and sentinel lymph node procedure under the care of Dr. Betts. Pathology showed a residual grade II 4.8 cm, pleomorphic invasive lobular carcinoma with associated LCIS. Invasive tumor cellularity was 30%.  There was evidence of perineural invasion,  "however, no lymphvascular space invasion. Two sentinel lymph nodes were negative.  She has been on adjuvant letrozole since 03/2016.    Ms. Chambers had genetic testing on 9/15/15.  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  Of note she had a number of alterations in RUFINA, BARD1, BRCA1/2, MUTYH, and TP53 that were classified as \"likely benign\" and \"benign\".    Interim History:   Ms. Chambers comes into clinic for routine breast cancer follow up.  She is in good health and is without concerns or complaints.  She will be 74 years old in February.  She feels remarkably well.  She is doing pilates once a week.  She is working with a  once a week and does routine cardiovascular exercise as well as strength training.  She states she has had a prolonged cold, which she believes she got from her 7 yo granddaughter.  She has residual cough.  She denies shortness of breath or chest pain.  She has no abdominal complaints.  She has no new bone or joint pains.  She denies lumps, nodularity, pain or swelling of either the right breast or the left chest wall.      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    MASTECTOMY SIMPLE, SENTINEL NODE, COMBINED Left 2/24/2016    Procedure: COMBINED MASTECTOMY SIMPLE, SENTINEL NODE;  Surgeon: Satnam Betts MD;  Location: [facility] OR    PHACOEMULSIFICATION WITH STANDARD INTRAOCULAR LENS IMPLANT Right 9/12/2022    Procedure: " RIGHT EYE PHACOEMULSIFICATION, CATARACT, WITH  INTRAOCULAR LENS IMPLANT INSERTION TORIC LENS;  Surgeon: Ion Puente MD;  [place] [facility] [place]    PHACOEMULSIFICATION WITH STANDARD INTRAOCULAR LENS IMPLANT Left 9/26/2022    Procedure: LEFT PHACOEMULSIFICATION, CATARACT, WITH STANDARD INTRAOCULAR LENS IMPLANT INSERTION;  Surgeon: Ion Puente MD;  Location: [facility] [place]    REMOVE PORT VASCULAR ACCESS N/A 2/24/2016    Procedure: REMOVE PORT VASCULAR ACCESS;  Surgeon: Satnam Betts MD;  [facility]    THORACIC SURGERY       MEDS:  Current Outpatient Medications   Medication    Acetaminophen (TYLENOL PO)    aspirin 81 MG EC tablet    biotin 1000 MCG TABS tablet    calcium carbonate (OS-OCTAVIANO 500 MG [place]. [place]) 500 MG tablet    chlorthalidone (HYGROTON) 25 MG tablet    letrozole (FEMARA) 2.5 MG tablet    levothyroxine (SYNTHROID/LEVOTHROID) 50 MCG tablet    lisinopril (ZESTRIL) 5 MG tablet    multivitamin w/minerals (THERA-VIT-M) tablet    rosuvastatin (CRESTOR) 5 MG tablet    triamcinolone (KENALOG) 0.1 % external ointment    vitamin C (ASCORBIC ACID) 100 MG tablet    VITAMIN D, CHOLECALCIFEROL, PO     No current facility-administered medications for this visit.     ALLERGIES:  Allergies   Allergen Reactions    Latex Rash     SKIN REACTION    SKIN REACTION    Sulfa Antibiotics Itching and Rash        PHYSICAL EXAM:  BP (!) 164/83 (BP Location: Right arm, Patient Position: Sitting, Cuff Size: Adult Regular)   Pulse 50   Temp 98.2  F (36.8  C) (Oral)   Resp 12   Wt 64.5 kg (142 lb 4.8 oz)   SpO2 99%   BMI 25.21 kg/m    General:  Well appearing adult female in NAD.  Alert and oriented x 3.  HEENT:  Normocephalic.  Sclera anicteric.  MMM.  No lesions of the oropharynx.  Lymph:  No palpable cervical, supraclavicular, or axillary LAD.  Chest:  CTA bilaterally.  No wheezes or crackles.  CV:  RRR.  Nl S1 and S2.  No audible m/r/g.  Breast:  Right breast is of increased fibroglandular density.  Right breast  periareolar lumpectomy changes.  Left mastectomy.  There are no discretely palpable masses or nodularity of either the right breast or the left chest wall.  Abd:  Soft/ND.    Ext:  No pitting edema of the bilateral lower extremities.    Musculo:  Full ROM of the bilateral upper extremities.  Neuro:  Cranial nerves grossly intact.  Gait is stable.  Able to climb on the exam table unaided.  Psych:  Mood and affect appear normal.    LABS REVIEWED THIS VISIT:  11/13/2023 Right breast screening mammogram:  I personally reviewed these images, there are no suspicious areas of asymmetry or enhancement when compared to prior.      11/13/2023 DEXA bone density scan:  Results   Lumbar Spine  T-score 0.3 (L1-4), BMD is 1.211 g/cm2.         Left femoral neck  T-score -0.8      Right femoral neck  T-score -1.2      Left Total hip  T-score 0 , BMD is 1.009 g/cm2.     Right total hip  T-score 0.1, BMD is 1.019 g/cm2.    IMPRESSION/PLAN: 74 yo female with a h/o T2N0M0, grade II, ER positive, VA negative, HER2 non-amplified invasive lobular carcinoma of the left breast. She is s/p treatment with taxol and ganetespib followed by dose dense adriamycin and cyclophosphamide, left breast mastectomy, and has been on letrozole since 03/2016.    1.  Left breast ER-positive carcinoma:  Ms. Chambers is 7 years, 8.5 months out from excision of a left breast cancer.  She continues on letrozole.  She is tolerating the medication well.  We plan to treat for a total of 10 years (through 03/2026).      She is asymptomatic of disease recurrence on history taken today.  Due to h/o bilateral breast cancers and multiple gene mutations, we were previously performing high risk screening with both annual mammogram and breast MRI. We reviewed the most of the genetic mutations she had were classified as likely benign.  Review of her mammogram today shows the breast to be almost entirely fat and therefore mammograms are easy to read.  Of note, on my view of her  mammogram images there are no concerning findings.  She will be 74 years old in February.  Okay to forgo further mammograms.    - Return to clinic in 6 months.    2.  Cancer screening:  No change since last visit.  High risk breast screening with alternating breast MRI and mammogram as above.  Colon cancer screening per recommendations of PCP.     3.  Osteopenia:  I reviewed the DEXA performed today which shows a lowest T-score of -1.2  (Last DEXA bone with a lowest T-score of -1.1, two years ago).  Continue calcium and vitamin D supplementation as well as daily weightbearing activity.       4.  Followup:    Visit with me in 6 months.

## 2023-12-20 ENCOUNTER — MYC MEDICAL ADVICE (OUTPATIENT)
Dept: INTERNAL MEDICINE | Facility: CLINIC | Age: 73
End: 2023-12-20
Payer: MEDICARE

## 2023-12-20 DIAGNOSIS — I10 HYPERTENSION GOAL BP (BLOOD PRESSURE) < 140/90: ICD-10-CM

## 2023-12-21 RX ORDER — LISINOPRIL 5 MG/1
5 TABLET ORAL DAILY
Qty: 90 TABLET | Refills: 2 | Status: SHIPPED | OUTPATIENT
Start: 2023-12-21 | End: 2024-02-06

## 2023-12-21 NOTE — TELEPHONE ENCOUNTER
lisinopril (ZESTRIL) 5 MG tablet    Last Written Prescription Date:  9/15/23  Last Fill Quantity: 90,   # refills: 3  [place], [place] - [address] [facility] [phone]      Remaining refills sent to requested pharmacy. [id number] - [place], [place] - [address] AT [facility] [address]

## 2024-02-05 ENCOUNTER — MYC MEDICAL ADVICE (OUTPATIENT)
Dept: INTERNAL MEDICINE | Facility: CLINIC | Age: 74
End: 2024-02-05
Payer: MEDICARE

## 2024-02-05 DIAGNOSIS — C50.512 MALIGNANT NEOPLASM OF LOWER-OUTER QUADRANT OF LEFT FEMALE BREAST (H): ICD-10-CM

## 2024-02-05 DIAGNOSIS — E03.9 HYPOTHYROIDISM, UNSPECIFIED TYPE: ICD-10-CM

## 2024-02-05 DIAGNOSIS — E78.5 HYPERLIPIDEMIA LDL GOAL <130: ICD-10-CM

## 2024-02-05 DIAGNOSIS — I10 UNCONTROLLED HYPERTENSION: ICD-10-CM

## 2024-02-06 DIAGNOSIS — C50.512 MALIGNANT NEOPLASM OF LOWER-OUTER QUADRANT OF LEFT FEMALE BREAST (H): ICD-10-CM

## 2024-02-06 DIAGNOSIS — I10 HYPERTENSION GOAL BP (BLOOD PRESSURE) < 140/90: ICD-10-CM

## 2024-02-06 RX ORDER — LETROZOLE 2.5 MG/1
2.5 TABLET, FILM COATED ORAL DAILY
Qty: 90 TABLET | Refills: 3 | Status: SHIPPED | OUTPATIENT
Start: 2024-02-06

## 2024-02-06 RX ORDER — ROSUVASTATIN CALCIUM 5 MG/1
5 TABLET, COATED ORAL DAILY
Qty: 90 TABLET | Refills: 2 | Status: SHIPPED | OUTPATIENT
Start: 2024-02-06 | End: 2024-10-04

## 2024-02-06 RX ORDER — LEVOTHYROXINE SODIUM 50 UG/1
50 TABLET ORAL DAILY
Qty: 90 TABLET | Refills: 2 | Status: SHIPPED | OUTPATIENT
Start: 2024-02-06

## 2024-02-06 RX ORDER — LISINOPRIL 5 MG/1
5 TABLET ORAL DAILY
Qty: 90 TABLET | Refills: 2 | Status: SHIPPED | OUTPATIENT
Start: 2024-02-06

## 2024-02-06 RX ORDER — CHLORTHALIDONE 25 MG/1
25 TABLET ORAL DAILY
Qty: 90 TABLET | Refills: 1 | Status: SHIPPED | OUTPATIENT
Start: 2024-02-06 | End: 2024-07-26

## 2024-02-06 RX ORDER — LETROZOLE 2.5 MG/1
2.5 TABLET, FILM COATED ORAL DAILY
Qty: 90 TABLET | Refills: 3 | Status: CANCELLED | OUTPATIENT
Start: 2024-02-06

## 2024-02-06 NOTE — TELEPHONE ENCOUNTER
chlorthalidone (HYGROTON) 25 MG tablet   90 tablet 3 9/15/2023     levothyroxine (SYNTHROID/LEVOTHROID) 50 MCG tablet   90 tablet 3 11/8/2023       Remaining refills sent to requested pharmacy. Per WO per my chart request    letrozole (FEMARA) 2.5 MG tablet   90 tablet 3 1/20/2023     Routed because: my chart request. Oncology med

## 2024-02-06 NOTE — TELEPHONE ENCOUNTER
rosuvastatin (CRESTOR) 5 MG tablet        Last Written Prescription Date:  10/16/23  Last Fill Quantity: 90,   # refills: 3  Prev sent to [facility] [phone]  Requested to  [facility]     Sent today:  lisinopril (ZESTRIL) 5 MG tablet 90 tablet 2 2/6/2024     levothyroxine (SYNTHROID/LEVOTHROID) 50 MCG tablet 90 tablet 2 2/6/2024     chlorthalidone (HYGROTON) 25 MG tablet 90 tablet 1 2/6/2024     letrozole (FEMARA) 2.5 MG tablet 90 tablet 3 2/6/2024     rosuvastatin (CRESTOR) 5 MG tablet 90 tablet 2 2/6/2024     [facility] - [facility] [place]  [address] [place] [address]

## 2024-02-23 ENCOUNTER — TELEPHONE (OUTPATIENT)
Dept: OPHTHALMOLOGY | Facility: CLINIC | Age: 74
End: 2024-02-23
Payer: MEDICARE

## 2024-02-23 ENCOUNTER — MYC MEDICAL ADVICE (OUTPATIENT)
Dept: OPHTHALMOLOGY | Facility: CLINIC | Age: 74
End: 2024-02-23
Payer: MEDICARE

## 2024-02-23 NOTE — TELEPHONE ENCOUNTER
[employer] message also sent with images and will document in [employer] message with images.    Eren Lo RN 7:31 AM 02/23/24

## 2024-02-23 NOTE — TELEPHONE ENCOUNTER
[facility] Call Center    Phone Message    May a detailed message be left on voicemail: yes     Reason for Call: Symptoms or Concerns     If patient has red-flag symptoms, warm transfer to triage line    Current symptom or concern: Pt states she woke up yesterday and both her eyes were red. She says they are worse today and she does have a little bit of drainage but no pain.    Symptoms have been present for:  2 day(s)    Has patient previously been seen for this? No    By : Dr. Puente    Date: 10/18/22    Are there any new or worsening symptoms? Yes: New symptoms    Action Taken: Message routed to:  [facility] EYE    Travel Screening: Not Applicable

## 2024-02-23 NOTE — TELEPHONE ENCOUNTER
Spoke to pt at [phone]    Mild red/pink eyes times two days.    Was with granddaughter over weekend who also had pink/red eye.    No pain, some discomfort.    No vision changes    No itching    Some whitish discharge.    Reviewed s/s of viral conjunctivitis and will resolve over next 10-14 days (possible sooner).    Reviewed comfort care with preservative free artificial tears and may cool tears if needed for added comfort.  Pt may use cool compresses also for comfort.    Reviewed if having a lot more redness, eye pain, vision changes to reach out to clinic next week.    Pt seemed comfortable with plan/information.    Note to Acute eye clinic resident for review also and amendment if applicable.    Eren Lo RN 7:56 AM 02/23/24

## 2024-05-12 NOTE — PROGRESS NOTES
MEDICAL ONCOLOGY FOLLOW-UP PATIENT VISIT     NAME: Sushila Chambers     DATE: 5/13/2024    PRIMARY CARE PHYSICIAN: Meryl Muro    PATIENT ID: Multifocal, stage IIa, T2N0M0, ER positive, KS negative, HER2 non-amplified invasive lobular carcinoma of the left breast.     Oncology History: Ms. Chambers is a 74 year old female with a history of lung cancer and right breast cancer with more recent invasive lobular carcinoma of the left breast. Ms. Chambers was treated for right sided breast cancer in 2009 with right breast lumpectomy, radiation, and endocrine therapy. She self palpated a mass in the left breast in early August, 2015. Diagnostic mammogram and ultrasound showed 2 ill-defined spiculated masses at the 4:30 and 2:00 positions of the left breast. There was surrounding architectural distortion and the total area of involvement measured 6.5 cm. Targeted left breast ultrasound showed a multilobulated hypoechoic mass at the 4:00 position measuring 2.5 cm and an ill-defined hypoechoic mass at the 2:00 position measuring 2.4 cm.  Biopsy of the mass at the 4:00 position showed grade II invasive pleomorphic lobular carcinoma. Biopsy of the mass at the 2:00 position showed a grade II invasive lobular carcinoma. No angiolymphatic invasion or associated LCIS was seen in either specimen. Estrogen receptor staining was positive in >80% of tumor cell nuclei. Progesterone receptor staining was positive in <5% of tumor cell nuclei. HER2 was non-amplifed by FISH in both specimens.     Ms. Chambers enrolled on the ISPY2 clinical trial.  She received 12 weeks of ganetespib and Taxol and 4 cycles of dose dense AC from 9/29/15 - 2/1/16. On 2/24/16 she underwent left breast mastectomy and sentinel lymph node procedure under the care of Dr. Betts. Pathology showed a residual grade II 4.8 cm, pleomorphic invasive lobular carcinoma with associated LCIS. Invasive tumor cellularity was 30%.  There was evidence of perineural invasion,  "however, no lymphvascular space invasion. Two sentinel lymph nodes were negative.  She has been on adjuvant curative intent treatment with letrozole since 03/2016.    Ms. Chambers had genetic testing on 9/15/15.  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  Of note she had a number of alterations in RUFINA, BARD1, BRCA1/2, MUTYH, and TP53 that were classified as \"likely benign\" and \"benign\".    Interim History:   Ms. Garcia comes into clinic today for routine breast cancer follow-up.  She continues on adjuvant curative intent treatment with letrozole.  She is tolerating the medication remarkably well.  She denies concerning lumps, pain, or swelling of the chest wall.  She has full range of motion of her bilateral upper extremities.  She has no current cough, shortness of breath, or chest pain.  She has no abdominal complaints.  She denies headaches or focal neurologic complaints.  She has no new bone or joint aches or pains.  She continues to work with a  twice a week and walks on a daily basis.  Her health has overall been excellent.    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    MASTECTOMY SIMPLE, SENTINEL NODE, COMBINED Left 2/24/2016    Procedure: COMBINED MASTECTOMY SIMPLE, SENTINEL NODE;  Surgeon: Satnam Betts MD;  Location: [facility] OR    PHACOEMULSIFICATION WITH STANDARD INTRAOCULAR LENS IMPLANT Right 9/12/2022    Procedure: RIGHT EYE " PHACOEMULSIFICATION, CATARACT, WITH  INTRAOCULAR LENS IMPLANT INSERTION TORIC LENS;  Surgeon: Ion Puente MD;  Location: [facility] [place]    PHACOEMULSIFICATION WITH STANDARD INTRAOCULAR LENS IMPLANT Left 9/26/2022    Procedure: LEFT PHACOEMULSIFICATION, CATARACT, WITH STANDARD INTRAOCULAR LENS IMPLANT INSERTION;  Surgeon: Ion Puente MD;  Location: [facility] [place]    REMOVE PORT VASCULAR ACCESS N/A 2/24/2016    Procedure: REMOVE PORT VASCULAR ACCESS;  Surgeon: Satnam Betts MD;  Location:  OR    THORACIC SURGERY       MEDS:  Current Outpatient Medications   Medication Sig Dispense Refill    Acetaminophen (TYLENOL PO) Take 500 mg by mouth every 4 hours as needed for mild pain or fever Reported on 3/21/2017      aspirin 81 MG EC tablet Take 81 mg by mouth daily      biotin 1000 MCG TABS tablet Take by mouth daily      calcium carbonate (OS-OCTAVIANO 500 MG [place]. CA) 500 MG tablet Take by mouth 2 times daily      chlorthalidone (HYGROTON) 25 MG tablet Take 1 tablet (25 mg) by mouth daily 90 tablet 1    letrozole (FEMARA) 2.5 MG tablet Take 1 tablet (2.5 mg) by mouth daily 90 tablet 3    levothyroxine (SYNTHROID/LEVOTHROID) 50 MCG tablet Take 1 tablet (50 mcg) by mouth daily 90 tablet 2    lisinopril (ZESTRIL) 5 MG tablet Take 1 tablet (5 mg) by mouth daily 90 tablet 2    multivitamin w/minerals (THERA-VIT-M) tablet Take 1 tablet by mouth daily      rosuvastatin (CRESTOR) 5 MG tablet Take 1 tablet (5 mg) by mouth daily 90 tablet 2    triamcinolone (KENALOG) 0.1 % external ointment Apply topically 2 times daily To areas of bug bites.  Avoid use on face and neck. (Patient not taking: Reported on 10/31/2022) 30 g 1    vitamin C (ASCORBIC ACID) 100 MG tablet       VITAMIN D, CHOLECALCIFEROL, PO Take by mouth daily       No current facility-administered medications for this visit.     ALLERGIES:  Allergies   Allergen Reactions    Latex Rash     SKIN REACTION    SKIN REACTION    Sulfa Antibiotics Itching and Rash         PHYSICAL EXAM:  BP (!) 157/81   Pulse 103   Temp 98.4  F (36.9  C)   Resp 16   Wt 65.3 kg (144 lb)   SpO2 98%   BMI 25.51 kg/m    General:  Well appearing adult female in NAD.  Alert and oriented x 3.  HEENT:  Normocephalic.  Sclera anicteric.  MMM.  No lesions of the oropharynx.  Lymph:  No palpable cervical, supraclavicular, or axillary LAD.  Chest:  CTA bilaterally.  No wheezes or crackles.  CV:  RRR.  Nl S1 and S2.  No audible m/r/g.  Breast:  Right breast is of increased fibroglandular density.  Right breast periareolar lumpectomy changes.  Left mastectomy.  There are no dominant or discretely palpable masses or nodularity of either the right breast or the left chest wall.  Abd:  Soft/ND.    Ext:  No pitting edema of the bilateral lower extremities.    Musculo:  Full ROM of the bilateral upper extremities.  Neuro:  Cranial nerves grossly intact.  Gait is stable.  Able to climb on the exam table unaided.  Psych:  Mood and affect appear normal.    LABS REVIEWED THIS VISIT:  No interim imaging or laboratory studies pertinent to today's visit.    IMPRESSION/PLAN: 73 yo female with a h/o T2N0M0, grade II, ER positive, MT negative, HER2 non-amplified invasive lobular carcinoma of the left breast. She is s/p treatment with taxol and ganetespib followed by dose dense adriamycin and cyclophosphamide, left breast mastectomy, and has been on letrozole since 03/2016.    1.  Left breast ER-positive carcinoma:  Ms. Chambers is 8 years, 2.5 months out from excision of a left breast cancer.  She continues on adjuvant curative intent treatment with letrozole.  Plan is to treat for a total of 10 years (through 03/2026).      Plan at this time is to continue surveillance with annual mammography of the right breast.  - Return to clinic in 6 months.  - Right breast screening mammogram 11/14/2024 or later.    2.  Cancer screening:  No change since last visit.  Continue annual mammography as above.  Colon cancer screening per  recommendations of PCP.     3.  Osteopenia:  DEXA in 11/2023 with a lowest T-score of -1.2 c/w osteopenia.  Continue calcium and vitamin D supplementation as well as daily weightbearing activity.       4.  Followup:    Right breast screening mammogram and visit with me 11/14/2024 or later.

## 2024-05-13 ENCOUNTER — ONCOLOGY VISIT (OUTPATIENT)
Dept: ONCOLOGY | Facility: CLINIC | Age: 74
End: 2024-05-13
Attending: INTERNAL MEDICINE
Payer: MEDICARE

## 2024-05-13 VITALS
SYSTOLIC BLOOD PRESSURE: 157 MMHG | OXYGEN SATURATION: 98 % | DIASTOLIC BLOOD PRESSURE: 81 MMHG | BODY MASS INDEX: 25.51 KG/M2 | RESPIRATION RATE: 16 BRPM | TEMPERATURE: 98.4 F | HEART RATE: 103 BPM | WEIGHT: 144 LBS

## 2024-05-13 DIAGNOSIS — Z12.31 VISIT FOR SCREENING MAMMOGRAM: ICD-10-CM

## 2024-05-13 DIAGNOSIS — C50.512 MALIGNANT NEOPLASM OF LOWER-OUTER QUADRANT OF LEFT BREAST OF FEMALE, ESTROGEN RECEPTOR POSITIVE (H): Primary | ICD-10-CM

## 2024-05-13 DIAGNOSIS — Z17.0 MALIGNANT NEOPLASM OF LOWER-OUTER QUADRANT OF LEFT BREAST OF FEMALE, ESTROGEN RECEPTOR POSITIVE (H): Primary | ICD-10-CM

## 2024-05-13 PROCEDURE — 99212 OFFICE O/P EST SF 10 MIN: CPT | Performed by: INTERNAL MEDICINE

## 2024-05-13 PROCEDURE — G0463 HOSPITAL OUTPT CLINIC VISIT: HCPCS | Performed by: INTERNAL MEDICINE

## 2024-05-13 ASSESSMENT — PAIN SCALES - GENERAL: PAINLEVEL: NO PAIN (0)

## 2024-05-13 NOTE — LETTER
5/13/2024         RE: Sushila Chambers  [address] [place] [address]        Dear Colleague,    Thank you for referring your patient, Sushila Chambers, to the [facility]. Please see a copy of my visit note below.    MEDICAL ONCOLOGY FOLLOW-UP PATIENT VISIT     NAME: Sushila Chambers     DATE: 5/13/2024    PRIMARY CARE PHYSICIAN: Meryl Muro    PATIENT ID: Multifocal, stage IIa, T2N0M0, ER positive, AL negative, HER2 non-amplified invasive lobular carcinoma of the left breast.     Oncology History: Ms. Chambers is a 74 year old female with a history of lung cancer and right breast cancer with more recent invasive lobular carcinoma of the left breast. Ms. Chambers was treated for right sided breast cancer in 2009 with right breast lumpectomy, radiation, and endocrine therapy. She self palpated a mass in the left breast in early August, 2015. Diagnostic mammogram and ultrasound showed 2 ill-defined spiculated masses at the 4:30 and 2:00 positions of the left breast. There was surrounding architectural distortion and the total area of involvement measured 6.5 cm. Targeted left breast ultrasound showed a multilobulated hypoechoic mass at the 4:00 position measuring 2.5 cm and an ill-defined hypoechoic mass at the 2:00 position measuring 2.4 cm.  Biopsy of the mass at the 4:00 position showed grade II invasive pleomorphic lobular carcinoma. Biopsy of the mass at the 2:00 position showed a grade II invasive lobular carcinoma. No angiolymphatic invasion or associated LCIS was seen in either specimen. Estrogen receptor staining was positive in >80% of tumor cell nuclei. Progesterone receptor staining was positive in <5% of tumor cell nuclei. HER2 was non-amplifed by FISH in both specimens.     Ms. Chambers enrolled on the ISPY2 clinical trial.  She received 12 weeks of ganetespib and Taxol and 4 cycles of dose dense AC from 9/29/15 - 2/1/16. On 2/24/16 she underwent left breast  "mastectomy and sentinel lymph node procedure under the care of Dr. Betts. Pathology showed a residual grade II 4.8 cm, pleomorphic invasive lobular carcinoma with associated LCIS. Invasive tumor cellularity was 30%.  There was evidence of perineural invasion, however, no lymphvascular space invasion. Two sentinel lymph nodes were negative.  She has been on adjuvant curative intent treatment with letrozole since 03/2016.    Ms. Chambers had genetic testing on 9/15/15.  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  Of note she had a number of alterations in RUFINA, BARD1, BRCA1/2, MUTYH, and TP53 that were classified as \"likely benign\" and \"benign\".    Interim History:   Ms. Garcia comes into clinic today for routine breast cancer follow-up.  She continues on adjuvant curative intent treatment with letrozole.  She is tolerating the medication remarkably well.  She denies concerning lumps, pain, or swelling of the chest wall.  She has full range of motion of her bilateral upper extremities.  She has no current cough, shortness of breath, or chest pain.  She has no abdominal complaints.  She denies headaches or focal neurologic complaints.  She has no new bone or joint aches or pains.  She continues to work with a  twice a week and walks on a daily basis.  Her health has overall been excellent.    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    " MASTECTOMY SIMPLE, SENTINEL NODE, COMBINED Left 2/24/2016    Procedure: COMBINED MASTECTOMY SIMPLE, SENTINEL NODE;  Surgeon: Satnam Betts MD;  Location: [facility] OR    PHACOEMULSIFICATION WITH STANDARD INTRAOCULAR LENS IMPLANT Right 9/12/2022    Procedure: RIGHT EYE PHACOEMULSIFICATION, CATARACT, WITH  INTRAOCULAR LENS IMPLANT INSERTION TORIC LENS;  Surgeon: Ion Puente MD;  Location: [facility] OR    PHACOEMULSIFICATION WITH STANDARD INTRAOCULAR LENS IMPLANT Left 9/26/2022    Procedure: LEFT PHACOEMULSIFICATION, CATARACT, WITH STANDARD INTRAOCULAR LENS IMPLANT INSERTION;  Surgeon: Ion Puente MD;  Location: [facility] OR    REMOVE PORT VASCULAR ACCESS N/A 2/24/2016    Procedure: REMOVE PORT VASCULAR ACCESS;  Surgeon: Satnam Betts MD;  [facility]    THORACIC SURGERY       MEDS:  Current Outpatient Medications   Medication Sig Dispense Refill    Acetaminophen (TYLENOL PO) Take 500 mg by mouth every 4 hours as needed for mild pain or fever Reported on 3/21/2017      aspirin 81 MG EC tablet Take 81 mg by mouth daily      biotin 1000 MCG TABS tablet Take by mouth daily      calcium carbonate (OS-OCTAVIANO 500 MG [place]. [place]) 500 MG tablet Take by mouth 2 times daily      chlorthalidone (HYGROTON) 25 MG tablet Take 1 tablet (25 mg) by mouth daily 90 tablet 1    letrozole (FEMARA) 2.5 MG tablet Take 1 tablet (2.5 mg) by mouth daily 90 tablet 3    levothyroxine (SYNTHROID/LEVOTHROID) 50 MCG tablet Take 1 tablet (50 mcg) by mouth daily 90 tablet 2    lisinopril (ZESTRIL) 5 MG tablet Take 1 tablet (5 mg) by mouth daily 90 tablet 2    multivitamin w/minerals (THERA-VIT-M) tablet Take 1 tablet by mouth daily      rosuvastatin (CRESTOR) 5 MG tablet Take 1 tablet (5 mg) by mouth daily 90 tablet 2    triamcinolone (KENALOG) 0.1 % external ointment Apply topically 2 times daily To areas of bug bites.  Avoid use on face and neck. (Patient not taking: Reported on 10/31/2022) 30 g 1    vitamin C (ASCORBIC ACID) 100 MG tablet        VITAMIN D, CHOLECALCIFEROL, PO Take by mouth daily       No current facility-administered medications for this visit.     ALLERGIES:  Allergies   Allergen Reactions    Latex Rash     SKIN REACTION    SKIN REACTION    Sulfa Antibiotics Itching and Rash        PHYSICAL EXAM:  BP (!) 157/81   Pulse 103   Temp 98.4  F (36.9  C)   Resp 16   Wt 65.3 kg (144 lb)   SpO2 98%   BMI 25.51 kg/m    General:  Well appearing adult female in NAD.  Alert and oriented x 3.  HEENT:  Normocephalic.  Sclera anicteric.  MMM.  No lesions of the oropharynx.  Lymph:  No palpable cervical, supraclavicular, or axillary LAD.  Chest:  CTA bilaterally.  No wheezes or crackles.  CV:  RRR.  Nl S1 and S2.  No audible m/r/g.  Breast:  Right breast is of increased fibroglandular density.  Right breast periareolar lumpectomy changes.  Left mastectomy.  There are no dominant or discretely palpable masses or nodularity of either the right breast or the left chest wall.  Abd:  Soft/ND.    Ext:  No pitting edema of the bilateral lower extremities.    Musculo:  Full ROM of the bilateral upper extremities.  Neuro:  Cranial nerves grossly intact.  Gait is stable.  Able to climb on the exam table unaided.  Psych:  Mood and affect appear normal.    LABS REVIEWED THIS VISIT:  No interim imaging or laboratory studies pertinent to today's visit.    IMPRESSION/PLAN: 73 yo female with a h/o T2N0M0, grade II, ER positive, NC negative, HER2 non-amplified invasive lobular carcinoma of the left breast. She is s/p treatment with taxol and ganetespib followed by dose dense adriamycin and cyclophosphamide, left breast mastectomy, and has been on letrozole since 03/2016.    1.  Left breast ER-positive carcinoma:  Ms. Chambers is 8 years, 2.5 months out from excision of a left breast cancer.  She continues on adjuvant curative intent treatment with letrozole.  Plan is to treat for a total of 10 years (through 03/2026).      Plan at this time is to continue  surveillance with annual mammography of the right breast.  - Return to clinic in 6 months.  - Right breast screening mammogram 11/14/2024 or later.    2.  Cancer screening:  No change since last visit.  Continue annual mammography as above.  Colon cancer screening per recommendations of PCP.     3.  Osteopenia:  DEXA in 11/2023 with a lowest T-score of -1.2 c/w osteopenia.  Continue calcium and vitamin D supplementation as well as daily weightbearing activity.       4.  Followup:    Right breast screening mammogram and visit with me 11/14/2024 or later.        Brittany Villegas MD

## 2024-05-13 NOTE — NURSING NOTE
"Oncology Rooming Note    May 13, 2024 2:59 PM   Sushila Chambers is a 74 year old female who presents for:    Chief Complaint   Patient presents with    Oncology Clinic Visit     Malignant neoplasm of lower-outer quadrant of left breast of female, estrogen receptor positive      Initial Vitals: BP (!) 157/81   Pulse 103   Temp 98.4  F (36.9  C)   Resp 16   Wt 65.3 kg (144 lb)   SpO2 98%   BMI 25.51 kg/m   Estimated body mass index is 25.51 kg/m  as calculated from the following:    Height as of 9/26/22: 1.6 m (5' 3\").    Weight as of this encounter: 65.3 kg (144 lb). Body surface area is 1.7 meters squared.  No Pain (0) Comment: Data Unavailable   No LMP recorded. Patient is postmenopausal.  Allergies reviewed: Yes  Medications reviewed: Yes    Medications: Medication refills not needed today.  Pharmacy name entered into [employer]:    [facility] - [place], [place] - [address] [id number] - [place] [address] [facility] [address]  [facility] [place] [facility]  [facility] - [place], [place] - [address]  [facility] - [facility], [place] - [address] [place] [address]    Frailty Screening:   Is the patient here for a new oncology consult visit in cancer care? 2. No      Clinical concerns: no other complaints      [address]"

## 2024-07-14 ENCOUNTER — HOSPITAL ENCOUNTER (INPATIENT)
Facility: CLINIC | Age: 74
LOS: 4 days | Discharge: HOME | DRG: 853 | End: 2024-07-18
Attending: EMERGENCY MEDICINE | Admitting: STUDENT IN AN ORGANIZED HEALTH CARE EDUCATION/TRAINING PROGRAM
Payer: MEDICARE

## 2024-07-14 ENCOUNTER — ANESTHESIA EVENT (OUTPATIENT)
Dept: SURGERY | Facility: CLINIC | Age: 74
DRG: 853 | End: 2024-07-14
Payer: MEDICARE

## 2024-07-14 ENCOUNTER — APPOINTMENT (OUTPATIENT)
Dept: GENERAL RADIOLOGY | Facility: CLINIC | Age: 74
DRG: 853 | End: 2024-07-14
Attending: INTERNAL MEDICINE
Payer: MEDICARE

## 2024-07-14 ENCOUNTER — APPOINTMENT (OUTPATIENT)
Dept: ULTRASOUND IMAGING | Facility: CLINIC | Age: 74
DRG: 853 | End: 2024-07-14
Attending: EMERGENCY MEDICINE
Payer: MEDICARE

## 2024-07-14 ENCOUNTER — ANESTHESIA (OUTPATIENT)
Dept: SURGERY | Facility: CLINIC | Age: 74
DRG: 853 | End: 2024-07-14
Payer: MEDICARE

## 2024-07-14 ENCOUNTER — APPOINTMENT (OUTPATIENT)
Dept: GENERAL RADIOLOGY | Facility: CLINIC | Age: 74
DRG: 853 | End: 2024-07-14
Attending: EMERGENCY MEDICINE
Payer: MEDICARE

## 2024-07-14 DIAGNOSIS — R10.13 ABDOMINAL PAIN, EPIGASTRIC: Primary | ICD-10-CM

## 2024-07-14 DIAGNOSIS — R74.01 TRANSAMINITIS: ICD-10-CM

## 2024-07-14 DIAGNOSIS — R17 ELEVATED BILIRUBIN: ICD-10-CM

## 2024-07-14 DIAGNOSIS — E87.6 HYPOKALEMIA: ICD-10-CM

## 2024-07-14 DIAGNOSIS — K83.09 ASCENDING CHOLANGITIS (H): ICD-10-CM

## 2024-07-14 DIAGNOSIS — R79.89 ELEVATED LFTS: ICD-10-CM

## 2024-07-14 DIAGNOSIS — R17 JAUNDICE: ICD-10-CM

## 2024-07-14 DIAGNOSIS — Z46.89 ENCOUNTER FOR REMOVAL OF BILIARY STENT: ICD-10-CM

## 2024-07-14 LAB
ALBUMIN SERPL BCG-MCNC: 3.6 G/DL (ref 3.5–5.2)
ALP SERPL-CCNC: 129 U/L (ref 40–150)
ALT SERPL W P-5'-P-CCNC: 962 U/L (ref 0–50)
ANION GAP SERPL CALCULATED.3IONS-SCNC: 12 MMOL/L (ref 7–15)
ANION GAP SERPL CALCULATED.3IONS-SCNC: 14 MMOL/L (ref 7–15)
APTT PPP: 28 SECONDS (ref 22–38)
AST SERPL W P-5'-P-CCNC: 355 U/L (ref 0–45)
BASOPHILS # BLD AUTO: 0 10E3/UL (ref 0–0.2)
BASOPHILS NFR BLD AUTO: 0 %
BILIRUB SERPL-MCNC: 6 MG/DL
BUN SERPL-MCNC: 17.2 MG/DL (ref 8–23)
BUN SERPL-MCNC: 18.8 MG/DL (ref 8–23)
CALCIUM SERPL-MCNC: 8.3 MG/DL (ref 8.8–10.2)
CALCIUM SERPL-MCNC: 8.9 MG/DL (ref 8.8–10.2)
CHLORIDE SERPL-SCNC: 89 MMOL/L (ref 98–107)
CHLORIDE SERPL-SCNC: 93 MMOL/L (ref 98–107)
CREAT SERPL-MCNC: 0.97 MG/DL (ref 0.51–0.95)
CREAT SERPL-MCNC: 1.05 MG/DL (ref 0.51–0.95)
DEPRECATED HCO3 PLAS-SCNC: 25 MMOL/L (ref 22–29)
DEPRECATED HCO3 PLAS-SCNC: 27 MMOL/L (ref 22–29)
EGFRCR SERPLBLD CKD-EPI 2021: 55 ML/MIN/1.73M2
EGFRCR SERPLBLD CKD-EPI 2021: 61 ML/MIN/1.73M2
EOSINOPHIL # BLD AUTO: 0 10E3/UL (ref 0–0.7)
EOSINOPHIL NFR BLD AUTO: 0 %
ERCP: NORMAL
ERYTHROCYTE [DISTWIDTH] IN BLOOD BY AUTOMATED COUNT: 13.2 % (ref 10–15)
FLUAV RNA SPEC QL NAA+PROBE: NEGATIVE
FLUBV RNA RESP QL NAA+PROBE: NEGATIVE
GLUCOSE BLDC GLUCOMTR-MCNC: 99 MG/DL (ref 70–99)
GLUCOSE SERPL-MCNC: 126 MG/DL (ref 70–99)
GLUCOSE SERPL-MCNC: 138 MG/DL (ref 70–99)
HCT VFR BLD AUTO: 31 % (ref 35–47)
HGB BLD-MCNC: 11.4 G/DL (ref 11.7–15.7)
HOLD SPECIMEN: NORMAL
IMM GRANULOCYTES # BLD: 0.1 10E3/UL
IMM GRANULOCYTES NFR BLD: 1 %
INR PPP: 1.52 (ref 0.85–1.15)
LIPASE SERPL-CCNC: 26 U/L (ref 13–60)
LYMPHOCYTES # BLD AUTO: 0.2 10E3/UL (ref 0.8–5.3)
LYMPHOCYTES NFR BLD AUTO: 2 %
MAGNESIUM SERPL-MCNC: 1.6 MG/DL (ref 1.7–2.3)
MCH RBC QN AUTO: 31.5 PG (ref 26.5–33)
MCHC RBC AUTO-ENTMCNC: 36.8 G/DL (ref 31.5–36.5)
MCV RBC AUTO: 86 FL (ref 78–100)
MONOCYTES # BLD AUTO: 0.2 10E3/UL (ref 0–1.3)
MONOCYTES NFR BLD AUTO: 2 %
NEUTROPHILS # BLD AUTO: 10.5 10E3/UL (ref 1.6–8.3)
NEUTROPHILS NFR BLD AUTO: 95 %
NRBC # BLD AUTO: 0 10E3/UL
NRBC BLD AUTO-RTO: 0 /100
PHOSPHATE SERPL-MCNC: 2.4 MG/DL (ref 2.5–4.5)
PLATELET # BLD AUTO: 161 10E3/UL (ref 150–450)
POTASSIUM SERPL-SCNC: 2.6 MMOL/L (ref 3.4–5.3)
POTASSIUM SERPL-SCNC: 3.1 MMOL/L (ref 3.4–5.3)
POTASSIUM SERPL-SCNC: 3.1 MMOL/L (ref 3.4–5.3)
PROT SERPL-MCNC: 6.5 G/DL (ref 6.4–8.3)
RBC # BLD AUTO: 3.62 10E6/UL (ref 3.8–5.2)
RSV RNA SPEC NAA+PROBE: NEGATIVE
SARS-COV-2 RNA RESP QL NAA+PROBE: NEGATIVE
SODIUM SERPL-SCNC: 128 MMOL/L (ref 135–145)
SODIUM SERPL-SCNC: 132 MMOL/L (ref 135–145)
WBC # BLD AUTO: 10.9 10E3/UL (ref 4–11)

## 2024-07-14 PROCEDURE — C2617 STENT, NON-COR, TEM W/O DEL: HCPCS | Performed by: INTERNAL MEDICINE

## 2024-07-14 PROCEDURE — 258N000003 HC RX IP 258 OP 636: Performed by: ANESTHESIOLOGY

## 2024-07-14 PROCEDURE — 85730 THROMBOPLASTIN TIME PARTIAL: CPT | Performed by: EMERGENCY MEDICINE

## 2024-07-14 PROCEDURE — 0FC98ZZ EXTIRPATION OF MATTER FROM COMMON BILE DUCT, VIA NATURAL OR ARTIFICIAL OPENING ENDOSCOPIC: ICD-10-PCS | Performed by: INTERNAL MEDICINE

## 2024-07-14 PROCEDURE — 999N000181 XR SURGERY CARM FLUORO GREATER THAN 5 MIN W STILLS: Mod: TC

## 2024-07-14 PROCEDURE — 96374 THER/PROPH/DIAG INJ IV PUSH: CPT | Performed by: EMERGENCY MEDICINE

## 2024-07-14 PROCEDURE — 99100 ANES PT EXTEME AGE<1 YR&>70: CPT | Performed by: REGISTERED NURSE

## 2024-07-14 PROCEDURE — 43274 ERCP DUCT STENT PLACEMENT: CPT | Performed by: ANESTHESIOLOGY

## 2024-07-14 PROCEDURE — 360N000082 HC SURGERY LEVEL 2 W/ FLUORO, PER MIN: Performed by: INTERNAL MEDICINE

## 2024-07-14 PROCEDURE — 370N000017 HC ANESTHESIA TECHNICAL FEE, PER MIN: Performed by: INTERNAL MEDICINE

## 2024-07-14 PROCEDURE — 36415 COLL VENOUS BLD VENIPUNCTURE: CPT | Performed by: EMERGENCY MEDICINE

## 2024-07-14 PROCEDURE — 250N000011 HC RX IP 250 OP 636: Performed by: PHYSICIAN ASSISTANT

## 2024-07-14 PROCEDURE — 99100 ANES PT EXTEME AGE<1 YR&>70: CPT | Performed by: ANESTHESIOLOGY

## 2024-07-14 PROCEDURE — 93005 ELECTROCARDIOGRAM TRACING: CPT | Performed by: EMERGENCY MEDICINE

## 2024-07-14 PROCEDURE — 120N000002 HC R&B MED SURG/OB UMMC

## 2024-07-14 PROCEDURE — 87637 SARSCOV2&INF A&B&RSV AMP PRB: CPT | Performed by: EMERGENCY MEDICINE

## 2024-07-14 PROCEDURE — 36415 COLL VENOUS BLD VENIPUNCTURE: CPT | Performed by: STUDENT IN AN ORGANIZED HEALTH CARE EDUCATION/TRAINING PROGRAM

## 2024-07-14 PROCEDURE — 93010 ELECTROCARDIOGRAM REPORT: CPT | Performed by: EMERGENCY MEDICINE

## 2024-07-14 PROCEDURE — 84132 ASSAY OF SERUM POTASSIUM: CPT | Performed by: PHYSICIAN ASSISTANT

## 2024-07-14 PROCEDURE — 255N000002 HC RX 255 OP 636: Performed by: INTERNAL MEDICINE

## 2024-07-14 PROCEDURE — 85610 PROTHROMBIN TIME: CPT | Performed by: EMERGENCY MEDICINE

## 2024-07-14 PROCEDURE — 250N000025 HC SEVOFLURANE, PER MIN: Performed by: INTERNAL MEDICINE

## 2024-07-14 PROCEDURE — 250N000011 HC RX IP 250 OP 636: Performed by: ANESTHESIOLOGY

## 2024-07-14 PROCEDURE — 85004 AUTOMATED DIFF WBC COUNT: CPT | Performed by: EMERGENCY MEDICINE

## 2024-07-14 PROCEDURE — 71046 X-RAY EXAM CHEST 2 VIEWS: CPT | Mod: 26 | Performed by: STUDENT IN AN ORGANIZED HEALTH CARE EDUCATION/TRAINING PROGRAM

## 2024-07-14 PROCEDURE — 83690 ASSAY OF LIPASE: CPT | Performed by: EMERGENCY MEDICINE

## 2024-07-14 PROCEDURE — 999N000141 HC STATISTIC PRE-PROCEDURE NURSING ASSESSMENT: Performed by: INTERNAL MEDICINE

## 2024-07-14 PROCEDURE — 83735 ASSAY OF MAGNESIUM: CPT | Performed by: PHYSICIAN ASSISTANT

## 2024-07-14 PROCEDURE — C1726 CATH, BAL DIL, NON-VASCULAR: HCPCS | Performed by: INTERNAL MEDICINE

## 2024-07-14 PROCEDURE — 71046 X-RAY EXAM CHEST 2 VIEWS: CPT

## 2024-07-14 PROCEDURE — 84100 ASSAY OF PHOSPHORUS: CPT | Performed by: PHYSICIAN ASSISTANT

## 2024-07-14 PROCEDURE — 84132 ASSAY OF SERUM POTASSIUM: CPT | Performed by: STUDENT IN AN ORGANIZED HEALTH CARE EDUCATION/TRAINING PROGRAM

## 2024-07-14 PROCEDURE — 250N000011 HC RX IP 250 OP 636: Performed by: INTERNAL MEDICINE

## 2024-07-14 PROCEDURE — 710N000010 HC RECOVERY PHASE 1, LEVEL 2, PER MIN: Performed by: INTERNAL MEDICINE

## 2024-07-14 PROCEDURE — 250N000009 HC RX 250: Performed by: INTERNAL MEDICINE

## 2024-07-14 PROCEDURE — 250N000009 HC RX 250: Performed by: ANESTHESIOLOGY

## 2024-07-14 PROCEDURE — 99207 PR APP CREDIT; MD BILLING SHARED VISIT: CPT | Mod: FS | Performed by: PHYSICIAN ASSISTANT

## 2024-07-14 PROCEDURE — 36415 COLL VENOUS BLD VENIPUNCTURE: CPT | Performed by: PHYSICIAN ASSISTANT

## 2024-07-14 PROCEDURE — 99285 EMERGENCY DEPT VISIT HI MDM: CPT | Mod: 25 | Performed by: EMERGENCY MEDICINE

## 2024-07-14 PROCEDURE — 99223 1ST HOSP IP/OBS HIGH 75: CPT | Mod: AI | Performed by: STUDENT IN AN ORGANIZED HEALTH CARE EDUCATION/TRAINING PROGRAM

## 2024-07-14 PROCEDURE — 99222 1ST HOSP IP/OBS MODERATE 55: CPT | Mod: 25 | Performed by: INTERNAL MEDICINE

## 2024-07-14 PROCEDURE — 87149 DNA/RNA DIRECT PROBE: CPT | Performed by: EMERGENCY MEDICINE

## 2024-07-14 PROCEDURE — 87181 SC STD AGAR DILUTION PER AGT: CPT | Performed by: EMERGENCY MEDICINE

## 2024-07-14 PROCEDURE — C1769 GUIDE WIRE: HCPCS | Performed by: INTERNAL MEDICINE

## 2024-07-14 PROCEDURE — 250N000011 HC RX IP 250 OP 636: Performed by: EMERGENCY MEDICINE

## 2024-07-14 PROCEDURE — 82040 ASSAY OF SERUM ALBUMIN: CPT | Performed by: EMERGENCY MEDICINE

## 2024-07-14 PROCEDURE — 272N000001 HC OR GENERAL SUPPLY STERILE: Performed by: INTERNAL MEDICINE

## 2024-07-14 PROCEDURE — 0FF98ZZ FRAGMENTATION IN COMMON BILE DUCT, VIA NATURAL OR ARTIFICIAL OPENING ENDOSCOPIC: ICD-10-PCS | Performed by: INTERNAL MEDICINE

## 2024-07-14 PROCEDURE — 43274 ERCP DUCT STENT PLACEMENT: CPT | Performed by: REGISTERED NURSE

## 2024-07-14 PROCEDURE — 0F798DZ DILATION OF COMMON BILE DUCT WITH INTRALUMINAL DEVICE, VIA NATURAL OR ARTIFICIAL OPENING ENDOSCOPIC: ICD-10-PCS | Performed by: INTERNAL MEDICINE

## 2024-07-14 PROCEDURE — 76705 ECHO EXAM OF ABDOMEN: CPT | Mod: 26 | Performed by: STUDENT IN AN ORGANIZED HEALTH CARE EDUCATION/TRAINING PROGRAM

## 2024-07-14 PROCEDURE — 99285 EMERGENCY DEPT VISIT HI MDM: CPT | Performed by: EMERGENCY MEDICINE

## 2024-07-14 PROCEDURE — 76705 ECHO EXAM OF ABDOMEN: CPT

## 2024-07-14 DEVICE — IMPLANTABLE DEVICE: Type: IMPLANTABLE DEVICE | Site: BILE DUCT | Status: FUNCTIONAL

## 2024-07-14 RX ORDER — VITAMIN B COMPLEX
25 TABLET ORAL DAILY
Status: DISCONTINUED | OUTPATIENT
Start: 2024-07-15 | End: 2024-07-18 | Stop reason: HOSPADM

## 2024-07-14 RX ORDER — CHLORTHALIDONE 25 MG/1
25 TABLET ORAL DAILY
Status: DISCONTINUED | OUTPATIENT
Start: 2024-07-14 | End: 2024-07-18 | Stop reason: HOSPADM

## 2024-07-14 RX ORDER — LETROZOLE 2.5 MG/1
2.5 TABLET, FILM COATED ORAL DAILY
Status: DISCONTINUED | OUTPATIENT
Start: 2024-07-15 | End: 2024-07-18 | Stop reason: HOSPADM

## 2024-07-14 RX ORDER — ONDANSETRON 2 MG/ML
4 INJECTION INTRAMUSCULAR; INTRAVENOUS EVERY 6 HOURS PRN
Status: DISCONTINUED | OUTPATIENT
Start: 2024-07-14 | End: 2024-07-18 | Stop reason: HOSPADM

## 2024-07-14 RX ORDER — ROSUVASTATIN CALCIUM 5 MG/1
5 TABLET, COATED ORAL EVERY EVENING
Status: DISCONTINUED | OUTPATIENT
Start: 2024-07-14 | End: 2024-07-18 | Stop reason: HOSPADM

## 2024-07-14 RX ORDER — ACETAMINOPHEN 325 MG/1
650 TABLET ORAL EVERY 6 HOURS PRN
Status: DISCONTINUED | OUTPATIENT
Start: 2024-07-14 | End: 2024-07-18 | Stop reason: HOSPADM

## 2024-07-14 RX ORDER — POTASSIUM CHLORIDE 750 MG/1
40 TABLET, EXTENDED RELEASE ORAL ONCE
Status: DISCONTINUED | OUTPATIENT
Start: 2024-07-14 | End: 2024-07-14

## 2024-07-14 RX ORDER — ONDANSETRON 2 MG/ML
4 INJECTION INTRAMUSCULAR; INTRAVENOUS EVERY 30 MIN PRN
Status: DISCONTINUED | OUTPATIENT
Start: 2024-07-14 | End: 2024-07-14 | Stop reason: HOSPADM

## 2024-07-14 RX ORDER — CALCIUM CARBONATE 500(1250)
500 TABLET ORAL DAILY
Status: DISCONTINUED | OUTPATIENT
Start: 2024-07-15 | End: 2024-07-18 | Stop reason: HOSPADM

## 2024-07-14 RX ORDER — METRONIDAZOLE 500 MG/100ML
500 INJECTION, SOLUTION INTRAVENOUS EVERY 8 HOURS
Status: DISCONTINUED | OUTPATIENT
Start: 2024-07-15 | End: 2024-07-18

## 2024-07-14 RX ORDER — AMOXICILLIN 250 MG
1 CAPSULE ORAL 2 TIMES DAILY PRN
Status: DISCONTINUED | OUTPATIENT
Start: 2024-07-14 | End: 2024-07-18 | Stop reason: HOSPADM

## 2024-07-14 RX ORDER — AMOXICILLIN 250 MG
2 CAPSULE ORAL 2 TIMES DAILY PRN
Status: DISCONTINUED | OUTPATIENT
Start: 2024-07-14 | End: 2024-07-18 | Stop reason: HOSPADM

## 2024-07-14 RX ORDER — FENTANYL CITRATE 50 UG/ML
50 INJECTION, SOLUTION INTRAMUSCULAR; INTRAVENOUS EVERY 5 MIN PRN
Status: DISCONTINUED | OUTPATIENT
Start: 2024-07-14 | End: 2024-07-14 | Stop reason: HOSPADM

## 2024-07-14 RX ORDER — IOPAMIDOL 510 MG/ML
INJECTION, SOLUTION INTRAVASCULAR PRN
Status: DISCONTINUED | OUTPATIENT
Start: 2024-07-14 | End: 2024-07-14 | Stop reason: HOSPADM

## 2024-07-14 RX ORDER — LISINOPRIL 5 MG/1
5 TABLET ORAL DAILY
Status: DISCONTINUED | OUTPATIENT
Start: 2024-07-15 | End: 2024-07-18 | Stop reason: HOSPADM

## 2024-07-14 RX ORDER — HYDROMORPHONE HYDROCHLORIDE 1 MG/ML
0.2 INJECTION, SOLUTION INTRAMUSCULAR; INTRAVENOUS; SUBCUTANEOUS EVERY 5 MIN PRN
Status: DISCONTINUED | OUTPATIENT
Start: 2024-07-14 | End: 2024-07-14 | Stop reason: HOSPADM

## 2024-07-14 RX ORDER — OXYCODONE HYDROCHLORIDE 5 MG/1
5 TABLET ORAL
Status: DISCONTINUED | OUTPATIENT
Start: 2024-07-14 | End: 2024-07-14 | Stop reason: HOSPADM

## 2024-07-14 RX ORDER — HYDROMORPHONE HYDROCHLORIDE 1 MG/ML
0.4 INJECTION, SOLUTION INTRAMUSCULAR; INTRAVENOUS; SUBCUTANEOUS EVERY 5 MIN PRN
Status: DISCONTINUED | OUTPATIENT
Start: 2024-07-14 | End: 2024-07-14 | Stop reason: HOSPADM

## 2024-07-14 RX ORDER — POTASSIUM CHLORIDE 750 MG/1
10 TABLET, EXTENDED RELEASE ORAL ONCE
Status: DISCONTINUED | OUTPATIENT
Start: 2024-07-14 | End: 2024-07-14

## 2024-07-14 RX ORDER — SODIUM CHLORIDE 9 MG/ML
INJECTION, SOLUTION INTRAVENOUS CONTINUOUS PRN
Status: DISCONTINUED | OUTPATIENT
Start: 2024-07-14 | End: 2024-07-14

## 2024-07-14 RX ORDER — METRONIDAZOLE 500 MG/100ML
500 INJECTION, SOLUTION INTRAVENOUS ONCE
Status: COMPLETED | OUTPATIENT
Start: 2024-07-14 | End: 2024-07-14

## 2024-07-14 RX ORDER — EPINEPHRINE 0.1 MG/ML
INJECTION INTRAVENOUS PRN
Status: DISCONTINUED | OUTPATIENT
Start: 2024-07-14 | End: 2024-07-14 | Stop reason: HOSPADM

## 2024-07-14 RX ORDER — DEXAMETHASONE SODIUM PHOSPHATE 4 MG/ML
INJECTION, SOLUTION INTRA-ARTICULAR; INTRALESIONAL; INTRAMUSCULAR; INTRAVENOUS; SOFT TISSUE PRN
Status: DISCONTINUED | OUTPATIENT
Start: 2024-07-14 | End: 2024-07-14

## 2024-07-14 RX ORDER — ONDANSETRON 2 MG/ML
INJECTION INTRAMUSCULAR; INTRAVENOUS PRN
Status: DISCONTINUED | OUTPATIENT
Start: 2024-07-14 | End: 2024-07-14

## 2024-07-14 RX ORDER — DEXAMETHASONE SODIUM PHOSPHATE 4 MG/ML
4 INJECTION, SOLUTION INTRA-ARTICULAR; INTRALESIONAL; INTRAMUSCULAR; INTRAVENOUS; SOFT TISSUE
Status: DISCONTINUED | OUTPATIENT
Start: 2024-07-14 | End: 2024-07-14 | Stop reason: HOSPADM

## 2024-07-14 RX ORDER — HYDROXYZINE HYDROCHLORIDE 10 MG/1
10 TABLET, FILM COATED ORAL EVERY 6 HOURS PRN
Status: DISCONTINUED | OUTPATIENT
Start: 2024-07-14 | End: 2024-07-14 | Stop reason: HOSPADM

## 2024-07-14 RX ORDER — CEFTRIAXONE 2 G/1
2 INJECTION, POWDER, FOR SOLUTION INTRAMUSCULAR; INTRAVENOUS ONCE
Status: COMPLETED | OUTPATIENT
Start: 2024-07-14 | End: 2024-07-14

## 2024-07-14 RX ORDER — LEVOTHYROXINE SODIUM 50 UG/1
50 TABLET ORAL DAILY
Status: DISCONTINUED | OUTPATIENT
Start: 2024-07-15 | End: 2024-07-18 | Stop reason: HOSPADM

## 2024-07-14 RX ORDER — LABETALOL HYDROCHLORIDE 5 MG/ML
10 INJECTION, SOLUTION INTRAVENOUS
Status: DISCONTINUED | OUTPATIENT
Start: 2024-07-14 | End: 2024-07-14 | Stop reason: HOSPADM

## 2024-07-14 RX ORDER — NALOXONE HYDROCHLORIDE 0.4 MG/ML
0.1 INJECTION, SOLUTION INTRAMUSCULAR; INTRAVENOUS; SUBCUTANEOUS
Status: DISCONTINUED | OUTPATIENT
Start: 2024-07-14 | End: 2024-07-14 | Stop reason: HOSPADM

## 2024-07-14 RX ORDER — MEPERIDINE HYDROCHLORIDE 25 MG/ML
12.5 INJECTION INTRAMUSCULAR; INTRAVENOUS; SUBCUTANEOUS EVERY 5 MIN PRN
Status: DISCONTINUED | OUTPATIENT
Start: 2024-07-14 | End: 2024-07-14 | Stop reason: HOSPADM

## 2024-07-14 RX ORDER — CEFTRIAXONE 2 G/1
2 INJECTION, POWDER, FOR SOLUTION INTRAMUSCULAR; INTRAVENOUS EVERY 24 HOURS
Status: DISCONTINUED | OUTPATIENT
Start: 2024-07-15 | End: 2024-07-18 | Stop reason: HOSPADM

## 2024-07-14 RX ORDER — FENTANYL CITRATE 50 UG/ML
INJECTION, SOLUTION INTRAMUSCULAR; INTRAVENOUS PRN
Status: DISCONTINUED | OUTPATIENT
Start: 2024-07-14 | End: 2024-07-14

## 2024-07-14 RX ORDER — PROPOFOL 10 MG/ML
INJECTION, EMULSION INTRAVENOUS PRN
Status: DISCONTINUED | OUTPATIENT
Start: 2024-07-14 | End: 2024-07-14

## 2024-07-14 RX ORDER — OXYCODONE HYDROCHLORIDE 5 MG/1
5 TABLET ORAL EVERY 6 HOURS PRN
Status: DISCONTINUED | OUTPATIENT
Start: 2024-07-14 | End: 2024-07-16

## 2024-07-14 RX ORDER — INDOMETHACIN 50 MG/1
SUPPOSITORY RECTAL PRN
Status: DISCONTINUED | OUTPATIENT
Start: 2024-07-14 | End: 2024-07-14 | Stop reason: HOSPADM

## 2024-07-14 RX ORDER — LIDOCAINE HYDROCHLORIDE 20 MG/ML
INJECTION, SOLUTION INFILTRATION; PERINEURAL PRN
Status: DISCONTINUED | OUTPATIENT
Start: 2024-07-14 | End: 2024-07-14

## 2024-07-14 RX ORDER — POLYETHYLENE GLYCOL 3350 17 G/17G
17 POWDER, FOR SOLUTION ORAL 2 TIMES DAILY PRN
Status: DISCONTINUED | OUTPATIENT
Start: 2024-07-14 | End: 2024-07-18 | Stop reason: HOSPADM

## 2024-07-14 RX ORDER — EPINEPHRINE 1 MG/ML
INJECTION, SOLUTION, CONCENTRATE INTRAVENOUS PRN
Status: DISCONTINUED | OUTPATIENT
Start: 2024-07-14 | End: 2024-07-14 | Stop reason: HOSPADM

## 2024-07-14 RX ORDER — OXYCODONE HYDROCHLORIDE 10 MG/1
10 TABLET ORAL
Status: DISCONTINUED | OUTPATIENT
Start: 2024-07-14 | End: 2024-07-14 | Stop reason: HOSPADM

## 2024-07-14 RX ORDER — SODIUM CHLORIDE, SODIUM LACTATE, POTASSIUM CHLORIDE, CALCIUM CHLORIDE 600; 310; 30; 20 MG/100ML; MG/100ML; MG/100ML; MG/100ML
INJECTION, SOLUTION INTRAVENOUS CONTINUOUS
Status: DISCONTINUED | OUTPATIENT
Start: 2024-07-14 | End: 2024-07-14 | Stop reason: HOSPADM

## 2024-07-14 RX ORDER — ONDANSETRON 4 MG/1
4 TABLET, ORALLY DISINTEGRATING ORAL EVERY 6 HOURS PRN
Status: DISCONTINUED | OUTPATIENT
Start: 2024-07-14 | End: 2024-07-18 | Stop reason: HOSPADM

## 2024-07-14 RX ORDER — PHENYLEPHRINE HYDROCHLORIDE 10 MG/ML
INJECTION INTRAVENOUS PRN
Status: DISCONTINUED | OUTPATIENT
Start: 2024-07-14 | End: 2024-07-14

## 2024-07-14 RX ORDER — FENTANYL CITRATE 50 UG/ML
25 INJECTION, SOLUTION INTRAMUSCULAR; INTRAVENOUS
Status: DISCONTINUED | OUTPATIENT
Start: 2024-07-14 | End: 2024-07-14 | Stop reason: HOSPADM

## 2024-07-14 RX ORDER — FENTANYL CITRATE 50 UG/ML
25 INJECTION, SOLUTION INTRAMUSCULAR; INTRAVENOUS EVERY 5 MIN PRN
Status: DISCONTINUED | OUTPATIENT
Start: 2024-07-14 | End: 2024-07-14 | Stop reason: HOSPADM

## 2024-07-14 RX ORDER — ONDANSETRON 4 MG/1
4 TABLET, ORALLY DISINTEGRATING ORAL EVERY 30 MIN PRN
Status: DISCONTINUED | OUTPATIENT
Start: 2024-07-14 | End: 2024-07-14 | Stop reason: HOSPADM

## 2024-07-14 RX ORDER — POTASSIUM CHLORIDE 7.45 MG/ML
10 INJECTION INTRAVENOUS
Status: DISCONTINUED | OUTPATIENT
Start: 2024-07-14 | End: 2024-07-14

## 2024-07-14 RX ORDER — LIDOCAINE 40 MG/G
CREAM TOPICAL
Status: DISCONTINUED | OUTPATIENT
Start: 2024-07-14 | End: 2024-07-16

## 2024-07-14 RX ADMIN — FENTANYL CITRATE 25 MCG: 50 INJECTION INTRAMUSCULAR; INTRAVENOUS at 19:02

## 2024-07-14 RX ADMIN — POTASSIUM CHLORIDE 10 MEQ: 7.46 INJECTION, SOLUTION INTRAVENOUS at 15:54

## 2024-07-14 RX ADMIN — Medication 30 MG: at 19:21

## 2024-07-14 RX ADMIN — ONDANSETRON 4 MG: 2 INJECTION INTRAMUSCULAR; INTRAVENOUS at 20:01

## 2024-07-14 RX ADMIN — PHENYLEPHRINE HYDROCHLORIDE 100 MCG: 10 INJECTION INTRAVENOUS at 19:21

## 2024-07-14 RX ADMIN — PHENYLEPHRINE HYDROCHLORIDE 100 MCG: 10 INJECTION INTRAVENOUS at 19:32

## 2024-07-14 RX ADMIN — SODIUM CHLORIDE: 0.9 INJECTION, SOLUTION INTRAVENOUS at 18:57

## 2024-07-14 RX ADMIN — PHENYLEPHRINE HYDROCHLORIDE 100 MCG: 10 INJECTION INTRAVENOUS at 19:06

## 2024-07-14 RX ADMIN — PHENYLEPHRINE HYDROCHLORIDE 100 MCG: 10 INJECTION INTRAVENOUS at 19:56

## 2024-07-14 RX ADMIN — PROPOFOL 200 MG: 10 INJECTION, EMULSION INTRAVENOUS at 19:03

## 2024-07-14 RX ADMIN — PHENYLEPHRINE HYDROCHLORIDE 100 MCG: 10 INJECTION INTRAVENOUS at 19:46

## 2024-07-14 RX ADMIN — PHENYLEPHRINE HYDROCHLORIDE 100 MCG: 10 INJECTION INTRAVENOUS at 19:54

## 2024-07-14 RX ADMIN — SUCCINYLCHOLINE CHLORIDE 120 MG: 20 INJECTION, SOLUTION INTRAMUSCULAR; INTRAVENOUS; PARENTERAL at 19:03

## 2024-07-14 RX ADMIN — SODIUM CHLORIDE: 0.9 INJECTION, SOLUTION INTRAVENOUS at 20:01

## 2024-07-14 RX ADMIN — LIDOCAINE HYDROCHLORIDE 100 MG: 20 INJECTION, SOLUTION INFILTRATION; PERINEURAL at 19:02

## 2024-07-14 RX ADMIN — POTASSIUM CHLORIDE 10 MEQ: 7.46 INJECTION, SOLUTION INTRAVENOUS at 18:02

## 2024-07-14 RX ADMIN — DEXAMETHASONE SODIUM PHOSPHATE 4 MG: 4 INJECTION, SOLUTION INTRA-ARTICULAR; INTRALESIONAL; INTRAMUSCULAR; INTRAVENOUS; SOFT TISSUE at 19:11

## 2024-07-14 RX ADMIN — PHENYLEPHRINE HYDROCHLORIDE 100 MCG: 10 INJECTION INTRAVENOUS at 19:15

## 2024-07-14 RX ADMIN — CEFTRIAXONE SODIUM 2 G: 2 INJECTION, POWDER, FOR SOLUTION INTRAMUSCULAR; INTRAVENOUS at 15:51

## 2024-07-14 RX ADMIN — FENTANYL CITRATE 25 MCG: 50 INJECTION INTRAMUSCULAR; INTRAVENOUS at 19:21

## 2024-07-14 RX ADMIN — POTASSIUM CHLORIDE 10 MEQ: 7.46 INJECTION, SOLUTION INTRAVENOUS at 17:00

## 2024-07-14 RX ADMIN — Medication 100 MG: at 20:04

## 2024-07-14 RX ADMIN — METRONIDAZOLE 500 MG: 500 INJECTION, SOLUTION INTRAVENOUS at 16:42

## 2024-07-14 RX ADMIN — PROPOFOL 75 MCG/KG/MIN: 10 INJECTION, EMULSION INTRAVENOUS at 19:06

## 2024-07-14 ASSESSMENT — ACTIVITIES OF DAILY LIVING (ADL)
ADLS_ACUITY_SCORE: 35
ADLS_ACUITY_SCORE: 19
ADLS_ACUITY_SCORE: 35
ADLS_ACUITY_SCORE: 35
ADLS_ACUITY_SCORE: 19
ADLS_ACUITY_SCORE: 35
ADLS_ACUITY_SCORE: 35
ADLS_ACUITY_SCORE: 23
ADLS_ACUITY_SCORE: 35
ADLS_ACUITY_SCORE: 35

## 2024-07-14 ASSESSMENT — COLUMBIA-SUICIDE SEVERITY RATING SCALE - C-SSRS
2. HAVE YOU ACTUALLY HAD ANY THOUGHTS OF KILLING YOURSELF IN THE PAST MONTH?: NO
1. IN THE PAST MONTH, HAVE YOU WISHED YOU WERE DEAD OR WISHED YOU COULD GO TO SLEEP AND NOT WAKE UP?: NO
6. HAVE YOU EVER DONE ANYTHING, STARTED TO DO ANYTHING, OR PREPARED TO DO ANYTHING TO END YOUR LIFE?: NO

## 2024-07-14 NOTE — ED TRIAGE NOTES
Triage Assessment (Adult)       Row Name 07/14/24 1153          Triage Assessment    Airway WDL WDL        Respiratory WDL    Respiratory WDL WDL        Skin Circulation/Temperature WDL    Skin Circulation/Temperature WDL X        Cardiac WDL    Cardiac WDL WDL        Peripheral/Neurovascular WDL    Peripheral Neurovascular WDL WDL        Cognitive/Neuro/Behavioral WDL    Cognitive/Neuro/Behavioral WDL WDL

## 2024-07-14 NOTE — ANESTHESIA PREPROCEDURE EVALUATION
Anesthesia Pre-Procedure Evaluation    Patient: Sushila Chambers   MRN: [id number] : 1950        Procedure : Procedure(s):  ENDOSCOPIC RETROGRADE CHOLANGIOPANCREATOGRAPHY          Past Medical History:   Diagnosis Date     Basal cell carcinoma      Breast cancer, right breast (H)      History of blood transfusion      Hypothyroid      Lung cancer (H) 2012    right     Uncontrolled hypertension 11/15/2017      Past Surgical History:   Procedure Laterality Date     ABDOMEN SURGERY       BIOPSY, LUNG NODULE Right     + cancer     COLONOSCOPY       ECTOPIC PREGNANCY SURGERY Left      GENITOURINARY SURGERY       GYN SURGERY       INSERT PORT VASCULAR ACCESS       LUMPECTOMY BREAST Right 2010     LUNG SURGERY Right     histoplasmosis     MASTECTOMY SIMPLE, SENTINEL NODE, COMBINED Left 2016    Procedure: COMBINED MASTECTOMY SIMPLE, SENTINEL NODE;  Surgeon: Satnam Betts MD;  Location: [facility] OR     PHACOEMULSIFICATION WITH STANDARD INTRAOCULAR LENS IMPLANT Right 2022    Procedure: RIGHT EYE PHACOEMULSIFICATION, CATARACT, WITH  INTRAOCULAR LENS IMPLANT INSERTION TORIC LENS;  Surgeon: Ion Puente MD;  Location: [facility] OR     PHACOEMULSIFICATION WITH STANDARD INTRAOCULAR LENS IMPLANT Left 2022    Procedure: LEFT PHACOEMULSIFICATION, CATARACT, WITH STANDARD INTRAOCULAR LENS IMPLANT INSERTION;  Surgeon: Ion Puente MD;  Location: [facility] OR     REMOVE PORT VASCULAR ACCESS N/A 2016    Procedure: REMOVE PORT VASCULAR ACCESS;  Surgeon: Satnam Betts MD;  Location: [facility] OR     THORACIC SURGERY        Allergies   Allergen Reactions     Latex Rash     SKIN REACTION    SKIN REACTION     Sulfa Antibiotics Itching and Rash      Social History     Tobacco Use     Smoking status: Never     Smokeless tobacco: Never   Substance Use Topics     Alcohol use: Yes     Comment: Occasionally      Wt Readings from Last 1 Encounters:   24 65.8 kg (145 lb)        Anesthesia Evaluation   Pt has  had prior anesthetic.     No history of anesthetic complications       ROS/MED HX  ENT/Pulmonary:  - neg pulmonary ROS     Neurologic:  - neg neurologic ROS     Cardiovascular:     (+) Dyslipidemia hypertension- -   -  - -                                      METS/Exercise Tolerance: 3 - Able to walk 1-2 blocks without stopping    Hematologic:  - neg hematologic  ROS     Musculoskeletal:   (+)  arthritis,             GI/Hepatic: Comment: Suspected acute cholangitis, Acute transaminitis, Extrahepatic biliary obstruction. One time  vomiting since Friday               Renal/Genitourinary: Comment: Hypokalemia, Hyponatremia   (-) renal disease   Endo:     (+)          thyroid problem, hypothyroidism,           Psychiatric/Substance Use:  - neg psychiatric ROS     Infectious Disease:       Malignancy:   (+) Malignancy, History of Breast and Lung.Breast CA Remission status post.      Other:            Physical Exam    Airway        Mallampati: II   TM distance: > 3 FB   Neck ROM: full   Mouth opening: > 3 cm    Respiratory Devices and Support         Dental       (+) Minor Abnormalities - some fillings, tiny chips      Cardiovascular   cardiovascular exam normal          Pulmonary   pulmonary exam normal              OUTSIDE LABS:  CBC:   Lab Results   Component Value Date    WBC 10.9 07/14/2024    WBC 6.4 07/21/2022    HGB 11.4 (L) 07/14/2024    HGB 13.4 07/21/2022    HCT 31.0 (L) 07/14/2024    HCT 37.9 07/21/2022     07/14/2024     07/21/2022     BMP:   Lab Results   Component Value Date     (L) 07/14/2024     (L) 10/04/2023    POTASSIUM 2.6 (LL) 07/14/2024    POTASSIUM 3.5 10/04/2023    CHLORIDE 89 (L) 07/14/2024    CHLORIDE 93 (L) 10/04/2023    CO2 27 07/14/2024    CO2 28 10/04/2023    BUN 17.2 07/14/2024    BUN 13.8 10/04/2023    CR 0.97 (H) 07/14/2024    CR 0.85 10/04/2023     (H) 07/14/2024     (H) 10/04/2023     COAGS:   Lab Results   Component Value Date    PTT 28  "07/14/2024    INR 1.52 (H) 07/14/2024    FIBR 482 (H) 09/09/2015     POC: No results found for: \"BGM\", \"HCG\", \"HCGS\"  HEPATIC:   Lab Results   Component Value Date    ALBUMIN 3.6 07/14/2024    PROTTOTAL 6.5 07/14/2024     (HH) 07/14/2024     (H) 07/14/2024    ALKPHOS 129 07/14/2024    BILITOTAL 6.0 (H) 07/14/2024     OTHER:   Lab Results   Component Value Date    A1C 5.6 10/04/2023    OCTAVIANO 8.9 07/14/2024    MAG 2.0 12/07/2015    LIPASE 26 07/14/2024    TSH 3.76 10/04/2023       Anesthesia Plan    ASA Status:  2, emergent    NPO Status:  NPO Appropriate    Anesthesia Type: General.     - Airway: ETT   Induction: Intravenous, Propofol, RSI.   Maintenance: TIVA.   Techniques and Equipment:     - Lines/Monitors: BIS     Consents    Anesthesia Plan(s) and associated risks, benefits, and realistic alternatives discussed. Questions answered and patient/representative(s) expressed understanding.     - Discussed: Risks, Benefits and Alternatives for BOTH SEDATION and the PROCEDURE were discussed     - Discussed with:  Patient      - Extended Intubation/Ventilatory Support Discussed: No.      - Patient is DNR/DNI Status: No     Use of blood products discussed: No .     Postoperative Care    Pain management: IV analgesics, Multi-modal analgesia.   PONV prophylaxis: Ondansetron (or other 5HT-3), Background Propofol Infusion, Dexamethasone or Solumedrol     Comments:               Benny Baca MD    I have reviewed the pertinent notes and labs in the chart from the past 30 days and (re)examined the patient.  Any updates or changes from those notes are reflected in this note.    # Hypokalemia: Lowest K = 2.6 mmol/L in last 2 days, will replace as needed  # Hyponatremia: Lowest Na = 128 mmol/L in last 2 days, will monitor as appropriate        # Coagulation Defect: INR = 1.52 (Ref range: 0.85 - 1.15) and/or PTT = 28 Seconds (Ref range: 22 - 38 Seconds), will monitor for bleeding  # Drug Induced Platelet " "Defect: home medication list includes an antiplatelet medication  # Overweight: Estimated body mass index is 25.69 kg/m  as calculated from the following:    Height as of this encounter: 1.6 m (5' 3\").    Weight as of this encounter: 65.8 kg (145 lb).      74-year-old with history of breast cancer and lung cancer now in remission presented with elevated LFT and CBD dilation with concern for obstruction cholangitis..  Common bile duct is 13 mm diameter with small amount of Sludge.       Elevated LFT AST of 355  and T. bili 6 0 INR 1.5    Hypokalemia 2.6 status post 10 units x 2 received in ED.  Will review the repeat labs    Hyponatremia-will infuse normal saline    Chelsea Cody MD, PhD  [facility] [address]  [address] [facility] [address] [phone]                                     "

## 2024-07-14 NOTE — ED PROVIDER NOTES
"ED Provider Note  [facility]      History     Chief Complaint   Patient presents with    Abdominal Pain     HPI  Sushila Chambers is a 74 year old female with a past medical history of lung cancer (s/p excision) and breast cancer (on adjuvant letrozole), hypothyroidism, hyperlipidemia, hypertension, presenting to the ED with upper abdominal / epigastric pain.  Pain started after eating dinner on 7/12.  Patient rated the worst pain at 7-8 out of 10, intense but not \"shooting\".  Pain comes and goes and radiates around to bilateral flanks.  Abdominal pain is companied by gas, occasional nausea.  She vomited once on 7/13 at 5 a.m. and self-reported a temperature of 100.7 at home.  She took Tylenol p.o. to alleviate fever.    Patient denies headache, blurry vision, chest pain, shortness of breath, dysuria/hematuria, or diarrhea.  Patient has not had a bowel movement since symptom onset and has felt dehydrated since then.  Patient shared food on 7/12 with daughter, who does not report food poisoning symptoms.    Past Medical History  Past Medical History:   Diagnosis Date    Basal cell carcinoma     Breast cancer, right breast (H)     History of blood transfusion     Hypothyroid     Lung cancer (H) 2012    right    Uncontrolled hypertension 11/15/2017     Past Surgical History:   Procedure Laterality Date    ABDOMEN SURGERY      BIOPSY, LUNG NODULE Right 2012    + cancer    COLONOSCOPY      ECTOPIC PREGNANCY SURGERY Left 1988    GENITOURINARY SURGERY      GYN SURGERY      INSERT PORT VASCULAR ACCESS      LUMPECTOMY BREAST Right 2010    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    MASTECTOMY SIMPLE, SENTINEL NODE, COMBINED Left 2/24/2016    Procedure: COMBINED MASTECTOMY SIMPLE, SENTINEL NODE;  Surgeon: Satnam Btets MD;  Location: [facility] OR    PHACOEMULSIFICATION WITH STANDARD INTRAOCULAR LENS IMPLANT Right 9/12/2022    Procedure: RIGHT EYE PHACOEMULSIFICATION, CATARACT, WITH  INTRAOCULAR LENS " "IMPLANT INSERTION TORIC LENS;  Surgeon: Ion Puente MD;  Location: [facility] OR    PHACOEMULSIFICATION WITH STANDARD INTRAOCULAR LENS IMPLANT Left 9/26/2022    Procedure: LEFT PHACOEMULSIFICATION, CATARACT, WITH STANDARD INTRAOCULAR LENS IMPLANT INSERTION;  Surgeon: Ion Puente MD;  Location: [facility] OR    REMOVE PORT VASCULAR ACCESS N/A 2/24/2016    Procedure: REMOVE PORT VASCULAR ACCESS;  Surgeon: Satnam Betts MD;  Location: [facility] OR    THORACIC SURGERY       No current outpatient medications on file.    Allergies   Allergen Reactions    Latex Rash     SKIN REACTION    SKIN REACTION    Sulfa Antibiotics Itching and Rash     Family History  Family History   Problem Relation Age of Onset    Breast Cancer Mother     Cancer Father         bone cancer    Vertigo Sister     Melanoma No family hx of     Glaucoma No family hx of     Macular Degeneration No family hx of     Skin Cancer No family hx of      Social History   Social History     Tobacco Use    Smoking status: Never    Smokeless tobacco: Never   Substance Use Topics    Alcohol use: Yes     Comment: Occasionally    Drug use: No      A medically appropriate review of systems was performed with pertinent positives and negatives noted in the HPI, and all other systems negative.    Physical Exam   BP: (!) 149/63  Pulse: 95  Temp: 98.9  F (37.2  C)  Resp: 16  Height: 160 cm (5' 3\")  Weight: 65.8 kg (145 lb)  SpO2: 94 %  Physical Exam  Physical Exam  Constitutional:       General: Not in acute distress.     Appearance: Normal appearance.   HENT:      Head: Normocephalic.      Nose: Nose normal.   Eyes:      Pupils: Pupils are equal, round, and reactive to light.   Cardiovascular:      Rate and Rhythm: Normal rate and regular rhythm.   Pulmonary:      Effort: Pulmonary effort is normal.   Abdominal:      General: There is no distension.      Comments: Slightly tender to deep palpation on RUQ, generalized tenderness throughout the entire abdomen.  Is " otherwise soft.  No rebound tenderness.  No CVA tenderness.  No overlying skin changes other than old vertical midline scar under umbilicus.   Musculoskeletal:         General: No deformity. Normal range of motion.      Cervical back: Normal range of motion.   Skin:     General: Skin is warm. No pitting edema.   Neurological:      Mental Status: Patient is alert and oriented to person, place, and time.   Psychiatric:         Mood and Affect: Mood normal.       ED Course, Procedures, & Data      Procedures            EKG Interpretation:      Interpreted by Alfredito Jacome  Time reviewed: 4:04 pm  Symptoms at time of EKG: Epigastric pain   Rhythm: normal sinus   Rate: normal  Axis: normal  Ectopy: none  Conduction: normal  ST Segments/ T Waves: No ST-T wave changes  Q Waves: none  Comparison to prior: No old EKG available    Clinical Impression: normal EKG            Results for orders placed or performed during the hospital encounter of 07/14/24   US Abdomen Limited (RUQ)     Status: None    Narrative    EXAMINATION: Limited Abdominal Ultrasound, 7/14/2024 1:45 PM     COMPARISON: None.    HISTORY: RUQ tenderness, r/o cholecystitis vs. cholelithiasis vs.  pancreatitis    FINDINGS:   Fluid: No evidence of ascites or pleural effusions.    Liver: The liver demonstrates normal echotexture, measuring 18.9 cm in  craniocaudal dimension. There is no focal mass.     Gallbladder: The gallbladder is not well-visualized. No  pericholecystic fluid. Negative sonographic Pemberton's sign.    Bile Ducts: No intrahepatic biliary duct dilation.  The common bile  duct measures up to 13 mm in diameter small amount of sludge present.  No obstructing stones identified on ultrasound.    Pancreas: Visualized portions of the head and body of the pancreas are  unremarkable.     Kidney: The right kidney measures 10.1 cm long. There is no  hydronephrosis or hydroureter, no shadowing renal calculi, cystic  lesion or mass.       Impression     IMPRESSION:   The gallbladder is not well visualized and may be contracted with  echogenic stones. The common bile duct is dilated up to 13 mm a small  amount of sludge present. No obstructing stones identified on  ultrasound. May consider MRCP for further evaluation.    I have personally reviewed the examination and initial interpretation  and I agree with the findings.    ZULEYMA POLK DO         SYSTEM ID:  [id number]   XR Chest 2 Views     Status: None    Narrative    XR CHEST 2 VIEWS 7/14/2024 2:00 PM      HISTORY: fever, r/o CAP    COMPARISON: 9/15/2014.     FINDINGS:   Frontal view of the chest. Blunting of the right costophrenic angle is  likely due to some superimposed soft tissues. Normal cardiomediastinal  silhouette. No pneumothorax or pleural effusion. No focal airspace  consolidation.      Impression    IMPRESSION:   No focal consolidation.    I have personally reviewed the examination and initial interpretation  and I agree with the findings.    ZULEYMA POLK DO         SYSTEM ID:  [id number]   [place] Draw     Status: None    Narrative    The following orders were created for panel order [place] Draw.  Procedure                               Abnormality         Status                     ---------                               -----------         ------                     Extra Blood Culture Bottle[[id number]]                       Final result               Extra Blue Top Tube[[id number]]                              Final result               Extra Red Top Tube[[id number]]                               Final result               Extra Purple Top Tube[[id number]]                            Final result               Extra Green Top (Lithium...[[id number]]                      Final result                 Please view results for these tests on the individual orders.   Extra Blood Culture Bottle     Status: None   Result Value Ref Range    Hold Specimen JIC    Extra Blue Top Tube     Status: None   Result Value  Ref Range    Hold Specimen JIC    Extra Red Top Tube     Status: None   Result Value Ref Range    Hold Specimen JIC    Extra Purple Top Tube     Status: None   Result Value Ref Range    Hold Specimen JIC    Extra Green Top (Lithium Heparin) ON ICE     Status: None   Result Value Ref Range    Hold Specimen JIC    Comprehensive metabolic panel     Status: Abnormal   Result Value Ref Range    Sodium 128 (L) 135 - 145 mmol/L    Potassium 2.6 (LL) 3.4 - 5.3 mmol/L    Carbon Dioxide (CO2) 27 22 - 29 mmol/L    Anion Gap 12 7 - 15 mmol/L    Urea Nitrogen 17.2 8.0 - 23.0 mg/dL    Creatinine 0.97 (H) 0.51 - 0.95 mg/dL    GFR Estimate 61 >60 mL/min/1.73m2    Calcium 8.9 8.8 - 10.2 mg/dL    Chloride 89 (L) 98 - 107 mmol/L    Glucose 138 (H) 70 - 99 mg/dL    Alkaline Phosphatase 129 40 - 150 U/L     (H) 0 - 45 U/L     (HH) 0 - 50 U/L    Protein Total 6.5 6.4 - 8.3 g/dL    Albumin 3.6 3.5 - 5.2 g/dL    Bilirubin Total 6.0 (H) <=1.2 mg/dL   Lipase     Status: Normal   Result Value Ref Range    Lipase 26 13 - 60 U/L   Symptomatic Influenza A/B, RSV, & SARS-CoV2 PCR (COVID-19) Nasopharyngeal     Status: Normal    Specimen: Nasopharyngeal; Swab   Result Value Ref Range    Influenza A PCR Negative Negative    Influenza B PCR Negative Negative    RSV PCR Negative Negative    SARS CoV2 PCR Negative Negative    Narrative    Testing was performed using the Xpert Xpress CoV2/Flu/RSV Assay on the Cepheid GeneXpert Instrument. This test should be ordered for the detection of SARS-CoV-2, influenza, and RSV viruses in individuals who meet clinical and/or epidemiological criteria. Test performance is unknown in asymptomatic patients. This test is for in vitro diagnostic use under the FDA EUA for laboratories certified under CLIA to perform high or moderate complexity testing. This test has not been FDA cleared or approved. A negative result does not rule out the presence of PCR inhibitors in the specimen or target RNA in  concentration below the limit of detection for the assay. If only one viral target is positive but coinfection with multiple targets is suspected, the sample should be re-tested with another FDA cleared, approved, or authorized test, if coinfection would change clinical management. This test was validated by the [facility]. These laboratories are certified under the Clinical Laboratory Improvement Amendments of 1988 (CLIA-88) as qualified to perform high complexity laboratory testing.   CBC with platelets and differential     Status: Abnormal   Result Value Ref Range    WBC Count 10.9 4.0 - 11.0 10e3/uL    RBC Count 3.62 (L) 3.80 - 5.20 10e6/uL    Hemoglobin 11.4 (L) 11.7 - 15.7 g/dL    Hematocrit 31.0 (L) 35.0 - 47.0 %    MCV 86 78 - 100 fL    MCH 31.5 26.5 - 33.0 pg    MCHC 36.8 (H) 31.5 - 36.5 g/dL    RDW 13.2 10.0 - 15.0 %    Platelet Count 161 150 - 450 10e3/uL    % Neutrophils 95 %    % Lymphocytes 2 %    % Monocytes 2 %    % Eosinophils 0 %    % Basophils 0 %    % Immature Granulocytes 1 %    NRBCs per 100 WBC 0 <1 /100    Absolute Neutrophils 10.5 (H) 1.6 - 8.3 10e3/uL    Absolute Lymphocytes 0.2 (L) 0.8 - 5.3 10e3/uL    Absolute Monocytes 0.2 0.0 - 1.3 10e3/uL    Absolute Eosinophils 0.0 0.0 - 0.7 10e3/uL    Absolute Basophils 0.0 0.0 - 0.2 10e3/uL    Absolute Immature Granulocytes 0.1 <=0.4 10e3/uL    Absolute NRBCs 0.0 10e3/uL   INR     Status: Abnormal   Result Value Ref Range    INR 1.52 (H) 0.85 - 1.15   Partial thromboplastin time     Status: Normal   Result Value Ref Range    aPTT 28 22 - 38 Seconds   Magnesium     Status: Abnormal   Result Value Ref Range    Magnesium 1.6 (L) 1.7 - 2.3 mg/dL   Phosphorus     Status: Abnormal   Result Value Ref Range    Phosphorus 2.4 (L) 2.5 - 4.5 mg/dL   EKG 12-lead, tracing only     Status: None (Preliminary result)   Result Value Ref Range    Systolic Blood Pressure  mmHg    Diastolic Blood Pressure  mmHg    Ventricular Rate 81 BPM    Atrial  Rate 81 BPM    NC Interval 144 ms    QRS Duration 84 ms     ms    QTc 460 ms    P Axis 60 degrees    R AXIS -7 degrees    T Axis 29 degrees    Interpretation ECG Sinus rhythm  Low voltage QRS  Borderline ECG      CBC with platelets differential     Status: Abnormal    Narrative    The following orders were created for panel order CBC with platelets differential.  Procedure                               Abnormality         Status                     ---------                               -----------         ------                     CBC with platelets and d...[[id number]]  Abnormal            Final result                 Please view results for these tests on the individual orders.     Medications   lidocaine 1 % 0.1-1 mL ( Other Auto Hold 7/14/24 1730)   lidocaine (LMX4) cream ( Topical Auto Hold 7/14/24 1730)   sodium chloride (PF) 0.9% PF flush 3 mL ( Intracatheter Automatically Held 7/17/24 1620)   sodium chloride (PF) 0.9% PF flush 3 mL ( Intracatheter Auto Hold 7/14/24 1730)   senna-docusate (SENOKOT-S/PERICOLACE) 8.6-50 MG per tablet 1 tablet ( Oral Auto Hold 7/14/24 1730)     Or   senna-docusate (SENOKOT-S/PERICOLACE) 8.6-50 MG per tablet 2 tablet ( Oral Auto Hold 7/14/24 1730)   polyethylene glycol (MIRALAX) Packet 17 g ( Oral Auto Hold 7/14/24 1730)   ondansetron (ZOFRAN ODT) ODT tab 4 mg ( Oral Auto Hold 7/14/24 1730)     Or   ondansetron (ZOFRAN) injection 4 mg ( Intravenous Auto Hold 7/14/24 1730)   calcium carbonate 500 mg (elemental) (OSCAL) tablet 500 mg ( Oral Automatically Held 7/18/24 0800)   chlorthalidone (HYGROTON) tablet 25 mg ( Oral Automatically Held 7/17/24 0800)   letrozole (FEMARA) tablet 2.5 mg ( Oral Automatically Held 7/18/24 0800)   levothyroxine (SYNTHROID/LEVOTHROID) tablet 50 mcg ( Oral Automatically Held 7/18/24 0800)   lisinopril (ZESTRIL) tablet 5 mg ( Oral Automatically Held 7/18/24 0800)   rosuvastatin (CRESTOR) tablet 5 mg ( Oral Automatically Held 7/17/24 2000)    Vitamin D3 (CHOLECALCIFEROL) tablet 25 mcg ( Oral Automatically Held 7/18/24 0800)   acetaminophen (TYLENOL) tablet 650 mg ( Oral Auto Hold 7/14/24 1730)   oxyCODONE (ROXICODONE) tablet 5 mg ( Oral Auto Hold 7/14/24 1730)   cefTRIAXone (ROCEPHIN) 2 g vial to attach to  ml bag for ADULTS or NS 50 ml bag for PEDS ( Intravenous Automatically Held 7/18/24 1400)   metroNIDAZOLE (FLAGYL) infusion 500 mg ( Intravenous Automatically Held 7/18/24 1700)   potassium chloride brian ER (KLOR-CON M10) CR tablet 40 mEq ( Oral Auto Hold 7/14/24 1730)     Followed by   potassium chloride brian ER (KLOR-CON M10) CR tablet 10 mEq ( Oral Automatically Held 7/14/24 1915)   sodium chloride 0.9% BOLUS 500 mL ( Intravenous Auto Hold 7/14/24 1730)   cefTRIAXone (ROCEPHIN) 2 g vial to attach to  ml bag for ADULTS or NS 50 ml bag for PEDS (0 g Intravenous Stopped 7/14/24 1644)   metroNIDAZOLE (FLAGYL) infusion 500 mg (500 mg Intravenous $New Bag 7/14/24 1642)     Labs Ordered and Resulted from Time of ED Arrival to Time of ED Departure   COMPREHENSIVE METABOLIC PANEL - Abnormal       Result Value    Sodium 128 (*)     Potassium 2.6 (*)     Carbon Dioxide (CO2) 27      Anion Gap 12      Urea Nitrogen 17.2      Creatinine 0.97 (*)     GFR Estimate 61      Calcium 8.9      Chloride 89 (*)     Glucose 138 (*)     Alkaline Phosphatase 129       (*)      (*)     Protein Total 6.5      Albumin 3.6      Bilirubin Total 6.0 (*)    CBC WITH PLATELETS AND DIFFERENTIAL - Abnormal    WBC Count 10.9      RBC Count 3.62 (*)     Hemoglobin 11.4 (*)     Hematocrit 31.0 (*)     MCV 86      MCH 31.5      MCHC 36.8 (*)     RDW 13.2      Platelet Count 161      % Neutrophils 95      % Lymphocytes 2      % Monocytes 2      % Eosinophils 0      % Basophils 0      % Immature Granulocytes 1      NRBCs per 100 WBC 0      Absolute Neutrophils 10.5 (*)     Absolute Lymphocytes 0.2 (*)     Absolute Monocytes 0.2      Absolute Eosinophils 0.0       Absolute Basophils 0.0      Absolute Immature Granulocytes 0.1      Absolute NRBCs 0.0     INR - Abnormal    INR 1.52 (*)    LIPASE - Normal    Lipase 26     INFLUENZA A/B, RSV, & SARS-COV2 PCR - Normal    Influenza A PCR Negative      Influenza B PCR Negative      RSV PCR Negative      SARS CoV2 PCR Negative     PARTIAL THROMBOPLASTIN TIME - Normal    aPTT 28     ROUTINE UA WITH MICROSCOPIC REFLEX TO CULTURE   BLOOD CULTURE   BLOOD CULTURE     XR Chest 2 Views   Final Result   IMPRESSION:    No focal consolidation.      I have personally reviewed the examination and initial interpretation   and I agree with the findings.      ZULEYMA POLK DO            SYSTEM ID:  [id number]      US Abdomen Limited (RUQ)   Final Result   IMPRESSION:    The gallbladder is not well visualized and may be contracted with   echogenic stones. The common bile duct is dilated up to 13 mm a small   amount of sludge present. No obstructing stones identified on   ultrasound. May consider MRCP for further evaluation.      I have personally reviewed the examination and initial interpretation   and I agree with the findings.      ZULEYMA POLK DO            SYSTEM ID:  [id number]             Critical care was not performed.     Medical Decision Making  The patient's presentation was of moderate complexity (2 or more stable chronic illnesses).    The patient's evaluation involved:  review of external note(s) from 1 sources (see separate area of note for details)  review of 3+ test result(s) ordered prior to this encounter (see separate area of note for details)  ordering and/or review of 3+ test(s) in this encounter (see separate area of note for details)  discussion of management or test interpretation with another health professional (see separate area of note for details)    The patient's management necessitated moderate risk (prescription drug management including medications given in the ED).    Assessment & Plan    Sushila Chambers is a 74 year  old female with a past medical history of lung cancer (s/p excision) and breast cancer (on adjuvant letrozole), hypothyroidism, hyperlipidemia, hypertension, presenting to the ED with upper abdominal / epigastric pain.     Labs show elevated LFTs ( ), elevated total bilirubin at 6.0. Right upper quadrant abdominal ultrasound shows dilated common bile duct, gallbladder not well visualized.  Given self-reported fever, possible jaundice and scleral icterus, and right upper quadrant pain (Charcot's triad), concern for acute cholangitis.  Per consult with luminal GI team, may follow-up for MRCP.  Plan to admit to medicine team.     Low potassium at 2.6 which was replaced with 20 mEq.  INR at 1.52.    Chest x-ray negative for pneumonia.  COVID/Flu/RSV negative.    I have reviewed the nursing notes. I have reviewed the findings, diagnosis, plan and need for follow up with the patient.    --    ED Attending Physician Attestation    I MARITA CHANEL MD, cared for this patient with the Medical Student. I performed, or re-performed, the physical exam and medical decision-making. I have verified the accuracy of all the medical student findings and documentation above, and have edited as necessary.    Summary of HPI, PE, ED Course   Patient is a 74 year old female evaluated in the emergency department for fever at home with RUQ abdominal pain. Exam and ED course notable for findings consistent with possible cholangitis. GI consulted and recommending admission to medicine on abx and MRCP. Abx started and she will be admitted to medicine. After the completion of care in the emergency department, the patient was admitted to inpatient.      MARITA CHANEL MD  Emergency Medicine       Current Discharge Medication List          Final diagnoses:   Jaundice   Elevated LFTs   Elevated bilirubin   Hypokalemia     Alfredito Jacome, PhD  Medical Student, MS4    Marita Chanel MD  [facility] EMERGENCY  DEPARTMENT  7/14/2024     Joe Chanel MD  07/14/24 1800

## 2024-07-14 NOTE — CONSULTS
[facility]  GASTROENTEROLOGY CONSULTATION      Date of Admission:     7/14/2024  Requesting physician: Kelly Borrego MD             Reason for Consultation:   We were asked by Dr. Carver of medicine to evaluate this patient with     concern for cholangitis with CBD dilation       History is obtained from the patient         ASSESSMENT AND RECOMMENDATIONS:   Assessment:  Susihla Chambers is a 74 year old female with a history of breast cancer and lung cancer now in remission who presents with nausea, vomiting, abdominal pain and fevers. Found to have elevated LFTs and CBD dilation concerning for biliary obstruction.     # Concern for cholangitis   # Elevated LFTs  # Extrahepatic biliary obstruction  Patient presenting with 2 days of nausea, vomiting, abdominal pain now with fever today. In ED she is hemodynamically stable. LFTs elevated and US shows dilated CBD to 13mm with sludge suggestive of obstruction. Normal WBC count but with fevers concern for cholangitis.  Will plan for ERCP for further evaluation.       Recommendations  - NPO   - s/p potassium replacement   - plan for ERCP this evening   - Continue antibiotics  - follow blood cultures  - Repeat LFTs, CBC, BMP in AM.    Thank you for involving us in this patient's care. Please do not hesitate to contact the GI service with any questions or concerns.     Pt care plan discussed with Dr. Paiz, GI staff physician.    Leroy Butt MD  Gastroenterology Fellow    -------------------------------------------------------------------------------------------------------------------           History of Present Illness:   Sushila Chambers is a 74 year old female with a history of breast cancer and lung cancer now in remission who presents with nausea, vomiting, abdominal pain and fevers.    Patient reports after eating dinner on Friday night she began to have right upper quadrant abdominal pain along with nausea and vomiting which has  persisted through the weekend.  Her daughter split a meal with her and did not have similar symptoms.  Today the pain was ongoing and she measured a fever of 100.7 at home so took acetaminophen.  No regular acetaminophen or alcohol use.    On admission here she is afebrile and hemodynamically stable.  Labs are notable for sodium of 128, potassium 2.6, , , total bilirubin of 6.  Normal lipase.  Normal white count and platelets.  INR 1.5              Past Medical History:   Reviewed and edited as appropriate  Past Medical History:   Diagnosis Date    Basal cell carcinoma     Breast cancer, right breast (H)     History of blood transfusion     Hypothyroid     Lung cancer (H) 2012    right    Uncontrolled hypertension 11/15/2017            Past Surgical History:   Reviewed and edited as appropriate   Past Surgical History:   Procedure Laterality Date    ABDOMEN SURGERY      BIOPSY, LUNG NODULE Right 2012    + cancer    COLONOSCOPY      ECTOPIC PREGNANCY SURGERY Left 1988    GENITOURINARY SURGERY      GYN SURGERY      INSERT PORT VASCULAR ACCESS      LUMPECTOMY BREAST Right 2010    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    MASTECTOMY SIMPLE, SENTINEL NODE, COMBINED Left 2/24/2016    Procedure: COMBINED MASTECTOMY SIMPLE, SENTINEL NODE;  Surgeon: Satnam Betts MD;  Location: [facility] OR    PHACOEMULSIFICATION WITH STANDARD INTRAOCULAR LENS IMPLANT Right 9/12/2022    Procedure: RIGHT EYE PHACOEMULSIFICATION, CATARACT, WITH  INTRAOCULAR LENS IMPLANT INSERTION TORIC LENS;  Surgeon: Ion Puente MD;  [facility]    PHACOEMULSIFICATION WITH STANDARD INTRAOCULAR LENS IMPLANT Left 9/26/2022    Procedure: LEFT PHACOEMULSIFICATION, CATARACT, WITH STANDARD INTRAOCULAR LENS IMPLANT INSERTION;  Surgeon: Ion Puente MD;  [facility]    REMOVE PORT VASCULAR ACCESS N/A 2/24/2016    Procedure: REMOVE PORT VASCULAR ACCESS;  Surgeon: Satnam Betts MD;  Location: [facility] OR    THORACIC SURGERY                 Social History:   Reviewed and edited as appropriate  Social History     Socioeconomic History    Marital status:      Spouse name: Not on file    Number of children: Not on file    Years of education: Not on file    Highest education level: Not on file   Occupational History    Not on file   Tobacco Use    Smoking status: Never    Smokeless tobacco: Never   Substance and Sexual Activity    Alcohol use: Yes     Comment: Occasionally    Drug use: No    Sexual activity: Never   Other Topics Concern    Parent/sibling w/ CABG, MI or angioplasty before 65F 55M? No   Social History Narrative    Not on file     Social Determinants of Health     Financial Resource Strain: Low Risk  (10/16/2023)    Financial Resource Strain     Within the past 12 months, have you or your family members you live with been unable to get utilities (heat, electricity) when it was really needed?: No   Food Insecurity: Low Risk  (10/16/2023)    Food Insecurity     Within the past 12 months, did you worry that your food would run out before you got money to buy more?: No     Within the past 12 months, did the food you bought just not last and you didn t have money to get more?: No   Transportation Needs: Low Risk  (10/16/2023)    Transportation Needs     Within the past 12 months, has lack of transportation kept you from medical appointments, getting your medicines, non-medical meetings or appointments, work, or from getting things that you need?: No   Physical Activity: Not on file   Stress: Not on file   Social Connections: Not on file   Interpersonal Safety: Not At Risk (5/25/2023)    Humiliation, Afraid, Rape, and Kick questionnaire     Fear of Current or Ex-Partner: No     Emotionally Abused: No     Physically Abused: No     Sexually Abused: No   Housing Stability: Low Risk  (10/16/2023)    Housing Stability     Do you have housing? : Yes     Are you worried about losing your housing?: No            Family History:  "  Reviewed and edited as appropriate  Family History   Problem Relation Age of Onset    Breast Cancer Mother     Cancer Father         bone cancer    Vertigo Sister     Melanoma No family hx of     Glaucoma No family hx of     Macular Degeneration No family hx of     Skin Cancer No family hx of         No known history of colorectal cancer, liver disease, or inflammatory bowel disease.       Allergies:   Reviewed and edited as appropriate     Allergies   Allergen Reactions    Latex Rash     SKIN REACTION    SKIN REACTION    Sulfa Antibiotics Itching and Rash            Review of Systems:   A complete 10 point review of systems was obtained.  Please see the HPI for pertinent positives and negatives.    All other systems were reviewed and were found to be negative.            Physical Exam:   /66   Pulse 90   Temp 98.9  F (37.2  C) (Oral)   Resp 16   Ht 1.6 m (5' 3\")   Wt 65.8 kg (145 lb)   SpO2 94%   BMI 25.69 kg/m    Wt:   Wt Readings from Last 2 Encounters:   07/14/24 65.8 kg (145 lb)   05/13/24 65.3 kg (144 lb)      Constitutional: cooperative, pleasant, not dyspneic/diaphoretic, no acute distress  Eyes: scleral icterus  Ears/nose/mouth/throat: Normal oropharynx without ulcers or exudate, mucus membranes moist, hearing intact  CV: No edema  Respiratory: Unlabored breathing  Abd: soft, nonddistended, tender in RUQ and epigastric areas.   Skin: warm, perfused, + jaundice  Neuro: AAO x 3,  no focal deficits.   Psych: Normal affect  MSK: No gross deformities         Data:   Labs and imaging below were independently reviewed and interpreted    BMP  Recent Labs   Lab 07/14/24  1204   *   POTASSIUM 2.6*   CHLORIDE 89*   OCTAVIANO 8.9   CO2 27   BUN 17.2   CR 0.97*   *     CBC  Recent Labs   Lab 07/14/24  1204   WBC 10.9   RBC 3.62*   HGB 11.4*   HCT 31.0*   MCV 86   MCH 31.5   MCHC 36.8*   RDW 13.2        INR  Recent Labs   Lab 07/14/24  1204   INR 1.52*     LFTs  Recent Labs   Lab " 07/14/24  1204   ALKPHOS 129   *   *   BILITOTAL 6.0*   PROTTOTAL 6.5   ALBUMIN 3.6      PANC  Recent Labs   Lab 07/14/24  1204   LIPASE 26       Imaging:   Gallbladder not well-visualized on ultrasound and no intrahepatic dilation.  Common bile duct 13 mm in diameter with small amount of sludge

## 2024-07-14 NOTE — H&P
[facility]    History and Physical - Hospitalist Service, GOLD TEAM        Date of Admission:  7/14/2024    Assessment & Plan      Sushila Chambers is a 74 year old female admitted on 7/14/2024. She has a past medical history significant for histoplasmosis (~2000), prior lung cancer (early 2000s), right breast cancer (2009) s/p lumpectomy/radiation/endocrine therapy, left breast cancer (2015) s/p chemotherapy/mastectomy on letrozole,  hypothyroidism, hyperlipidemia and hypertension. Began vomiting evening of 7/12 with persistent upper abdominal pain and low grade fever. Labs/imaging c/f cholangitis.     Probable acute cholangitis  Acute transaminitis  Extrahepatic biliary obstruction  Presenting with 2 days of vomiting, upper abdominal pain and fever. Lipase wnl. LFTs notable for , , Tbili 6.0. Alk Phos wnl.  INR 1.52. RUQ US obatined - gallbladder not well visualize, may be contracted with echogenic stones; common bile duct dilated up to 13 mm with small amount of sludge present. No obstructing stones identified. High concern for cholangitis on admission, started on ceftriaxone/flagyl.   - Panc-Bili team consulted   - NPO  - Plan for ERCP this evening  - Pending blood cultures x2  - Continue IV ceftriaxone 2 g daily + IV flagyl 500 mg q8h  - Trend LFTs, CBC  - Tylenol, oxycodone PRN for pain  - Zofran PRN    Hypokalemia  K 2.6 on admission in setting of emesis/poor PO intake with chlorthalidone likely contributing. ED ordered IV potassium 10 mEq x 6 doses -- worried this won't bring her up to goal prior to her ERCP.  - S/p 10 mEq IV potassium, stop further doses  - Give PO 40 mEq x 1 followed by additional 10 mEq in 1 hour  - Will recheck this evening  - Added on Mg, Phos  - Telemetry     Hyponatremia  Na 128 on admission. In setting of GI illness as above and likely hypovolemic. Also on chlorthalidone which may be contributing.   -  mL/hr x 1  - Will  "recheck this evening    HTN  - Holding chlorthalidone due to lyte disturbances  - Continue PTA lisinopril 5 mg daily with hold parameters      Multifocal, stage IIa, T2N0M0, ER positive, IL negative, HER2 non-amplified invasive lobular carcinoma of left breast  Hx of right breast cancer (2009) s/p lumpectomy, radiation and endocrine therapy  Hx of lung cancer  Follows with Dr. Villegas, last seen 5/2024. Remote hx of lung cancer while living in [facility]. Underwent treatment of prior breast cancer in 2009, self-palpated mass in left breast in 8/2015. S/p 12 weeks of ganestespib and taxol, 4 cycles of dose dense adriamycin + cyclophosphamide (9/2015-2//2016) followed by left breast mastectomy, sentinel lymph node procedure. On adjuvant curative intent treatment with letrozole since 3/2016.   - Continue PTA letrozole   - Plan for follow-up in 11/2024    HLD  - Holding PTA statin while LFTs recover    Hypothyroidism  - Continue PTA levothyroxine 50 mcg daily         Diet: NPO per Anesthesia Guidelines for Procedure/Surgery Except for: Meds    DVT Prophylaxis: Pneumatic Compression Devices  Tovar Catheter: Not present  Lines: None     Cardiac Monitoring: None  Code Status: Full Code    Clinically Significant Risk Factors Present on Admission        # Hypokalemia: Lowest K = 2.6 mmol/L in last 2 days, will replace as needed  # Hyponatremia: Lowest Na = 128 mmol/L in last 2 days, will monitor as appropriate         # Coagulation Defect: INR = 1.52 (Ref range: 0.85 - 1.15) and/or PTT = 28 Seconds (Ref range: 22 - 38 Seconds), will monitor for bleeding  # Drug Induced Platelet Defect: home medication list includes an antiplatelet medication   # Hypertension: Noted on problem list             # Overweight: Estimated body mass index is 25.69 kg/m  as calculated from the following:    Height as of this encounter: 1.6 m (5' 3\").    Weight as of this encounter: 65.8 kg (145 lb).              Disposition Plan     Medically " Ready for Discharge: Anticipated in 2-4 Days         The patient's care was discussed with the Attending Physician, Dr. Carver, Patient, Patient's Family, and GI Team.    Natalie Alberto PA-C  Hospitalist Service, [facility]  Securely message with [employer] (more info)  Text page via [facility] Paging/Directory   See signed in provider for up to date coverage information    ______________________________________________________________________    Chief Complaint   Vomiting, upper abdominal pain    History is obtained from the patient    History of Present Illness   Sushila Chambers is a 74 year old female who presented to the ER after getting sick on Friday night.  She reports she was out to dinner with her family when she got sick at the restaurant.  She continued to have episodes of vomiting since then and has had minimal intake.  Has since developed upper abdominal pain which does radiate around to her back.  Had a low-grade fever at home she thinks up to about 100.  Has not had a bowel movement since then but has been passing gas.  She denies any shortness of breath, cough, dysuria.    In regards to her oncologic history, she is currently on letrozole.  Has a history of lung cancer in the early 2000's while living in [place].  Has also had breast cancer twice.  Last saw oncology in May with plans for follow-up in 6 months.      Past Medical History    Past Medical History:   Diagnosis Date    Basal cell carcinoma     Breast cancer, right breast (H)     History of blood transfusion     Hypothyroid     Lung cancer (H) 2012    right    Uncontrolled hypertension 11/15/2017       Past Surgical History   Past Surgical History:   Procedure Laterality Date    ABDOMEN SURGERY      BIOPSY, LUNG NODULE Right 2012    + cancer    COLONOSCOPY      ECTOPIC PREGNANCY SURGERY Left 1988    GENITOURINARY SURGERY      GYN SURGERY      INSERT PORT VASCULAR ACCESS       LUMPECTOMY BREAST Right 2010    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    MASTECTOMY SIMPLE, SENTINEL NODE, COMBINED Left 2/24/2016    Procedure: COMBINED MASTECTOMY SIMPLE, SENTINEL NODE;  Surgeon: Satnam Betts MD;  Location: [facility] OR    PHACOEMULSIFICATION WITH STANDARD INTRAOCULAR LENS IMPLANT Right 9/12/2022    Procedure: RIGHT EYE PHACOEMULSIFICATION, CATARACT, WITH  INTRAOCULAR LENS IMPLANT INSERTION TORIC LENS;  Surgeon: Ion Puente MD;  Location: [facility] OR    PHACOEMULSIFICATION WITH STANDARD INTRAOCULAR LENS IMPLANT Left 9/26/2022    Procedure: LEFT PHACOEMULSIFICATION, CATARACT, WITH STANDARD INTRAOCULAR LENS IMPLANT INSERTION;  Surgeon: Ion Puente MD;  Location: [facility] OR    REMOVE PORT VASCULAR ACCESS N/A 2/24/2016    Procedure: REMOVE PORT VASCULAR ACCESS;  Surgeon: Satnam Betts MD;  Location: [facility] OR    THORACIC SURGERY         Prior to Admission Medications   Prior to Admission Medications   Prescriptions Last Dose Informant Patient Reported? Taking?   Acetaminophen (TYLENOL PO)   Yes No   Sig: Take 500 mg by mouth every 4 hours as needed for mild pain or fever Reported on 3/21/2017   VITAMIN D, CHOLECALCIFEROL, PO  Self Yes No   Sig: Take by mouth daily   aspirin 81 MG EC tablet   Yes No   Sig: Take 81 mg by mouth daily   biotin 1000 MCG TABS tablet  Self Yes No   Sig: Take by mouth daily   calcium carbonate (OS-OCTAVIANO 500 MG [place]) 500 MG tablet  Self Yes No   Sig: Take by mouth 2 times daily   chlorthalidone (HYGROTON) 25 MG tablet   No No   Sig: Take 1 tablet (25 mg) by mouth daily   letrozole (FEMARA) 2.5 MG tablet   No No   Sig: Take 1 tablet (2.5 mg) by mouth daily   levothyroxine (SYNTHROID/LEVOTHROID) 50 MCG tablet   No No   Sig: Take 1 tablet (50 mcg) by mouth daily   lisinopril (ZESTRIL) 5 MG tablet   No No   Sig: Take 1 tablet (5 mg) by mouth daily   multivitamin w/minerals (THERA-VIT-M) tablet  Self Yes No   Sig: Take 1 tablet by mouth daily   rosuvastatin (CRESTOR) 5  MG tablet   No No   Sig: Take 1 tablet (5 mg) by mouth daily   triamcinolone (KENALOG) 0.1 % external ointment   No No   Sig: Apply topically 2 times daily To areas of bug bites.  Avoid use on face and neck.   Patient not taking: Reported on 10/31/2022   vitamin C (ASCORBIC ACID) 100 MG tablet   Yes No      Facility-Administered Medications: None        Review of Systems    The 10 point Review of Systems is negative other than noted in the HPI or here.      Physical Exam   Vital Signs: Temp: 98.9  F (37.2  C) Temp src: Oral BP: 121/62 Pulse: 90   Resp: 16 SpO2: (!) 89 %      Weight: 145 lbs 0 oz    General Appearance: Awake.  Alert and oriented x 4.  Laying in bed in no acute distress.  Nontoxic-appearing.  Eyes: Normal lids.  Anicteric sclera.  Pupils equal and round.  HEENT: Normocephalic.  Atraumatic.  Nares patent.  Mucous membranes moist.  Respiratory: Normal work of breathing on room air.  Lungs clear to auscultation bilaterally.  No wheezes.  Cardiovascular: Regular rate and rhythm.  S1, S2.  No murmurs appreciated.  Pedal pulses 2+.  GI: Abdomen nondistended.  Normal active.  Soft.  Tenderness throughout her upper abdomen with some voluntary guarding.  No obvious rebounding.  Lymph/Hematologic: No abnormal or excessive bruising on exposed skin.  Skin: Warm, dry.  No rashes on exposed skin.  Musculoskeletal: Moves all extremities spontaneously.    Medical Decision Making       75 MINUTES SPENT BY ME on the date of service doing chart review, history, exam, documentation & further activities per the note.      Data     I have personally reviewed the following data over the past 24 hrs:    10.9  \   11.4 (L)   / 161     128 (L) 89 (L) 17.2 /  138 (H)   2.6 (LL) 27 0.97 (H) \     ALT: 962 (HH) AST: 355 (H) AP: 129 TBILI: 6.0 (H)   ALB: 3.6 TOT PROTEIN: 6.5 LIPASE: 26     INR:  1.52 (H) PTT:  28   D-dimer:  N/A Fibrinogen:  N/A       Imaging results reviewed over the past 24 hrs:   Recent Results (from the past  24 hour(s))   US Abdomen Limited (RUQ)    Narrative    EXAMINATION: Limited Abdominal Ultrasound, 7/14/2024 1:45 PM     COMPARISON: None.    HISTORY: RUQ tenderness, r/o cholecystitis vs. cholelithiasis vs.  pancreatitis    FINDINGS:   Fluid: No evidence of ascites or pleural effusions.    Liver: The liver demonstrates normal echotexture, measuring 18.9 cm in  craniocaudal dimension. There is no focal mass.     Gallbladder: The gallbladder is not well-visualized. No  pericholecystic fluid. Negative sonographic Pemberton's sign.    Bile Ducts: No intrahepatic biliary duct dilation.  The common bile  duct measures up to 13 mm in diameter small amount of sludge present.  No obstructing stones identified on ultrasound.    Pancreas: Visualized portions of the head and body of the pancreas are  unremarkable.     Kidney: The right kidney measures 10.1 cm long. There is no  hydronephrosis or hydroureter, no shadowing renal calculi, cystic  lesion or mass.       Impression    IMPRESSION:   The gallbladder is not well visualized and may be contracted with  echogenic stones. The common bile duct is dilated up to 13 mm a small  amount of sludge present. No obstructing stones identified on  ultrasound. May consider MRCP for further evaluation.    I have personally reviewed the examination and initial interpretation  and I agree with the findings.    ZULEYMA POLK DO         SYSTEM ID:  [id number]   XR Chest 2 Views    Narrative    XR CHEST 2 VIEWS 7/14/2024 2:00 PM      HISTORY: fever, r/o CAP    COMPARISON: 9/15/2014.     FINDINGS:   Frontal view of the chest. Blunting of the right costophrenic angle is  likely due to some superimposed soft tissues. Normal cardiomediastinal  silhouette. No pneumothorax or pleural effusion. No focal airspace  consolidation.      Impression    IMPRESSION:   No focal consolidation.    I have personally reviewed the examination and initial interpretation  and I agree with the findings.    ZULEYMA POLK DO          SYSTEM ID:  [id number]

## 2024-07-14 NOTE — ED TRIAGE NOTES
Pt biba w/ c/o abd pain, fever. Per EMS who provides report, abd pain onset yesterday but worsening this am. Pt further endorses n/v yesterday-has not vomited since- on/ off nausea reported today, however. Tmax 100.7. Pain endorsed to upper quadrants radiating to sides. Pt appears jaundiced to this RN.

## 2024-07-15 ENCOUNTER — ANESTHESIA EVENT (OUTPATIENT)
Dept: SURGERY | Facility: CLINIC | Age: 74
DRG: 853 | End: 2024-07-15
Payer: MEDICARE

## 2024-07-15 LAB
ALBUMIN SERPL BCG-MCNC: 3.2 G/DL (ref 3.5–5.2)
ALP SERPL-CCNC: 115 U/L (ref 40–150)
ALT SERPL W P-5'-P-CCNC: 576 U/L (ref 0–50)
AMYLASE SERPL-CCNC: 28 U/L (ref 28–100)
ANION GAP SERPL CALCULATED.3IONS-SCNC: 12 MMOL/L (ref 7–15)
AST SERPL W P-5'-P-CCNC: 141 U/L (ref 0–45)
ATRIAL RATE - MUSE: 81 BPM
BILIRUB SERPL-MCNC: 4.3 MG/DL
BUN SERPL-MCNC: 20.3 MG/DL (ref 8–23)
CALCIUM SERPL-MCNC: 8.3 MG/DL (ref 8.8–10.2)
CHLORIDE SERPL-SCNC: 92 MMOL/L (ref 98–107)
CREAT SERPL-MCNC: 1.02 MG/DL (ref 0.51–0.95)
DEPRECATED HCO3 PLAS-SCNC: 25 MMOL/L (ref 22–29)
DIASTOLIC BLOOD PRESSURE - MUSE: NORMAL MMHG
EGFRCR SERPLBLD CKD-EPI 2021: 57 ML/MIN/1.73M2
ENTEROCOCCUS FAECALIS: NOT DETECTED
ENTEROCOCCUS FAECIUM: NOT DETECTED
ERYTHROCYTE [DISTWIDTH] IN BLOOD BY AUTOMATED COUNT: 13.2 % (ref 10–15)
GLUCOSE SERPL-MCNC: 126 MG/DL (ref 70–99)
HCT VFR BLD AUTO: 27.9 % (ref 35–47)
HGB BLD-MCNC: 9.9 G/DL (ref 11.7–15.7)
HOLD SPECIMEN: NORMAL
INR PPP: 1.53 (ref 0.85–1.15)
INTERPRETATION ECG - MUSE: NORMAL
LIPASE SERPL-CCNC: 19 U/L (ref 13–60)
LISTERIA SPECIES (DETECTED/NOT DETECTED): NOT DETECTED
MCH RBC QN AUTO: 31.1 PG (ref 26.5–33)
MCHC RBC AUTO-ENTMCNC: 35.5 G/DL (ref 31.5–36.5)
MCV RBC AUTO: 88 FL (ref 78–100)
P AXIS - MUSE: 60 DEGREES
PLATELET # BLD AUTO: 124 10E3/UL (ref 150–450)
POTASSIUM SERPL-SCNC: 3.1 MMOL/L (ref 3.4–5.3)
POTASSIUM SERPL-SCNC: 3.2 MMOL/L (ref 3.4–5.3)
POTASSIUM SERPL-SCNC: 3.4 MMOL/L (ref 3.4–5.3)
PR INTERVAL - MUSE: 144 MS
PROT SERPL-MCNC: 5.9 G/DL (ref 6.4–8.3)
QRS DURATION - MUSE: 84 MS
QT - MUSE: 396 MS
QTC - MUSE: 460 MS
R AXIS - MUSE: -7 DEGREES
RBC # BLD AUTO: 3.18 10E6/UL (ref 3.8–5.2)
SODIUM SERPL-SCNC: 129 MMOL/L (ref 135–145)
STAPHYLOCOCCUS AUREUS: NOT DETECTED
STAPHYLOCOCCUS EPIDERMIDIS: NOT DETECTED
STAPHYLOCOCCUS LUGDUNENSIS: NOT DETECTED
STAPHYLOCOCCUS SPECIES: NOT DETECTED
STREPTOCOCCUS AGALACTIAE: NOT DETECTED
STREPTOCOCCUS ANGINOSUS GROUP: NOT DETECTED
STREPTOCOCCUS PNEUMONIAE: DETECTED
STREPTOCOCCUS PYOGENES: NOT DETECTED
SYSTOLIC BLOOD PRESSURE - MUSE: NORMAL MMHG
T AXIS - MUSE: 29 DEGREES
VENTRICULAR RATE- MUSE: 81 BPM
WBC # BLD AUTO: 10.5 10E3/UL (ref 4–11)

## 2024-07-15 PROCEDURE — 99222 1ST HOSP IP/OBS MODERATE 55: CPT | Mod: 57 | Performed by: SURGERY

## 2024-07-15 PROCEDURE — 80053 COMPREHEN METABOLIC PANEL: CPT | Performed by: PHYSICIAN ASSISTANT

## 2024-07-15 PROCEDURE — 85610 PROTHROMBIN TIME: CPT | Performed by: PHYSICIAN ASSISTANT

## 2024-07-15 PROCEDURE — 84132 ASSAY OF SERUM POTASSIUM: CPT | Performed by: PHYSICIAN ASSISTANT

## 2024-07-15 PROCEDURE — 36415 COLL VENOUS BLD VENIPUNCTURE: CPT | Performed by: HOSPITALIST

## 2024-07-15 PROCEDURE — 250N000011 HC RX IP 250 OP 636: Performed by: PHYSICIAN ASSISTANT

## 2024-07-15 PROCEDURE — 84132 ASSAY OF SERUM POTASSIUM: CPT | Performed by: HOSPITALIST

## 2024-07-15 PROCEDURE — 120N000002 HC R&B MED SURG/OB UMMC

## 2024-07-15 PROCEDURE — 36415 COLL VENOUS BLD VENIPUNCTURE: CPT | Performed by: PHYSICIAN ASSISTANT

## 2024-07-15 PROCEDURE — 87040 BLOOD CULTURE FOR BACTERIA: CPT | Performed by: HOSPITALIST

## 2024-07-15 PROCEDURE — 250N000013 HC RX MED GY IP 250 OP 250 PS 637: Performed by: PHYSICIAN ASSISTANT

## 2024-07-15 PROCEDURE — 99233 SBSQ HOSP IP/OBS HIGH 50: CPT | Performed by: HOSPITALIST

## 2024-07-15 PROCEDURE — 85027 COMPLETE CBC AUTOMATED: CPT | Performed by: PHYSICIAN ASSISTANT

## 2024-07-15 PROCEDURE — 258N000003 HC RX IP 258 OP 636: Performed by: HOSPITALIST

## 2024-07-15 PROCEDURE — 250N000013 HC RX MED GY IP 250 OP 250 PS 637: Performed by: HOSPITALIST

## 2024-07-15 PROCEDURE — 99232 SBSQ HOSP IP/OBS MODERATE 35: CPT | Mod: GC | Performed by: INTERNAL MEDICINE

## 2024-07-15 PROCEDURE — 83690 ASSAY OF LIPASE: CPT | Performed by: PHYSICIAN ASSISTANT

## 2024-07-15 PROCEDURE — 82150 ASSAY OF AMYLASE: CPT | Performed by: PHYSICIAN ASSISTANT

## 2024-07-15 RX ORDER — POTASSIUM CHLORIDE 1.5 G/1.58G
40 POWDER, FOR SOLUTION ORAL ONCE
Status: COMPLETED | OUTPATIENT
Start: 2024-07-15 | End: 2024-07-15

## 2024-07-15 RX ORDER — POTASSIUM CHLORIDE 750 MG/1
40 TABLET, EXTENDED RELEASE ORAL ONCE
Status: COMPLETED | OUTPATIENT
Start: 2024-07-15 | End: 2024-07-15

## 2024-07-15 RX ORDER — ACETAMINOPHEN 500 MG
500-1000 TABLET ORAL EVERY 6 HOURS PRN
COMMUNITY

## 2024-07-15 RX ADMIN — LISINOPRIL 5 MG: 5 TABLET ORAL at 07:40

## 2024-07-15 RX ADMIN — METRONIDAZOLE 500 MG: 5 INJECTION, SOLUTION INTRAVENOUS at 09:28

## 2024-07-15 RX ADMIN — POTASSIUM CHLORIDE 40 MEQ: 1.5 POWDER, FOR SOLUTION ORAL at 20:37

## 2024-07-15 RX ADMIN — CEFTRIAXONE SODIUM 2 G: 2 INJECTION, POWDER, FOR SOLUTION INTRAMUSCULAR; INTRAVENOUS at 13:22

## 2024-07-15 RX ADMIN — CALCIUM 500 MG: 500 TABLET ORAL at 07:40

## 2024-07-15 RX ADMIN — LEVOTHYROXINE SODIUM 50 MCG: 0.05 TABLET ORAL at 07:40

## 2024-07-15 RX ADMIN — POTASSIUM CHLORIDE 40 MEQ: 750 TABLET, EXTENDED RELEASE ORAL at 11:27

## 2024-07-15 RX ADMIN — POTASSIUM & SODIUM PHOSPHATES POWDER PACK 280-160-250 MG 1 PACKET: 280-160-250 PACK at 14:38

## 2024-07-15 RX ADMIN — METRONIDAZOLE 500 MG: 5 INJECTION, SOLUTION INTRAVENOUS at 01:12

## 2024-07-15 RX ADMIN — LETROZOLE 2.5 MG: 2.5 TABLET ORAL at 13:22

## 2024-07-15 RX ADMIN — ACETAMINOPHEN 650 MG: 325 TABLET, FILM COATED ORAL at 16:26

## 2024-07-15 RX ADMIN — POTASSIUM & SODIUM PHOSPHATES POWDER PACK 280-160-250 MG 1 PACKET: 280-160-250 PACK at 11:27

## 2024-07-15 RX ADMIN — METRONIDAZOLE 500 MG: 5 INJECTION, SOLUTION INTRAVENOUS at 17:35

## 2024-07-15 RX ADMIN — POTASSIUM & SODIUM PHOSPHATES POWDER PACK 280-160-250 MG 1 PACKET: 280-160-250 PACK at 17:34

## 2024-07-15 RX ADMIN — SODIUM CHLORIDE 500 ML: 9 INJECTION, SOLUTION INTRAVENOUS at 14:34

## 2024-07-15 RX ADMIN — Medication 25 MCG: at 07:40

## 2024-07-15 ASSESSMENT — ACTIVITIES OF DAILY LIVING (ADL)
ADLS_ACUITY_SCORE: 24
ADLS_ACUITY_SCORE: 23
ADLS_ACUITY_SCORE: 24
DEPENDENT_IADLS:: INDEPENDENT
ADLS_ACUITY_SCORE: 23
ADLS_ACUITY_SCORE: 24
ADLS_ACUITY_SCORE: 23
ADLS_ACUITY_SCORE: 24
ADLS_ACUITY_SCORE: 23
ADLS_ACUITY_SCORE: 24
ADLS_ACUITY_SCORE: 23
ADLS_ACUITY_SCORE: 24
ADLS_ACUITY_SCORE: 23
ADLS_ACUITY_SCORE: 24

## 2024-07-15 NOTE — PROGRESS NOTES
GASTROENTEROLOGY PROGRESS NOTE    Date of Admission: 7/14/2024  Reason for Admission: CVA    ASSESSMENT:  Sushila Chambers is a 74 year old female with a history of breast cancer and lung cancer now in remission who presents with nausea, vomiting, abdominal pain and fevers. Found to have elevated LFTs and CBD dilation concerning for biliary obstruction.     #Ascending Cholangitis  #Choledocholithiasis   #Elevated LFTs  Patient presented with 2 days of nausea, vomiting, and abdominal pain. She had fever on day of admission. LFTs were elevated and U/S showed CBD dilated to 13 mm with sludge suggestive of obstruction. Concern for cholangitis on admission.     Patient underwent ERCP on 7/14/24 which showed copious amounts of pus and large stones in the bile duct. Additionally, there was a stone in the cystic duct/gall bladder and pus drainage from the gall bladder concerning for acute cholecystitis. Bile duct was completed cleared of stone. Two plastic biliary stents were placed. Patient felt significantly post procedure and continues to do well clinically post procedure day #1.     General surgery team evaluated patient on 7/15/24, plan for cholecystectomy in the coming days (likely 7/16/24).     RECOMMENDATIONS:  - Antibiotics per primary team   - Avoid ASA/AC for 3 days to prevent post-sphincterotomy bleeding   - Appreciate general surgery consultation for same admission cholecystectomy     - Plan for EGD under moderate conscious sedation in about 4 weeks as outpatient. If biliary stent still present, will be removed at that time.  Will place referral for you.     GI Service will sign off at this time. Please do not hesitate to page with any questions.     The patient was discussed and plan agreed upon with GI staff, Dr Paiz.    Angel Lewis MD  Gastroenterology Fellow    _______________________________________________________________    Subjective: Nursing notes and 24hr events reviewed.   "    Patient feels remarkably better compared to when she first came in. Walking around. Tolerating PO intake. No nausea/vomiting/abdominal pain.     ROS:   4 pt ROS negative unless noted in subjective.     Objective:  Blood pressure 112/63, pulse 77, temperature 98.5  F (36.9  C), temperature source Oral, resp. rate 17, height 1.6 m (5' 3\"), weight 66 kg (145 lb 8 oz), SpO2 95%, not currently breastfeeding.  Gen: no acute distress. Sitting up in bed.   HEENT: NCAT. Conjunctiva clear.    Resp: No increased work of breathing. On room air.   Abd: Soft, NT, ND, no guarding or rebound  Msk: no gross deformity  Skin:  jaundice  Ext: warm, well perfused   Neuro: grossly normal  Mental status/Psych: A&O. Asks/answers questions appropriately     Procedure 7/14/24  - The major papilla appeared normal and selective                          biliary cannulation was performed, 12 mm biliary                          sphincterotomy, 10 mm biliary orifice dilation was                          performed followed by balloon extraction.                          - Copious amounts of pus, large stones were removed.                          Two stones required basket and mechanical lithotripsy                          and successful extraction of fragments                          - A stone is seen in the cystic duct/gall bladder neck                          region and pus is draining from gall bladder                          concerning for acute cholecystitis                          - Bile duct is completely cleared of stone as                          evidenced by the air cholangiogram                          - Two 10 Fr by 5 cm Sofflex plastic biliary stents                          were placed into the common bile duct.       LABS:  [facility]  Recent Labs   Lab 07/15/24  0558 07/15/24  0045 07/14/24  2238 07/14/24  1834 07/14/24  1753 07/14/24  1204   *  --  132*  --   --  128*   POTASSIUM 3.2* 3.1* 3.1* 3.1*  --  2.6*   CHLORIDE " 92*  --  93*  --   --  89*   OCTAVIANO 8.3*  --  8.3*  --   --  8.9   CO2 25  --  25  --   --  27   BUN 20.3  --  18.8  --   --  17.2   CR 1.02*  --  1.05*  --   --  0.97*   *  --  126*  --  99 138*     CBC  Recent Labs   Lab 07/15/24  0558 07/14/24  1204   WBC 10.5 10.9   RBC 3.18* 3.62*   HGB 9.9* 11.4*   HCT 27.9* 31.0*   MCV 88 86   MCH 31.1 31.5   MCHC 35.5 36.8*   RDW 13.2 13.2   * 161     INR  Recent Labs   Lab 07/15/24  0558 07/14/24  1204   INR 1.53* 1.52*     LFTs  Recent Labs   Lab 07/15/24  0558 07/14/24  1204   ALKPHOS 115 129   * 355*   * 962*   BILITOTAL 4.3* 6.0*   PROTTOTAL 5.9* 6.5   ALBUMIN 3.2* 3.6      PANC  Recent Labs   Lab 07/15/24  0445 07/14/24  1204   LIPASE 19 26   AMYLASE 28  --          IMAGING:    See patient chart

## 2024-07-15 NOTE — PLAN OF CARE
"Goal Outcome Evaluation:      She was admitted 7/14 after N/V and having upper abd pain and low grade fever   Dx: ERCP   Hx: Histoplmosis 2020, prior lung cancer 2020, right breast cancer 09, lumpectomy. Left breast cancer 2015, hypothyroidism, HLD, HTN,     Patient is alert and oriented. She denies pain.     Neuro: A&Ox4.   Cardiac: SR. VSS.  /63 (BP Location: Right arm)   Pulse 89   Temp 98.5  F (36.9  C) (Oral)   Resp 18   Ht 1.6 m (5' 3\")   Wt 66 kg (145 lb 8 oz)   SpO2 94%   BMI 25.77 kg/m        Respiratory: Sating 92% on RA.    GI/: Adequate urine output. LBM 7/12/24    Diet/appetite: Tolerating Clear liquid diet    Activity:  Assist of 1 to the BR    Pain: At acceptable level on current regimen.     Skin: No new deficits noted.    LDA's:  Right hand PIV-SL, Left PIV-SL     Plan: Continue with POC. Notify primary team Med GOLD NIGHT with changes.   -Intermitted IV ABX  -Telemetry monitoring   -Pain management   "

## 2024-07-15 NOTE — ANESTHESIA POSTPROCEDURE EVALUATION
Patient: Sushila Chambers    Procedure: Procedure(s):  ENDOSCOPIC RETROGRADE CHOLANGIOPANCREATOGRAPHY, WITH BILIARY SPHINCTEROTOMY, STONE AND PUS REMOVAL, DILATION AND STENT PLACEMENT       Anesthesia Type:  General    Note:  Disposition: Inpatient   Postop Pain Control: Uneventful            Sign Out: Well controlled pain   PONV: No   Neuro/Psych: Uneventful            Sign Out: Acceptable/Baseline neuro status   Airway/Respiratory: Uneventful            Sign Out: Acceptable/Baseline resp. status   CV/Hemodynamics: Uneventful            Sign Out: Acceptable CV status; No obvious hypovolemia; No obvious fluid overload   Other NRE: NONE   DID A NON-ROUTINE EVENT OCCUR? No       Last vitals:  Vitals Value Taken Time   /64 07/14/24 2100   Temp 37.1  C (98.7  F) 07/14/24 2050   Pulse 86 07/14/24 2107   Resp 25 07/14/24 2107   SpO2 97 % 07/14/24 2107   Vitals shown include unfiled device data.    Electronically Signed By: Glenda Schafer MD  July 14, 2024  9:08 PM

## 2024-07-15 NOTE — ANESTHESIA CARE TRANSFER NOTE
Patient: Sushila Chambers    Procedure: Procedure(s):  ENDOSCOPIC RETROGRADE CHOLANGIOPANCREATOGRAPHY, WITH BILIARY SPHINCTEROTOMY, STONE AND PUS REMOVAL, DILATION AND STENT PLACEMENT       Diagnosis: Elevated LFTs [R79.89]  Diagnosis Additional Information: No value filed.    Anesthesia Type:   General     Note:    Oropharynx: oropharynx clear of all foreign objects and spontaneously breathing  Level of Consciousness: awake  Oxygen Supplementation: nasal cannula  Level of Supplemental Oxygen (L/min / FiO2): 2  Independent Airway: airway patency satisfactory and stable  Dentition: dentition unchanged  Vital Signs Stable: post-procedure vital signs reviewed and stable  Report to RN Given: handoff report given  Patient transferred to: PACU    Handoff Report: Identifed the Patient, Identified the Reponsible Provider, Reviewed the pertinent medical history, Discussed the surgical course, Reviewed Intra-OP anesthesia mangement and issues during anesthesia, Set expectations for post-procedure period and Allowed opportunity for questions and acknowledgement of understanding  Vitals:  Vitals Value Taken Time   /87 2018   Temp     Pulse 89 2018   Resp     SpO2 99% 2018       Electronically Signed By: BREANNE Baker CRNA  July 14, 2024  8:19 PM

## 2024-07-15 NOTE — CONSULTS
Care Management Initial Consult    General Information  Assessment completed with: Patient, VM-chart review, Pt Sushila  Type of CM/SW Visit: Initial Assessment    Primary Care Provider verified and updated as needed: Yes (Brittany Wills [phone] [facility] [address], [facility] [address])   Readmission within the last 30 days: no previous admission in last 30 days      Reason for Consult: other (see comments) (Elevated NÉSTOR)  Advance Care Planning: Advance Care Planning Reviewed: questions answered        Communication Assessment  Patient's communication style: spoken language (English or Bilingual)    Hearing Difficulty or Deaf: no   Wear Glasses or Blind: no    Cognitive  Cognitive/Neuro/Behavioral: WDL                      Living Environment:   People in home: alone     Current living Arrangements: house      Able to return to prior arrangements: yes     Family/Social Support:  Care provided by: self  Provides care for: no one  Marital Status:   Children          Description of Support System: Supportive, Involved    Support Assessment: Adequate family and caregiver support, Adequate social supports    Current Resources:   Patient receiving home care services: Yes     Community Resources: None  Equipment currently used at home: none  Supplies currently used at home: None    Employment/Financial:  Employment Status: retired        Financial Concerns: none      Does the patient's insurance plan have a 3 day qualifying hospital stay waiver?  No    Lifestyle & Psychosocial Needs:  Social Determinants of Health     Food Insecurity: Low Risk  (10/16/2023)    Food Insecurity     Within the past 12 months, did you worry that your food would run out before you got money to buy more?: No     Within the past 12 months, did the food you bought just not last and you didn t have money to get more?: No   Depression: Not at risk (10/16/2023)    PHQ-2     PHQ-2 Score: 0   Housing Stability: Low Risk   (10/16/2023)    Housing Stability     Do you have housing? : Yes     Are you worried about losing your housing?: No   Tobacco Use: Low Risk  (5/13/2024)    Patient History     Smoking Tobacco Use: Never     Smokeless Tobacco Use: Never     Passive Exposure: Not on file   Financial Resource Strain: Low Risk  (10/16/2023)    Financial Resource Strain     Within the past 12 months, have you or your family members you live with been unable to get utilities (heat, electricity) when it was really needed?: No   Alcohol Use: Not on file   Transportation Needs: Low Risk  (10/16/2023)    Transportation Needs     Within the past 12 months, has lack of transportation kept you from medical appointments, getting your medicines, non-medical meetings or appointments, work, or from getting things that you need?: No   Physical Activity: Not on file   Interpersonal Safety: Not At Risk (5/25/2023)    Humiliation, Afraid, Rape, and Kick questionnaire     Fear of Current or Ex-Partner: No     Emotionally Abused: No     Physically Abused: No     Sexually Abused: No   Stress: Not on file   Social Connections: Not on file   Health Literacy: Not on file     Functional Status:  Prior to admission patient needed assistance:   Dependent ADLs:: Independent  Dependent IADLs:: Independent     Mental Health Status:  Mental Health Status: No Current Concerns       Chemical Dependency Status:  Chemical Dependency Status: No Current Concerns           Values/Beliefs:  Spiritual, Cultural Beliefs, Cheondoism Practices, Values that affect care: no             Additional Information:    Background: Sushila Chambers is a 74 year old female admitted on 7/14/2024. She has a past medical history significant for histoplasmosis (~2000), prior lung cancer (early 2000s), right breast cancer (2009) s/p lumpectomy/radiation/endocrine therapy, left breast cancer (2015) s/p chemotherapy/mastectomy on letrozole,  hypothyroidism, hyperlipidemia and hypertension. Began  vomiting evening of 7/12 with persistent upper abdominal pain and low grade fever. Labs/imaging c/f cholangitis (Soraida Anderson MD.)    Care management assessment completed at the bedside with the pt, Sushila, d/t suzy NÉSTOR. RNCC introduced self and explained the nature of the care management assessment. Pt agreed to speak with RNCC. The pt verified address, PCP, and health insurance in chart is current.     Sushila lives at home alone with her 6 y/o back lab Max. She is ind. No HC/DME needed before. Her two daughters live close by and are her strong support. She denied any concerns. Family to transport at discharge. No discharge needs identified at this time.    Dianelys Anderson RN    7C RN Coordinator  Phone: [phone]  7/15/2024  Units: 7C Med Surg 7401 thru 7418 RNCC     Social Work and Care Management Department   SEARCHABLE in [facility] - search CARE COORDINATOR   [facility] [place][phone][place] Saturday & Sunday; ([phone]) [facility] Recognized Holidays   Units: 5A Onc 5201 - 5219 RNCC,  5A Onc 5220 thru 5240 RNCC, 5C OFFSERVICE [phone] RNCC & 5C OFF SERVICE [id number] RNCC   Units: 6B Vocera, 6C Card 6401 thru 6420 RNCC, 6C Card 6502 thru 6514 RNCC & 6C Card 6515 thru 6519 RNCC    Units: 7A SOT RNCC Vocera, 7B Med Surg Vocera, & 7C Med Surg 7502 thru 7521 RNCC   Units: 6A Vocera & 4A CVICU Vocera, 4C MICU Vocera, and 4E SICU Vocera     Units: 5 Ortho Vocera & 5 Med Surg Vocera    Units: 6 Med Surg Vocera & 8 Med Surg Vocera

## 2024-07-15 NOTE — PROGRESS NOTES
Admission          7/14/2024 11:47 AM  -----------------------------------------------------------  Reason for admission: N/V mild fever and had a planned ERCP   Primary team notified of pt arrival.  Admitted from: PACU   Via: stretcher  Accompanied by: family-Daughter Марина Navarro: Placed in closet; valuables sent home with family  Admission Profile: complete  Teaching: orientation to unit and call light- call light within reach, call don't fall, use of console, meal times, when to call for the RN, and enforced importance of safety   Access: PIV  Telemetry:Placed on pt  Ht./Wt.: complete  Code Status verified on armband: yes  2 RN Skin Assessment Completed By:  Juvencio CAPONE RN and Terri CROSS RN   Med Rec completed: yes  Suction/Ambu bag/Flowmeter at bedside: yes  Is patient having diarrhea upon admission- if YES fill out testing algorithm : no      Pt status: Stable. Patient is alert and oriented X4. She denied pain. She was able to stand with 1 assist on standing scale for weight. Vital signs obtained. She came on 2LNC but writer removed the NC. She is now on room air and writer will monitor her O2 sats.     Temp:  [97.6  F (36.4  C)-99.1  F (37.3  C)] 98.1  F (36.7  C)  Pulse:  [] 89  Resp:  [16-31] 18  BP: (111-157)/(59-71) 117/64  SpO2:  [89 %-99 %] 90 %

## 2024-07-15 NOTE — PROGRESS NOTES
Brief Medicine Note:    Patient underwent ERCP this evening.  See procedure note for details.  Did not complete potassium replacement prior to going to her procedure - s/p 3 doses IV potassium chloride 10 mEq at this time. Did not receiving IVF bolus but did get some fluid intra-procedurally.     - BMP ordered for 2200  - Added K, Mg, Phos replacment protocols  - Lipase, amylase ordered for AM  - Will need General Surgery consult in the morning  - Avoid ASA or anticoagulation x 3 days  - Will need flat plate and upright AXR in 4 weeks  - Will need upper endoscopy for biliary stent removal in the future    MINAL Shafer-C  Internal Medicine ANA Hospitalist  [facility]

## 2024-07-15 NOTE — PLAN OF CARE
Goal Outcome Evaluation:  Plan of Care Reviewed With: patient  Overall Patient Progress: no change  Outcome Evaluation:     Assumed Cares 7322-2542: K and phos replaced. Mag WNL. K recheck at 4pm. NPO midnight for gallbladder removal potentially tomorrow. 500mL NS bolus given through PIV.     A+Ox4. Up independently. Denies pain/nausea/discomfort. Tolerating clear liquids well. Voiding adequately. Last BM Friday 7/12. VSS on RA.   Temp: 98.5  F (36.9  C)    BP: 112/63    Pulse: 77    Resp: 17    SpO2: 95 %    O2 Device: None (Room air)    Oxygen Delivery: 2 LPM

## 2024-07-15 NOTE — PLAN OF CARE
Goal Outcome Evaluation:      Plan of Care Reviewed With: patient    Overall Patient Progress: no changeOverall Patient Progress: no change    Outcome Evaluation: Plan to d/c home when med ready.      Dianelys Anderson, RN    7C RN Coordinator  Phone: [phone]  7/15/2024  Units: 7C Med Surg 7401 thru 7418 RNCC     Social Work and Care Management Department   SEARCHABLE in [facility] - search CARE COORDINATOR   [facility] [place] ([phone]) Saturday & Sunday; ([phone]) [facility] Recognized Holidays   Units: 5A Onc 5201 - 5219 RNCC,  5A Onc 5220 thru 5240 RNCC, 5C OFFSERVICE [phone] RNCC & 5C OFF SERVICE [phone] RNCC   Units: 6B Vocera, 6C Card 6401 thru 6420 RNCC, 6C Card 6502 thru 6514 RNCC & 6C Card 6515 thru 6519 RNCC    Units: 7A SOT RNCC Vocera, 7B Med Surg Vocera, & 7C Med Surg 7502 thru 7521 RNCC   Units: 6A Vocera & 4A CVICU Vocera, 4C MICU Vocera, and 4E SICU Vocera     Units: 5 Ortho Vocera & 5 Med Surg Vocera    Units: 6 Med Surg Vocera & 8 Med Surg Vocera

## 2024-07-15 NOTE — CONSULTS
EGS Surgery Consult  July 15, 2024    Sushila Chambers  : 1950    Date of Service: 7/15/2024 7:57 AM  Reason for consult: ascending cholangitis s/p ERCP, consideration of same admission lap cholecystectomy    Assessment and Plan:  Sushila Chambers is a 74 year old female with PMHx breast cancer and lung cancer in remission, on letrozole, HTN, hypothyroidism who was admitted on  for ascending cholangitis. Initial , , T bili 6.0. RUQ ultrasound with CBD dilated to 13 mm. ERCP performed , with two plastic stents placed into CBD. Surgery is consulted to consider same-admission laparoscopic cholecystectomy. LFTs improving, lipase and amylase both normal. Hyponatremic, hypokalemic, hypochloremic. Patient would benefit from surgical intervention, but requires optimization of electrolytes prior to OR.    - Recommend optimizing electrolytes prior to OR  - OK for diet today, NPO at midnight  - Continue abx per primary  - Possible OR tomorrow for lap tadeo    Discussed with chief resident and staff surgeon Dr. Casas.    Soraida Anderson MD  General Surgery, PGY2     History of Present Illness:    Sushila Chambers is a 74 year old female with PMHx breast cancer and lung cancer in remission, on letrozole, HTN, hypothyroidism that presents with abdominal pain and nausea/vomiting that started on . She presented to the ED on , and was found to have ascending cholangitis. , , T bili 6.0. RUQ ultrasound with CBD dilated to 13 mm. ERCP performed , with two plastic stents placed into CBD. Post-procedure, she has been tolerating clear liquids, no abdominal pain, no nausea/vomiting. Has continued on ceftri/flagyl. Patient reports no history of biliary colic.    Patient reports prior ex lap in  for ruptured ectopic pregnancy, otherwise no abdominal surgeries. No anesthetic complications. No bleeding or clotting disorders in personal or family history. Takes aspirin at home, patient is  unsure why she takes this. Has not taken aspirin in several days. No other anticoagulation.     Past Medical History:  Past Medical History:   Diagnosis Date    Basal cell carcinoma     Breast cancer, right breast (H)     History of blood transfusion     Hypothyroid     Lung cancer (H) 2012    right    Uncontrolled hypertension 11/15/2017       Past Surgical History  Past Surgical History:   Procedure Laterality Date    ABDOMEN SURGERY      BIOPSY, LUNG NODULE Right 2012    + cancer    COLONOSCOPY      ECTOPIC PREGNANCY SURGERY Left 1988    GENITOURINARY SURGERY      GYN SURGERY      INSERT PORT VASCULAR ACCESS      LUMPECTOMY BREAST Right 2010    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    MASTECTOMY SIMPLE, SENTINEL NODE, COMBINED Left 2/24/2016    Procedure: COMBINED MASTECTOMY SIMPLE, SENTINEL NODE;  Surgeon: Satnam Betts MD;  Location: [facility] OR    PHACOEMULSIFICATION WITH STANDARD INTRAOCULAR LENS IMPLANT Right 9/12/2022    Procedure: RIGHT EYE PHACOEMULSIFICATION, CATARACT, WITH  INTRAOCULAR LENS IMPLANT INSERTION TORIC LENS;  Surgeon: Ion Puente MD;  Location: [facility] OR    PHACOEMULSIFICATION WITH STANDARD INTRAOCULAR LENS IMPLANT Left 9/26/2022    Procedure: LEFT PHACOEMULSIFICATION, CATARACT, WITH STANDARD INTRAOCULAR LENS IMPLANT INSERTION;  Surgeon: Ion Puente MD;  [place] [facility] [place]    REMOVE PORT VASCULAR ACCESS N/A 2/24/2016    Procedure: REMOVE PORT VASCULAR ACCESS;  Surgeon: Satnam Betts MD;  Location: [facility] OR    THORACIC SURGERY         Family History:  Family History   Problem Relation Age of Onset    Breast Cancer Mother     Cancer Father         bone cancer    Vertigo Sister     Melanoma No family hx of     Glaucoma No family hx of     Macular Degeneration No family hx of     Skin Cancer No family hx of        Social History:  No tobacco use  Social alcohol use  No recreational drug use   Lives at home alone, independent in ADL's, exercises  "regularly      Medications:  Tylenol  Aspirin 81 mg daily  Chlorthalidone  Levothyroxine  Lisinopril  Rosuvastatin  Vitamins     Allergies:     Allergies   Allergen Reactions    Latex Rash     SKIN REACTION    SKIN REACTION    Sulfa Antibiotics Itching and Rash       Review of Symptoms:  A 10 point review of symptoms has been conducted and is negative except for that mentioned in the above HPI.    Physical Exam:    Blood pressure 112/63, pulse 77, temperature 98.5  F (36.9  C), temperature source Oral, resp. rate 17, height 1.6 m (5' 3\"), weight 66 kg (145 lb 8 oz), SpO2 95%, not currently breastfeeding.  Gen:    Lying in bed in NAD, A&OX3  HEENT: Normocephalic and atraumatic  CV:  Regular rate, well perfused extremities  Pulm:  Non-labored breathing on room air  Abd:  Soft, mildly tender to palpation in epigastrium, non-distended, no guarding. Well   healed infraumbilical surgical incision.  Ext:  Warm and well perfused, no obvious deformities    Labs:  CBC RESULTS:   Recent Labs   Lab Test 07/15/24  0558   WBC 10.5   RBC 3.18*   HGB 9.9*   HCT 27.9*   MCV 88   MCH 31.1   MCHC 35.5   RDW 13.2   *     Last Basic Metabolic Panel:  Lab Results   Component Value Date     07/15/2024     06/29/2021      Lab Results   Component Value Date    POTASSIUM 3.2 07/15/2024    POTASSIUM 3.7 07/21/2022    POTASSIUM 3.7 06/29/2021     Lab Results   Component Value Date    CHLORIDE 92 07/15/2024    CHLORIDE 93 07/21/2022    CHLORIDE 101 06/29/2021     Lab Results   Component Value Date    OCTAVIANO 8.3 07/15/2024    OCTAVIANO 9.7 06/29/2021     Lab Results   Component Value Date    CO2 25 07/15/2024    CO2 31 07/21/2022    CO2 29 06/29/2021     Lab Results   Component Value Date    BUN 20.3 07/15/2024    BUN 16 07/21/2022    BUN 18 06/29/2021     Lab Results   Component Value Date    CR 1.02 07/15/2024    CR 1.03 06/29/2021     Lab Results   Component Value Date     07/15/2024    GLC 99 07/14/2024     07/21/2022 "     06/29/2021     Liver Function Studies -   Recent Labs   Lab Test 07/15/24  0558   PROTTOTAL 5.9*   ALBUMIN 3.2*   BILITOTAL 4.3*   ALKPHOS 115   *   *      Lipase   Date Value Ref Range Status   07/15/2024 19 13 - 60 U/L Final      Amylase   Date Value Ref Range Status   07/15/2024 28 28 - 100 U/L Final        Imaging:    RUQ U/S 7/14/2024  Narrative & Impression   EXAMINATION: Limited Abdominal Ultrasound, 7/14/2024 1:45 PM      COMPARISON: None.     HISTORY: RUQ tenderness, r/o cholecystitis vs. cholelithiasis vs.  pancreatitis     FINDINGS:   Fluid: No evidence of ascites or pleural effusions.     Liver: The liver demonstrates normal echotexture, measuring 18.9 cm in  craniocaudal dimension. There is no focal mass.      Gallbladder: The gallbladder is not well-visualized. No  pericholecystic fluid. Negative sonographic Pemberton's sign.     Bile Ducts: No intrahepatic biliary duct dilation.  The common bile  duct measures up to 13 mm in diameter small amount of sludge present.  No obstructing stones identified on ultrasound.     Pancreas: Visualized portions of the head and body of the pancreas are  unremarkable.      Kidney: The right kidney measures 10.1 cm long. There is no  hydronephrosis or hydroureter, no shadowing renal calculi, cystic  lesion or mass.                                                                       IMPRESSION:   The gallbladder is not well visualized and may be contracted with  echogenic stones. The common bile duct is dilated up to 13 mm a small  amount of sludge present. No obstructing stones identified on  ultrasound. May consider MRCP for further evaluation.

## 2024-07-15 NOTE — PHARMACY-ADMISSION MEDICATION HISTORY
Pharmacist Admission Medication History    Admission medication history is complete. The information provided in this note is only as accurate as the sources available at the time of the update.    Information Source(s): Patient, Hospital records, Prescription bottles, and CareEverywhere/SureScripts via in-person    Pertinent Information: Patient states that she only takes her Vitamin D supplement during the winter months    Changes made to PTA medication list:  Added: None  Deleted: Triamcinolone 0.1 external ointment (therapy completed)  Changed: None    Allergies reviewed with patient and updates made in EHR: yes    Medication History Completed By: Jose Mayo [facility] 7/15/2024 3:33 PM    PTA Med List   Medication Sig Last Dose    acetaminophen (TYLENOL) 500 MG tablet Take 500-1,000 mg by mouth every 6 hours as needed for mild pain Past Month    aspirin 81 MG EC tablet Take 81 mg by mouth daily Past Week    biotin 1000 MCG TABS tablet Take by mouth daily Past Week    calcium carbonate (OS-OCTAVIANO 500 MG [place]. [place]) 500 MG tablet Take by mouth 2 times daily Past Week    chlorthalidone (HYGROTON) 25 MG tablet Take 1 tablet (25 mg) by mouth daily Past Week    letrozole (FEMARA) 2.5 MG tablet Take 1 tablet (2.5 mg) by mouth daily 7/14/2024    levothyroxine (SYNTHROID/LEVOTHROID) 50 MCG tablet Take 1 tablet (50 mcg) by mouth daily 7/14/2024    lisinopril (ZESTRIL) 5 MG tablet Take 1 tablet (5 mg) by mouth daily Past Week    multivitamin w/minerals (THERA-VIT-M) tablet Take 1 tablet by mouth daily Past Week    rosuvastatin (CRESTOR) 5 MG tablet Take 1 tablet (5 mg) by mouth daily Past Week    vitamin C (ASCORBIC ACID) 100 MG tablet  Past Week    VITAMIN D, CHOLECALCIFEROL, PO Take by mouth daily More than a month

## 2024-07-15 NOTE — ANESTHESIA PROCEDURE NOTES
Airway       Patient location during procedure: OR       Procedure Start/Stop Times: 7/14/2024 7:04 PM  Staff -        CRNA: Natividad Man APRN CRNA       Performed By: CRNAIndications and Patient Condition       Indications for airway management: aram-procedural       Induction type:RSI       Mask difficulty assessment: 0 - not attempted    Final Airway Details       Final airway type: endotracheal airway       Successful airway: ETT - single  Endotracheal Airway Details        ETT size (mm): 7.0       Cuffed: yes       Successful intubation technique: direct laryngoscopy       DL Blade Type: Dinh 2       Grade View of Cords: 1       Adjucts: stylet       Position: Right       Measured from: gums/teeth       Secured at (cm): 21       Bite Block used: GI bite block with gauze padding.    Post intubation assessment        Placement verified by: capnometry, equal breath sounds and chest rise        Number of attempts at approach: 1       Number of other approaches attempted: 0       Secured with: tape       Ease of procedure: easy       Dentition: Intact and Unchanged    Medication(s) Administered   Medication Administration Time: 7/14/2024 7:04 PM

## 2024-07-15 NOTE — PROGRESS NOTES
[facility]    Medicine Progress Note - Hospitalist Service, GOLD TEAM 9    Date of Admission:  7/14/2024    Assessment & Plan     Sushila Chambers is a 74 year old female admitted on 7/14/2024. She has a past medical history significant for histoplasmosis (~2000), prior lung cancer (early 2000s), right breast cancer (2009) s/p lumpectomy/radiation/endocrine therapy, left breast cancer (2015) s/p chemotherapy/mastectomy on letrozole,  hypothyroidism, hyperlipidemia and hypertension. Began vomiting evening of 7/12 with persistent upper abdominal pain and low grade fever. Labs/imaging c/f cholangitis.      Today's updates:  - Replace K, Mg, Phos  - continue IV Abx  - Repeat BC  - NPO at midnight for possible Lap tadeo in the AM with Gen Surg  - Hold ASA/AC for 3 days post ERCP per GI recs to prevent post-sphincterotomy bleeding    > GI to arrange ERCP in 4 weeks to assess and stent removal if in place  - restart Crestor at discharge  - Hold ACEI for anticipated surgery 7/15    acute cholangitis  Acute transaminitis  Extrahepatic biliary obstruction  Strep pneumo bacteremia (per BC from 7/14)  Presenting with 2 days of vomiting, upper abdominal pain and fever. Lipase wnl. LFTs notable for , , Tbili 6.0. Alk Phos wnl.  INR 1.52. RUQ US obatined - gallbladder not well visualize, may be contracted with echogenic stones; common bile duct dilated up to 13 mm with small amount of sludge present. No obstructing stones identified. High concern for cholangitis on admission, started on ceftriaxone/flagyl.   - Panc-Bili team consulted   - NPO at midnight  - Repeat blood cultures x2  - Continue IV ceftriaxone 2 g daily + IV flagyl 500 mg q8h  - Trend LFTs, CBC  - Tylenol, oxycodone PRN for pain  - Zofran PRN     Hypokalemia  Hypomagnesia  hypophosphatemia  K 2.6 on admission in setting of emesis/poor PO intake with chlorthalidone likely contributing. ED ordered IV potassium  10 mEq x 6 doses -- worried this won't bring her up to goal prior to her ERCP.  -NS bolus  - Replace and check/trend labs/lytes     Hyponatremia  Na 128 on admission. In setting of GI illness as above and likely hypovolemic. Also on chlorthalidone which may be contributin- now held  - NS bolus and re-assess with AM labs        HTN  - Holding chlorthalidone due to lyte disturbances  - Continue PTA lisinopril 5 mg daily with hold parameters       Multifocal, stage IIa, T2N0M0, ER positive, [place] negative, HER2 non-amplified invasive lobular carcinoma of left breast  Hx of right breast cancer (2009) s/p lumpectomy, radiation and endocrine therapy  Hx of lung cancer  Follows with Dr. Villegas, last seen 5/2024. Remote hx of lung cancer while living in [facility]. Underwent treatment of prior breast cancer in 2009, self-palpated mass in left breast in 8/2015. S/p 12 weeks of ganestespib and taxol, 4 cycles of dose dense adriamycin + cyclophosphamide (9/2015-2//2016) followed by left breast mastectomy, sentinel lymph node procedure. On adjuvant curative intent treatment with letrozole since 3/2016.   - Continue PTA letrozole   - Plan for follow-up in 11/2024     HLD  - Holding PTA statin while LFTs recover     Hypothyroidism  - Continue PTA levothyroxine 50 mcg daily           Diet: Advance Diet as Tolerated: Regular Diet Adult  NPO for Medical/Clinical Reasons Except for: Meds    DVT Prophylaxis: Held due to procedure/surgery  Tovar Catheter: Not present  Lines: None     Cardiac Monitoring: None  Code Status: Full Code      Clinically Significant Risk Factors Present on Admission        # Hypokalemia: Lowest K = 2.6 mmol/L in last 2 days, will replace as needed  # Hyponatremia: Lowest Na = 128 mmol/L in last 2 days, will monitor as appropriate  # Hypocalcemia: Lowest Ca = 8.3 mg/dL in last 2 days, will monitor and replace as appropriate   # Hypomagnesemia: Lowest Mg = 1.6 mg/dL in last 2 days, will replace as needed   #  "Hypoalbuminemia: Lowest albumin = 3.2 g/dL at 7/15/2024  5:58 AM, will monitor as appropriate  # Coagulation Defect: INR = 1.53 (Ref range: 0.85 - 1.15) and/or PTT = 28 Seconds (Ref range: 22 - 38 Seconds), will monitor for bleeding  # Drug Induced Platelet Defect: home medication list includes an antiplatelet medication   # Hypertension: Noted on problem list    # Anemia: based on hgb <11           # Overweight: Estimated body mass index is 25.77 kg/m  as calculated from the following:    Height as of this encounter: 1.6 m (5' 3\").    Weight as of this encounter: 66 kg (145 lb 8 oz).              Disposition Plan     Medically Ready for Discharge: Anticipated in 2-4 Days             Simone Reno MD  Hospitalist Service, [address]  Securely message with [employer] (more info)  Text page via [employer] Paging/Directory   See signed in provider for up to date coverage information  ______________________________________________________________________    Interval History   Seen this AM  Feels less abd pain and better today after the ERCP  Agrees with the surgical planning as she is aware of    No dizziness  No chest pain      Physical Exam   Vital Signs: Temp: 98.5  F (36.9  C) Temp src: Oral BP: 112/63 Pulse: 77   Resp: 17 SpO2: 95 % O2 Device: None (Room air) Oxygen Delivery: 2 LPM  Weight: 145 lbs 8 oz    General: Alert awake and oriented, no distress, conversational  Eyes: Normal pink mucosa with no signs of pallor, no scleral icterus.  Neck: No significant lymphadenopathy, no palpable LN, No JVD  Heart: Regular rate and rhythm, normal S1, normal S2, no murmurs rubs or gallops, PMI is nondisplaced   Peripherally there is no edema  Lungs: No increased work of breathing, good effort, lungs are clear to auscultation bilaterally   No wheezing or crackles   Breathing on room air  Abdomen: Nontender, nondistended, normal active bowel sounds, no palpable " masses  Extremities: Normal capillary refill, no edema, no clubbing, no cyanosis  Neuro: Alert and oriented to person place location and situation   Grossly arms and legs are without any focal motor or sensory deficits   Gait was not assessed   Cranial nerves II through XII are grossly intact  Psych: No acute psychosis, good mood, good spirits      Medical Decision Making       Medical complexity over the past 24 hours:  - Parenteral (IV) CONTROLLED SUBSTANCES ordered  - Intensive monitoring for MEDICATION TOXICITY  - Decision regarding ESCALATION OF LEVEL OF CARE and need for electrolyte monitoring and replacement, for suspected surgery tomorrow.      Data     I have personally reviewed the following data over the past 24 hrs:    10.5  \   9.9 (L)   / 124 (L)     129 (L) 92 (L) 20.3 /  126 (H)   3.2 (L) 25 1.02 (H) \     ALT: 576 (HH) AST: 141 (H) AP: 115 TBILI: 4.3 (H)   ALB: 3.2 (L) TOT PROTEIN: 5.9 (L) LIPASE: 19     INR:  1.53 (H) PTT:  28   D-dimer:  N/A Fibrinogen:  N/A       Imaging results reviewed over the past 24 hrs:   Recent Results (from the past 24 hour(s))   XR Chest 2 Views    Narrative    XR CHEST 2 VIEWS 7/14/2024 2:00 PM      HISTORY: fever, r/o CAP    COMPARISON: 9/15/2014.     FINDINGS:   Frontal view of the chest. Blunting of the right costophrenic angle is  likely due to some superimposed soft tissues. Normal cardiomediastinal  silhouette. No pneumothorax or pleural effusion. No focal airspace  consolidation.      Impression    IMPRESSION:   No focal consolidation.    I have personally reviewed the examination and initial interpretation  and I agree with the findings.    ZULEYMA POLK DO         SYSTEM ID:  [id number]   XR Surgery GRETCHEN G/T 5 Min Fluoro w Stills    Narrative    This exam was marked as non-reportable because it will not be read by a   radiologist or a [place] non-radiologist provider.

## 2024-07-15 NOTE — ANESTHESIA PREPROCEDURE EVALUATION
Anesthesia Pre-Procedure Evaluation    Patient: Sushila Chambers   MRN: [id number] : 1950        Procedure : Procedure(s):  CHOLECYSTECTOMY, LAPAROSCOPIC          Past Medical History:   Diagnosis Date    Basal cell carcinoma     Breast cancer, right breast (H)     History of blood transfusion     Hypothyroid     Lung cancer (H) 2012    right    Uncontrolled hypertension 11/15/2017      Past Surgical History:   Procedure Laterality Date    ABDOMEN SURGERY      BIOPSY, LUNG NODULE Right     + cancer    COLONOSCOPY      ECTOPIC PREGNANCY SURGERY Left     ENDOSCOPIC RETROGRADE CHOLANGIOPANCREATOGRAM N/A 2024    Procedure: ENDOSCOPIC RETROGRADE CHOLANGIOPANCREATOGRAPHY, WITH BILIARY SPHINCTEROTOMY, STONE AND PUS REMOVAL, DILATION AND STENT PLACEMENT;  Surgeon: Guru Mendy Paiz MD;  Location: [facility] OR    GENITOURINARY SURGERY      GYN SURGERY      INSERT PORT VASCULAR ACCESS      LUMPECTOMY BREAST Right     LUNG SURGERY Right     histoplasmosis    MASTECTOMY SIMPLE, SENTINEL NODE, COMBINED Left 2016    Procedure: COMBINED MASTECTOMY SIMPLE, SENTINEL NODE;  Surgeon: Satnam Betts MD;  Location: [facility] OR    PHACOEMULSIFICATION WITH STANDARD INTRAOCULAR LENS IMPLANT Right 2022    Procedure: RIGHT EYE PHACOEMULSIFICATION, CATARACT, WITH  INTRAOCULAR LENS IMPLANT INSERTION TORIC LENS;  Surgeon: Ion Puente MD;  Location: [facility] OR    PHACOEMULSIFICATION WITH STANDARD INTRAOCULAR LENS IMPLANT Left 2022    Procedure: LEFT PHACOEMULSIFICATION, CATARACT, WITH STANDARD INTRAOCULAR LENS IMPLANT INSERTION;  Surgeon: Ion Puente MD;  Location: [facility]    REMOVE PORT VASCULAR ACCESS N/A 2016    Procedure: REMOVE PORT VASCULAR ACCESS;  Surgeon: Satnam Betts MD;  [facility]    THORACIC SURGERY        Allergies   Allergen Reactions    Latex Rash     SKIN REACTION    SKIN REACTION    Sulfa Antibiotics Itching and Rash      Social History     Tobacco  Use    Smoking status: Never    Smokeless tobacco: Never   Substance Use Topics    Alcohol use: Yes     Comment: Occasionally      Wt Readings from Last 1 Encounters:   07/14/24 66 kg (145 lb 8 oz)        Anesthesia Evaluation   Pt has had prior anesthetic. Type: General.    No history of anesthetic complications       ROS/MED HX  ENT/Pulmonary:  - neg pulmonary ROS     Neurologic:  - neg neurologic ROS     Cardiovascular:     (+) Dyslipidemia hypertension- -   -  - -                                 Previous cardiac testing   Echo: Date: 9/9/15 Results:  Global and regional left ventricular function is normal with an EF of 60-65%.  Right ventricular function, chamber size, wall motion, and thickness normal.  No pericardial effusion is present.  Stress Test:  Date: Results:    ECG Reviewed:  Date: 7/14/24 Results:  NSR  Cath:  Date: Results:      METS/Exercise Tolerance: 3 - Able to walk 1-2 blocks without stopping    Hematologic:  - neg hematologic  ROS     Musculoskeletal:   (+)  arthritis,             GI/Hepatic: Comment: Acute cholangitis, Acute transaminitis, Extrahepatic biliary obstruction              Renal/Genitourinary: Comment: Hypokalemia, Hyponatremia   (-) renal disease   Endo:     (+)          thyroid problem, hypothyroidism,           Psychiatric/Substance Use:  - neg psychiatric ROS     Infectious Disease:       Malignancy: Comment: Hxlung cancer (early 2000s), right breast cancer (2009) s/p lumpectomy/radiation/endocrine therapy, left breast cancer (2015) s/p chemotherapy/mastectomy on letrozol  (+) Malignancy, History of Breast and Lung.  Lung CA Remission status post.  Breast CA Remission status post Surgery, Chemo and Radiation.      Other:               OUTSIDE LABS:  CBC:   Lab Results   Component Value Date    WBC 10.5 07/15/2024    WBC 10.9 07/14/2024    HGB 9.9 (L) 07/15/2024    HGB 11.4 (L) 07/14/2024    HCT 27.9 (L) 07/15/2024    HCT 31.0 (L) 07/14/2024     (L) 07/15/2024      "07/14/2024     BMP:   Lab Results   Component Value Date     (L) 07/15/2024     (L) 07/14/2024    POTASSIUM 3.4 07/15/2024    POTASSIUM 3.2 (L) 07/15/2024    CHLORIDE 92 (L) 07/15/2024    CHLORIDE 93 (L) 07/14/2024    CO2 25 07/15/2024    CO2 25 07/14/2024    BUN 20.3 07/15/2024    BUN 18.8 07/14/2024    CR 1.02 (H) 07/15/2024    CR 1.05 (H) 07/14/2024     (H) 07/15/2024     (H) 07/14/2024     COAGS:   Lab Results   Component Value Date    PTT 28 07/14/2024    INR 1.53 (H) 07/15/2024    FIBR 482 (H) 09/09/2015     POC: No results found for: \"BGM\", \"HCG\", \"HCGS\"  HEPATIC:   Lab Results   Component Value Date    ALBUMIN 3.2 (L) 07/15/2024    PROTTOTAL 5.9 (L) 07/15/2024     (HH) 07/15/2024     (H) 07/15/2024    ALKPHOS 115 07/15/2024    BILITOTAL 4.3 (H) 07/15/2024     OTHER:   Lab Results   Component Value Date    A1C 5.6 10/04/2023    OCTAVIANO 8.3 (L) 07/15/2024    PHOS 2.4 (L) 07/14/2024    MAG 1.6 (L) 07/14/2024    LIPASE 19 07/15/2024    AMYLASE 28 07/15/2024    TSH 3.76 10/04/2023       Anesthesia Plan    ASA Status:  2    NPO Status:  NPO Appropriate    Anesthesia Type: General.     - Airway: ETT   Induction: Intravenous.   Maintenance: Balanced.   Techniques and Equipment:     - Lines/Monitors: BIS     Consents            Postoperative Care    Pain management: IV analgesics, Oral pain medications, Multi-modal analgesia.   PONV prophylaxis: Ondansetron (or other 5HT-3), Dexamethasone or Solumedrol     Comments:               Travis Street MD    I have reviewed the pertinent notes and labs in the chart from the past 30 days and (re)examined the patient.  Any updates or changes from those notes are reflected in this note.             "

## 2024-07-16 ENCOUNTER — APPOINTMENT (OUTPATIENT)
Dept: CT IMAGING | Facility: CLINIC | Age: 74
DRG: 853 | End: 2024-07-16
Attending: STUDENT IN AN ORGANIZED HEALTH CARE EDUCATION/TRAINING PROGRAM
Payer: MEDICARE

## 2024-07-16 ENCOUNTER — ANESTHESIA (OUTPATIENT)
Dept: SURGERY | Facility: CLINIC | Age: 74
DRG: 853 | End: 2024-07-16
Payer: MEDICARE

## 2024-07-16 LAB
ALBUMIN SERPL BCG-MCNC: 3.2 G/DL (ref 3.5–5.2)
ALP SERPL-CCNC: 163 U/L (ref 40–150)
ALT SERPL W P-5'-P-CCNC: 415 U/L (ref 0–50)
ANION GAP SERPL CALCULATED.3IONS-SCNC: 10 MMOL/L (ref 7–15)
AST SERPL W P-5'-P-CCNC: 88 U/L (ref 0–45)
BILIRUB SERPL-MCNC: 3.9 MG/DL
BUN SERPL-MCNC: 18.7 MG/DL (ref 8–23)
CALCIUM SERPL-MCNC: 8.9 MG/DL (ref 8.8–10.2)
CHLORIDE SERPL-SCNC: 97 MMOL/L (ref 98–107)
CREAT SERPL-MCNC: 0.92 MG/DL (ref 0.51–0.95)
EGFRCR SERPLBLD CKD-EPI 2021: 65 ML/MIN/1.73M2
ERYTHROCYTE [DISTWIDTH] IN BLOOD BY AUTOMATED COUNT: 13.3 % (ref 10–15)
GLUCOSE SERPL-MCNC: 103 MG/DL (ref 70–99)
HCO3 SERPL-SCNC: 25 MMOL/L (ref 22–29)
HCT VFR BLD AUTO: 29 % (ref 35–47)
HGB BLD-MCNC: 10.1 G/DL (ref 11.7–15.7)
MAGNESIUM SERPL-MCNC: 1.9 MG/DL (ref 1.7–2.3)
MCH RBC QN AUTO: 30.6 PG (ref 26.5–33)
MCHC RBC AUTO-ENTMCNC: 34.8 G/DL (ref 31.5–36.5)
MCV RBC AUTO: 88 FL (ref 78–100)
PHOSPHATE SERPL-MCNC: 1.3 MG/DL (ref 2.5–4.5)
PHOSPHATE SERPL-MCNC: 2.7 MG/DL (ref 2.5–4.5)
PLATELET # BLD AUTO: 157 10E3/UL (ref 150–450)
POTASSIUM SERPL-SCNC: 3.5 MMOL/L (ref 3.4–5.3)
POTASSIUM SERPL-SCNC: 3.5 MMOL/L (ref 3.4–5.3)
PROT SERPL-MCNC: 6.2 G/DL (ref 6.4–8.3)
RBC # BLD AUTO: 3.3 10E6/UL (ref 3.8–5.2)
SODIUM SERPL-SCNC: 132 MMOL/L (ref 135–145)
WBC # BLD AUTO: 10.2 10E3/UL (ref 4–11)

## 2024-07-16 PROCEDURE — 250N000009 HC RX 250: Performed by: STUDENT IN AN ORGANIZED HEALTH CARE EDUCATION/TRAINING PROGRAM

## 2024-07-16 PROCEDURE — 84450 TRANSFERASE (AST) (SGOT): CPT | Performed by: PHYSICIAN ASSISTANT

## 2024-07-16 PROCEDURE — 36415 COLL VENOUS BLD VENIPUNCTURE: CPT | Performed by: PHYSICIAN ASSISTANT

## 2024-07-16 PROCEDURE — 250N000013 HC RX MED GY IP 250 OP 250 PS 637: Performed by: STUDENT IN AN ORGANIZED HEALTH CARE EDUCATION/TRAINING PROGRAM

## 2024-07-16 PROCEDURE — 258N000003 HC RX IP 258 OP 636: Performed by: STUDENT IN AN ORGANIZED HEALTH CARE EDUCATION/TRAINING PROGRAM

## 2024-07-16 PROCEDURE — 84100 ASSAY OF PHOSPHORUS: CPT | Performed by: STUDENT IN AN ORGANIZED HEALTH CARE EDUCATION/TRAINING PROGRAM

## 2024-07-16 PROCEDURE — 0FJ44ZZ INSPECTION OF GALLBLADDER, PERCUTANEOUS ENDOSCOPIC APPROACH: ICD-10-PCS | Performed by: SURGERY

## 2024-07-16 PROCEDURE — 250N000009 HC RX 250: Performed by: SURGERY

## 2024-07-16 PROCEDURE — 250N000009 HC RX 250

## 2024-07-16 PROCEDURE — 370N000017 HC ANESTHESIA TECHNICAL FEE, PER MIN: Performed by: SURGERY

## 2024-07-16 PROCEDURE — 272N000001 HC OR GENERAL SUPPLY STERILE: Performed by: SURGERY

## 2024-07-16 PROCEDURE — 250N000013 HC RX MED GY IP 250 OP 250 PS 637: Performed by: PHYSICIAN ASSISTANT

## 2024-07-16 PROCEDURE — 47562 LAPAROSCOPIC CHOLECYSTECTOMY: CPT | Mod: 53 | Performed by: SURGERY

## 2024-07-16 PROCEDURE — 258N000003 HC RX IP 258 OP 636

## 2024-07-16 PROCEDURE — 258N000003 HC RX IP 258 OP 636: Performed by: HOSPITALIST

## 2024-07-16 PROCEDURE — 85027 COMPLETE CBC AUTOMATED: CPT | Performed by: PHYSICIAN ASSISTANT

## 2024-07-16 PROCEDURE — 250N000011 HC RX IP 250 OP 636: Performed by: STUDENT IN AN ORGANIZED HEALTH CARE EDUCATION/TRAINING PROGRAM

## 2024-07-16 PROCEDURE — 250N000011 HC RX IP 250 OP 636

## 2024-07-16 PROCEDURE — 82040 ASSAY OF SERUM ALBUMIN: CPT | Performed by: PHYSICIAN ASSISTANT

## 2024-07-16 PROCEDURE — G1010 CDSM STANSON: HCPCS | Performed by: STUDENT IN AN ORGANIZED HEALTH CARE EDUCATION/TRAINING PROGRAM

## 2024-07-16 PROCEDURE — 74176 CT ABD & PELVIS W/O CONTRAST: CPT | Mod: MG

## 2024-07-16 PROCEDURE — 250N000011 HC RX IP 250 OP 636: Mod: JZ | Performed by: STUDENT IN AN ORGANIZED HEALTH CARE EDUCATION/TRAINING PROGRAM

## 2024-07-16 PROCEDURE — 4A1BXSH MONITORING OF GASTROINTESTINAL VASCULAR PERFUSION USING INDOCYANINE GREEN DYE, EXTERNAL APPROACH: ICD-10-PCS | Performed by: SURGERY

## 2024-07-16 PROCEDURE — 250N000011 HC RX IP 250 OP 636: Performed by: PHYSICIAN ASSISTANT

## 2024-07-16 PROCEDURE — 83735 ASSAY OF MAGNESIUM: CPT | Performed by: HOSPITALIST

## 2024-07-16 PROCEDURE — 74176 CT ABD & PELVIS W/O CONTRAST: CPT | Mod: 26 | Performed by: STUDENT IN AN ORGANIZED HEALTH CARE EDUCATION/TRAINING PROGRAM

## 2024-07-16 PROCEDURE — 250N000009 HC RX 250: Performed by: HOSPITALIST

## 2024-07-16 PROCEDURE — 80053 COMPREHEN METABOLIC PANEL: CPT | Performed by: HOSPITALIST

## 2024-07-16 PROCEDURE — 99100 ANES PT EXTEME AGE<1 YR&>70: CPT | Performed by: ANESTHESIOLOGY

## 2024-07-16 PROCEDURE — 999N000128 HC STATISTIC PERIPHERAL IV START W/O US GUIDANCE

## 2024-07-16 PROCEDURE — 710N000010 HC RECOVERY PHASE 1, LEVEL 2, PER MIN: Performed by: SURGERY

## 2024-07-16 PROCEDURE — 999N000141 HC STATISTIC PRE-PROCEDURE NURSING ASSESSMENT: Performed by: SURGERY

## 2024-07-16 PROCEDURE — 250N000025 HC SEVOFLURANE, PER MIN: Performed by: SURGERY

## 2024-07-16 PROCEDURE — 36415 COLL VENOUS BLD VENIPUNCTURE: CPT | Performed by: HOSPITALIST

## 2024-07-16 PROCEDURE — 36415 COLL VENOUS BLD VENIPUNCTURE: CPT | Performed by: STUDENT IN AN ORGANIZED HEALTH CARE EDUCATION/TRAINING PROGRAM

## 2024-07-16 PROCEDURE — 360N000076 HC SURGERY LEVEL 3, PER MIN: Performed by: SURGERY

## 2024-07-16 PROCEDURE — 84100 ASSAY OF PHOSPHORUS: CPT | Performed by: HOSPITALIST

## 2024-07-16 PROCEDURE — 47562 LAPAROSCOPIC CHOLECYSTECTOMY: CPT | Performed by: ANESTHESIOLOGY

## 2024-07-16 PROCEDURE — 120N000002 HC R&B MED SURG/OB UMMC

## 2024-07-16 PROCEDURE — 99233 SBSQ HOSP IP/OBS HIGH 50: CPT | Performed by: INTERNAL MEDICINE

## 2024-07-16 RX ORDER — LIDOCAINE 40 MG/G
CREAM TOPICAL
Status: DISCONTINUED | OUTPATIENT
Start: 2024-07-16 | End: 2024-07-18 | Stop reason: HOSPADM

## 2024-07-16 RX ORDER — INDOCYANINE GREEN AND WATER 25 MG
2.5 KIT INJECTION ONCE
Status: COMPLETED | OUTPATIENT
Start: 2024-07-16 | End: 2024-07-16

## 2024-07-16 RX ORDER — SODIUM CHLORIDE, SODIUM LACTATE, POTASSIUM CHLORIDE, CALCIUM CHLORIDE 600; 310; 30; 20 MG/100ML; MG/100ML; MG/100ML; MG/100ML
INJECTION, SOLUTION INTRAVENOUS CONTINUOUS
Status: DISCONTINUED | OUTPATIENT
Start: 2024-07-16 | End: 2024-07-16 | Stop reason: HOSPADM

## 2024-07-16 RX ORDER — CEFAZOLIN SODIUM/WATER 2 G/20 ML
2 SYRINGE (ML) INTRAVENOUS
Status: COMPLETED | OUTPATIENT
Start: 2024-07-16 | End: 2024-07-16

## 2024-07-16 RX ORDER — CEFAZOLIN SODIUM/WATER 2 G/20 ML
2 SYRINGE (ML) INTRAVENOUS SEE ADMIN INSTRUCTIONS
Status: DISCONTINUED | OUTPATIENT
Start: 2024-07-16 | End: 2024-07-16 | Stop reason: HOSPADM

## 2024-07-16 RX ORDER — LIDOCAINE 40 MG/G
CREAM TOPICAL
Status: DISCONTINUED | OUTPATIENT
Start: 2024-07-16 | End: 2024-07-16 | Stop reason: HOSPADM

## 2024-07-16 RX ORDER — NALOXONE HYDROCHLORIDE 0.4 MG/ML
0.1 INJECTION, SOLUTION INTRAMUSCULAR; INTRAVENOUS; SUBCUTANEOUS
Status: DISCONTINUED | OUTPATIENT
Start: 2024-07-16 | End: 2024-07-16 | Stop reason: HOSPADM

## 2024-07-16 RX ORDER — DEXAMETHASONE SODIUM PHOSPHATE 4 MG/ML
4 INJECTION, SOLUTION INTRA-ARTICULAR; INTRALESIONAL; INTRAMUSCULAR; INTRAVENOUS; SOFT TISSUE
Status: DISCONTINUED | OUTPATIENT
Start: 2024-07-16 | End: 2024-07-16 | Stop reason: HOSPADM

## 2024-07-16 RX ORDER — SODIUM CHLORIDE, SODIUM LACTATE, POTASSIUM CHLORIDE, CALCIUM CHLORIDE 600; 310; 30; 20 MG/100ML; MG/100ML; MG/100ML; MG/100ML
INJECTION, SOLUTION INTRAVENOUS CONTINUOUS
Status: DISCONTINUED | OUTPATIENT
Start: 2024-07-16 | End: 2024-07-17

## 2024-07-16 RX ORDER — FENTANYL CITRATE 50 UG/ML
25 INJECTION, SOLUTION INTRAMUSCULAR; INTRAVENOUS EVERY 5 MIN PRN
Status: DISCONTINUED | OUTPATIENT
Start: 2024-07-16 | End: 2024-07-16 | Stop reason: HOSPADM

## 2024-07-16 RX ORDER — HYDROMORPHONE HCL IN WATER/PF 6 MG/30 ML
0.2 PATIENT CONTROLLED ANALGESIA SYRINGE INTRAVENOUS EVERY 5 MIN PRN
Status: DISCONTINUED | OUTPATIENT
Start: 2024-07-16 | End: 2024-07-16 | Stop reason: HOSPADM

## 2024-07-16 RX ORDER — LIDOCAINE HYDROCHLORIDE AND EPINEPHRINE 10; 10 MG/ML; UG/ML
INJECTION, SOLUTION INFILTRATION; PERINEURAL PRN
Status: DISCONTINUED | OUTPATIENT
Start: 2024-07-16 | End: 2024-07-16 | Stop reason: HOSPADM

## 2024-07-16 RX ORDER — PROPOFOL 10 MG/ML
INJECTION, EMULSION INTRAVENOUS PRN
Status: DISCONTINUED | OUTPATIENT
Start: 2024-07-16 | End: 2024-07-16

## 2024-07-16 RX ORDER — POTASSIUM PHOS IN 0.9 % NACL 15MMOL/250
15 PLASTIC BAG, INJECTION (ML) INTRAVENOUS ONCE
Status: COMPLETED | OUTPATIENT
Start: 2024-07-16 | End: 2024-07-16

## 2024-07-16 RX ORDER — ONDANSETRON 2 MG/ML
4 INJECTION INTRAMUSCULAR; INTRAVENOUS EVERY 30 MIN PRN
Status: DISCONTINUED | OUTPATIENT
Start: 2024-07-16 | End: 2024-07-16 | Stop reason: HOSPADM

## 2024-07-16 RX ORDER — OXYCODONE HYDROCHLORIDE 5 MG/1
5-10 TABLET ORAL EVERY 6 HOURS PRN
Status: DISCONTINUED | OUTPATIENT
Start: 2024-07-16 | End: 2024-07-18 | Stop reason: HOSPADM

## 2024-07-16 RX ORDER — ONDANSETRON 2 MG/ML
INJECTION INTRAMUSCULAR; INTRAVENOUS PRN
Status: DISCONTINUED | OUTPATIENT
Start: 2024-07-16 | End: 2024-07-16

## 2024-07-16 RX ORDER — FENTANYL CITRATE 50 UG/ML
INJECTION, SOLUTION INTRAMUSCULAR; INTRAVENOUS PRN
Status: DISCONTINUED | OUTPATIENT
Start: 2024-07-16 | End: 2024-07-16

## 2024-07-16 RX ORDER — NALOXONE HYDROCHLORIDE 0.4 MG/ML
0.2 INJECTION, SOLUTION INTRAMUSCULAR; INTRAVENOUS; SUBCUTANEOUS
Status: DISCONTINUED | OUTPATIENT
Start: 2024-07-16 | End: 2024-07-18 | Stop reason: HOSPADM

## 2024-07-16 RX ORDER — LABETALOL HYDROCHLORIDE 5 MG/ML
10 INJECTION, SOLUTION INTRAVENOUS
Status: DISCONTINUED | OUTPATIENT
Start: 2024-07-16 | End: 2024-07-16 | Stop reason: HOSPADM

## 2024-07-16 RX ORDER — ONDANSETRON 4 MG/1
4 TABLET, ORALLY DISINTEGRATING ORAL EVERY 30 MIN PRN
Status: DISCONTINUED | OUTPATIENT
Start: 2024-07-16 | End: 2024-07-16 | Stop reason: HOSPADM

## 2024-07-16 RX ORDER — OXYCODONE HYDROCHLORIDE 5 MG/1
5 TABLET ORAL
Status: DISCONTINUED | OUTPATIENT
Start: 2024-07-16 | End: 2024-07-16 | Stop reason: HOSPADM

## 2024-07-16 RX ORDER — HYDROMORPHONE HCL IN WATER/PF 6 MG/30 ML
0.2 PATIENT CONTROLLED ANALGESIA SYRINGE INTRAVENOUS EVERY 4 HOURS PRN
Status: DISCONTINUED | OUTPATIENT
Start: 2024-07-16 | End: 2024-07-18 | Stop reason: HOSPADM

## 2024-07-16 RX ORDER — ACETAMINOPHEN 325 MG/1
975 TABLET ORAL ONCE
Status: COMPLETED | OUTPATIENT
Start: 2024-07-16 | End: 2024-07-16

## 2024-07-16 RX ORDER — OXYCODONE HYDROCHLORIDE 10 MG/1
10 TABLET ORAL
Status: DISCONTINUED | OUTPATIENT
Start: 2024-07-16 | End: 2024-07-16 | Stop reason: HOSPADM

## 2024-07-16 RX ORDER — HYDROMORPHONE HCL IN WATER/PF 6 MG/30 ML
0.4 PATIENT CONTROLLED ANALGESIA SYRINGE INTRAVENOUS EVERY 5 MIN PRN
Status: DISCONTINUED | OUTPATIENT
Start: 2024-07-16 | End: 2024-07-16 | Stop reason: HOSPADM

## 2024-07-16 RX ORDER — DEXAMETHASONE SODIUM PHOSPHATE 4 MG/ML
INJECTION, SOLUTION INTRA-ARTICULAR; INTRALESIONAL; INTRAMUSCULAR; INTRAVENOUS; SOFT TISSUE PRN
Status: DISCONTINUED | OUTPATIENT
Start: 2024-07-16 | End: 2024-07-16

## 2024-07-16 RX ORDER — LIDOCAINE HYDROCHLORIDE 20 MG/ML
INJECTION, SOLUTION INFILTRATION; PERINEURAL PRN
Status: DISCONTINUED | OUTPATIENT
Start: 2024-07-16 | End: 2024-07-16

## 2024-07-16 RX ORDER — FENTANYL CITRATE 50 UG/ML
50 INJECTION, SOLUTION INTRAMUSCULAR; INTRAVENOUS EVERY 5 MIN PRN
Status: DISCONTINUED | OUTPATIENT
Start: 2024-07-16 | End: 2024-07-16 | Stop reason: HOSPADM

## 2024-07-16 RX ORDER — NALOXONE HYDROCHLORIDE 0.4 MG/ML
0.4 INJECTION, SOLUTION INTRAMUSCULAR; INTRAVENOUS; SUBCUTANEOUS
Status: DISCONTINUED | OUTPATIENT
Start: 2024-07-16 | End: 2024-07-18 | Stop reason: HOSPADM

## 2024-07-16 RX ORDER — SODIUM CHLORIDE, SODIUM LACTATE, POTASSIUM CHLORIDE, CALCIUM CHLORIDE 600; 310; 30; 20 MG/100ML; MG/100ML; MG/100ML; MG/100ML
INJECTION, SOLUTION INTRAVENOUS CONTINUOUS PRN
Status: DISCONTINUED | OUTPATIENT
Start: 2024-07-16 | End: 2024-07-16

## 2024-07-16 RX ADMIN — DEXMEDETOMIDINE HYDROCHLORIDE 4 MCG: 100 INJECTION, SOLUTION INTRAVENOUS at 14:27

## 2024-07-16 RX ADMIN — FENTANYL CITRATE 50 MCG: 50 INJECTION INTRAMUSCULAR; INTRAVENOUS at 12:55

## 2024-07-16 RX ADMIN — PROPOFOL 20 MG: 10 INJECTION, EMULSION INTRAVENOUS at 14:36

## 2024-07-16 RX ADMIN — PHENYLEPHRINE HYDROCHLORIDE 100 MCG: 10 INJECTION INTRAVENOUS at 13:15

## 2024-07-16 RX ADMIN — FENTANYL CITRATE 50 MCG: 50 INJECTION, SOLUTION INTRAMUSCULAR; INTRAVENOUS at 15:19

## 2024-07-16 RX ADMIN — PHENYLEPHRINE HYDROCHLORIDE 100 MCG: 10 INJECTION INTRAVENOUS at 12:36

## 2024-07-16 RX ADMIN — Medication 2 G: at 12:24

## 2024-07-16 RX ADMIN — HYDROMORPHONE HYDROCHLORIDE 0.5 MG: 1 INJECTION, SOLUTION INTRAMUSCULAR; INTRAVENOUS; SUBCUTANEOUS at 13:35

## 2024-07-16 RX ADMIN — LIDOCAINE HYDROCHLORIDE 40 MG: 20 INJECTION, SOLUTION INFILTRATION; PERINEURAL at 14:38

## 2024-07-16 RX ADMIN — Medication 25 MCG: at 07:55

## 2024-07-16 RX ADMIN — PROPOFOL 20 MG: 10 INJECTION, EMULSION INTRAVENOUS at 14:30

## 2024-07-16 RX ADMIN — PHENYLEPHRINE HYDROCHLORIDE 100 MCG: 10 INJECTION INTRAVENOUS at 13:19

## 2024-07-16 RX ADMIN — PROPOFOL 20 MG: 10 INJECTION, EMULSION INTRAVENOUS at 14:20

## 2024-07-16 RX ADMIN — SODIUM CHLORIDE, POTASSIUM CHLORIDE, SODIUM LACTATE AND CALCIUM CHLORIDE: 600; 310; 30; 20 INJECTION, SOLUTION INTRAVENOUS at 12:14

## 2024-07-16 RX ADMIN — LEVOTHYROXINE SODIUM 50 MCG: 0.05 TABLET ORAL at 07:55

## 2024-07-16 RX ADMIN — Medication 50 MG: at 12:20

## 2024-07-16 RX ADMIN — ACETAMINOPHEN 650 MG: 325 TABLET, FILM COATED ORAL at 06:25

## 2024-07-16 RX ADMIN — METRONIDAZOLE 500 MG: 5 INJECTION, SOLUTION INTRAVENOUS at 09:16

## 2024-07-16 RX ADMIN — Medication 100 MG: at 14:26

## 2024-07-16 RX ADMIN — INDOCYANINE GREEN AND WATER 2.5 MG: KIT at 10:53

## 2024-07-16 RX ADMIN — ACETAMINOPHEN 650 MG: 325 TABLET, FILM COATED ORAL at 15:39

## 2024-07-16 RX ADMIN — FENTANYL CITRATE 25 MCG: 50 INJECTION, SOLUTION INTRAMUSCULAR; INTRAVENOUS at 15:33

## 2024-07-16 RX ADMIN — METRONIDAZOLE 500 MG: 5 INJECTION, SOLUTION INTRAVENOUS at 01:27

## 2024-07-16 RX ADMIN — PROPOFOL 120 MG: 10 INJECTION, EMULSION INTRAVENOUS at 12:20

## 2024-07-16 RX ADMIN — LIDOCAINE HYDROCHLORIDE 60 MG: 20 INJECTION, SOLUTION INFILTRATION; PERINEURAL at 12:20

## 2024-07-16 RX ADMIN — LETROZOLE 2.5 MG: 2.5 TABLET ORAL at 07:56

## 2024-07-16 RX ADMIN — Medication 10 MG: at 13:31

## 2024-07-16 RX ADMIN — DEXMEDETOMIDINE HYDROCHLORIDE 4 MCG: 100 INJECTION, SOLUTION INTRAVENOUS at 14:18

## 2024-07-16 RX ADMIN — SODIUM CHLORIDE, POTASSIUM CHLORIDE, SODIUM LACTATE AND CALCIUM CHLORIDE: 600; 310; 30; 20 INJECTION, SOLUTION INTRAVENOUS at 06:16

## 2024-07-16 RX ADMIN — ONDANSETRON 4 MG: 2 INJECTION INTRAMUSCULAR; INTRAVENOUS at 14:26

## 2024-07-16 RX ADMIN — FENTANYL CITRATE 50 MCG: 50 INJECTION INTRAMUSCULAR; INTRAVENOUS at 12:20

## 2024-07-16 RX ADMIN — SODIUM CHLORIDE, POTASSIUM CHLORIDE, SODIUM LACTATE AND CALCIUM CHLORIDE: 600; 310; 30; 20 INJECTION, SOLUTION INTRAVENOUS at 17:40

## 2024-07-16 RX ADMIN — Medication 100 MG: at 14:21

## 2024-07-16 RX ADMIN — DEXAMETHASONE SODIUM PHOSPHATE 6 MG: 4 INJECTION, SOLUTION INTRA-ARTICULAR; INTRALESIONAL; INTRAMUSCULAR; INTRAVENOUS; SOFT TISSUE at 12:26

## 2024-07-16 RX ADMIN — PROPOFOL 20 MG: 10 INJECTION, EMULSION INTRAVENOUS at 14:39

## 2024-07-16 RX ADMIN — METRONIDAZOLE 500 MG: 5 INJECTION, SOLUTION INTRAVENOUS at 17:40

## 2024-07-16 RX ADMIN — DEXMEDETOMIDINE HYDROCHLORIDE 4 MCG: 100 INJECTION, SOLUTION INTRAVENOUS at 14:21

## 2024-07-16 RX ADMIN — FENTANYL CITRATE 25 MCG: 50 INJECTION, SOLUTION INTRAMUSCULAR; INTRAVENOUS at 15:27

## 2024-07-16 RX ADMIN — PROPOFOL 20 MG: 10 INJECTION, EMULSION INTRAVENOUS at 14:42

## 2024-07-16 RX ADMIN — CALCIUM 500 MG: 500 TABLET ORAL at 17:40

## 2024-07-16 RX ADMIN — Medication 10 MG: at 13:15

## 2024-07-16 RX ADMIN — ACETAMINOPHEN 650 MG: 325 TABLET, FILM COATED ORAL at 22:12

## 2024-07-16 RX ADMIN — POTASSIUM PHOSPHATE, MONOBASIC POTASSIUM PHOSPHATE, DIBASIC 15 MMOL: 224; 236 INJECTION, SOLUTION, CONCENTRATE INTRAVENOUS at 08:36

## 2024-07-16 RX ADMIN — PROPOFOL 20 MG: 10 INJECTION, EMULSION INTRAVENOUS at 14:23

## 2024-07-16 ASSESSMENT — ACTIVITIES OF DAILY LIVING (ADL)
ADLS_ACUITY_SCORE: 19
ADLS_ACUITY_SCORE: 20
ADLS_ACUITY_SCORE: 20
ADLS_ACUITY_SCORE: 24
ADLS_ACUITY_SCORE: 20
ADLS_ACUITY_SCORE: 19
ADLS_ACUITY_SCORE: 20
ADLS_ACUITY_SCORE: 19
ADLS_ACUITY_SCORE: 20
ADLS_ACUITY_SCORE: 20
ADLS_ACUITY_SCORE: 19
ADLS_ACUITY_SCORE: 24
ADLS_ACUITY_SCORE: 20
ADLS_ACUITY_SCORE: 19
ADLS_ACUITY_SCORE: 19
ADLS_ACUITY_SCORE: 20
ADLS_ACUITY_SCORE: 20

## 2024-07-16 NOTE — OP NOTE
General surgery operative report    Preprocedure diagnosis: Ascending cholangitis  Postprocedural diagnosis: Same  Procedure: Diagnostic laparoscopy    Surgeon: Dr. Travis Casas  Assistant:Dr. Amara Larkin    Anesthesia: General assisted endotracheal anesthesia    Estimated blood loss: 15 cc  Specimens: None  Drains: None  Complications: None    Findings: Absence gallbladder in gallbladder fossa.  Laparoscopic cholecystectomy aborted    Indications for procedure: Patient is a 74-year-old female who presented with a ascending cholangitis.  She had an ERCP for which a stent was placed and pus expressed along with stones.  Due to her ascending cholangitis, a laparoscopic cholecystectomy was discussed with the patient in detail including risk, benefits, alternatives.    Description of procedure:  Patient was brought the operating room and placed in the supine position.  General endotracheal anesthesia was initiated.  The patient was prepped and draped in the usual sterile fashion.  A timeout was performed confirming correct patient, site, procedure, antibiotics, allergies.  The patient's abdomen was entered using the open Lopez technique with a #11 blade scalpel making a linear incision superior to the umbilicus in the midline.  This was taken down to the level of the fascia using blunt dissection.  The fascia was grasped with Kocher clamps and divided sharply with a scalpel.  A finger sweep confirmed entry into the abdomen.  A 0 Vicryl suture was placed in the position in a figure-of-eight fashion.  A 12 mm trocar was placed in the abdomen which was then insufflated.  A laparoscopic camera was inserted through the trocar which confirms no injury to the surrounding structures.  5 mm trocars were placed in the subcostal, 6 subxiphoid, right anterior axial line under direct visualization.  The patient was placed in the reverse Trendelenburg position with right side up.  There were omental adhesions to the gallbladder  fossa.  These were taken down with a combination of blunt dissection, suction , and electrocautery.  The gallbladder fossa was visualized with the absence of the gallbladder without any cystic structures identified.  Indocyanine green with spy was used to try to identify the gallbladder which was not present.  There was no common bile duct identified as well.  At this time, I asked one of my partners, Dr. Ana Paredes, to come to the operating room.  He also confirms absence of gallbladder and the gallbladder fossa.  I also spoke with Dr. Guru Paiz who did the ERCP about his procedure.  The patient's imaging was reviewed including the right upper quadrant ultrasound which showed a not well-visualized gallbladder.  Because the gallbladder was not visualized in the gallbladder fossa, and the patient is on antibiotics with a stent with ERCP and clinically feeling better, I decided to abort the laparoscopic cholecystectomy and and the diagnostic laparoscopy with plans for a CT scan to evaluate the presence of a gallbladder and speak with the patient.  Hemostasis was confirmed.  The abdomen was desufflated after 20 cc of 1% lidocaine with epinephrine were used for local anesthetic at the port sites.  The 0 Vicryl suture was used to close the fascia at the periumbilical incision.  4-0 Monocryl was used to close the skin at all port sites.  Dermabond was used for dressing.  Patient tolerated procedure well without any complications.  Counts were correct x 2.  Patient was transferred the PACU in stable condition.  I was present and scrubbed for the entire procedure up until closing the skin.    Dr. Travis Casas  General surgery

## 2024-07-16 NOTE — PROGRESS NOTES
Vitals stable on RA, lower back pain 4/10 effectively managed with Tylenol 650 mg, alert/oriented, pleasant, up ad de, voiding spontaneously, had small formed BM this evening, K recheck 3.4, replaced orally, will recheck at midnight, will have lap tadeo tomorrow, consent forms for procedure and potential blood transfusion signed and witnessed by writer,  [email or web address] at midnight per order

## 2024-07-16 NOTE — BRIEF OP NOTE
[facility]    Brief Operative Note    Pre-operative diagnosis: Jaundice [R17]  Ascending cholangitis (H28) [K83.09]  Post-operative diagnosis Cholangitis    Procedure: DIAGNOSTIC LAPAROSCOPIC, N/A - Abdomen    Surgeon: Surgeons and Role:     * Travis Casas MD - Primary     * Amara Larkin MD - Assisting  Anesthesia: General   Estimated Blood Loss: 15 mL from 7/16/2024 12:12 PM to 7/16/2024  2:56 PM      Drains: None  Specimens: * No specimens in log *  Findings:   Very difficult to identify gallbladder. Many adhesions with possibly duodenum adhered to a possible contracted gallbladder. Due to difficulty in identifying anatomy, procedure was aborted. .  Complications: None.  Implants: * No implants in log *

## 2024-07-16 NOTE — PLAN OF CARE
Goal Outcome Evaluation:    Plan of Care Reviewed With: patient    Overall Patient Progress: no change    Outcome Evaluation: Patient rested well overnight. Up IND in room. Voided adequately. Received IV antibx. NPO at 0000 for lap choly procedure. VSS. K checked overnight, 3.5, no replacement needed. Able to make needs known. LR infusion started preop, procedure scheduled for noon.

## 2024-07-16 NOTE — ANESTHESIA POSTPROCEDURE EVALUATION
Patient: Sushila Chambers    Procedure: Procedure(s):  DIAGNOSTIC LAPAROSCOPIC       Anesthesia Type:  General    Note:  Disposition: Inpatient   Postop Pain Control: Uneventful            Sign Out: Well controlled pain   PONV: No   Neuro/Psych: Uneventful            Sign Out: Acceptable/Baseline neuro status   Airway/Respiratory: Uneventful            Sign Out: Acceptable/Baseline resp. status   CV/Hemodynamics: Uneventful            Sign Out: Acceptable CV status; No obvious hypovolemia; No obvious fluid overload   Other NRE: NONE   DID A NON-ROUTINE EVENT OCCUR? No       Last vitals:  Vitals Value Taken Time   /71 07/16/24 1500   Temp     Pulse 87 07/16/24 1500   Resp 24 07/16/24 1500   SpO2 96 % 07/16/24 1500   Vitals shown include unfiled device data.    Electronically Signed By: Jose Bryant MD  July 16, 2024  3:01 PM

## 2024-07-16 NOTE — ANESTHESIA CARE TRANSFER NOTE
Patient: Sushila Chambers    Procedure: Procedure(s):  DIAGNOSTIC LAPAROSCOPIC       Diagnosis: Jaundice [R17]  Ascending cholangitis (H28) [K83.09]  Diagnosis Additional Information: No value filed.    Anesthesia Type:   General     Note:    Oropharynx: oropharynx clear of all foreign objects and spontaneously breathing  Level of Consciousness: awake  Oxygen Supplementation: face mask  Level of Supplemental Oxygen (L/min / FiO2): 4  Independent Airway: airway patency satisfactory and stable  Dentition: dentition unchanged  Vital Signs Stable: post-procedure vital signs reviewed and stable  Report to RN Given: handoff report given  Patient transferred to: PACU    Handoff Report: Identifed the Patient, Identified the Reponsible Provider, Reviewed the pertinent medical history, Discussed the surgical course, Reviewed Intra-OP anesthesia mangement and issues during anesthesia, Set expectations for post-procedure period and Allowed opportunity for questions and acknowledgement of understanding    Vitals:  Vitals Value Taken Time   /71 07/16/24 1500   Temp 36.3  C (97.4  F) 07/16/24 1500   Pulse 86 07/16/24 1503   Resp 20 07/16/24 1503   SpO2 94 % 07/16/24 1503   Vitals shown include unfiled device data.    Electronically Signed By: Travis Street MD  July 16, 2024  3:04 PM

## 2024-07-16 NOTE — PROGRESS NOTES
[facility]    Medicine Progress Note - Hospitalist Service, GOLD TEAM 9    Date of Admission:  7/14/2024    Assessment & Plan   Sushila Chambers is a 74 year old female admitted on 7/14/2024. She has a past medical history significant for histoplasmosis (~2000), prior lung cancer (early 2000s), right breast cancer (2009) s/p lumpectomy/radiation/endocrine therapy, left breast cancer (2015) s/p chemotherapy/mastectomy on letrozole,  hypothyroidism, hyperlipidemia and hypertension. Began vomiting evening of 7/12 with persistent upper abdominal pain and low grade fever. Labs/imaging c/f cholangitis.      Today's updates:  - Replace Phos  - continue IV Abx  - blood culture on admission positive for E. Coli and S. Pneumoniae  - today Lap tadeo with general surgery  - Hold ASA/AC for 3 days post ERCP per GI recs to prevent post-sphincterotomy bleeding    > GI to arrange ERCP in 4 weeks to assess and stent removal if in place  - restart Crestor at discharge    Acute cholangitis  Acute transaminitis  Extrahepatic biliary obstruction  Strep pneumo bacteremia (per BC from 7/14)  Presenting with 2 days of vomiting, upper abdominal pain and fever. Lipase wnl. LFTs notable for , , Tbili 6.0. Alk Phos wnl.  INR 1.52. RUQ US obatined - gallbladder not well visualize, may be contracted with echogenic stones; common bile duct dilated up to 13 mm with small amount of sludge present. No obstructing stones identified. High concern for cholangitis on admission, started on ceftriaxone/flagyl.   - Panc-Bili team consulted    - s/p ERCP  - General surgery consulted   - plan lab tadeo today  - Repeat blood cultures x2  - Continue IV ceftriaxone 2 g daily + IV flagyl 500 mg q8h  - Trend LFTs, CBC  - Tylenol, oxycodone PRN for pain  - Zofran PRN  - patient concerned about diet and activity restrictions     Hypokalemia  Hypomagnesia  hypophosphatemia  -NS bolus  - Replace and  check/trend labs/lytes     Hyponatremia  Na 128 on admission. In setting of GI illness as above and likely hypovolemic. Also on chlorthalidone which may be contributin- now held  - NS bolus and re-assess with AM labs     HTN  - Holding chlorthalidone due to lyte disturbances  - hold PTA lisinopril 5 mg daily  - restart 7/17     Multifocal, stage IIa, T2N0M0, ER positive, HI negative, HER2 non-amplified invasive lobular carcinoma of left breast  Hx of right breast cancer (2009) s/p lumpectomy, radiation and endocrine therapy  Hx of lung cancer  Follows with Dr. Villegas, last seen 5/2024. Remote hx of lung cancer while living in [facility]. Underwent treatment of prior breast cancer in 2009, self-palpated mass in left breast in 8/2015. S/p 12 weeks of ganestespib and taxol, 4 cycles of dose dense adriamycin + cyclophosphamide (9/2015-2//2016) followed by left breast mastectomy, sentinel lymph node procedure. On adjuvant curative intent treatment with letrozole since 3/2016.   - Continue PTA letrozole   - Plan for follow-up in 11/2024     HLD  - Holding PTA statin while LFTs recover     Hypothyroidism  - Continue PTA levothyroxine 50 mcg daily           Diet: Advance Diet as Tolerated: Regular Diet Adult    DVT Prophylaxis: Pneumatic Compression Devices  Tovar Catheter: Not present  Lines: None     Cardiac Monitoring: None  Code Status: Full Code      Clinically Significant Risk Factors        # Hypokalemia: Lowest K = 3.1 mmol/L in last 2 days, will replace as needed  # Hyponatremia: Lowest Na = 129 mmol/L in last 2 days, will monitor as appropriate  # Hypocalcemia: Lowest Ca = 8.3 mg/dL in last 2 days, will monitor and replace as appropriate     # Hypoalbuminemia: Lowest albumin = 3.2 g/dL at 7/16/2024  6:42 AM, will monitor as appropriate  # Coagulation Defect: INR = 1.53 (Ref range: 0.85 - 1.15) and/or PTT = 28 Seconds (Ref range: 22 - 38 Seconds), will monitor for bleeding  # Thrombocytopenia: Lowest platelets  "= 124 in last 2 days, will monitor for bleeding   # Hypertension: Noted on problem list              # Overweight: Estimated body mass index is 25.77 kg/m  as calculated from the following:    Height as of this encounter: 1.6 m (5' 3\").    Weight as of this encounter: 66 kg (145 lb 8 oz)., PRESENT ON ADMISSION     # Financial/Environmental Concerns: none         Disposition Plan     Medically Ready for Discharge: Anticipated Tomorrow             Rigoberto Alvarado MD  Hospitalist Service, GOLD TEAM 9  [facility]  Securely message with [employer] (more info)  Text page via [facility] Paging/Directory   See signed in provider for up to date coverage information  ______________________________________________________________________    Interval History   NPO for surgery today. Feeling well. Hoping to learn best exercises and diet for after surgery.     Physical Exam   Vital Signs: Temp: 97.5  F (36.4  C) Temp src: Axillary BP: 127/68 Pulse: 79   Resp: 19 SpO2: 93 % O2 Device: None (Room air)    Weight: 145 lbs 8 oz    Lying comfortably in bed, conversant    Medical Decision Making       55 MINUTES SPENT BY ME on the date of service doing chart review, history, exam, documentation & further activities per the note.      Data     I have personally reviewed the following data over the past 24 hrs:    10.2  \   10.1 (L)   / 157     132 (L) 97 (L) 18.7 /  103 (H)   3.5 25 0.92 \     ALT: 415 (H) AST: 88 (H) AP: 163 (H) TBILI: 3.9 (H)   ALB: 3.2 (L) TOT PROTEIN: 6.2 (L) LIPASE: N/A       Imaging results reviewed over the past 24 hrs:   No results found for this or any previous visit (from the past 24 hour(s)).  "

## 2024-07-16 NOTE — ANESTHESIA PROCEDURE NOTES
Airway       Patient location during procedure: OR       Procedure Start/Stop Times: 7/16/2024 12:25 PM  Staff -        Resident/Fellow: Joao Singh MD       Performed By: residentIndications and Patient Condition       Indications for airway management: aram-procedural       Induction type:intravenous       Mask difficulty assessment: 1 - vent by mask    Final Airway Details       Final airway type: endotracheal airway       Successful airway: ETT - single  Endotracheal Airway Details        ETT size (mm): 7.5       Cuffed: yes       Successful intubation technique: video laryngoscopy and direct laryngoscopy       VL Blade Size: Dinh 3       Grade View of Cords: 1       Adjucts: stylet       Position: Right       Measured from: gums/teeth       Secured at (cm): 21       Bite block used: Soft    Post intubation assessment        Placement verified by: capnometry, equal breath sounds and chest rise        Number of attempts at approach: 1       Secured with: tape       Ease of procedure: easy       Dentition: Intact    Medication(s) Administered   Medication Administration Time: 7/16/2024 12:25 PM

## 2024-07-17 LAB
ALBUMIN SERPL BCG-MCNC: 2.8 G/DL (ref 3.5–5.2)
ALP SERPL-CCNC: 156 U/L (ref 40–150)
ALT SERPL W P-5'-P-CCNC: 229 U/L (ref 0–50)
ANION GAP SERPL CALCULATED.3IONS-SCNC: 10 MMOL/L (ref 7–15)
AST SERPL W P-5'-P-CCNC: 52 U/L (ref 0–45)
BILIRUB SERPL-MCNC: 2.1 MG/DL
BUN SERPL-MCNC: 15.9 MG/DL (ref 8–23)
CALCIUM SERPL-MCNC: 8.5 MG/DL (ref 8.8–10.4)
CHLORIDE SERPL-SCNC: 98 MMOL/L (ref 98–107)
CREAT SERPL-MCNC: 0.84 MG/DL (ref 0.51–0.95)
EGFRCR SERPLBLD CKD-EPI 2021: 72 ML/MIN/1.73M2
ERYTHROCYTE [DISTWIDTH] IN BLOOD BY AUTOMATED COUNT: 13.7 % (ref 10–15)
GLUCOSE BLDC GLUCOMTR-MCNC: 94 MG/DL (ref 70–99)
GLUCOSE SERPL-MCNC: 106 MG/DL (ref 70–99)
HCO3 SERPL-SCNC: 25 MMOL/L (ref 22–29)
HCT VFR BLD AUTO: 26.6 % (ref 35–47)
HGB BLD-MCNC: 9.2 G/DL (ref 11.7–15.7)
MAGNESIUM SERPL-MCNC: 1.6 MG/DL (ref 1.7–2.3)
MCH RBC QN AUTO: 30.3 PG (ref 26.5–33)
MCHC RBC AUTO-ENTMCNC: 34.6 G/DL (ref 31.5–36.5)
MCV RBC AUTO: 88 FL (ref 78–100)
PHOSPHATE SERPL-MCNC: 2.5 MG/DL (ref 2.5–4.5)
PLATELET # BLD AUTO: 156 10E3/UL (ref 150–450)
POTASSIUM SERPL-SCNC: 3.6 MMOL/L (ref 3.4–5.3)
PROT SERPL-MCNC: 5.3 G/DL (ref 6.4–8.3)
RBC # BLD AUTO: 3.04 10E6/UL (ref 3.8–5.2)
SODIUM SERPL-SCNC: 133 MMOL/L (ref 135–145)
WBC # BLD AUTO: 9.4 10E3/UL (ref 4–11)

## 2024-07-17 PROCEDURE — 120N000002 HC R&B MED SURG/OB UMMC

## 2024-07-17 PROCEDURE — 82040 ASSAY OF SERUM ALBUMIN: CPT | Performed by: STUDENT IN AN ORGANIZED HEALTH CARE EDUCATION/TRAINING PROGRAM

## 2024-07-17 PROCEDURE — 250N000011 HC RX IP 250 OP 636: Performed by: STUDENT IN AN ORGANIZED HEALTH CARE EDUCATION/TRAINING PROGRAM

## 2024-07-17 PROCEDURE — 99231 SBSQ HOSP IP/OBS SF/LOW 25: CPT | Mod: 24 | Performed by: SURGERY

## 2024-07-17 PROCEDURE — 36415 COLL VENOUS BLD VENIPUNCTURE: CPT | Performed by: STUDENT IN AN ORGANIZED HEALTH CARE EDUCATION/TRAINING PROGRAM

## 2024-07-17 PROCEDURE — 83735 ASSAY OF MAGNESIUM: CPT | Performed by: STUDENT IN AN ORGANIZED HEALTH CARE EDUCATION/TRAINING PROGRAM

## 2024-07-17 PROCEDURE — 250N000013 HC RX MED GY IP 250 OP 250 PS 637: Performed by: STUDENT IN AN ORGANIZED HEALTH CARE EDUCATION/TRAINING PROGRAM

## 2024-07-17 PROCEDURE — 99233 SBSQ HOSP IP/OBS HIGH 50: CPT | Performed by: INTERNAL MEDICINE

## 2024-07-17 PROCEDURE — 84100 ASSAY OF PHOSPHORUS: CPT | Performed by: HOSPITALIST

## 2024-07-17 PROCEDURE — 85027 COMPLETE CBC AUTOMATED: CPT | Performed by: STUDENT IN AN ORGANIZED HEALTH CARE EDUCATION/TRAINING PROGRAM

## 2024-07-17 RX ADMIN — METRONIDAZOLE 500 MG: 5 INJECTION, SOLUTION INTRAVENOUS at 08:00

## 2024-07-17 RX ADMIN — LEVOTHYROXINE SODIUM 50 MCG: 0.05 TABLET ORAL at 07:56

## 2024-07-17 RX ADMIN — METRONIDAZOLE 500 MG: 5 INJECTION, SOLUTION INTRAVENOUS at 16:57

## 2024-07-17 RX ADMIN — ACETAMINOPHEN 650 MG: 325 TABLET, FILM COATED ORAL at 19:09

## 2024-07-17 RX ADMIN — LETROZOLE 2.5 MG: 2.5 TABLET ORAL at 07:56

## 2024-07-17 RX ADMIN — CALCIUM 500 MG: 500 TABLET ORAL at 07:56

## 2024-07-17 RX ADMIN — CEFTRIAXONE SODIUM 2 G: 2 INJECTION, POWDER, FOR SOLUTION INTRAMUSCULAR; INTRAVENOUS at 13:21

## 2024-07-17 RX ADMIN — Medication 25 MCG: at 07:56

## 2024-07-17 RX ADMIN — ACETAMINOPHEN 650 MG: 325 TABLET, FILM COATED ORAL at 04:41

## 2024-07-17 RX ADMIN — ACETAMINOPHEN 650 MG: 325 TABLET, FILM COATED ORAL at 13:20

## 2024-07-17 RX ADMIN — METRONIDAZOLE 500 MG: 5 INJECTION, SOLUTION INTRAVENOUS at 01:14

## 2024-07-17 ASSESSMENT — ACTIVITIES OF DAILY LIVING (ADL)
ADLS_ACUITY_SCORE: 19

## 2024-07-17 NOTE — DISCHARGE INSTRUCTIONS
Discharge Instructions   Activity  - No strenuous exercise for 3-4 weeks  - No lifting, pushing, pulling more than 15-20 pounds for 3-4 weeks  - Do not strain with bowel movements  - Do not drive until you can press the brake pedal quickly and fully without pain    Incisions  - The type of wound closure used for your incision:  Dermabond  - You may shower and get incisions wet starting 24 hrs after surgery  - Do not scrub incisions or submerge wounds (e.g. bath, pool, hot tub, etc.) for 2 weeks or until the glue or steri-strips have come off  - Avoid applying any lotions or ointments near the areas where the Dermabond glue was used    Medications  - Do not take more than 4,000mg of acetaminophen in any 24 hour period, as this can cause liver damage  - Take stool softeners such as Senna or Miralax while you are using narcotics, but stop if you develop diarrhea    Follow-Up:  - Call your primary care provider to touch base regarding your recent admission.    - Call or return sooner than your regularly scheduled visit if you develop any of the following: fever >101.5, uncontrolled pain, uncontrolled nausea or vomiting, as well as increased redness, swelling, or drainage from your wound.   -  A nurse from the General Surgery Clinic will contact you 24 hours, or next business day, after your discharge from the hospital.  If you have questions or to schedule a follow up appointment please call the General Surgery Clinic at [phone].  Call [phone] and ask to speak with the Surgery resident on call if you are having troubles in the evenings, at night, or on the weekend.

## 2024-07-17 NOTE — PLAN OF CARE
"Blood pressure 137/66, pulse 71, temperature 98.1  F (36.7  C), temperature source Oral, resp. rate 16, height 1.6 m (5' 3\"), weight 66 kg (145 lb 8 oz), SpO2 97%, via RA .    Patient A & O  X 4 , VSS no respiratory distress noted , some mild abdominal pain , received Tylenol po X 1 post assessment with relief. Abdominal incision CDI . Received scheduled scheduled medications as ordered . 2 PIV SL. Patient with good appetite and tolerated well. No Nausea reported. Up with SBA , void adequate, passing gas , and no bowel movement yet. Call light within reach, able to make needs known. Continue with POC and update MD with change.     Goal Outcome Evaluation:    Plan of Care Reviewed With: patient  Overall Patient Progress: improvingOverall . Tolerating po, no C/o Nausea.            "

## 2024-07-17 NOTE — PROGRESS NOTES
[facility]    Medicine Progress Note - Hospitalist Service, GOLD TEAM 9    Date of Admission:  7/14/2024    Assessment & Plan   Sushila Chambers is a 74 year old female admitted on 7/14/2024. She has a past medical history significant for histoplasmosis (~2000), prior lung cancer (early 2000s), right breast cancer (2009) s/p lumpectomy/radiation/endocrine therapy, left breast cancer (2015) s/p chemotherapy/mastectomy on letrozole,  hypothyroidism, hyperlipidemia and hypertension. Began vomiting evening of 7/12 with persistent upper abdominal pain and low grade fever. Labs/imaging c/f cholangitis.      Today's updates:  - Replace Phos  - continue IV Abx  - blood culture on admission positive for E. Coli and S. Pneumoniae, awaiting sensitivities  - Lap tadeo with general surgery, unsuccessfully, patient found not to have gall bladder  - Hold ASA/AC for 3 days post ERCP per GI recs to prevent post-sphincterotomy bleeding    > GI to arrange ERCP in 4 weeks to assess and stent removal if in place    Acute cholangitis due to E. Coli and S. Pneumoniae  Acute transaminitis  Extrahepatic biliary obstruction  Strep pneumo bacteremia (per BC from 7/14)  Presenting with 2 days of vomiting, upper abdominal pain and fever. Lipase wnl. LFTs notable for , , Tbili 6.0. Alk Phos wnl.  INR 1.52. RUQ US obatined - gallbladder not well visualize, may be contracted with echogenic stones; common bile duct dilated up to 13 mm with small amount of sludge present. No obstructing stones identified. High concern for cholangitis on admission, started on ceftriaxone/flagyl.   - Panc-Bili team consulted    - s/p ERCP  - General surgery consulted   - plan lab tadeo today  - Repeat blood cultures x2   - pending sensitivities  - Continue IV ceftriaxone 2 g daily + IV flagyl 500 mg q8h  - Trend LFTs, CBC  - Tylenol, oxycodone PRN for pain  - Zofran PRN  - patient concerned about diet and  activity restrictions     Hypokalemia  Hypomagnesia  hypophosphatemia  -NS bolus  - Replace and check/trend labs/lytes     Hyponatremia  Na 128 on admission. In setting of GI illness as above and likely hypovolemic. Also on chlorthalidone which may be contributin- now held  - NS bolus and re-assess with AM labs     HTN  - Holding chlorthalidone due to lyte disturbances  - hold PTA lisinopril 5 mg daily  - restart 7/17     Multifocal, stage IIa, T2N0M0, ER positive, RI negative, HER2 non-amplified invasive lobular carcinoma of left breast  Hx of right breast cancer (2009) s/p lumpectomy, radiation and endocrine therapy  Hx of lung cancer  Follows with Dr. Villegas, last seen 5/2024. Remote hx of lung cancer while living in [facility]. Underwent treatment of prior breast cancer in 2009, self-palpated mass in left breast in 8/2015. S/p 12 weeks of ganestespib and taxol, 4 cycles of dose dense adriamycin + cyclophosphamide (9/2015-2//2016) followed by left breast mastectomy, sentinel lymph node procedure. On adjuvant curative intent treatment with letrozole since 3/2016.   - Continue PTA letrozole   - Plan for follow-up in 11/2024     HLD  - Holding PTA statin while LFTs recover     Hypothyroidism  - Continue PTA levothyroxine 50 mcg daily           Diet: Advance Diet as Tolerated: Regular Diet Adult    DVT Prophylaxis: Pneumatic Compression Devices  Tovar Catheter: Not present  Lines: None     Cardiac Monitoring: None  Code Status: Full Code      Clinically Significant Risk Factors            # Hypomagnesemia: Lowest Mg = 1.6 mg/dL in last 2 days, will replace as needed   # Hypoalbuminemia: Lowest albumin = 2.8 g/dL at 7/17/2024  5:53 AM, will monitor as appropriate  # Coagulation Defect: INR = 1.53 (Ref range: 0.85 - 1.15) and/or PTT = 28 Seconds (Ref range: 22 - 38 Seconds), will monitor for bleeding    # Hypertension: Noted on problem list              # Overweight: Estimated body mass index is 25.77 kg/m  as  "calculated from the following:    Height as of this encounter: 1.6 m (5' 3\").    Weight as of this encounter: 66 kg (145 lb 8 oz)., PRESENT ON ADMISSION     # Financial/Environmental Concerns: none         Disposition Plan     Medically Ready for Discharge: Anticipated Tomorrow             Rigoberto Alvarado MD  Hospitalist Service, GOLD TEAM 9  [facility]  Securely message with [employer] (more info)  Text page via [employer] Paging/Directory   See signed in provider for up to date coverage information  ______________________________________________________________________    Interval History   Yesterday had surgery for cholecystectomy, but gall bladder was not found, confirmed on CT. This morning pain is well controlled.     Physical Exam   Vital Signs: Temp: 98.1  F (36.7  C) Temp src: Oral BP: 137/66 Pulse: 71   Resp: 16 SpO2: 97 % O2 Device: None (Room air)    Weight: 145 lbs 8 oz    Gen: NAD, sitting comfortably in bed  CV: RRR, no murmurs, 2+ radial pulses  RESP: CTA bilaterally, no w/r/c  Abd: soft, nontender, nondistended  Ext: no edema bilaterally    Medical Decision Making       55 MINUTES SPENT BY ME on the date of service doing chart review, history, exam, documentation & further activities per the note.      Data     I have personally reviewed the following data over the past 24 hrs:    9.4  \   9.2 (L)   / 156     133 (L) 98 15.9 /  94   3.6 25 0.84 \     ALT: 229 (H) AST: 52 (H) AP: 156 (H) TBILI: 2.1 (H)   ALB: 2.8 (L) TOT PROTEIN: 5.3 (L) LIPASE: N/A       Imaging results reviewed over the past 24 hrs:   Recent Results (from the past 24 hour(s))   CT Abdomen Pelvis w/o Contrast    Narrative    Exam: Abdomen/pelvis CT without contrast 7/16/2024 4:33 PM      History: ascending cholangitis, evaluate for contracted gallbladder    Comparison: Abdomen ultrasound 7/14/2024.     Technique: Helical CT from the lung bases through the symphysis pubis  was performed without " intravenous contrast.    Findings:    Liver: No suspicious liver mass.    Gallbladder: No discernible gallbladder is present. Pneumobilia. Stent  in the common bile duct.     Pancreas: No pancreatic mass or pancreatic ductal dilatation.    Spleen: Normal size.    Adrenals: No nodule.    Kidneys/bladder: No hydronephrosis, calculus, or renal mass. Urinary  bladder is normal.    Reproductive: No suspicious pelvic mass.    Bowel: Normal caliber small and large bowel. Colonic diverticulosis.  Normal appendix.    Mesentery/peritoneum: No free fluid.    Lymph nodes: No adenopathy.    Vascular: Infrarenal aorta is not aneurysmal.    Bones/soft tissues: No acute fracture or dislocation. Extensive  subcutaneous emphysema dissecting throughout the right anterolateral  abdominal wall soft tissues. Small amount of pneumoperitoneum in the  right upper quadrant and less so in the lower abdomen, presumably  related to the same day diagnostic laparoscopic surgery.    Lung bases: Small bilateral pleural effusions with adjacent  compressive atelectasis/consolidation.      Impression    Impression:   1. Gallbladder is not identified in the gallbladder fossa. Suspect  prior cholecystectomy per op note today.    2. Postoperative and postprocedural changes of pneumobilia, small  volume pneumoperitoneum, and anterolateral right abdominal wall  subcutaneous air.    3. Small pleural effusions.    I have personally reviewed the examination and initial interpretation  and I agree with the findings.    ZULEYMA POLK DO         SYSTEM ID:  [id number]

## 2024-07-17 NOTE — PLAN OF CARE
Goal Outcome Evaluation:    Plan of Care Reviewed With: patient    Overall Patient Progress: no change    Outcome Evaluation: Patient rested well overnight. IVF continued with IV antibx. Received Q6 tylenol for pain. Up w/ SBA and uses IV pole to ambulate to the BR. Lap sites remain intact with lq bandage. VSS. Looking forward for d/c.

## 2024-07-17 NOTE — PLAN OF CARE
Goal Outcome Evaluation:      Plan of Care Reviewed With: patient    Overall Patient Progress: improvingOverall Patient Progress: improving    Outcome Evaluation: manage pain, tolerate PO diet    Assumed cares [id number]. A&O and able to make needs known. VSS on RA. Capno on and stable. Arrived back to unit from post-op around 1600. Lap sites CDI with liquid bandage. Pt up and ambulating well in room. Little to no pain reported this shift. Eating dinner now. PIV infusing LR @ 75 with IV abx as scheduled. CT completed after returning to floor. Continue with plan of care.

## 2024-07-17 NOTE — PROGRESS NOTES
Surgery Progress Note  07/17/2024       Subjective:  Comfortable in bed this AM, endorses mild but controlled soreness at incisions. Overall feeling much improved from admission date. We discussed the intraoperative and imaging findings of no visualization of the gallbladder.      Objective:  Temp:  [97.4  F (36.3  C)-98.2  F (36.8  C)] 98  F (36.7  C)  Pulse:  [69-89] 73  Resp:  [14-23] 17  BP: (115-147)/(60-87) 144/72  Cuff Mean (mmHg):  [86] 86  SpO2:  [91 %-97 %] 95 %    I/O last 3 completed shifts:  In: 510 [P.O.:10; I.V.:500]  Out: 15 [Blood:15]      Gen: Awake, alert, NAD  Resp: NLB on RA  Abd: soft, mildly distended, mild soreness to palpation  Incision: c/d/I  Ext: WWP, no edema     Labs:  Recent Labs   Lab 07/17/24  0553 07/16/24  0642 07/15/24  0558   WBC 9.4 10.2 10.5   HGB 9.2* 10.1* 9.9*    157 124*       Recent Labs   Lab 07/17/24  0651 07/17/24  0553 07/16/24  1645 07/16/24  0642 07/16/24  0338 07/15/24  1530 07/15/24  0558 07/14/24  1753 07/14/24  1204   NA  --  133*  --  132*  --   --  129*   < > 128*   POTASSIUM  --  3.6  --  3.5 3.5   < > 3.2*   < > 2.6*   CHLORIDE  --  98  --  97*  --   --  92*   < > 89*   CO2  --  25  --  25  --   --  25   < > 27   BUN  --  15.9  --  18.7  --   --  20.3   < > 17.2   CR  --  0.84  --  0.92  --   --  1.02*   < > 0.97*   GLC 94 106*  --  103*  --   --  126*   < > 138*   OCTAVIANO  --  8.5*  --  8.9  --   --  8.3*   < > 8.9   MAG  --  1.6*  --  1.9  --   --   --   --  1.6*   PHOS  --   --  2.7 1.3*  --   --   --   --  2.4*    < > = values in this interval not displayed.       Imaging:  CT Abdomen Pelvis w/o Contrast    Result Date: 7/16/2024  Exam: Abdomen/pelvis CT without contrast 7/16/2024 4:33 PM  History: ascending cholangitis, evaluate for contracted gallbladder Comparison: Abdomen ultrasound 7/14/2024. Technique: Helical CT from the lung bases through the symphysis pubis was performed without intravenous contrast. Findings: Liver: No suspicious liver mass.  Gallbladder: No discernible gallbladder is present. Pneumobilia. Stent in the common bile duct. Pancreas: No pancreatic mass or pancreatic ductal dilatation. Spleen: Normal size. Adrenals: No nodule. Kidneys/bladder: No hydronephrosis, calculus, or renal mass. Urinary bladder is normal. Reproductive: No suspicious pelvic mass. Bowel: Normal caliber small and large bowel. Colonic diverticulosis. Normal appendix. Mesentery/peritoneum: No free fluid. Lymph nodes: No adenopathy. Vascular: Infrarenal aorta is not aneurysmal. Bones/soft tissues: No acute fracture or dislocation. Extensive subcutaneous emphysema dissecting throughout the right anterolateral abdominal wall soft tissues. Small amount of pneumoperitoneum in the right upper quadrant and less so in the lower abdomen, presumably related to the same day diagnostic laparoscopic surgery. Lung bases: Small bilateral pleural effusions with adjacent compressive atelectasis/consolidation.     Impression: 1. Gallbladder is not identified in the gallbladder fossa. Suspect prior cholecystectomy per op note today. 2. Postoperative and postprocedural changes of pneumobilia, small volume pneumoperitoneum, and anterolateral right abdominal wall subcutaneous air. 3. Small pleural effusions. I have personally reviewed the examination and initial interpretation and I agree with the findings. ZULEYMA POLK DO   SYSTEM ID:  [id number]    XR Surgery GRETCHEN G/T 5 Min Fluoro w Stills    Result Date: 7/14/2024  This exam was marked as non-reportable because it will not be read by a radiologist or a [place] non-radiologist provider.     XR Chest 2 Views    Result Date: 7/14/2024  XR CHEST 2 VIEWS 7/14/2024 2:00 PM  HISTORY: fever, r/o CAP COMPARISON: 9/15/2014. FINDINGS: Frontal view of the chest. Blunting of the right costophrenic angle is likely due to some superimposed soft tissues. Normal cardiomediastinal silhouette. No pneumothorax or pleural effusion. No focal airspace  consolidation.     IMPRESSION: No focal consolidation. I have personally reviewed the examination and initial interpretation and I agree with the findings. ZULEYMA POLK DO   SYSTEM ID:  [id number]    US Abdomen Limited (RUQ)    Result Date: 7/14/2024  EXAMINATION: Limited Abdominal Ultrasound, 7/14/2024 1:45 PM COMPARISON: None. HISTORY: RUQ tenderness, r/o cholecystitis vs. cholelithiasis vs. pancreatitis FINDINGS: Fluid: No evidence of ascites or pleural effusions. Liver: The liver demonstrates normal echotexture, measuring 18.9 cm in craniocaudal dimension. There is no focal mass. Gallbladder: The gallbladder is not well-visualized. No pericholecystic fluid. Negative sonographic Pemberton's sign. Bile Ducts: No intrahepatic biliary duct dilation.  The common bile duct measures up to 13 mm in diameter small amount of sludge present. No obstructing stones identified on ultrasound. Pancreas: Visualized portions of the head and body of the pancreas are unremarkable. Kidney: The right kidney measures 10.1 cm long. There is no hydronephrosis or hydroureter, no shadowing renal calculi, cystic lesion or mass.     IMPRESSION: The gallbladder is not well visualized and may be contracted with echogenic stones. The common bile duct is dilated up to 13 mm a small amount of sludge present. No obstructing stones identified on ultrasound. May consider MRCP for further evaluation. I have personally reviewed the examination and initial interpretation and I agree with the findings. ZULEYMA POLK DO   SYSTEM ID:  [id number]       Assessment/Plan:   Sushila Chambers is a 74 year old female admitted on 7/14/2024. She has a past medical history significant for histoplasmosis, prior lung cancer, right breast cancer s/p lumpectomy/radiation/endocrine therapy, left breast cancer s/p chemotherapy/mastectomy on letrozole, hypothyroidism, hyperlipidemia and hypertension. Began vomiting evening of 7/12 with persistent upper abdominal pain and low grade  fever. Labs/imaging c/f cholangitis. Brought to OR on 7/17 for cholecystectomy. Case was aborted when GB not visualized. Post-op CT scan does not demonstrate the presence of a GB, possible prior resection vs agenesis. Her symptoms are much improved and she is okay with the current care plan.     -No further surgical intervention required; EGS will sign off    Seen, examined, and discussed with chief resident, who will discuss with staff.  - - - - - - - - - - - - - - - - - -  Ralph Bella MD  PGY-1 Surgery Resident

## 2024-07-18 VITALS
HEIGHT: 63 IN | DIASTOLIC BLOOD PRESSURE: 80 MMHG | RESPIRATION RATE: 16 BRPM | OXYGEN SATURATION: 95 % | SYSTOLIC BLOOD PRESSURE: 155 MMHG | TEMPERATURE: 98.6 F | HEART RATE: 84 BPM | WEIGHT: 145.5 LBS | BODY MASS INDEX: 25.78 KG/M2

## 2024-07-18 LAB
ALBUMIN SERPL BCG-MCNC: 2.8 G/DL (ref 3.5–5.2)
ALP SERPL-CCNC: 154 U/L (ref 40–150)
ALT SERPL W P-5'-P-CCNC: 161 U/L (ref 0–50)
ANION GAP SERPL CALCULATED.3IONS-SCNC: 8 MMOL/L (ref 7–15)
AST SERPL W P-5'-P-CCNC: 50 U/L (ref 0–45)
BILIRUB SERPL-MCNC: 2 MG/DL
BUN SERPL-MCNC: 11.1 MG/DL (ref 8–23)
CALCIUM SERPL-MCNC: 8.5 MG/DL (ref 8.8–10.4)
CHLORIDE SERPL-SCNC: 97 MMOL/L (ref 98–107)
CREAT SERPL-MCNC: 0.78 MG/DL (ref 0.51–0.95)
EGFRCR SERPLBLD CKD-EPI 2021: 79 ML/MIN/1.73M2
ERYTHROCYTE [DISTWIDTH] IN BLOOD BY AUTOMATED COUNT: 14.3 % (ref 10–15)
GLUCOSE SERPL-MCNC: 94 MG/DL (ref 70–99)
HCO3 SERPL-SCNC: 27 MMOL/L (ref 22–29)
HCT VFR BLD AUTO: 28.8 % (ref 35–47)
HGB BLD-MCNC: 10.3 G/DL (ref 11.7–15.7)
MAGNESIUM SERPL-MCNC: 1.3 MG/DL (ref 1.7–2.3)
MAGNESIUM SERPL-MCNC: 2.5 MG/DL (ref 1.7–2.3)
MCH RBC QN AUTO: 30.8 PG (ref 26.5–33)
MCHC RBC AUTO-ENTMCNC: 35.8 G/DL (ref 31.5–36.5)
MCV RBC AUTO: 86 FL (ref 78–100)
PHOSPHATE SERPL-MCNC: 2.6 MG/DL (ref 2.5–4.5)
PLATELET # BLD AUTO: 198 10E3/UL (ref 150–450)
POTASSIUM SERPL-SCNC: 3.3 MMOL/L (ref 3.4–5.3)
POTASSIUM SERPL-SCNC: 3.3 MMOL/L (ref 3.4–5.3)
PROT SERPL-MCNC: 5.4 G/DL (ref 6.4–8.3)
RBC # BLD AUTO: 3.34 10E6/UL (ref 3.8–5.2)
SODIUM SERPL-SCNC: 132 MMOL/L (ref 135–145)
WBC # BLD AUTO: 10.5 10E3/UL (ref 4–11)

## 2024-07-18 PROCEDURE — 36415 COLL VENOUS BLD VENIPUNCTURE: CPT | Performed by: STUDENT IN AN ORGANIZED HEALTH CARE EDUCATION/TRAINING PROGRAM

## 2024-07-18 PROCEDURE — 80053 COMPREHEN METABOLIC PANEL: CPT | Performed by: STUDENT IN AN ORGANIZED HEALTH CARE EDUCATION/TRAINING PROGRAM

## 2024-07-18 PROCEDURE — 250N000013 HC RX MED GY IP 250 OP 250 PS 637: Performed by: STUDENT IN AN ORGANIZED HEALTH CARE EDUCATION/TRAINING PROGRAM

## 2024-07-18 PROCEDURE — 250N000011 HC RX IP 250 OP 636: Performed by: STUDENT IN AN ORGANIZED HEALTH CARE EDUCATION/TRAINING PROGRAM

## 2024-07-18 PROCEDURE — 84132 ASSAY OF SERUM POTASSIUM: CPT | Performed by: INTERNAL MEDICINE

## 2024-07-18 PROCEDURE — 99239 HOSP IP/OBS DSCHRG MGMT >30: CPT | Performed by: INTERNAL MEDICINE

## 2024-07-18 PROCEDURE — 36415 COLL VENOUS BLD VENIPUNCTURE: CPT | Performed by: INTERNAL MEDICINE

## 2024-07-18 PROCEDURE — 84100 ASSAY OF PHOSPHORUS: CPT | Performed by: HOSPITALIST

## 2024-07-18 PROCEDURE — 85049 AUTOMATED PLATELET COUNT: CPT | Performed by: STUDENT IN AN ORGANIZED HEALTH CARE EDUCATION/TRAINING PROGRAM

## 2024-07-18 PROCEDURE — 250N000013 HC RX MED GY IP 250 OP 250 PS 637: Performed by: INTERNAL MEDICINE

## 2024-07-18 PROCEDURE — 83735 ASSAY OF MAGNESIUM: CPT | Performed by: HOSPITALIST

## 2024-07-18 PROCEDURE — 83735 ASSAY OF MAGNESIUM: CPT | Performed by: INTERNAL MEDICINE

## 2024-07-18 PROCEDURE — 250N000011 HC RX IP 250 OP 636: Performed by: INTERNAL MEDICINE

## 2024-07-18 RX ORDER — POTASSIUM CHLORIDE 750 MG/1
40 TABLET, EXTENDED RELEASE ORAL ONCE
Status: COMPLETED | OUTPATIENT
Start: 2024-07-18 | End: 2024-07-18

## 2024-07-18 RX ORDER — MAGNESIUM SULFATE HEPTAHYDRATE 40 MG/ML
4 INJECTION, SOLUTION INTRAVENOUS ONCE
Status: COMPLETED | OUTPATIENT
Start: 2024-07-18 | End: 2024-07-18

## 2024-07-18 RX ORDER — METRONIDAZOLE 500 MG/1
500 TABLET ORAL 3 TIMES DAILY
Status: DISCONTINUED | OUTPATIENT
Start: 2024-07-18 | End: 2024-07-18 | Stop reason: HOSPADM

## 2024-07-18 RX ADMIN — ACETAMINOPHEN 650 MG: 325 TABLET, FILM COATED ORAL at 13:53

## 2024-07-18 RX ADMIN — LETROZOLE 2.5 MG: 2.5 TABLET ORAL at 07:35

## 2024-07-18 RX ADMIN — LEVOTHYROXINE SODIUM 50 MCG: 0.05 TABLET ORAL at 07:35

## 2024-07-18 RX ADMIN — CEFTRIAXONE SODIUM 2 G: 2 INJECTION, POWDER, FOR SOLUTION INTRAMUSCULAR; INTRAVENOUS at 13:45

## 2024-07-18 RX ADMIN — MAGNESIUM SULFATE IN WATER 4 G: 40 INJECTION, SOLUTION INTRAVENOUS at 10:55

## 2024-07-18 RX ADMIN — Medication 25 MCG: at 07:35

## 2024-07-18 RX ADMIN — METRONIDAZOLE 500 MG: 5 INJECTION, SOLUTION INTRAVENOUS at 09:20

## 2024-07-18 RX ADMIN — CALCIUM 500 MG: 500 TABLET ORAL at 07:35

## 2024-07-18 RX ADMIN — ACETAMINOPHEN 650 MG: 325 TABLET, FILM COATED ORAL at 06:58

## 2024-07-18 RX ADMIN — POTASSIUM CHLORIDE 40 MEQ: 750 TABLET, EXTENDED RELEASE ORAL at 10:55

## 2024-07-18 RX ADMIN — ACETAMINOPHEN 650 MG: 325 TABLET, FILM COATED ORAL at 01:03

## 2024-07-18 RX ADMIN — METRONIDAZOLE 500 MG: 5 INJECTION, SOLUTION INTRAVENOUS at 01:03

## 2024-07-18 ASSESSMENT — ACTIVITIES OF DAILY LIVING (ADL)
ADLS_ACUITY_SCORE: 19

## 2024-07-18 NOTE — PROGRESS NOTES
Care Management Follow Up    Length of Stay (days): 4    Expected Discharge Date: 07/18/2024     Concerns to be Addressed: no discharge needs identified     Patient plan of care discussed at interdisciplinary rounds: Yes    Anticipated Discharge Disposition: Home     Anticipated Discharge Services: None  Anticipated Discharge DME: None    Patient/family educated on Medicare website which has current facility and service quality ratings: no  Education Provided on the Discharge Plan: Yes  Patient/Family in Agreement with the Plan: yes    Referrals Placed by CM/SW: External Care Coordination  Private pay costs discussed: Not applicable    Additional Information:    Pt is medically ready for discharge. Family to transport home. No new services/needs at discharge.    Dianelys Anderson RN    7C RN Coordinator  Phone: [phone]  7/18/2024  Units: 7C Med Surg 7401 thru 7418 RNCC     Social Work and Care Management Department   SEARCHABLE in [facility] - search CARE COORDINATOR   [place] [facility] [place] ([phone]) Saturday & Sunday; ([phone]) [facility] Recognized Holidays   Units: 5A Onc 5201 - 5219 RNCC,  5A Onc 5220 thru 5240 RNCC, 5C OFFSERVICE [id number] RNCC & 5C OFF SERVICE [phone] RNCC   Units: 6B Vocera, 6C Card 6401 thru 6420 RNCC, 6C Card 6502 thru 6514 RNCC & 6C Card 6515 thru 6519 RNCC    Units: 7A SOT RNCC Vocera, 7B Med Surg Vocera, & 7C Med Surg 7502 thru 7521 RNCC   Units: 6A Vocera & 4A CVICU Vocera, 4C MICU Vocera, and 4E SICU Vocera     Units: 5 Ortho Vocera & 5 Med Surg Vocera    Units: 6 Med Surg Vocera & 8 Med Surg Vocera

## 2024-07-18 NOTE — PLAN OF CARE
"Blood pressure (!) 155/80, pulse 84, temperature 98.6  F (37  C), temperature source Oral, resp. rate 16, height 1.6 m (5' 3\"), weight 66 kg (145 lb 8 oz), SpO2 95%, Via RA     Patient A & O X 4 able to make needs known. Abdominal pain manageable with PRN Tylenol. Received scheduled medications . Potassium 3.3 , Magnesium 1.3 , K+, Mag replaced per protocol. Appetite good , void adequate, Patient report had bowel movement X 1 this morning. Patient stable and had discharge order to go home. Patient aware and agreeable with plan. Writer reviewed discharge instructions with patient and instruct patient to follow up with appointment and complete oral Abx as scheduled . Also instruct patient how to care for abdominal incision patient verbalized understanding . Writer asked patient if she has questions or concerns . Patient report no concerns or questions . Patient left the unit at ~ 16:25 via wheelchair escorted by NST to [place] family to provide ride. Patient took all her belonging with.          Goal Outcome Evaluation:    Plan of Care Reviewed With: patient  Overall Patient Progress: improved . Patient stable discharge to home.       "

## 2024-07-18 NOTE — DISCHARGE SUMMARY
"[facility]  Hospitalist Discharge Summary      Date of Admission:  7/14/2024  Date of Discharge:  7/18/2024  Discharging Provider: Rigoberto Alvarado MD  Discharge Service: Hospitalist Service, GOLD TEAM 9    Discharge Diagnoses   Sepsis due to acute cholangitis due to Streptococcus pneumonia and E. Coli  Streptococcus pneumonia bacteremia  E. Coli bacteremia  Hypokalemia  HTN  Breast Cancer  HLD  Hypothyroidism    Clinically Significant Risk Factors     # Overweight: Estimated body mass index is 25.77 kg/m  as calculated from the following:    Height as of this encounter: 1.6 m (5' 3\").    Weight as of this encounter: 66 kg (145 lb 8 oz).       Follow-ups Needed After Discharge   Follow-up Appointments     Adult [facility] Follow-up and recommended labs and tests      Follow up with primary care provider, Brittany Goodwin, within 7-14   days for hospital follow- up.  No follow up labs or test are needed.    Follow up with gastroenterology for bile duct stent removal, within 4   weeks  for hospital follow- up. No follow up labs or test are needed.    Appointments on [place] and/or [facility] (with [facility] or [facility]   provider or service). Call [phone] if you haven't heard regarding   these appointments within 7 days of discharge.            Unresulted Labs Ordered in the Past 30 Days of this Admission       Date and Time Order Name Status Description    7/15/2024 10:59 AM Blood Culture Hand, Right Preliminary     7/15/2024 10:59 AM Blood Culture Hand, Left Preliminary     7/14/2024  2:46 PM Blood Culture Hand, Left Preliminary         These results will be followed up by Dr. Alvarado    Discharge Disposition   Discharged to home  Condition at discharge: Stable    Hospital Course   74 year old female with prior lung cancer, R breast cancer, left breast cancer who was admitted on 7/14/2024 for fevers, abdominal pain, nausea and vomiting found to have acute " cholangitis. GI and general surgery were consulted. Patient was treated with IV antibiotics. Blood cultures positive for pan sensitive S. Pneumoniae and E.coli. GI did ERCP with stent placement on 7/14 and general surgery attempted cholecystectomy on 7/16 but gall bladder not encountered, CT abdomen on 7/16 did not identify the gall bladder. Patient is discharged to complete 7 more days of antibiotics, augmentin. Follow up with PCP and GI teams. GI team will need to perform ERCP to remove stent    Consultations This Hospital Stay   GI PANCREATICOBILIARY ADULT IP CONSULT  SURGERY GENERAL ADULT IP CONSULT  CARE MANAGEMENT / SOCIAL WORK IP CONSULT  NURSING TO CONSULT FOR VASCULAR ACCESS CARE IP CONSULT    Code Status   Full Code    Time Spent on this Encounter   I, Rigoberto Alvarado MD, personally saw the patient today and spent  45 minutes discharging this patient.       Rigoberto Alvarado MD  [address] MED SURG  [address]  [facility] [place] [address]  Phone: [phone]  ______________________________________________________________________    Physical Exam   Vital Signs: Temp: 98.6  F (37  C) Temp src: Oral BP: (!) 160/69 Pulse: 84   Resp: 16 SpO2: 95 % O2 Device: None (Room air)    Weight: 145 lbs 8 oz  Gen: NAD, sitting comfortably in bed  CV: RRR, no murmurs, 2+ radial pulses  RESP: CTA bilaterally, no w/r/c  Abd: soft, nontender, nondistended       Primary Care Physician   Brittany Goodwin    Discharge Orders      Adult GI  Referral - Procedure Only      Primary Care - Care Coordination Referral      Reason for your hospital stay    You were admitted for acute cholangitis or serious infection of the bile duct. During this admission you were treated with iv antibiotics, procedure with gastroenterology called ERCP, and an attempted gall bladder removal with surgery team. You are able to be discharged on 7 more days of antibiotics. Please follow up with your primary care doctor in the next 2  weeks and with the GI team to removed the stent in your bile duct.     Activity    Your activity upon discharge: activity as tolerated     Adult [facility] Follow-up and recommended labs and tests    Follow up with primary care provider, Brittany Goodwin, within 7-14 days for hospital follow- up.  No follow up labs or test are needed.    Follow up with gastroenterology for bile duct stent removal, within 4 weeks  for hospital follow- up. No follow up labs or test are needed.    Appointments on [place] and/or [facility] (with [facility] or [facility] provider or service). Call [phone] if you haven't heard regarding these appointments within 7 days of discharge.     Diet    Follow this diet upon discharge: Orders Placed This Encounter      Advance Diet as Tolerated: Regular Diet Adult       Significant Results and Procedures   Most Recent 3 CBC's:  Recent Labs   Lab Test 07/18/24  0639 07/17/24  0553 07/16/24  0642   WBC 10.5 9.4 10.2   HGB 10.3* 9.2* 10.1*   MCV 86 88 88    156 157     Most Recent 3 BMP's:  Recent Labs   Lab Test 07/18/24  1407 07/18/24  0639 07/17/24  0651 07/17/24  0553 07/16/24  0642   NA  --  132*  --  133* 132*   POTASSIUM 3.3* 3.3*  --  3.6 3.5   CHLORIDE  --  97*  --  98 97*   CO2  --  27  --  25 25   BUN  --  11.1  --  15.9 18.7   CR  --  0.78  --  0.84 0.92   ANIONGAP  --  8  --  10 10   OCTAVIANO  --  8.5*  --  8.5* 8.9   GLC  --  94 94 106* 103*     Most Recent 2 LFT's:  Recent Labs   Lab Test 07/18/24  0639 07/17/24  0553   AST 50* 52*   * 229*   ALKPHOS 154* 156*   BILITOTAL 2.0* 2.1*     Most Recent 3 INR's:  Recent Labs   Lab Test 07/15/24  0558 07/14/24  1204   INR 1.53* 1.52*   ,   Results for orders placed or performed during the hospital encounter of 07/14/24   US Abdomen Limited (RUQ)    Narrative    EXAMINATION: Limited Abdominal Ultrasound, 7/14/2024 1:45 PM     COMPARISON: None.    HISTORY: RUQ tenderness, r/o cholecystitis vs. cholelithiasis  vs.  pancreatitis    FINDINGS:   Fluid: No evidence of ascites or pleural effusions.    Liver: The liver demonstrates normal echotexture, measuring 18.9 cm in  craniocaudal dimension. There is no focal mass.     Gallbladder: The gallbladder is not well-visualized. No  pericholecystic fluid. Negative sonographic Pemberton's sign.    Bile Ducts: No intrahepatic biliary duct dilation.  The common bile  duct measures up to 13 mm in diameter small amount of sludge present.  No obstructing stones identified on ultrasound.    Pancreas: Visualized portions of the head and body of the pancreas are  unremarkable.     Kidney: The right kidney measures 10.1 cm long. There is no  hydronephrosis or hydroureter, no shadowing renal calculi, cystic  lesion or mass.       Impression    IMPRESSION:   The gallbladder is not well visualized and may be contracted with  echogenic stones. The common bile duct is dilated up to 13 mm a small  amount of sludge present. No obstructing stones identified on  ultrasound. May consider MRCP for further evaluation.    I have personally reviewed the examination and initial interpretation  and I agree with the findings.    ZULEYMA POLK DO         SYSTEM ID:  [id number]   XR Chest 2 Views    Narrative    XR CHEST 2 VIEWS 7/14/2024 2:00 PM      HISTORY: fever, r/o CAP    COMPARISON: 9/15/2014.     FINDINGS:   Frontal view of the chest. Blunting of the right costophrenic angle is  likely due to some superimposed soft tissues. Normal cardiomediastinal  silhouette. No pneumothorax or pleural effusion. No focal airspace  consolidation.      Impression    IMPRESSION:   No focal consolidation.    I have personally reviewed the examination and initial interpretation  and I agree with the findings.    ZULEYMA POLK DO         SYSTEM ID:  [id number]   XR Surgery GRETCHEN G/T 5 Min Fluoro w Stills    Narrative    This exam was marked as non-reportable because it will not be read by a   radiologist or a [place]  non-radiologist provider.         CT Abdomen Pelvis w/o Contrast    Narrative    Exam: Abdomen/pelvis CT without contrast 7/16/2024 4:33 PM      History: ascending cholangitis, evaluate for contracted gallbladder    Comparison: Abdomen ultrasound 7/14/2024.     Technique: Helical CT from the lung bases through the symphysis pubis  was performed without intravenous contrast.    Findings:    Liver: No suspicious liver mass.    Gallbladder: No discernible gallbladder is present. Pneumobilia. Stent  in the common bile duct.     Pancreas: No pancreatic mass or pancreatic ductal dilatation.    Spleen: Normal size.    Adrenals: No nodule.    Kidneys/bladder: No hydronephrosis, calculus, or renal mass. Urinary  bladder is normal.    Reproductive: No suspicious pelvic mass.    Bowel: Normal caliber small and large bowel. Colonic diverticulosis.  Normal appendix.    Mesentery/peritoneum: No free fluid.    Lymph nodes: No adenopathy.    Vascular: Infrarenal aorta is not aneurysmal.    Bones/soft tissues: No acute fracture or dislocation. Extensive  subcutaneous emphysema dissecting throughout the right anterolateral  abdominal wall soft tissues. Small amount of pneumoperitoneum in the  right upper quadrant and less so in the lower abdomen, presumably  related to the same day diagnostic laparoscopic surgery.    Lung bases: Small bilateral pleural effusions with adjacent  compressive atelectasis/consolidation.      Impression    Impression:   1. Gallbladder is not identified in the gallbladder fossa. Suspect  prior cholecystectomy per op note today.    2. Postoperative and postprocedural changes of pneumobilia, small  volume pneumoperitoneum, and anterolateral right abdominal wall  subcutaneous air.    3. Small pleural effusions.    I have personally reviewed the examination and initial interpretation  and I agree with the findings.    ZULEYMA POLK DO         SYSTEM ID:  [id number]       Discharge Medications   Current Discharge  Medication List        START taking these medications    Details   amoxicillin-clavulanate (AUGMENTIN) 875-125 MG tablet Take 1 tablet by mouth 2 times daily for 7 days  Qty: 14 tablet, Refills: 0    Associated Diagnoses: Abdominal pain, epigastric           CONTINUE these medications which have NOT CHANGED    Details   acetaminophen (TYLENOL) 500 MG tablet Take 500-1,000 mg by mouth every 6 hours as needed for mild pain      aspirin 81 MG EC tablet Take 81 mg by mouth daily      biotin 1000 MCG TABS tablet Take by mouth daily      calcium carbonate (OS-OCTAVIANO 500 MG [place]) 500 MG tablet Take by mouth 2 times daily      chlorthalidone (HYGROTON) 25 MG tablet Take 1 tablet (25 mg) by mouth daily  Qty: 90 tablet, Refills: 1    Associated Diagnoses: Uncontrolled hypertension      letrozole (FEMARA) 2.5 MG tablet Take 1 tablet (2.5 mg) by mouth daily  Qty: 90 tablet, Refills: 3    Associated Diagnoses: Malignant neoplasm of lower-outer quadrant of left female breast (H)      levothyroxine (SYNTHROID/LEVOTHROID) 50 MCG tablet Take 1 tablet (50 mcg) by mouth daily  Qty: 90 tablet, Refills: 2    Associated Diagnoses: Hypothyroidism, unspecified type      lisinopril (ZESTRIL) 5 MG tablet Take 1 tablet (5 mg) by mouth daily  Qty: 90 tablet, Refills: 2    Associated Diagnoses: Hypertension goal BP (blood pressure) < 140/90      multivitamin w/minerals (THERA-VIT-M) tablet Take 1 tablet by mouth daily      rosuvastatin (CRESTOR) 5 MG tablet Take 1 tablet (5 mg) by mouth daily  Qty: 90 tablet, Refills: 2    Associated Diagnoses: Hyperlipidemia LDL goal <130      vitamin C (ASCORBIC ACID) 100 MG tablet       VITAMIN D, CHOLECALCIFEROL, PO Take by mouth daily           Allergies   Allergies   Allergen Reactions    Latex Rash     SKIN REACTION    SKIN REACTION    Sulfa Antibiotics Itching and Rash

## 2024-07-18 NOTE — PLAN OF CARE
Goal Outcome Evaluation:    Plan of Care Reviewed With: patient    Overall Patient Progress: improving    Outcome Evaluation: Patient slept well. Oriented x4. Lap sites intact with lq bandage. Received Q6 tylenol for pain, 5/10 in back. Reported relief. Turned self in bed and IND in room. IV antibx continued. Oriented x4. Anticipating d/c.

## 2024-07-19 ENCOUNTER — PATIENT OUTREACH (OUTPATIENT)
Dept: CARE COORDINATION | Facility: CLINIC | Age: 74
End: 2024-07-19
Payer: MEDICARE

## 2024-07-19 ENCOUNTER — MYC MEDICAL ADVICE (OUTPATIENT)
Dept: INTERNAL MEDICINE | Facility: CLINIC | Age: 74
End: 2024-07-19
Payer: MEDICARE

## 2024-07-19 ENCOUNTER — PATIENT OUTREACH (OUTPATIENT)
Dept: SURGERY | Facility: CLINIC | Age: 74
End: 2024-07-19
Payer: MEDICARE

## 2024-07-19 NOTE — TELEPHONE ENCOUNTER
Patient confirmed scheduled appointment:  Date: 7/29/24  Time: 1230pm  Visit type: VIDEO  Provider: PCP  Location: VIDEO  Testing/imaging: -  Additional notes: video follow up per pt    Patient confirmed scheduled appointment:  Date: 8/21/2024  Time: 1130am  Visit type: In person  Provider: PCP  Location: [address] [place] [address]  Testing/imaging: -  Additional notes: -

## 2024-07-19 NOTE — PROGRESS NOTES
RN Post-Op/Post-Discharge Care Coordination Note    Ms. Sushila Chambers is a 74 year old female who underwent  diagnostic laparoscopy  on 7/16 with  Dr. Travis Casas.  Spoke with Patient.    Support  Patient able to care for self independently     Health Status  Nausea/Vomiting: Patient denies nausea/vomiting.  Eating/drinking: Patient is able to eat and drink without any complaints. She will eat yogurt or start taking a Probiotic daily.  Bowel habits: Patient reports having a normal bowel movement.  Drains (CORBIN): N/A  Fevers/chills: Patient denies any fever or chills.  Incisions: Patient denies any signs and symptoms of infection..  Wound closure:  Skin Sealant  Pain: Minimal and is medicating with Tylenol PRN per package instructions  New Medications:  Augmentin    Activity/Restrictions  No lifting in excess of 15-20 pounds for 3-4 weeks    Equipment  None    Pathology reviewed with patient:  N/A    Forms/Letters  No    All of her questions were answered including reviewing restrictions and wound care.  She will call this office if she has any further questions and/or concerns.      RTC PRN.  Patient will follow with GI regarding stent removal.    A copy of this note was routed to the primary surgeon.      Whom and When to Call  Patient acknowledges understanding of how to manage any medication changes and   when to seek medical care.     Patient advised that if after hour medical concerns arise to please call [phone] and choose option 4 to speak to the physician on call.

## 2024-07-19 NOTE — PROGRESS NOTES
Clinic Care Coordination Contact    Situation: Patient chart reviewed by care coordinator.    Background: Referred by Inpatient RN for Care Transition (pt is discharge home today. No new services/needs) on 7/18/24.     Assessment: Per chart review, patient is being contacted by Mirna Acosta RN with General Surgery team for TCM call.     Plan/Recommendations: RN will close program at this time due to duplication of care management services and will await completion of TCM outreach for further care management referral/needs.     MADI KOHLER RN on 7/19/2024 at 9:24 AM

## 2024-07-20 LAB
BACTERIA BLD CULT: NO GROWTH
BACTERIA BLD CULT: NO GROWTH

## 2024-07-21 LAB
BACTERIA BLD CULT: ABNORMAL
BACTERIA BLD CULT: ABNORMAL

## 2024-07-22 DIAGNOSIS — K86.2 PANCREATIC CYST: Primary | ICD-10-CM

## 2024-07-22 DIAGNOSIS — I10 UNCONTROLLED HYPERTENSION: ICD-10-CM

## 2024-07-22 NOTE — PROGRESS NOTES
Post ERCP on 7/14/24 with Dr. Paiz.      Follow-up recommendations:      - Perform a flat plate and upright abdominal x-ray in                          4 weeks.                          - If stent is present,will need an upper endoscopy                          under moderate conscious sedation in [address] for                          biliary stent removal.     Orders placed:   -Abdomen xray (due 8/12/24); Added to Panc/bili stent log.  Provided patient with imaging scheduling number: [phone].     Communicated to patient via Robson Dumont RN Care Coordinator

## 2024-07-25 LAB
BACTERIA BLD CULT: ABNORMAL

## 2024-07-26 RX ORDER — CHLORTHALIDONE 25 MG/1
25 TABLET ORAL DAILY
Qty: 90 TABLET | Refills: 0 | Status: SHIPPED | OUTPATIENT
Start: 2024-07-26 | End: 2024-10-04

## 2024-07-26 NOTE — TELEPHONE ENCOUNTER
chlorthalidone (HYGROTON) 25 MG tablet         Last Written Prescription Date:  2/6/24  Last Fill Quantity: 90,   # refills: 1  Last Office Visit : 10/16/23  Future Office visit:  7/29/24    Refilled per protocol

## 2024-07-29 ENCOUNTER — VIRTUAL VISIT (OUTPATIENT)
Dept: INTERNAL MEDICINE | Facility: CLINIC | Age: 74
End: 2024-07-29
Payer: MEDICARE

## 2024-07-29 DIAGNOSIS — K83.09 CHOLANGITIS (H): Primary | ICD-10-CM

## 2024-07-29 PROCEDURE — 99214 OFFICE O/P EST MOD 30 MIN: CPT | Mod: 95 | Performed by: INTERNAL MEDICINE

## 2024-07-29 NOTE — PROGRESS NOTES
Sushila is a 74 year old who is being evaluated via a billable video visit.    How would you like to obtain your AVS? MyChart  If the video visit is dropped, the invitation should be resent by: Text to cell phone: [phone]  Will anyone else be joining your video visit? No      Are refills needed on medications prescribed by this physician? NO    Subjective   Sushila is a 74 year old, presenting for the following health issues:  hospital follow up, medication review  RECHECK and Emergency Surgery      CHAY     Was admitted to the hospital and diagnosed with sepsis, ascending cholangitis, had ERCP with stent, laparoscopy revealed absent gallbladder.  I reviewed labs, imaging, and notes including discharge summary and op report.     Now, doing well, denies abdominal pain or fever. No jaundice noted.    BP has been variable, we discussed this as well, taking low dose of lisinopril 5 mg daily along with chlorthalidone 25 mg daily.  She will check a few more home BP readings and then come back to an in person visit in two months.   Will check lipid panel again then, as well.         Objective         Vitals:  No vitals were obtained today due to virtual visit.    Physical Exam   GENERAL: alert and no distress  EYES: Eyes grossly normal to inspection.  No discharge or erythema, or obvious scleral/conjunctival abnormalities.  RESP: No audible wheeze, cough, or visible cyanosis.    SKIN: Visible skin clear. No significant rash, abnormal pigmentation or lesions.  NEURO: Cranial nerves grossly intact.  Mentation and speech appropriate for age.  PSYCH: Appropriate affect, tone, and pace of words    A/P status post hospital stay for ascending cholangitis, now much improved clinically.  Will have xray to assess for biliary stent migration and follow up in GI if need be for stent removal.  She can resume her usual activity, exercising in one week.       Video-Visit Details    Type of service:  Video Visit started 12:20 ended 12:35  with another 15 minutes chart review and documentation same day  Originating Location (pt. Location): Home    Distant Location (provider location):  On-site  Platform used for Video Visit: Alexander  Signed Electronically by: Brittany Goodwin MD

## 2024-07-29 NOTE — PATIENT INSTRUCTIONS
Thank you for visiting the Primary Care Center today at the [facility]! The following is some information about our clinic:     Primary Care Center Frequently-Asked Questions    (1) How do I schedule appointments at the [facility]?     Primary Care--to schedule or make changes to an existing appointment, please call our primary care line at [phone].    Labs--to schedule a lab appointment at the [facility] you can use [employer] or call [phone]. If you have a [place] location that is closer to home, you can reach out to that location for scheduling options.     Imaging--if you need to schedule a CT, X-ray, MRI, ultrasound, or other imaging study you can call [phone] to schedule at the [facility] or any other [facility] imaging location.     Referrals--if a referral to another specialty was ordered you can expect a phone call from their scheduling team. If you have not heard from them in a week, please call us or send us a [employer] message to check the status or get a scheduling number. Please note that this only applies to internal [facility] referrals. If the referral is external you would need to contact their office for scheduling.     (2) I have a question about my visit, who do I contact?     You can call us at the primary care line at [phone] to ask questions about your visit. You can also send a secure message through [employer], which is reviewed by clinic staff. Please note that [employer] messages have a twenty-four to forty-eight business hour turnaround time and should not be used for urgent concerns.    (3) How will I get the results of my tests?    If you are signed up for [employer] all tests will be released to you within twenty-four hours of resulting. Please allow three to five days for your doctor to review your results and place a note interpreting the results. If you do not have [employer] you will receive your  results through mail seven to ten business days following the return of the tests. Please note that if there should be any urgent or concerning results that your doctor or their registered nurse will reach out to you the same day as the tests come back. If you have follow up questions about your results or would like to discuss the results in detail please schedule a follow up with your provider either in person or virtually.     (4) How do I get refills of my prescriptions?     You should always first contact your pharmacy for refills of your medications. If submitting a refill request on [employer], please be sure to submit the request only once--repeat requests can cause delays in refill. If you are requesting a NEW medication or a medication related to new symptoms you will need to schedule an appointment with a provider prior to approval. Please note: Routine medication refills have up to one to three business day turnaround whereas controlled substances refills have up to five to seven business day turnaround.    (5) I have new symptoms, what do I do?     If you are having an immediate medical emergency, you should dial 911 for assistance.   For anything urgent that needs to be seen within a few hours to one day you should visit a local urgent care for assistance.  For non-urgent symptoms that need to be seen within a few days to a week you can schedule with an available provider in primary care by going to [employer] or calling [phone].   If you are not sure how serious your symptoms are or you would like to receive medical advice you can always call [phone] to speak with a triage nurse.

## 2024-08-12 ENCOUNTER — ANCILLARY PROCEDURE (OUTPATIENT)
Dept: GENERAL RADIOLOGY | Facility: CLINIC | Age: 74
End: 2024-08-12
Attending: INTERNAL MEDICINE
Payer: MEDICARE

## 2024-08-12 DIAGNOSIS — K86.2 PANCREATIC CYST: ICD-10-CM

## 2024-08-12 PROCEDURE — 74019 RADEX ABDOMEN 2 VIEWS: CPT | Mod: GC | Performed by: STUDENT IN AN ORGANIZED HEALTH CARE EDUCATION/TRAINING PROGRAM

## 2024-08-13 ENCOUNTER — TELEPHONE (OUTPATIENT)
Dept: GASTROENTEROLOGY | Facility: CLINIC | Age: 74
End: 2024-08-13
Payer: MEDICARE

## 2024-08-13 ENCOUNTER — PATIENT OUTREACH (OUTPATIENT)
Dept: GASTROENTEROLOGY | Facility: CLINIC | Age: 74
End: 2024-08-13
Payer: MEDICARE

## 2024-08-13 DIAGNOSIS — Z46.89 ENCOUNTER FOR REMOVAL OF BILIARY STENT: Primary | ICD-10-CM

## 2024-08-13 NOTE — PROGRESS NOTES
Following review of stent xray per Dr. Paiz: Will recommend EGD for stent removal.    Called patient to discuss procedure and indication. Discussed that procedure would be done at [facility] in [place] and that patient would require a  that day.     Discussed need for repeat abdomen x-ray just prior to procedure.     Patient agreeable to proceed with recommendations. Gastro procedure and x-ray orders placed. Patient advised that they should hear from endoscopy scheduling team in the coming days or they may call [phone] and select option 2 to speak to a .    Provided with clinic contact information for further questions or concerns.     Jeanette Dumont, RN Care Coordinator

## 2024-08-13 NOTE — TELEPHONE ENCOUNTER
"Endoscopy Scheduling Screen    Have you had a positive Covid test in the last 14 days?  No    What is your communication preference for Instructions and/or Bowel Prep?   MyChart    What insurance is in the chart?  Other:  BCBS MEDICARE    Ordering/Referring Provider:    (If ordering provider performs procedure, schedule with ordering provider unless otherwise instructed. )    BMI: Estimated body mass index is 25.77 kg/m  as calculated from the following:    Height as of 7/14/24: 1.6 m (5' 3\").    Weight as of 7/14/24: 66 kg (145 lb 8 oz).     Sedation Ordered  MAC/deep sedation.   BMI<= 45 45 < BMI <= 48 48 < BMI < = 50  BMI > 50   No Restrictions No [facility]  No [facility]  [facility] with exceptions Hospital Only OR Only       Do you have a history of malignant hyperthermia?  No    (Females) Are you currently pregnant?   No     Have you been diagnosed or told you have pulmonary hypertension?   No    Do you have an LVAD?  No    Have you been told you have moderate to severe sleep apnea?  No    Have you been told you have COPD, asthma, or any other lung disease?  No    Do you have any heart conditions?  No     Have you ever had or are you waiting for an organ transplant?  No. Continue scheduling, no site restrictions.    Have you had a stroke or transient ischemic attack (TIA aka \"mini stroke\" in the last 6 months?   No    Have you been diagnosed with or been told you have cirrhosis of the liver?   No    Are you currently on dialysis?   No    Do you need assistance transferring?   No    BMI: Estimated body mass index is 25.77 kg/m  as calculated from the following:    Height as of 7/14/24: 1.6 m (5' 3\").    Weight as of 7/14/24: 66 kg (145 lb 8 oz).     Is patients BMI > 40 and scheduling location [facility]?  No    Do you take an injectable medication for weight loss or diabetes (excluding insulin)?  No    Do you take the medication Naltrexone?  No    Do you take blood thinners?  No       Prep   Are you currently on " dialysis or do you have chronic kidney disease?  No    Do you have a diagnosis of diabetes?  No    Do you have a diagnosis of cystic fibrosis (CF)?  No    On a regular basis do you go 3 -5 days between bowel movements?  No    BMI > 40?  No    Preferred Pharmacy:    [facility] - [place], [place] - [address]  [address] [place] [address]  Phone: [phone] Fax: [phone]      Final Scheduling Details     Procedure scheduled  EGD w/ stent removal    Surgeon:       Date of procedure:  8/20/2024     Pre-OP / PAC:   No - Not required for this site.    Location  [facility] - Per order.    Sedation   MAC/Deep Sedation - Per order.      Patient Reminders:   You will receive a call from a Nurse to review instructions and health history.  This assessment must be completed prior to your procedure.  Failure to complete the Nurse assessment may result in the procedure being cancelled.      On the day of your procedure, please designate an adult(s) who can drive you home stay with you for the next 24 hours. The medicines used in the exam will make you sleepy. You will not be able to drive.      You cannot take public transportation, ride share services, or non-medical taxi service without a responsible caregiver.  Medical transport services are allowed with the requirement that a responsible caregiver will receive you at your destination.  We require that drivers and caregivers are confirmed prior to your procedure.

## 2024-08-13 NOTE — TELEPHONE ENCOUNTER
Pre assessment completed for upcoming procedure.   (Please see previous telephone encounter notes for complete details)      Procedure details:    Arrival time and facility location reviewed.    Pre op exam needed? No.    Designated  policy reviewed. Instructed to have someone stay 6  hours post procedure.       Medication review:    Medications reviewed. Please see supporting documentation below. Holding recommendations discussed (if applicable).   NSAID medication(s): Ibuprofen (Advil, Motrin): HOLD 1 day before procedure.      Prep for procedure:     Procedure prep instructions reviewed.        Any additional information needed:  N/A      Patient  verbalized understanding and had no questions or concerns at this time.      Meenakshi Morel RN  Endoscopy Procedure Pre Assessment   [phone] option 4

## 2024-08-13 NOTE — TELEPHONE ENCOUNTER
Pre visit planning completed.      Procedure details:    Patient scheduled for Upper endoscopy (EGD) on 8/20/2024.     Arrival time: 1200. Procedure time 1330. X-ray is at 1115 on first floor. Go directly up to third floor following x-ray    Facility location: [facility]; [address], [place] [address]. Check in location: Main entrance at registration desk.    Sedation type: Conscious sedation     Pre op exam needed? No.    Indication for procedure: Encounter for removal of biliary stent [Z46.89]       Chart review:     Electronic implanted devices? No    Recent diagnosis of diverticulitis within the last 6 weeks? N/A      Medication review:    Diabetic? No    Anticoagulants? No    Weight loss medication/injectable? No.    NSAIDS? No NSAID medications per patient's medication list.  RN will verify with pre-assessment call.    Other medication HOLDING recommendations:  N/A      Prep for procedure:     Prep instructions sent via [employer]         Meenakshi Morel RN  Endoscopy Procedure Pre Assessment RN  [phone] option 4

## 2024-08-19 ENCOUNTER — ANESTHESIA EVENT (OUTPATIENT)
Dept: GASTROENTEROLOGY | Facility: CLINIC | Age: 74
End: 2024-08-19
Payer: MEDICARE

## 2024-08-19 NOTE — ANESTHESIA PREPROCEDURE EVALUATION
Anesthesia Pre-Procedure Evaluation    Patient: Sushila Chambers   MRN: [id number] : 1950        Procedure : Procedure(s):  Esophagoscopy, gastroscopy, duodenoscopy (EGD), combined          Past Medical History:   Diagnosis Date    Basal cell carcinoma     Breast cancer, right breast (H)     History of blood transfusion     Hypothyroid     Lung cancer (H) 2012    right    Uncontrolled hypertension 11/15/2017      Past Surgical History:   Procedure Laterality Date    ABDOMEN SURGERY      BIOPSY, LUNG NODULE Right     + cancer    COLONOSCOPY      ECTOPIC PREGNANCY SURGERY Left     ENDOSCOPIC RETROGRADE CHOLANGIOPANCREATOGRAM N/A 2024    Procedure: ENDOSCOPIC RETROGRADE CHOLANGIOPANCREATOGRAPHY, WITH BILIARY SPHINCTEROTOMY, STONE AND PUS REMOVAL, DILATION AND STENT PLACEMENT;  Surgeon: Guru Mendy Paiz MD;  Location: [facility] OR    GENITOURINARY SURGERY      GYN SURGERY      INSERT PORT VASCULAR ACCESS      LAPAROSCOPIC CHOLECYSTECTOMY N/A 2024    Procedure: DIAGNOSTIC LAPAROSCOPIC;  Surgeon: Travis Casas MD;  [facility]    LUMPECTOMY BREAST Right     LUNG SURGERY Right     histoplasmosis    MASTECTOMY SIMPLE, SENTINEL NODE, COMBINED Left 2016    Procedure: COMBINED MASTECTOMY SIMPLE, SENTINEL NODE;  Surgeon: Satnam Betts MD;  Location:  OR    PHACOEMULSIFICATION WITH STANDARD INTRAOCULAR LENS IMPLANT Right 2022    Procedure: RIGHT EYE PHACOEMULSIFICATION, CATARACT, WITH  INTRAOCULAR LENS IMPLANT INSERTION TORIC LENS;  Surgeon: Ion Puente MD;  [place] [facility] [place]    PHACOEMULSIFICATION WITH STANDARD INTRAOCULAR LENS IMPLANT Left 2022    Procedure: LEFT PHACOEMULSIFICATION, CATARACT, WITH STANDARD INTRAOCULAR LENS IMPLANT INSERTION;  Surgeon: Ion Puente MD;  [place] [facility] [place]    REMOVE PORT VASCULAR ACCESS N/A 2016    Procedure: REMOVE PORT VASCULAR ACCESS;  Surgeon: Satnam Betts MD;  [facility]    THORACIC SURGERY         Allergies   Allergen Reactions    Latex Rash     SKIN REACTION    SKIN REACTION    Sulfa Antibiotics Itching and Rash      Social History     Tobacco Use    Smoking status: Never    Smokeless tobacco: Never   Substance Use Topics    Alcohol use: Yes     Comment: Occasionally      Wt Readings from Last 1 Encounters:   07/14/24 66 kg (145 lb 8 oz)        Anesthesia Evaluation   Pt has had prior anesthetic. Type: General.    No history of anesthetic complications       ROS/MED HX  ENT/Pulmonary:  - neg pulmonary ROS     Neurologic:  - neg neurologic ROS     Cardiovascular:     (+) Dyslipidemia hypertension- -   -  - -                                 Previous cardiac testing   Echo: Date: 9/9/15 Results:  Global and regional left ventricular function is normal with an EF of 60-65%.  Right ventricular function, chamber size, wall motion, and thickness normal.  No pericardial effusion is present.  Stress Test:  Date: Results:    ECG Reviewed:  Date: 7/14/24 Results:  NSR  Cath:  Date: Results:      METS/Exercise Tolerance: 3 - Able to walk 1-2 blocks without stopping    Hematologic:  - neg hematologic  ROS     Musculoskeletal:   (+)  arthritis,             GI/Hepatic: Comment: Acute cholangitis, Acute transaminitis, Extrahepatic biliary obstruction              Renal/Genitourinary: Comment: Hypokalemia, Hyponatremia   (-) renal disease   Endo:     (+)          thyroid problem, hypothyroidism,           Psychiatric/Substance Use:  - neg psychiatric ROS     Infectious Disease:       Malignancy: Comment: Hxlung cancer (early 2000s), right breast cancer (2009) s/p lumpectomy/radiation/endocrine therapy, left breast cancer (2015) s/p chemotherapy/mastectomy on letrozol  (+) Malignancy, History of Breast and Lung.  Lung CA Remission status post.  Breast CA Remission status post Surgery, Chemo and Radiation.      Other:            Physical Exam    Airway        Mallampati: II   TM distance: > 3 FB   Neck ROM: full   Mouth  "opening: > 3 cm    Respiratory Devices and Support         Dental     Comment: Wnl        Cardiovascular          Rhythm and rate: regular and normal     Pulmonary           breath sounds clear to auscultation           OUTSIDE LABS:  CBC:   Lab Results   Component Value Date    WBC 10.5 07/18/2024    WBC 9.4 07/17/2024    HGB 10.3 (L) 07/18/2024    HGB 9.2 (L) 07/17/2024    HCT 28.8 (L) 07/18/2024    HCT 26.6 (L) 07/17/2024     07/18/2024     07/17/2024     BMP:   Lab Results   Component Value Date     (L) 07/18/2024     (L) 07/17/2024    POTASSIUM 3.3 (L) 07/18/2024    POTASSIUM 3.3 (L) 07/18/2024    CHLORIDE 97 (L) 07/18/2024    CHLORIDE 98 07/17/2024    CO2 27 07/18/2024    CO2 25 07/17/2024    BUN 11.1 07/18/2024    BUN 15.9 07/17/2024    CR 0.78 07/18/2024    CR 0.84 07/17/2024    GLC 94 07/18/2024    GLC 94 07/17/2024     COAGS:   Lab Results   Component Value Date    PTT 28 07/14/2024    INR 1.53 (H) 07/15/2024    FIBR 482 (H) 09/09/2015     POC: No results found for: \"BGM\", \"HCG\", \"HCGS\"  HEPATIC:   Lab Results   Component Value Date    ALBUMIN 2.8 (L) 07/18/2024    PROTTOTAL 5.4 (L) 07/18/2024     (H) 07/18/2024    AST 50 (H) 07/18/2024    ALKPHOS 154 (H) 07/18/2024    BILITOTAL 2.0 (H) 07/18/2024     OTHER:   Lab Results   Component Value Date    A1C 5.6 10/04/2023    OCTAVIANO 8.5 (L) 07/18/2024    PHOS 2.6 07/18/2024    MAG 2.5 (H) 07/18/2024    LIPASE 19 07/15/2024    AMYLASE 28 07/15/2024    TSH 3.76 10/04/2023       Anesthesia Plan    ASA Status:  2    NPO Status:  NPO Appropriate    Anesthesia Type: MAC.     - Reason for MAC: straight local not clinically adequate              Consents    Anesthesia Plan(s) and associated risks, benefits, and realistic alternatives discussed. Questions answered and patient/representative(s) expressed understanding.     - Discussed:     - Discussed with:  Patient      - Extended Intubation/Ventilatory Support Discussed: No.      - Patient " is DNR/DNI Status: No     Use of blood products discussed: No .     Postoperative Care            Comments:    Other Comments: NPO. Meds reviewed. No problems with anesthesia in past. No recent URI. No CP or SOB.           Jessi Sesay MD    I have reviewed the pertinent notes and labs in the chart from the past 30 days and (re)examined the patient.  Any updates or changes from those notes are reflected in this note.

## 2024-08-20 ENCOUNTER — HOSPITAL ENCOUNTER (OUTPATIENT)
Facility: CLINIC | Age: 74
Discharge: HOME | End: 2024-08-20
Attending: INTERNAL MEDICINE | Admitting: INTERNAL MEDICINE
Payer: MEDICARE

## 2024-08-20 ENCOUNTER — ANESTHESIA (OUTPATIENT)
Dept: GASTROENTEROLOGY | Facility: CLINIC | Age: 74
End: 2024-08-20
Payer: MEDICARE

## 2024-08-20 VITALS
OXYGEN SATURATION: 99 % | DIASTOLIC BLOOD PRESSURE: 62 MMHG | TEMPERATURE: 97.9 F | RESPIRATION RATE: 14 BRPM | SYSTOLIC BLOOD PRESSURE: 125 MMHG

## 2024-08-20 LAB — UPPER GI ENDOSCOPY: NORMAL

## 2024-08-20 PROCEDURE — 250N000009 HC RX 250: Performed by: NURSE ANESTHETIST, CERTIFIED REGISTERED

## 2024-08-20 PROCEDURE — 99100 ANES PT EXTEME AGE<1 YR&>70: CPT | Performed by: NURSE ANESTHETIST, CERTIFIED REGISTERED

## 2024-08-20 PROCEDURE — 370N000017 HC ANESTHESIA TECHNICAL FEE, PER MIN: Performed by: INTERNAL MEDICINE

## 2024-08-20 PROCEDURE — 43237 ENDOSCOPIC US EXAM ESOPH: CPT | Performed by: ANESTHESIOLOGY

## 2024-08-20 PROCEDURE — 43237 ENDOSCOPIC US EXAM ESOPH: CPT | Performed by: NURSE ANESTHETIST, CERTIFIED REGISTERED

## 2024-08-20 PROCEDURE — 99100 ANES PT EXTEME AGE<1 YR&>70: CPT | Performed by: ANESTHESIOLOGY

## 2024-08-20 PROCEDURE — 250N000011 HC RX IP 250 OP 636: Performed by: NURSE ANESTHETIST, CERTIFIED REGISTERED

## 2024-08-20 PROCEDURE — 43247 EGD REMOVE FOREIGN BODY: CPT | Performed by: INTERNAL MEDICINE

## 2024-08-20 PROCEDURE — 258N000003 HC RX IP 258 OP 636: Performed by: NURSE ANESTHETIST, CERTIFIED REGISTERED

## 2024-08-20 RX ORDER — LIDOCAINE HYDROCHLORIDE 20 MG/ML
INJECTION, SOLUTION INFILTRATION; PERINEURAL PRN
Status: DISCONTINUED | OUTPATIENT
Start: 2024-08-20 | End: 2024-08-20

## 2024-08-20 RX ORDER — DEXAMETHASONE SODIUM PHOSPHATE 4 MG/ML
4 INJECTION, SOLUTION INTRA-ARTICULAR; INTRALESIONAL; INTRAMUSCULAR; INTRAVENOUS; SOFT TISSUE
Status: DISCONTINUED | OUTPATIENT
Start: 2024-08-20 | End: 2024-08-20 | Stop reason: HOSPADM

## 2024-08-20 RX ORDER — ONDANSETRON 4 MG/1
4 TABLET, ORALLY DISINTEGRATING ORAL EVERY 30 MIN PRN
Status: DISCONTINUED | OUTPATIENT
Start: 2024-08-20 | End: 2024-08-20 | Stop reason: HOSPADM

## 2024-08-20 RX ORDER — PROPOFOL 10 MG/ML
INJECTION, EMULSION INTRAVENOUS CONTINUOUS PRN
Status: DISCONTINUED | OUTPATIENT
Start: 2024-08-20 | End: 2024-08-20

## 2024-08-20 RX ORDER — OXYCODONE HYDROCHLORIDE 10 MG/1
10 TABLET ORAL
Status: DISCONTINUED | OUTPATIENT
Start: 2024-08-20 | End: 2024-08-20 | Stop reason: HOSPADM

## 2024-08-20 RX ORDER — SODIUM CHLORIDE, SODIUM LACTATE, POTASSIUM CHLORIDE, CALCIUM CHLORIDE 600; 310; 30; 20 MG/100ML; MG/100ML; MG/100ML; MG/100ML
INJECTION, SOLUTION INTRAVENOUS CONTINUOUS PRN
Status: DISCONTINUED | OUTPATIENT
Start: 2024-08-20 | End: 2024-08-20

## 2024-08-20 RX ORDER — PROPOFOL 10 MG/ML
INJECTION, EMULSION INTRAVENOUS PRN
Status: DISCONTINUED | OUTPATIENT
Start: 2024-08-20 | End: 2024-08-20

## 2024-08-20 RX ORDER — ONDANSETRON 2 MG/ML
4 INJECTION INTRAMUSCULAR; INTRAVENOUS EVERY 30 MIN PRN
Status: DISCONTINUED | OUTPATIENT
Start: 2024-08-20 | End: 2024-08-20 | Stop reason: HOSPADM

## 2024-08-20 RX ORDER — NALOXONE HYDROCHLORIDE 0.4 MG/ML
0.1 INJECTION, SOLUTION INTRAMUSCULAR; INTRAVENOUS; SUBCUTANEOUS
Status: DISCONTINUED | OUTPATIENT
Start: 2024-08-20 | End: 2024-08-20 | Stop reason: HOSPADM

## 2024-08-20 RX ORDER — OXYCODONE HYDROCHLORIDE 5 MG/1
5 TABLET ORAL
Status: DISCONTINUED | OUTPATIENT
Start: 2024-08-20 | End: 2024-08-20 | Stop reason: HOSPADM

## 2024-08-20 RX ADMIN — PROPOFOL 40 MG: 10 INJECTION, EMULSION INTRAVENOUS at 12:46

## 2024-08-20 RX ADMIN — LIDOCAINE HYDROCHLORIDE 80 MG: 20 INJECTION, SOLUTION INFILTRATION; PERINEURAL at 12:46

## 2024-08-20 RX ADMIN — PROPOFOL 30 MG: 10 INJECTION, EMULSION INTRAVENOUS at 12:55

## 2024-08-20 RX ADMIN — PROPOFOL 20 MG: 10 INJECTION, EMULSION INTRAVENOUS at 12:48

## 2024-08-20 RX ADMIN — PROPOFOL 150 MCG/KG/MIN: 10 INJECTION, EMULSION INTRAVENOUS at 12:44

## 2024-08-20 RX ADMIN — SODIUM CHLORIDE, POTASSIUM CHLORIDE, SODIUM LACTATE AND CALCIUM CHLORIDE: 600; 310; 30; 20 INJECTION, SOLUTION INTRAVENOUS at 12:44

## 2024-08-20 ASSESSMENT — ACTIVITIES OF DAILY LIVING (ADL)
ADLS_ACUITY_SCORE: 36

## 2024-08-20 NOTE — ANESTHESIA POSTPROCEDURE EVALUATION
Patient: Sushila Chambers    Procedure: Procedure(s):  ESOPHAGOGASTRODUODENOSCOPY, WITH FOREIGN BODY REMOVAL       Anesthesia Type:  MAC    Note:  Disposition: Outpatient   Postop Pain Control: Uneventful            Sign Out: Well controlled pain   PONV: No   Neuro/Psych: Uneventful            Sign Out: Acceptable/Baseline neuro status   Airway/Respiratory: Uneventful            Sign Out: Acceptable/Baseline resp. status   CV/Hemodynamics: Uneventful            Sign Out: Acceptable CV status; No obvious hypovolemia; No obvious fluid overload   Other NRE: NONE   DID A NON-ROUTINE EVENT OCCUR? No           Last vitals:  Vitals Value Taken Time   /62 08/20/24 1330   Temp     Pulse 77 08/20/24 1306   Resp 14 08/20/24 1330   SpO2 98 % 08/20/24 1334   Vitals shown include unfiled device data.    Electronically Signed By: Debi Quinonez MD  August 20, 2024  2:36 PM

## 2024-08-20 NOTE — ANESTHESIA CARE TRANSFER NOTE
Patient: Sushila Chambers    Procedure: Procedure(s):  ESOPHAGOGASTRODUODENOSCOPY, WITH FOREIGN BODY REMOVAL       Diagnosis: Encounter for removal of biliary stent [Z46.89]  Diagnosis Additional Information: No value filed.    Anesthesia Type:   MAC     Note:    Oropharynx: oropharynx clear of all foreign objects and spontaneously breathing  Level of Consciousness: awake  Oxygen Supplementation: room air    Independent Airway: airway patency satisfactory and stable  Dentition: dentition unchanged  Vital Signs Stable: post-procedure vital signs reviewed and stable  Report to RN Given: handoff report given  Patient transferred to: Phase II    Handoff Report: Identifed the Patient, Identified the Reponsible Provider, Reviewed the pertinent medical history, Discussed the surgical course, Reviewed Intra-OP anesthesia mangement and issues during anesthesia, Set expectations for post-procedure period and Allowed opportunity for questions and acknowledgement of understanding      Vitals:  Vitals Value Taken Time   /75    Temp 36.6    Pulse 77 08/20/24 1306   Resp 12    SpO2 98 % 08/20/24 1306   Vitals shown include unfiled device data.    Electronically Signed By: Charles Patrick Schlatter, APRN CRNA  August 20, 2024  1:06 PM

## 2024-10-04 ENCOUNTER — ORDERS ONLY (AUTO-RELEASED) (OUTPATIENT)
Dept: INTERNAL MEDICINE | Facility: CLINIC | Age: 74
End: 2024-10-04

## 2024-10-04 ENCOUNTER — OFFICE VISIT (OUTPATIENT)
Dept: INTERNAL MEDICINE | Facility: CLINIC | Age: 74
End: 2024-10-04
Payer: MEDICARE

## 2024-10-04 ENCOUNTER — LAB (OUTPATIENT)
Dept: LAB | Facility: CLINIC | Age: 74
End: 2024-10-04
Payer: MEDICARE

## 2024-10-04 VITALS
BODY MASS INDEX: 25.14 KG/M2 | WEIGHT: 141.9 LBS | HEART RATE: 106 BPM | OXYGEN SATURATION: 99 % | DIASTOLIC BLOOD PRESSURE: 81 MMHG | SYSTOLIC BLOOD PRESSURE: 154 MMHG

## 2024-10-04 DIAGNOSIS — Z12.11 COLON CANCER SCREENING: ICD-10-CM

## 2024-10-04 DIAGNOSIS — E78.5 HYPERLIPIDEMIA LDL GOAL <130: ICD-10-CM

## 2024-10-04 DIAGNOSIS — E03.9 HYPOTHYROIDISM, UNSPECIFIED TYPE: ICD-10-CM

## 2024-10-04 DIAGNOSIS — I10 BENIGN ESSENTIAL HYPERTENSION: ICD-10-CM

## 2024-10-04 DIAGNOSIS — Z00.00 ROUTINE GENERAL MEDICAL EXAMINATION AT A HEALTH CARE FACILITY: ICD-10-CM

## 2024-10-04 DIAGNOSIS — Z12.11 SCREEN FOR COLON CANCER: Primary | ICD-10-CM

## 2024-10-04 DIAGNOSIS — Z23 ENCOUNTER FOR IMMUNIZATION: ICD-10-CM

## 2024-10-04 DIAGNOSIS — Z29.11 NEED FOR VACCINATION AGAINST RESPIRATORY SYNCYTIAL VIRUS: ICD-10-CM

## 2024-10-04 DIAGNOSIS — N18.30 CKD (CHRONIC KIDNEY DISEASE) STAGE 3, GFR 30-59 ML/MIN (H): ICD-10-CM

## 2024-10-04 DIAGNOSIS — N18.30 STAGE 3 CHRONIC KIDNEY DISEASE, UNSPECIFIED WHETHER STAGE 3A OR 3B CKD (H): ICD-10-CM

## 2024-10-04 DIAGNOSIS — I10 UNCONTROLLED HYPERTENSION: ICD-10-CM

## 2024-10-04 LAB
ALBUMIN SERPL BCG-MCNC: 4.5 G/DL (ref 3.5–5.2)
ALP SERPL-CCNC: 62 U/L (ref 40–150)
ALT SERPL W P-5'-P-CCNC: 32 U/L (ref 0–50)
ANION GAP SERPL CALCULATED.3IONS-SCNC: 13 MMOL/L (ref 7–15)
AST SERPL W P-5'-P-CCNC: 32 U/L (ref 0–45)
BILIRUB SERPL-MCNC: 1.1 MG/DL
BUN SERPL-MCNC: 18.5 MG/DL (ref 8–23)
CALCIUM SERPL-MCNC: 10.9 MG/DL (ref 8.8–10.4)
CHLORIDE SERPL-SCNC: 97 MMOL/L (ref 98–107)
CHOLEST SERPL-MCNC: 187 MG/DL
CREAT SERPL-MCNC: 0.97 MG/DL (ref 0.51–0.95)
CREAT UR-MCNC: 115 MG/DL
EGFRCR SERPLBLD CKD-EPI 2021: 61 ML/MIN/1.73M2
FASTING STATUS PATIENT QL REPORTED: YES
FASTING STATUS PATIENT QL REPORTED: YES
GLUCOSE SERPL-MCNC: 93 MG/DL (ref 70–99)
HCO3 SERPL-SCNC: 29 MMOL/L (ref 22–29)
HDLC SERPL-MCNC: 61 MG/DL
LDLC SERPL CALC-MCNC: 81 MG/DL
MICROALBUMIN UR-MCNC: <12 MG/L
MICROALBUMIN/CREAT UR: NORMAL MG/G{CREAT}
NONHDLC SERPL-MCNC: 126 MG/DL
POTASSIUM SERPL-SCNC: 4.1 MMOL/L (ref 3.4–5.3)
PROT SERPL-MCNC: 7.7 G/DL (ref 6.4–8.3)
SODIUM SERPL-SCNC: 139 MMOL/L (ref 135–145)
TRIGL SERPL-MCNC: 224 MG/DL
TSH SERPL DL<=0.005 MIU/L-ACNC: 4.06 UIU/ML (ref 0.3–4.2)

## 2024-10-04 PROCEDURE — 36415 COLL VENOUS BLD VENIPUNCTURE: CPT | Performed by: PATHOLOGY

## 2024-10-04 PROCEDURE — 80053 COMPREHEN METABOLIC PANEL: CPT | Performed by: PATHOLOGY

## 2024-10-04 PROCEDURE — 90480 ADMN SARSCOV2 VAC 1/ONLY CMP: CPT | Performed by: INTERNAL MEDICINE

## 2024-10-04 PROCEDURE — G0439 PPPS, SUBSEQ VISIT: HCPCS | Performed by: INTERNAL MEDICINE

## 2024-10-04 PROCEDURE — 91320 SARSCV2 VAC 30MCG TRS-SUC IM: CPT | Performed by: INTERNAL MEDICINE

## 2024-10-04 PROCEDURE — 82043 UR ALBUMIN QUANTITATIVE: CPT | Performed by: INTERNAL MEDICINE

## 2024-10-04 PROCEDURE — 99000 SPECIMEN HANDLING OFFICE-LAB: CPT | Performed by: PATHOLOGY

## 2024-10-04 PROCEDURE — 90662 IIV NO PRSV INCREASED AG IM: CPT | Performed by: INTERNAL MEDICINE

## 2024-10-04 PROCEDURE — G0008 ADMIN INFLUENZA VIRUS VAC: HCPCS | Performed by: INTERNAL MEDICINE

## 2024-10-04 PROCEDURE — 84443 ASSAY THYROID STIM HORMONE: CPT | Performed by: PATHOLOGY

## 2024-10-04 PROCEDURE — 80061 LIPID PANEL: CPT | Performed by: PATHOLOGY

## 2024-10-04 RX ORDER — CHLORTHALIDONE 25 MG/1
25 TABLET ORAL DAILY
Qty: 90 TABLET | Refills: 3 | Status: SHIPPED | OUTPATIENT
Start: 2024-10-04

## 2024-10-04 RX ORDER — LISINOPRIL 10 MG/1
10 TABLET ORAL DAILY
Qty: 90 TABLET | Refills: 3 | Status: SHIPPED | OUTPATIENT
Start: 2024-10-04

## 2024-10-04 RX ORDER — ROSUVASTATIN CALCIUM 5 MG/1
5 TABLET, COATED ORAL DAILY
Qty: 90 TABLET | Refills: 3 | Status: SHIPPED | OUTPATIENT
Start: 2024-10-04

## 2024-10-04 SDOH — HEALTH STABILITY: PHYSICAL HEALTH: ON AVERAGE, HOW MANY DAYS PER WEEK DO YOU ENGAGE IN MODERATE TO STRENUOUS EXERCISE (LIKE A BRISK WALK)?: 4 DAYS

## 2024-10-04 ASSESSMENT — SOCIAL DETERMINANTS OF HEALTH (SDOH): HOW OFTEN DO YOU GET TOGETHER WITH FRIENDS OR RELATIVES?: MORE THAN THREE TIMES A WEEK

## 2024-10-04 NOTE — PROGRESS NOTES
Sushila was seen today for medicare visit and surgical followup.  She had an episode of ascending cholangitis, status post stent removal via EGD end of August, is doing well from that standpoint, no abdominal pain currently.    Her BP today is elevated and her home BP readings have been creeping up as well.  We discussed increasing lisinopril dose from 5 mg to 10.     She's due for colon cancer screening but had a difficult time tolerating the prep in the past.  We discussed cologuard as another screening option. Also due for labs.     No changes to past, family or social history and ROS negative. Physical exam as listed below was unremarkable.    A/P 74 year old female here for annual physical/wellness     CKD (chronic kidney disease) stage 3, GFR 30-59 ml/min (H)  -     Albumin Random Urine Quantitative with Creat Ratio; Future    Encounter for immunization  -     INFLUENZA HIGH DOSE, TRIVALENT, PF (FLUZONE)  -     COVID-19 12+ (PFIZER)    Routine general medical examination at a health care facility  -     REVIEW OF HEALTH MAINTENANCE PROTOCOL ORDERS    Colon cancer screening  -     COLOGUARD(EX[employer] SCIENCES); Future    Hypothyroidism, unspecified type  -     TSH with free T4 reflex; Future    Hyperlipidemia LDL goal <130  -     Lipid Profile NON-FASTING; Future  -     rosuvastatin (CRESTOR) 5 MG tablet; Take 1 tablet (5 mg) by mouth daily.    Benign essential hypertension  -     Comprehensive metabolic panel (BMP + Alb, Alk Phos, ALT, AST, Total. Bili, TP); Future  -     lisinopril (ZESTRIL) 10 MG tablet; Take 1 tablet (10 mg) by mouth daily.  -     chlorthalidone (HYGROTON) 25 MG tablet; Take 1 tablet (25 mg) by mouth daily.    Brittany Goodwin MD         Preventive Care Visit  [facility]  Brittany Goodwin MD, Internal Medicine  Oct 4, 2024        Subjective   Sushila is a 74 year old, presenting for the following:  Medicare Visit and Surgical Followup (Pt  had surgery on infected bile duct in July 2024. Pt reports she's healed well, no complications, although they discovered she has no gall bladder)        10/4/2024     8:35 AM   Additional Questions   Roomed by sahra bolivar         Health Care Directive  Patient does not have a Health Care Directive or Living Will:     HPI        10/4/2024   General Health   How would you rate your overall physical health? Good   Feel stress (tense, anxious, or unable to sleep) Only a little      (!) STRESS CONCERN      10/4/2024   Nutrition   Diet: Regular (no restrictions)            10/4/2024   Exercise   Days per week of moderate/strenous exercise 4 days            10/4/2024   Social Factors   Frequency of gathering with friends or relatives More than three times a week   Worry food won't last until get money to buy more No   Food not last or not have enough money for food? No   Do you have housing? (Housing is defined as stable permanent housing and does not include staying ouside in a car, in a tent, in an abandoned building, in an overnight shelter, or couch-surfing.) Yes   Are you worried about losing your housing? No   Lack of transportation? No   Unable to get utilities (heat,electricity)? No            10/4/2024   Fall Risk   Fallen 2 or more times in the past year? No   Trouble with walking or balance? No             10/4/2024   Activities of Daily Living- Home Safety   Needs help with the following daily activites None of the above   Safety concerns in the home Throw rugs in the hallway    No grab bars in the bathroom       Multiple values from one day are sorted in reverse-chronological order         10/4/2024   Dental   Dentist two times every year? Yes            10/4/2024   Hearing Screening   Hearing concerns? None of the above            10/4/2024   Driving Risk Screening   Patient/family members have concerns about driving No            10/4/2024   General Alertness/Fatigue Screening   Have you been more tired than  usual lately? (!) YES            10/4/2024   Urinary Incontinence Screening   Bothered by leaking urine in past 6 months No            10/4/2024   TB Screening   Were you born outside of the [place]? No            Today's PHQ-2 Score:       10/4/2024     8:36 AM   PHQ-2 ( 1999 [employer])   Q1: Little interest or pleasure in doing things 0   Q2: Feeling down, depressed or hopeless 0   PHQ-2 Score 0   Q1: Little interest or pleasure in doing things Not at all   Q2: Feeling down, depressed or hopeless Not at all   PHQ-2 Score 0           10/4/2024   Substance Use   Alcohol more than 3/day or more than 7/wk No   Do you have a current opioid prescription? No   How severe/bad is pain from 1 to 10? 1/10   Do you use any other substances recreationally? (!) BATH SALTS        Social History     Tobacco Use    Smoking status: Never    Smokeless tobacco: Never   Substance Use Topics    Alcohol use: Yes     Comment: Occasionally    Drug use: No           11/13/2023   LAST FHS-7 RESULTS   1st degree relative breast or ovarian cancer Yes   Any relative bilateral breast cancer No   Any male have breast cancer No   Any ONE woman have BOTH breast AND ovarian cancer No   Any woman with breast cancer before 50yrs No   2 or more relatives with breast AND/OR ovarian cancer No   2 or more relatives with breast AND/OR bowel cancer No             ASCVD Risk   The 10-year ASCVD risk score (Kate GANDHI, et al., 2019) is: 30%    Values used to calculate the score:      Age: 74 years      Sex: Female      Is Non- : No      Diabetic: No      Tobacco smoker: No      Systolic Blood Pressure: 154 mmHg      Is BP treated: Yes      HDL Cholesterol: 66 mg/dL      Total Cholesterol: 290 mg/dL          Reviewed and updated as needed this visit by Provider                  Current providers sharing in care for this patient include:  Patient Care Team:  Brittany Wills MD as PCP - General (Internal Medicine)  [place]  "Zahida Can MD as MD (Dermatology)  Brittany Villegas MD as Assigned Cancer Care Provider  Zack Dinh MD as MD (Dermapathology)  Shantel Grady MD as MD (Ophthalmology)  Brittany Wills MD as Referring Physician (Internal Medicine)  Brittany Wills MD as Assigned PCP  Ion Puente MD as MD (Ophthalmology)  Zack Dinh MD as Assigned Surgical Provider    The following health maintenance items are reviewed in Epic and correct as of today:  Health Maintenance   Topic Date Due    ANNUAL REVIEW OF HM ORDERS  Never done    COLORECTAL CANCER SCREENING  Never done    MICROALBUMIN  06/29/2022    RSV VACCINE (1 - 1-dose 75+ series) Never done    INFLUENZA VACCINE (1) 09/01/2024    COVID-19 Vaccine (5 - 2024-25 season) 09/01/2024    MEDICARE ANNUAL WELLNESS VISIT  09/15/2024    LIPID  10/04/2024    TSH W/FREE T4 REFLEX  10/04/2024    MAMMO SCREENING  11/13/2024    BMP  07/18/2025    HEMOGLOBIN  07/18/2025    FALL RISK ASSESSMENT  10/04/2025    ADVANCE CARE PLANNING  06/29/2026    DTAP/TDAP/TD IMMUNIZATION (2 - Td or Tdap) 03/21/2027    GLUCOSE  07/18/2027    DEXA  11/13/2038    HEPATITIS C SCREENING  Completed    PHQ-2 (once per calendar year)  Completed    Pneumococcal Vaccine: 65+ Years  Completed    URINALYSIS  Completed    ZOSTER IMMUNIZATION  Completed    HPV IMMUNIZATION  Aged Out    MENINGITIS IMMUNIZATION  Aged Out    RSV MONOCLONAL ANTIBODY  Aged Out            Objective    Exam  BP (!) 154/81 (BP Location: Right arm, Patient Position: Sitting, Cuff Size: Adult Regular)   Pulse 106   Wt 64.4 kg (141 lb 14.4 oz)   SpO2 99%   BMI 25.14 kg/m     Estimated body mass index is 25.14 kg/m  as calculated from the following:    Height as of 7/14/24: 1.6 m (5' 3\").    Weight as of this encounter: 64.4 kg (141 lb 14.4 oz).    Physical Exam  GENERAL: alert and no distress  EYES: Eyes grossly normal to inspection, PERRL and conjunctivae and sclerae normal  HENT: " ear canals and TM's normal, nose and mouth without ulcers or lesions  NECK: no adenopathy, no asymmetry, masses, or scars  RESP: lungs clear to auscultation - no rales, rhonchi or wheezes  CV: regular rate and rhythm, normal S1 S2, no S3 or S4, no murmur, click or rub, no peripheral edema  ABDOMEN: soft, nontender, no hepatosplenomegaly, no masses and bowel sounds normal  MS: no gross musculoskeletal defects noted, no edema  SKIN: no suspicious lesions or rashes  NEURO: Normal strength and tone, mentation intact and speech normal  PSYCH: mentation appears normal, affect normal/bright        10/4/2024   Mini Cog   Clock Draw Score 2 Normal   3 Item Recall 3 objects recalled   Mini Cog Total Score 5                 Signed Electronically by: Brittany Goodwin MD

## 2024-11-05 DIAGNOSIS — E03.9 HYPOTHYROIDISM, UNSPECIFIED TYPE: ICD-10-CM

## 2024-11-08 RX ORDER — LEVOTHYROXINE SODIUM 50 UG/1
50 TABLET ORAL DAILY
Qty: 90 TABLET | Refills: 3 | Status: SHIPPED | OUTPATIENT
Start: 2024-11-08

## 2024-11-16 NOTE — PROGRESS NOTES
MEDICAL ONCOLOGY FOLLOW-UP PATIENT VISIT     NAME: Sushila Chambers     DATE: 11/18/2024    PRIMARY CARE PHYSICIAN: Meryl Muro    PATIENT ID: Multifocal, stage IIa, T2N0M0, ER positive, WV negative, HER2 non-amplified invasive lobular carcinoma of the left breast.     Oncology History: Ms. Chambers is a 74 year old female with a history of lung cancer and right breast cancer with more recent invasive lobular carcinoma of the left breast. Ms. Chambers was treated for right sided breast cancer in 2009 with right breast lumpectomy, radiation, and endocrine therapy. She self palpated a mass in the left breast in early August, 2015. Diagnostic mammogram and ultrasound showed 2 ill-defined spiculated masses at the 4:30 and 2:00 positions of the left breast. There was surrounding architectural distortion and the total area of involvement measured 6.5 cm. Targeted left breast ultrasound showed a multilobulated hypoechoic mass at the 4:00 position measuring 2.5 cm and an ill-defined hypoechoic mass at the 2:00 position measuring 2.4 cm.  Biopsy of the mass at the 4:00 position showed grade II invasive pleomorphic lobular carcinoma. Biopsy of the mass at the 2:00 position showed a grade II invasive lobular carcinoma. No angiolymphatic invasion or associated LCIS was seen in either specimen. Estrogen receptor staining was positive in >80% of tumor cell nuclei. Progesterone receptor staining was positive in <5% of tumor cell nuclei. HER2 was non-amplifed by FISH in both specimens.     Ms. Chambers enrolled on the ISPY2 clinical trial.  She received 12 weeks of ganetespib and Taxol and 4 cycles of dose dense AC from 9/29/15 - 2/1/16. On 2/24/16 she underwent left breast mastectomy and sentinel lymph node procedure under the care of Dr. Betts. Pathology showed a residual grade II 4.8 cm, pleomorphic invasive lobular carcinoma with associated LCIS. Invasive tumor cellularity was 30%.  There was evidence of perineural invasion,  "however, no lymphvascular space invasion. Two sentinel lymph nodes were negative.  She has been on adjuvant curative intent treatment with letrozole since 03/2016.    Ms. Chambers had genetic testing on 9/15/15.  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  Of note she had a number of alterations in RUFINA, BARD1, BRCA1/2, MUTYH, and TP53 that were classified as \"likely benign\" and \"benign\".    Interim History:   Ms. Chambers comes into clinic today for an on treatment visit.  She is on adjuvant curative intent treatment with letrozole.  She notes that last summer, she became very sick with abdominal pain.  She was diagnosed with cholangitis.  She underwent biliary stenting and plan was to undergo a cholecystectomy.  However, her imaging demonstrated that she did not have a gallbladder.  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  She notes that overall she healed from this episode of cholangitis well.  She has now had her stents removed.  She tolerates dietary intake without any issues.  She denies current nausea, vomiting, abdominal pain, or diarrhea.  She denies concerning lumps, pain, or swelling of either her right breast or her left chest.  She was scheduled for right breast mammogram today, however upon walking out the door she discovered that her 90-year-old neighbor had fallen down her stairs and she stopped to help her.  She is therefore planning on rescheduling the mammogram.  She denies fevers, chills, or symptoms of infection.  She has no cough, shortness of breath, or chest pain.  She denies new bone or joint aches or pains.    PAST MEDICAL HISTORY:   Past Medical History:   Diagnosis Date    Basal cell carcinoma     Breast cancer, right breast (H)     History of blood transfusion     Hypothyroid     Lung cancer (H) 2012    right    Uncontrolled hypertension 11/15/2017       PAST SURGICAL HISTORY:  Past Surgical History: "   Procedure Laterality Date    ABDOMEN SURGERY      BIOPSY, LUNG NODULE Right 2012    + cancer    COLONOSCOPY      ECTOPIC PREGNANCY SURGERY Left 1988    ENDOSCOPIC RETROGRADE CHOLANGIOPANCREATOGRAM N/A 7/14/2024    Procedure: ENDOSCOPIC RETROGRADE CHOLANGIOPANCREATOGRAPHY, WITH BILIARY SPHINCTEROTOMY, STONE AND PUS REMOVAL, DILATION AND STENT PLACEMENT;  Surgeon: Guru Mendy Paiz MD;  Location:  OR    ESOPHAGOSCOPY, GASTROSCOPY, DUODENOSCOPY (EGD), COMBINED N/A 8/20/2024    Procedure: ESOPHAGOGASTRODUODENOSCOPY, WITH FOREIGN BODY REMOVAL;  Surgeon: Guru Mendy Paiz MD;  Location:  GI    GENITOURINARY SURGERY      GYN SURGERY      INSERT PORT VASCULAR ACCESS      LAPAROSCOPIC CHOLECYSTECTOMY N/A 7/16/2024    Procedure: DIAGNOSTIC LAPAROSCOPIC;  Surgeon: Travis Casas MD;  Location:  OR    LUMPECTOMY BREAST Right 2010    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    MASTECTOMY SIMPLE, SENTINEL NODE, COMBINED Left 2/24/2016    Procedure: COMBINED MASTECTOMY SIMPLE, SENTINEL NODE;  Surgeon: Satnam Betts MD;  [facility]    PHACOEMULSIFICATION WITH STANDARD INTRAOCULAR LENS IMPLANT Right 9/12/2022    Procedure: RIGHT EYE PHACOEMULSIFICATION, CATARACT, WITH  INTRAOCULAR LENS IMPLANT INSERTION TORIC LENS;  Surgeon: Ion Puente MD;  Location: [facility] OR    PHACOEMULSIFICATION WITH STANDARD INTRAOCULAR LENS IMPLANT Left 9/26/2022    Procedure: LEFT PHACOEMULSIFICATION, CATARACT, WITH STANDARD INTRAOCULAR LENS IMPLANT INSERTION;  Surgeon: Ion Puente MD;  Location: [facility] OR    REMOVE PORT VASCULAR ACCESS N/A 2/24/2016    Procedure: REMOVE PORT VASCULAR ACCESS;  Surgeon: Satnam Betts MD;  Location:  OR    THORACIC SURGERY       MEDS:  Current Outpatient Medications   Medication Sig Dispense Refill    acetaminophen (TYLENOL) 500 MG tablet Take 500-1,000 mg by mouth every 6 hours as needed for mild pain      aspirin 81 MG EC tablet Take 81 mg by mouth  daily      biotin 1000 MCG TABS tablet Take by mouth daily      calcium carbonate (OS-OCTAVIANO 500 MG [place]. [place]) 500 MG tablet Take by mouth 2 times daily      chlorthalidone (HYGROTON) 25 MG tablet Take 1 tablet (25 mg) by mouth daily. 90 tablet 3    letrozole (FEMARA) 2.5 MG tablet Take 1 tablet (2.5 mg) by mouth daily 90 tablet 3    levothyroxine (SYNTHROID/LEVOTHROID) 50 MCG tablet TAKE 1 TABLET DAILY 90 tablet 3    lisinopril (ZESTRIL) 10 MG tablet Take 1 tablet (10 mg) by mouth daily. 90 tablet 3    lisinopril (ZESTRIL) 5 MG tablet Take 1 tablet (5 mg) by mouth daily 90 tablet 2    multivitamin w/minerals (THERA-VIT-M) tablet Take 1 tablet by mouth daily      rosuvastatin (CRESTOR) 5 MG tablet Take 1 tablet (5 mg) by mouth daily. 90 tablet 3    vitamin C (ASCORBIC ACID) 100 MG tablet       VITAMIN D, CHOLECALCIFEROL, PO Take by mouth daily       No current facility-administered medications for this visit.     ALLERGIES:  Allergies   Allergen Reactions    Latex Rash     SKIN REACTION    SKIN REACTION    Sulfa Antibiotics Itching and Rash        PHYSICAL EXAM:  BP (!) 149/79 (BP Location: Right arm, Patient Position: Sitting, Cuff Size: Adult Regular)   Pulse 104   Temp 97.9  F (36.6  C) (Oral)   Resp 16   Wt 65.6 kg (144 lb 9.6 oz)   SpO2 99%   BMI 25.61 kg/m    General:  Well appearing adult female in NAD.  Alert and oriented x 3.  HEENT:  Normocephalic.  Sclera anicteric.  MMM.    Lymph:  No palpable cervical, supraclavicular, or axillary LAD.  Chest:  CTA bilaterally.  No wheezes or crackles.  CV:  RRR.  Nl S1 and S2.  No audible m/r/g.  Breast:  Right breast is of increased fibroglandular density to palpation.  Right breast periareolar lumpectomy changes.  Left mastectomy.  There are no dominant or discretely palpable masses or nodularity of either the right breast or the left chest wall, unchanged from prior visit.  Abd:  Soft/ND.    Ext:  No pitting edema of the bilateral lower extremities.    Musculo:   Full ROM of the bilateral upper extremities.  Neuro:  Cranial nerves grossly intact.  Gait is stable.  Able to climb on the exam table unaided.  Psych:  Mood and affect appear normal.    LABS REVIEWED THIS VISIT:  11/18/2024 Right breast screening mammogram:  Appointment canceled.      IMPRESSION/PLAN: 75 yo female with a h/o T2N0M0, grade II, ER positive, ID negative, HER2 non-amplified invasive lobular carcinoma of the left breast. She is s/p treatment with taxol and ganetespib followed by dose dense adriamycin and cyclophosphamide, left breast mastectomy (pyT2N0), and has been on curative intent treatment with letrozole since 03/2016.    1.  Left breast ER-positive carcinoma:  Ms. Chambers is 8 years, 9 months out from excision of a left breast cancer.  She continues on adjuvant curative intent treatment with letrozole.  Plan is to treat for a total of 10 years (through 03/2026).      Continue annual screening mammogram of the right breast.  - Right breast screening mammogram was scheduled for today, however patient canceled due to helping her neighbor after a fall.  We will help her to reschedule it.  - Return to clinic in 6 months.    2.  Cancer screening:  No change since last visit.  Continue annual mammography as above.  Colon cancer screening per recommendations of PCP.     3.  Osteopenia:  DEXA in 11/2023 with a lowest T-score of -1.2 c/w osteopenia.    - Continue calcium and vitamin D supplementation as well as daily weightbearing activity.    - plan to repeat a DEXA in 11/2025    4,  Cholangitis:  Unclear etiology.  Now resolved.  Notably, during this episode it was discovered that patient does not have a gallbladder.     5.  Followup:    Please help patient to reschedule her right breast screening mammogram.  Return to clinic for visit with me in 6 months.    I spent 30 minutes on the date of the encounter doing chart review, review of test results, interpretation of tests, patient visit, and  documentation

## 2024-11-18 ENCOUNTER — ONCOLOGY VISIT (OUTPATIENT)
Dept: ONCOLOGY | Facility: CLINIC | Age: 74
End: 2024-11-18
Attending: INTERNAL MEDICINE
Payer: MEDICARE

## 2024-11-18 VITALS
WEIGHT: 144.6 LBS | SYSTOLIC BLOOD PRESSURE: 149 MMHG | BODY MASS INDEX: 25.61 KG/M2 | OXYGEN SATURATION: 99 % | RESPIRATION RATE: 16 BRPM | DIASTOLIC BLOOD PRESSURE: 79 MMHG | TEMPERATURE: 97.9 F | HEART RATE: 104 BPM

## 2024-11-18 DIAGNOSIS — C50.512 MALIGNANT NEOPLASM OF LOWER-OUTER QUADRANT OF LEFT BREAST OF FEMALE, ESTROGEN RECEPTOR POSITIVE (H): Primary | ICD-10-CM

## 2024-11-18 DIAGNOSIS — Z87.19 HISTORY OF CHOLANGITIS: ICD-10-CM

## 2024-11-18 DIAGNOSIS — Z17.0 MALIGNANT NEOPLASM OF LOWER-OUTER QUADRANT OF LEFT BREAST OF FEMALE, ESTROGEN RECEPTOR POSITIVE (H): Primary | ICD-10-CM

## 2024-11-18 PROCEDURE — G0463 HOSPITAL OUTPT CLINIC VISIT: HCPCS | Performed by: INTERNAL MEDICINE

## 2024-11-18 PROCEDURE — 99214 OFFICE O/P EST MOD 30 MIN: CPT | Performed by: INTERNAL MEDICINE

## 2024-11-18 ASSESSMENT — PAIN SCALES - GENERAL: PAINLEVEL_OUTOF10: NO PAIN (0)

## 2024-11-18 NOTE — LETTER
11/18/2024      Sushila Chambers  [address] [place] [address]      Dear Colleague,    Thank you for referring your patient, Sushila Chambers, to the [facility]. Please see a copy of my visit note below.    MEDICAL ONCOLOGY FOLLOW-UP PATIENT VISIT     NAME: Sushila Chambers     DATE: 11/18/2024    PRIMARY CARE PHYSICIAN: Meryl Muro    PATIENT ID: Multifocal, stage IIa, T2N0M0, ER positive, MT negative, HER2 non-amplified invasive lobular carcinoma of the left breast.     Oncology History: Ms. Chambers is a 74 year old female with a history of lung cancer and right breast cancer with more recent invasive lobular carcinoma of the left breast. Ms. Chambers was treated for right sided breast cancer in 2009 with right breast lumpectomy, radiation, and endocrine therapy. She self palpated a mass in the left breast in early August, 2015. Diagnostic mammogram and ultrasound showed 2 ill-defined spiculated masses at the 4:30 and 2:00 positions of the left breast. There was surrounding architectural distortion and the total area of involvement measured 6.5 cm. Targeted left breast ultrasound showed a multilobulated hypoechoic mass at the 4:00 position measuring 2.5 cm and an ill-defined hypoechoic mass at the 2:00 position measuring 2.4 cm.  Biopsy of the mass at the 4:00 position showed grade II invasive pleomorphic lobular carcinoma. Biopsy of the mass at the 2:00 position showed a grade II invasive lobular carcinoma. No angiolymphatic invasion or associated LCIS was seen in either specimen. Estrogen receptor staining was positive in >80% of tumor cell nuclei. Progesterone receptor staining was positive in <5% of tumor cell nuclei. HER2 was non-amplifed by FISH in both specimens.     Ms. Chambers enrolled on the ISPY2 clinical trial.  She received 12 weeks of ganetespib and Taxol and 4 cycles of dose dense AC from 9/29/15 - 2/1/16. On 2/24/16 she underwent left breast mastectomy and  "sentinel lymph node procedure under the care of Dr. Betts. Pathology showed a residual grade II 4.8 cm, pleomorphic invasive lobular carcinoma with associated LCIS. Invasive tumor cellularity was 30%.  There was evidence of perineural invasion, however, no lymphvascular space invasion. Two sentinel lymph nodes were negative.  She has been on adjuvant curative intent treatment with letrozole since 03/2016.    Ms. Chambers had genetic testing on 9/15/15.  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  Of note she had a number of alterations in RUFINA, BARD1, BRCA1/2, MUTYH, and TP53 that were classified as \"likely benign\" and \"benign\".    Interim History:   Ms. Chambers comes into clinic today for an on treatment visit.  She is on adjuvant curative intent treatment with letrozole.  She notes that last summer, she became very sick with abdominal pain.  She was diagnosed with cholangitis.  She underwent biliary stenting and plan was to undergo a cholecystectomy.  However, her imaging demonstrated that she did not have a gallbladder.  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  She notes that overall she healed from this episode of cholangitis well.  She has now had her stents removed.  She tolerates dietary intake without any issues.  She denies current nausea, vomiting, abdominal pain, or diarrhea.  She denies concerning lumps, pain, or swelling of either her right breast or her left chest.  She was scheduled for right breast mammogram today, however upon walking out the door she discovered that her 90-year-old neighbor had fallen down her stairs and she stopped to help her.  She is therefore planning on rescheduling the mammogram.  She denies fevers, chills, or symptoms of infection.  She has no cough, shortness of breath, or chest pain.  She denies new bone or joint aches or pains.    PAST MEDICAL HISTORY:   Past Medical History:   Diagnosis " Date     Basal cell carcinoma      Breast cancer, right breast (H)      History of blood transfusion      Hypothyroid      Lung cancer (H) 2012    right     Uncontrolled hypertension 11/15/2017       PAST SURGICAL HISTORY:  Past Surgical History:   Procedure Laterality Date     ABDOMEN SURGERY       BIOPSY, LUNG NODULE Right 2012    + cancer     COLONOSCOPY       ECTOPIC PREGNANCY SURGERY Left 1988     ENDOSCOPIC RETROGRADE CHOLANGIOPANCREATOGRAM N/A 7/14/2024    Procedure: ENDOSCOPIC RETROGRADE CHOLANGIOPANCREATOGRAPHY, WITH BILIARY SPHINCTEROTOMY, STONE AND PUS REMOVAL, DILATION AND STENT PLACEMENT;  Surgeon: Guru Mendy Paiz MD;  [facility]     ESOPHAGOSCOPY, GASTROSCOPY, DUODENOSCOPY (EGD), COMBINED N/A 8/20/2024    Procedure: ESOPHAGOGASTRODUODENOSCOPY, WITH FOREIGN BODY REMOVAL;  Surgeon: Guru Mendy Paiz MD;  [facility]: [facility] GI     GENITOURINARY SURGERY       GYN SURGERY       INSERT PORT VASCULAR ACCESS       LAPAROSCOPIC CHOLECYSTECTOMY N/A 7/16/2024    Procedure: DIAGNOSTIC LAPAROSCOPIC;  Surgeon: Travis Casas MD;  [facility]     LUMPECTOMY BREAST Right 2010     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     MASTECTOMY SIMPLE, SENTINEL NODE, COMBINED Left 2/24/2016    Procedure: COMBINED MASTECTOMY SIMPLE, SENTINEL NODE;  Surgeon: Satnam Betts MD;  Location: [facility]     PHACOEMULSIFICATION WITH STANDARD INTRAOCULAR LENS IMPLANT Right 9/12/2022    Procedure: RIGHT EYE PHACOEMULSIFICATION, CATARACT, WITH  INTRAOCULAR LENS IMPLANT INSERTION TORIC LENS;  Surgeon: Ion Puente MD;  Location: [facility] OR     PHACOEMULSIFICATION WITH STANDARD INTRAOCULAR LENS IMPLANT Left 9/26/2022    Procedure: LEFT PHACOEMULSIFICATION, CATARACT, WITH STANDARD INTRAOCULAR LENS IMPLANT INSERTION;  Surgeon: Ion Puente MD;  Location: [facility] OR     REMOVE PORT VASCULAR ACCESS N/A 2/24/2016    Procedure: REMOVE PORT VASCULAR ACCESS;  Surgeon: Satnam Betts MD;  Location:  [facility] OR     THORACIC SURGERY       MEDS:  Current Outpatient Medications   Medication Sig Dispense Refill     acetaminophen (TYLENOL) 500 MG tablet Take 500-1,000 mg by mouth every 6 hours as needed for mild pain       aspirin 81 MG EC tablet Take 81 mg by mouth daily       biotin 1000 MCG TABS tablet Take by mouth daily       calcium carbonate (OS-OCTAVIANO 500 MG [place]. [place]) 500 MG tablet Take by mouth 2 times daily       chlorthalidone (HYGROTON) 25 MG tablet Take 1 tablet (25 mg) by mouth daily. 90 tablet 3     letrozole (FEMARA) 2.5 MG tablet Take 1 tablet (2.5 mg) by mouth daily 90 tablet 3     levothyroxine (SYNTHROID/LEVOTHROID) 50 MCG tablet TAKE 1 TABLET DAILY 90 tablet 3     lisinopril (ZESTRIL) 10 MG tablet Take 1 tablet (10 mg) by mouth daily. 90 tablet 3     lisinopril (ZESTRIL) 5 MG tablet Take 1 tablet (5 mg) by mouth daily 90 tablet 2     multivitamin w/minerals (THERA-VIT-M) tablet Take 1 tablet by mouth daily       rosuvastatin (CRESTOR) 5 MG tablet Take 1 tablet (5 mg) by mouth daily. 90 tablet 3     vitamin C (ASCORBIC ACID) 100 MG tablet        VITAMIN D, CHOLECALCIFEROL, PO Take by mouth daily       No current facility-administered medications for this visit.     ALLERGIES:  Allergies   Allergen Reactions     Latex Rash     SKIN REACTION    SKIN REACTION     Sulfa Antibiotics Itching and Rash        PHYSICAL EXAM:  BP (!) 149/79 (BP Location: Right arm, Patient Position: Sitting, Cuff Size: Adult Regular)   Pulse 104   Temp 97.9  F (36.6  C) (Oral)   Resp 16   Wt 65.6 kg (144 lb 9.6 oz)   SpO2 99%   BMI 25.61 kg/m    General:  Well appearing adult female in NAD.  Alert and oriented x 3.  HEENT:  Normocephalic.  Sclera anicteric.  MMM.    Lymph:  No palpable cervical, supraclavicular, or axillary LAD.  Chest:  CTA bilaterally.  No wheezes or crackles.  CV:  RRR.  Nl S1 and S2.  No audible m/r/g.  Breast:  Right breast is of increased fibroglandular density to palpation.  Right breast periareolar  lumpectomy changes.  Left mastectomy.  There are no dominant or discretely palpable masses or nodularity of either the right breast or the left chest wall, unchanged from prior visit.  Abd:  Soft/ND.    Ext:  No pitting edema of the bilateral lower extremities.    Musculo:  Full ROM of the bilateral upper extremities.  Neuro:  Cranial nerves grossly intact.  Gait is stable.  Able to climb on the exam table unaided.  Psych:  Mood and affect appear normal.    LABS REVIEWED THIS VISIT:  11/18/2024 Right breast screening mammogram:  Appointment canceled.      IMPRESSION/PLAN: 73 yo female with a h/o T2N0M0, grade II, ER positive, AL negative, HER2 non-amplified invasive lobular carcinoma of the left breast. She is s/p treatment with taxol and ganetespib followed by dose dense adriamycin and cyclophosphamide, left breast mastectomy (pyT2N0), and has been on curative intent treatment with letrozole since 03/2016.    1.  Left breast ER-positive carcinoma:  Ms. Chambers is 8 years, 9 months out from excision of a left breast cancer.  She continues on adjuvant curative intent treatment with letrozole.  Plan is to treat for a total of 10 years (through 03/2026).      Continue annual screening mammogram of the right breast.  - Right breast screening mammogram was scheduled for today, however patient canceled due to helping her neighbor after a fall.  We will help her to reschedule it.  - Return to clinic in 6 months.    2.  Cancer screening:  No change since last visit.  Continue annual mammography as above.  Colon cancer screening per recommendations of PCP.     3.  Osteopenia:  DEXA in 11/2023 with a lowest T-score of -1.2 c/w osteopenia.    - Continue calcium and vitamin D supplementation as well as daily weightbearing activity.    - plan to repeat a DEXA in 11/2025    4,  Cholangitis:  Unclear etiology.  Now resolved.  Notably, during this episode it was discovered that patient does not have a gallbladder.     5.  Followup:     Please help patient to reschedule her right breast screening mammogram.  Return to clinic for visit with me in 6 months.    I spent 30 minutes on the date of the encounter doing chart review, review of test results, interpretation of tests, patient visit, and documentation       Again, thank you for allowing me to participate in the care of your patient.        Sincerely,        Brittany Villegas MD

## 2024-11-18 NOTE — NURSING NOTE
"Oncology Rooming Note    November 18, 2024 1:52 PM   Sushila Chambers is a 74 year old female who presents for:    Chief Complaint   Patient presents with    Oncology Clinic Visit    Breast Cancer     Initial Vitals: BP (!) 169/91 (BP Location: Right arm, Patient Position: Sitting, Cuff Size: Adult Regular)   Pulse 104   Temp 97.9  F (36.6  C) (Oral)   Resp 16   Wt 65.6 kg (144 lb 9.6 oz)   SpO2 99%   BMI 25.61 kg/m   Estimated body mass index is 25.61 kg/m  as calculated from the following:    Height as of 7/14/24: 1.6 m (5' 3\").    Weight as of this encounter: 65.6 kg (144 lb 9.6 oz). Body surface area is 1.71 meters squared.  No Pain (0) Comment: Data Unavailable   No LMP recorded. Patient is postmenopausal.  Allergies reviewed: Yes  Medications reviewed: Yes    Medications: Medication refills not needed today.  Pharmacy name entered into [employer]:    [facility] - [place], [place] - [address] [id number] - [place], [place] - [address] [facility] [address]  [facility]  [email or web address] - [place], [place] - [address]  [facility] - [address]    Frailty Screening:   Is the patient here for a new oncology consult visit in cancer care? 2. No      Clinical concerns: Pt reports no new concerns today.       Alayna Cason, EMT     "

## 2024-11-19 ENCOUNTER — ANCILLARY PROCEDURE (OUTPATIENT)
Dept: MAMMOGRAPHY | Facility: CLINIC | Age: 74
End: 2024-11-19
Payer: MEDICARE

## 2024-11-19 DIAGNOSIS — Z12.31 VISIT FOR SCREENING MAMMOGRAM: ICD-10-CM

## 2024-11-19 PROCEDURE — 77067 SCR MAMMO BI INCL CAD: CPT | Mod: 52

## 2024-11-19 PROCEDURE — 77063 BREAST TOMOSYNTHESIS BI: CPT | Mod: 52

## 2025-01-20 DIAGNOSIS — C50.512 MALIGNANT NEOPLASM OF LOWER-OUTER QUADRANT OF LEFT FEMALE BREAST (H): ICD-10-CM

## 2025-01-20 RX ORDER — LETROZOLE 2.5 MG/1
1 TABLET, FILM COATED ORAL DAILY
Qty: 90 TABLET | Refills: 3 | Status: SHIPPED | OUTPATIENT
Start: 2025-01-20

## 2025-01-20 NOTE — TELEPHONE ENCOUNTER
Letrozole Refill   Last prescribing provider: Dr Villegas     Last clinic visit date: 11/18/24 Dr Villegas     Recommendations for requested medication (if none, N/A): Copied from chart note   IMPRESSION/PLAN: 75 yo female with a h/o T2N0M0, grade II, ER positive, VT negative, HER2 non-amplified invasive lobular carcinoma of the left breast. She is s/p treatment with taxol and ganetespib followed by dose dense adriamycin and cyclophosphamide, left breast mastectomy (pyT2N0), and has been on curative intent treatment with letrozole since 03/2016.     Any other pertinent information (if none, N/A): N/A    Refilled: Y/N, if NO, why?

## 2025-05-18 NOTE — PROGRESS NOTES
MEDICAL ONCOLOGY FOLLOW-UP PATIENT VISIT     NAME: Sushila Chambers     DATE: 5/19/2025    PRIMARY CARE PHYSICIAN: Meryl Muro    PATIENT ID: Multifocal, stage IIa, T2N0M0, ER positive, AR negative, HER2 non-amplified invasive lobular carcinoma of the left breast.     Oncology History: Ms. Chambers is a 75 year old female with a history of lung cancer and right breast cancer with more recent invasive lobular carcinoma of the left breast. Ms. Chambers was treated for right sided breast cancer in 2009 with right breast lumpectomy, radiation, and endocrine therapy. She self palpated a mass in the left breast in early August, 2015. Diagnostic mammogram and ultrasound showed 2 ill-defined spiculated masses at the 4:30 and 2:00 positions of the left breast. There was surrounding architectural distortion and the total area of involvement measured 6.5 cm. Targeted left breast ultrasound showed a multilobulated hypoechoic mass at the 4:00 position measuring 2.5 cm and an ill-defined hypoechoic mass at the 2:00 position measuring 2.4 cm.  Biopsy of the mass at the 4:00 position showed grade II invasive pleomorphic lobular carcinoma. Biopsy of the mass at the 2:00 position showed a grade II invasive lobular carcinoma. No angiolymphatic invasion or associated LCIS was seen in either specimen. Estrogen receptor staining was positive in >80% of tumor cell nuclei. Progesterone receptor staining was positive in <5% of tumor cell nuclei. HER2 was non-amplifed by FISH in both specimens.     Ms. Chambers enrolled on the ISPY2 clinical trial.  She received 12 weeks of ganetespib and Taxol and 4 cycles of dose dense AC from 9/29/15 - 2/1/16. On 2/24/16 she underwent left breast mastectomy and sentinel lymph node procedure under the care of Dr. Betts. Pathology showed a residual grade II 4.8 cm, pleomorphic invasive lobular carcinoma with associated LCIS. Invasive tumor cellularity was 30%.  There was evidence of perineural invasion,  "however, no lymphvascular space invasion. Two sentinel lymph nodes were negative.  She has been on adjuvant curative intent treatment with letrozole since 03/2016.    Ms. Chambers had genetic testing on 9/15/15.  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  Of note she had a number of alterations in RUFINA, BARD1, BRCA1/2, MUTYH, and TP53 that were classified as \"likely benign\" and \"benign\".    Interim History:   Ms. Chambers comes into clinic today for an on treatment visit.  She is on adjuvant curative intent treatment with letrozole.  She tolerates the medication well.  She denies any changes to her health since our last visit.  She lives independently.  She has a black labrador retriever that she walks daily, In addition, she works with a  twice a week.  One day, does resistance training, the other day she does pilates/core work.  She denies concerning lumps of either the right breast or the left chest wall.  She has no cough, shortness of breath or chest pain.  She has no abdominal complaints.  She denies bone or joint aches or pains.    PAST MEDICAL HISTORY:   Past Medical History:   Diagnosis Date    Basal cell carcinoma     Breast cancer, right breast (H)     History of blood transfusion     Hypothyroid     Lung cancer (H) 2012    right    Uncontrolled hypertension 11/15/2017       PAST SURGICAL HISTORY:  Past Surgical History:   Procedure Laterality Date    ABDOMEN SURGERY      BIOPSY, LUNG NODULE Right 2012    + cancer    COLONOSCOPY      ECTOPIC PREGNANCY SURGERY Left 1988    ENDOSCOPIC RETROGRADE CHOLANGIOPANCREATOGRAM N/A 7/14/2024    Procedure: ENDOSCOPIC RETROGRADE CHOLANGIOPANCREATOGRAPHY, WITH BILIARY SPHINCTEROTOMY, STONE AND PUS REMOVAL, DILATION AND STENT PLACEMENT;  Surgeon: Guru Mendy Paiz MD;  [facility]    ESOPHAGOSCOPY, GASTROSCOPY, DUODENOSCOPY (EGD), COMBINED N/A 8/20/2024    Procedure: ESOPHAGOGASTRODUODENOSCOPY, WITH FOREIGN BODY REMOVAL;  " Surgeon: Guru Mendy Paiz MD;  Location:  GI    GENITOURINARY SURGERY      GYN SURGERY      INSERT PORT VASCULAR ACCESS      LAPAROSCOPIC CHOLECYSTECTOMY N/A 7/16/2024    Procedure: DIAGNOSTIC LAPAROSCOPIC;  Surgeon: Travis Casas MD;  Location:  OR    LUMPECTOMY BREAST Right 2010    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    MASTECTOMY SIMPLE, SENTINEL NODE, COMBINED Left 2/24/2016    Procedure: COMBINED MASTECTOMY SIMPLE, SENTINEL NODE;  Surgeon: Satnam Betts MD;  Location:  OR    PHACOEMULSIFICATION WITH STANDARD INTRAOCULAR LENS IMPLANT Right 9/12/2022    Procedure: RIGHT EYE PHACOEMULSIFICATION, CATARACT, WITH  INTRAOCULAR LENS IMPLANT INSERTION TORIC LENS;  Surgeon: Ion Puente MD;  Location: [facility] OR    PHACOEMULSIFICATION WITH STANDARD INTRAOCULAR LENS IMPLANT Left 9/26/2022    Procedure: LEFT PHACOEMULSIFICATION, CATARACT, WITH STANDARD INTRAOCULAR LENS IMPLANT INSERTION;  Surgeon: Ion Puente MD;  Location: [facility] OR    REMOVE PORT VASCULAR ACCESS N/A 2/24/2016    Procedure: REMOVE PORT VASCULAR ACCESS;  Surgeon: Satnam Betts MD;  Location:  OR    THORACIC SURGERY       MEDS:  Current Outpatient Medications   Medication Sig Dispense Refill    acetaminophen (TYLENOL) 500 MG tablet Take 500-1,000 mg by mouth every 6 hours as needed for mild pain      aspirin 81 MG EC tablet Take 81 mg by mouth daily      biotin 1000 MCG TABS tablet Take by mouth daily      calcium carbonate (OS-OCTAVIANO 500 MG [place]. [place]) 500 MG tablet Take by mouth 2 times daily      chlorthalidone (HYGROTON) 25 MG tablet Take 1 tablet (25 mg) by mouth daily. 90 tablet 3    letrozole (FEMARA) 2.5 MG tablet TAKE 1 TABLET DAILY 90 tablet 3    levothyroxine (SYNTHROID/LEVOTHROID) 50 MCG tablet TAKE 1 TABLET DAILY 90 tablet 3    lisinopril (ZESTRIL) 10 MG tablet Take 1 tablet (10 mg) by mouth daily. 90 tablet 3    lisinopril (ZESTRIL) 5 MG tablet Take 1 tablet (5 mg) by mouth daily 90 tablet 2     "multivitamin w/minerals (THERA-VIT-M) tablet Take 1 tablet by mouth daily      rosuvastatin (CRESTOR) 5 MG tablet Take 1 tablet (5 mg) by mouth daily. 90 tablet 3    vitamin C (ASCORBIC ACID) 100 MG tablet       VITAMIN D, CHOLECALCIFEROL, PO Take by mouth daily       No current facility-administered medications for this visit.     ALLERGIES:  Allergies   Allergen Reactions    Latex Rash     SKIN REACTION    SKIN REACTION    Sulfa Antibiotics Itching and Rash        PHYSICAL EXAM:  /77   Pulse 103   Temp 98  F (36.7  C) (Tympanic)   Resp 18   Ht 1.651 m (5' 5\")   Wt 65.8 kg (145 lb)   SpO2 99%   BMI 24.13 kg/m    General:  Well appearing adult female in NAD.  Alert and oriented x 3.  HEENT:  Normocephalic.  Sclera anicteric.  MMM.    Lymph:  No palpable cervical, supraclavicular, or axillary LAD.  Chest:  CTA bilaterally.  No wheezes or crackles.  CV:  RRR.  Nl S1 and S2.  No audible m/r/g.  Breast:  Right breast is of increased fibroglandular density to palpation.  Right breast periareolar lumpectomy changes.  Left mastectomy.  There are no dominant or discretely palpable masses or nodularity of either the right breast or the left chest wall.  Abd:  Soft/ND.    Ext:  No pitting edema of the bilateral lower extremities.    Musculo:  Full ROM of the bilateral upper extremities.  Neuro:  Cranial nerves grossly intact.  Gait is stable.  Able to climb on the exam table unaided.  Psych:  Mood and affect appear normal.    LABS REVIEWED THIS VISIT:  No interim imaging or laboratory results pertinent to today's visit.    IMPRESSION/PLAN: 74 yo female with a h/o T2N0M0, grade II, ER positive, OH negative, HER2 non-amplified invasive lobular carcinoma of the left breast. She is s/p treatment with taxol and ganetespib followed by dose dense adriamycin and cyclophosphamide, left breast mastectomy (pyT2N0), and has been on curative intent treatment with letrozole since 03/2016.    1.  Left breast ER-positive " carcinoma:  Ms. Chambers is 9 years, 3 months out from excision of a left breast cancer.  She continues on adjuvant curative intent treatment with letrozole.  Plan is to treat for a total of 10 years (through 03/2026).      Continue annual screening mammogram of the right breast.  - Right breast screening mammogram and return visit with me 11/20/2025 or later.    2.  Cancer screening:  No change since last visit.  Continue annual mammography as above.    - Her PCP has ordered a Cologuard.  Sushila will follow up with her PCP for this.     3.  Osteopenia:  DEXA in 11/2023 with a lowest T-score of -1.2 c/w osteopenia.  She is at risk for osteoporosis on aromatase inhibitor therapy.  - Continue calcium and vitamin D supplementation as well as daily weightbearing activity.    - plan to repeat a DEXA at the time of return visit     4.  Followup:    DEXA bone density scan, right breast screening mammogram and return visit with me 11/20/2025 or later.    I spent 25 minutes on the date of the encounter doing chart review, review of test results, interpretation of tests, patient visit, and documentation

## 2025-05-19 ENCOUNTER — ONCOLOGY VISIT (OUTPATIENT)
Dept: ONCOLOGY | Facility: CLINIC | Age: 75
End: 2025-05-19
Attending: INTERNAL MEDICINE
Payer: MEDICARE

## 2025-05-19 VITALS
HEIGHT: 65 IN | WEIGHT: 145 LBS | OXYGEN SATURATION: 99 % | SYSTOLIC BLOOD PRESSURE: 139 MMHG | HEART RATE: 103 BPM | DIASTOLIC BLOOD PRESSURE: 77 MMHG | TEMPERATURE: 98 F | BODY MASS INDEX: 24.16 KG/M2 | RESPIRATION RATE: 18 BRPM

## 2025-05-19 DIAGNOSIS — C50.512 MALIGNANT NEOPLASM OF LOWER-OUTER QUADRANT OF LEFT BREAST OF FEMALE, ESTROGEN RECEPTOR POSITIVE (H): Primary | ICD-10-CM

## 2025-05-19 DIAGNOSIS — M85.851 OSTEOPENIA OF NECK OF RIGHT FEMUR: ICD-10-CM

## 2025-05-19 DIAGNOSIS — Z12.31 VISIT FOR SCREENING MAMMOGRAM: ICD-10-CM

## 2025-05-19 DIAGNOSIS — Z79.811 LONG TERM (CURRENT) USE OF AROMATASE INHIBITORS: ICD-10-CM

## 2025-05-19 DIAGNOSIS — Z17.0 MALIGNANT NEOPLASM OF LOWER-OUTER QUADRANT OF LEFT BREAST OF FEMALE, ESTROGEN RECEPTOR POSITIVE (H): Primary | ICD-10-CM

## 2025-05-19 PROCEDURE — 99213 OFFICE O/P EST LOW 20 MIN: CPT | Performed by: INTERNAL MEDICINE

## 2025-05-19 PROCEDURE — G0463 HOSPITAL OUTPT CLINIC VISIT: HCPCS | Performed by: INTERNAL MEDICINE

## 2025-05-19 ASSESSMENT — PAIN SCALES - GENERAL: PAINLEVEL_OUTOF10: NO PAIN (0)

## 2025-05-19 NOTE — LETTER
5/19/2025      Sushila Chambers  [address] [place] [address]      Dear Colleague,    Thank you for referring your patient, Sushila Chambers, to the [facility]. Please see a copy of my visit note below.    MEDICAL ONCOLOGY FOLLOW-UP PATIENT VISIT     NAME: Sushila Chambers     DATE: 5/19/2025    PRIMARY CARE PHYSICIAN: Meryl Muro    PATIENT ID: Multifocal, stage IIa, T2N0M0, ER positive, AR negative, HER2 non-amplified invasive lobular carcinoma of the left breast.     Oncology History: Ms. Chambers is a 75 year old female with a history of lung cancer and right breast cancer with more recent invasive lobular carcinoma of the left breast. Ms. Chambers was treated for right sided breast cancer in 2009 with right breast lumpectomy, radiation, and endocrine therapy. She self palpated a mass in the left breast in early August, 2015. Diagnostic mammogram and ultrasound showed 2 ill-defined spiculated masses at the 4:30 and 2:00 positions of the left breast. There was surrounding architectural distortion and the total area of involvement measured 6.5 cm. Targeted left breast ultrasound showed a multilobulated hypoechoic mass at the 4:00 position measuring 2.5 cm and an ill-defined hypoechoic mass at the 2:00 position measuring 2.4 cm.  Biopsy of the mass at the 4:00 position showed grade II invasive pleomorphic lobular carcinoma. Biopsy of the mass at the 2:00 position showed a grade II invasive lobular carcinoma. No angiolymphatic invasion or associated LCIS was seen in either specimen. Estrogen receptor staining was positive in >80% of tumor cell nuclei. Progesterone receptor staining was positive in <5% of tumor cell nuclei. HER2 was non-amplifed by FISH in both specimens.     Ms. Chambers enrolled on the ISPY2 clinical trial.  She received 12 weeks of ganetespib and Taxol and 4 cycles of dose dense AC from 9/29/15 - 2/1/16. On 2/24/16 she underwent left breast mastectomy and  "sentinel lymph node procedure under the care of Dr. Betts. Pathology showed a residual grade II 4.8 cm, pleomorphic invasive lobular carcinoma with associated LCIS. Invasive tumor cellularity was 30%.  There was evidence of perineural invasion, however, no lymphvascular space invasion. Two sentinel lymph nodes were negative.  She has been on adjuvant curative intent treatment with letrozole since 03/2016.    Ms. Chambers had genetic testing on 9/15/15.  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  Of note she had a number of alterations in RUFINA, BARD1, BRCA1/2, MUTYH, and TP53 that were classified as \"likely benign\" and \"benign\".    Interim History:   Ms. Chambers comes into clinic today for an on treatment visit.  She is on adjuvant curative intent treatment with letrozole.  She tolerates the medication well.  She denies any changes to her health since our last visit.  She lives independently.  She has a black labrador retriever that she walks daily, In addition, she works with a  twice a week.  One day, does resistance training, the other day she does pilates/core work.  She denies concerning lumps of either the right breast or the left chest wall.  She has no cough, shortness of breath or chest pain.  She has no abdominal complaints.  She denies bone or joint aches or pains.    PAST MEDICAL HISTORY:   Past Medical History:   Diagnosis Date     Basal cell carcinoma      Breast cancer, right breast (H)      History of blood transfusion      Hypothyroid      Lung cancer (H) 2012    right     Uncontrolled hypertension 11/15/2017       PAST SURGICAL HISTORY:  Past Surgical History:   Procedure Laterality Date     ABDOMEN SURGERY       BIOPSY, LUNG NODULE Right 2012    + cancer     COLONOSCOPY       ECTOPIC PREGNANCY SURGERY Left 1988     ENDOSCOPIC RETROGRADE CHOLANGIOPANCREATOGRAM N/A 7/14/2024    Procedure: ENDOSCOPIC RETROGRADE CHOLANGIOPANCREATOGRAPHY, WITH BILIARY SPHINCTEROTOMY, STONE AND PUS " REMOVAL, DILATION AND STENT PLACEMENT;  Surgeon: Guru Mendy Paiz MD;  Location:  OR     ESOPHAGOSCOPY, GASTROSCOPY, DUODENOSCOPY (EGD), COMBINED N/A 8/20/2024    Procedure: ESOPHAGOGASTRODUODENOSCOPY, WITH FOREIGN BODY REMOVAL;  Surgeon: Guru Mendy Paiz MD;  Location:  GI     GENITOURINARY SURGERY       GYN SURGERY       INSERT PORT VASCULAR ACCESS       LAPAROSCOPIC CHOLECYSTECTOMY N/A 7/16/2024    Procedure: DIAGNOSTIC LAPAROSCOPIC;  Surgeon: Travis Casas MD;  [facility]: [facility]     LUMPECTOMY BREAST Right 2010     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     MASTECTOMY SIMPLE, SENTINEL NODE, COMBINED Left 2/24/2016    Procedure: COMBINED MASTECTOMY SIMPLE, SENTINEL NODE;  Surgeon: Satnam Betts MD;  Location: [facility] OR     PHACOEMULSIFICATION WITH STANDARD INTRAOCULAR LENS IMPLANT Right 9/12/2022    Procedure: RIGHT EYE PHACOEMULSIFICATION, CATARACT, WITH  INTRAOCULAR LENS IMPLANT INSERTION TORIC LENS;  Surgeon: Ion Puente MD;  Location: [facility] OR     PHACOEMULSIFICATION WITH STANDARD INTRAOCULAR LENS IMPLANT Left 9/26/2022    Procedure: LEFT PHACOEMULSIFICATION, CATARACT, WITH STANDARD INTRAOCULAR LENS IMPLANT INSERTION;  Surgeon: Ion Puente MD;  Location: [facility] OR     REMOVE PORT VASCULAR ACCESS N/A 2/24/2016    Procedure: REMOVE PORT VASCULAR ACCESS;  Surgeon: Satnam Betts MD;  Location:  OR     THORACIC SURGERY       MEDS:  Current Outpatient Medications   Medication Sig Dispense Refill     acetaminophen (TYLENOL) 500 MG tablet Take 500-1,000 mg by mouth every 6 hours as needed for mild pain       aspirin 81 MG EC tablet Take 81 mg by mouth daily       biotin 1000 MCG TABS tablet Take by mouth daily       calcium carbonate (OS-OCTAVIANO 500 MG [place]. [place]) 500 MG tablet Take by mouth 2 times daily       chlorthalidone (HYGROTON) 25 MG tablet Take 1 tablet (25 mg) by mouth daily. 90 tablet 3     letrozole (FEMARA) 2.5 MG tablet TAKE 1 TABLET DAILY 90  "tablet 3     levothyroxine (SYNTHROID/LEVOTHROID) 50 MCG tablet TAKE 1 TABLET DAILY 90 tablet 3     lisinopril (ZESTRIL) 10 MG tablet Take 1 tablet (10 mg) by mouth daily. 90 tablet 3     lisinopril (ZESTRIL) 5 MG tablet Take 1 tablet (5 mg) by mouth daily 90 tablet 2     multivitamin w/minerals (THERA-VIT-M) tablet Take 1 tablet by mouth daily       rosuvastatin (CRESTOR) 5 MG tablet Take 1 tablet (5 mg) by mouth daily. 90 tablet 3     vitamin C (ASCORBIC ACID) 100 MG tablet        VITAMIN D, CHOLECALCIFEROL, PO Take by mouth daily       No current facility-administered medications for this visit.     ALLERGIES:  Allergies   Allergen Reactions     Latex Rash     SKIN REACTION    SKIN REACTION     Sulfa Antibiotics Itching and Rash        PHYSICAL EXAM:  /77   Pulse 103   Temp 98  F (36.7  C) (Tympanic)   Resp 18   Ht 1.651 m (5' 5\")   Wt 65.8 kg (145 lb)   SpO2 99%   BMI 24.13 kg/m    General:  Well appearing adult female in NAD.  Alert and oriented x 3.  HEENT:  Normocephalic.  Sclera anicteric.  MMM.    Lymph:  No palpable cervical, supraclavicular, or axillary LAD.  Chest:  CTA bilaterally.  No wheezes or crackles.  CV:  RRR.  Nl S1 and S2.  No audible m/r/g.  Breast:  Right breast is of increased fibroglandular density to palpation.  Right breast periareolar lumpectomy changes.  Left mastectomy.  There are no dominant or discretely palpable masses or nodularity of either the right breast or the left chest wall.  Abd:  Soft/ND.    Ext:  No pitting edema of the bilateral lower extremities.    Musculo:  Full ROM of the bilateral upper extremities.  Neuro:  Cranial nerves grossly intact.  Gait is stable.  Able to climb on the exam table unaided.  Psych:  Mood and affect appear normal.    LABS REVIEWED THIS VISIT:  No interim imaging or laboratory results pertinent to today's visit.    IMPRESSION/PLAN: 76 yo female with a h/o T2N0M0, grade II, ER positive, [place] negative, HER2 non-amplified invasive " lobular carcinoma of the left breast. She is s/p treatment with taxol and ganetespib followed by dose dense adriamycin and cyclophosphamide, left breast mastectomy (pyT2N0), and has been on curative intent treatment with letrozole since 03/2016.    1.  Left breast ER-positive carcinoma:  Ms. Chambers is 9 years, 3 months out from excision of a left breast cancer.  She continues on adjuvant curative intent treatment with letrozole.  Plan is to treat for a total of 10 years (through 03/2026).      Continue annual screening mammogram of the right breast.  - Right breast screening mammogram and return visit with me 11/20/2025 or later.    2.  Cancer screening:  No change since last visit.  Continue annual mammography as above.    - Her PCP has ordered a Cologuard.  Sushila will follow up with her PCP for this.     3.  Osteopenia:  DEXA in 11/2023 with a lowest T-score of -1.2 c/w osteopenia.  She is at risk for osteoporosis on aromatase inhibitor therapy.  - Continue calcium and vitamin D supplementation as well as daily weightbearing activity.    - plan to repeat a DEXA at the time of return visit     4.  Followup:    DEXA bone density scan, right breast screening mammogram and return visit with me 11/20/2025 or later.    I spent 25 minutes on the date of the encounter doing chart review, review of test results, interpretation of tests, patient visit, and documentation       Again, thank you for allowing me to participate in the care of your patient.        Sincerely,        Brittany Villegas MD    Electronically signed

## 2025-05-19 NOTE — NURSING NOTE
"Oncology Rooming Note    May 19, 2025 1:50 PM   Sushila Chambers is a 75 year old female who presents for:    Chief Complaint   Patient presents with    Oncology Clinic Visit     Breast Cancer     Initial Vitals: /77   Pulse 103   Temp 98  F (36.7  C) (Tympanic)   Resp 18   Ht 1.651 m (5' 5\")   Wt 65.8 kg (145 lb)   SpO2 99%   BMI 24.13 kg/m   Estimated body mass index is 24.13 kg/m  as calculated from the following:    Height as of this encounter: 1.651 m (5' 5\").    Weight as of this encounter: 65.8 kg (145 lb). Body surface area is 1.74 meters squared.  No Pain (0) Comment: Data Unavailable   No LMP recorded. Patient is postmenopausal.  Allergies reviewed: Yes  Medications reviewed: Yes    Medications: Medication refills not needed today.  Pharmacy name entered into "[employer]:    [facility] - [place], [place] - [address] [id number] - [place] [address] [facility] [address]  [facility]  [facility] - [place], [place] - [address]  [facility] - [place] [id number] [facility]    Frailty Screening:   Is the patient here for a new oncology consult visit in cancer care? 2. No    PHQ9:  Did this patient require a PHQ9?: No      Clinical concerns: none      Deuce Mauricio LPN              "

## (undated) DEVICE — CLIP APPLIER ENDO ROTATING 10MM MED/LG ER320

## (undated) DEVICE — EYE PACK CUSTOM ANTERIOR 30DEG TIP CENTURION PPK6682-04

## (undated) DEVICE — ENDO BITE BLOCK ADULT OMNI-BLOC

## (undated) DEVICE — GLOVE BIOGEL PI ULTRATOUCH G SZ 7.5 42175

## (undated) DEVICE — ENDO TUBING CO2 SMARTCAP STERILE DISP 100145CO2EXT

## (undated) DEVICE — LINEN TOWEL PACK X5 5464

## (undated) DEVICE — SOL WATER IRRIG 500ML BOTTLE 2F7113

## (undated) DEVICE — EYE TIP IRRIGATION & ASPIRATION POLYMER CVD 0.3MM 8065751512

## (undated) DEVICE — SOL NACL 0.9% IRRIG 1000ML BOTTLE 07138-09

## (undated) DEVICE — EYE SHIELD PLASTIC

## (undated) DEVICE — ENDO BASKET RETRIEVAL TRAPEZOID WIREGUIDED  2.5CM M00510880

## (undated) DEVICE — EYE KNIFE STILETTO VISITEC 1.1MM ANG 45DEG SIDEPORT 376620

## (undated) DEVICE — COVER CAMERA IN-LIGHT DISP LT-C02

## (undated) DEVICE — INTR ENDOSCOPIC STENT FUSION OASIS 09.0FRX200CM

## (undated) DEVICE — LINEN TOWEL PACK X30 5481

## (undated) DEVICE — KIT ENDO FIRST STEP DISINFECTANT 200ML W/POUCH EP-4

## (undated) DEVICE — Device

## (undated) DEVICE — PACK CATARACT CUSTOM ASC SEY15CPUMC

## (undated) DEVICE — ENDO TROCAR FIRST ENTRY KII FIOS ADV FIX 12X100MM CFF73

## (undated) DEVICE — GLOVE PROTEXIS MICRO 7.5  2D73PM75

## (undated) DEVICE — PACK ENDOSCOPY GI CUSTOM UMMC

## (undated) DEVICE — ANTIFOG SOLUTION W/FOAM PAD 31142527

## (undated) DEVICE — ENDO ENDO DISTAL MAJ-2315

## (undated) DEVICE — SYR 30ML LL W/O NDL 302832

## (undated) DEVICE — CATH RETRIEVAL BALLOON EXTRACTOR PRO RX-S INJ ABOVE 9-12MM

## (undated) DEVICE — EYE KNIFE SLIT XSTAR VISITEC 2.6MM 45DEG 373726

## (undated) DEVICE — SUCTION IRR STRYKERFLOW II W/TIP 250-070-520

## (undated) DEVICE — TUBING SMOKE EVAC PNEUMOCLEAR HIGH FLOW 0620050250

## (undated) DEVICE — SUCTION MANIFOLD NEPTUNE 2 SYS 4 PORT 0702-020-000

## (undated) DEVICE — EYE CANN IRR 27GA ANTERIOR CHAMBER 581280

## (undated) DEVICE — ENDO FCP GRASPING ROTATABLE 7.2MMX2.6MM FG-244NR

## (undated) DEVICE — ENDO TROCAR FIRST ENTRY KII FIOS ADV FIX 05X100MM CFF03

## (undated) DEVICE — SOL WATER IRRIG 1000ML BOTTLE 2F7114

## (undated) DEVICE — EYE CANN IRR 25GA CYSTOTOME 581610

## (undated) DEVICE — ESU PENCIL SMOKE EVAC W/ROCKER SWITCH 0703-047-000

## (undated) DEVICE — SU DERMABOND ADVANCED .7ML DNX12

## (undated) DEVICE — WIRE GUIDE 0.025"X270CM STR VISIGLIDE G-240-2527S

## (undated) DEVICE — ENDO CATH BALLOON DILATION HURRICAINE 10MMX4CM M00545960

## (undated) DEVICE — SU VICRYL+ 0 27 UR6 VLT VCP603H

## (undated) DEVICE — ENDO TROCAR BLUNT TIP KII BALLOON 12X100MM C0R47

## (undated) DEVICE — ENDO FUSION OMNI-TOME G31903

## (undated) DEVICE — NDL INSUFFLATION 13GA 150MM C2202

## (undated) DEVICE — ESU GROUND PAD ADULT W/CORD E7507

## (undated) DEVICE — ENDO TROCAR FIRST ENTRY KII FIOS Z-THRD 05X100MM CTF03

## (undated) DEVICE — TUBING SUCTION 10'X3/16" N510

## (undated) DEVICE — ESU HOLDER LAP INST DISP YELLOW SHORT 250MM H-PRO-250

## (undated) DEVICE — PREP CHLORAPREP 26ML TINTED HI-LITE ORANGE 930815

## (undated) DEVICE — BIOPSY VALVE BIOSHIELD 00711135

## (undated) DEVICE — DRSG PRIMAPORE 02X3" 7133

## (undated) DEVICE — ENDO TROCAR SLEEVE KII ADV FIXATION 05X100MM CFS02

## (undated) DEVICE — SU VICRYL 4-0 PS-2 18" UND J496H

## (undated) DEVICE — LINEN TOWEL PACK X6 WHITE 5487

## (undated) DEVICE — DRAPE IOBAN INCISE 23X17" 6650EZ

## (undated) DEVICE — INFLATION DEVICE BIG 60 ENDO-AN6012

## (undated) RX ORDER — OXYMETAZOLINE HYDROCHLORIDE 0.05 G/100ML
SPRAY NASAL
Status: DISPENSED
Start: 2024-07-14

## (undated) RX ORDER — ONDANSETRON 2 MG/ML
INJECTION INTRAMUSCULAR; INTRAVENOUS
Status: DISPENSED
Start: 2024-07-16

## (undated) RX ORDER — FENTANYL CITRATE 50 UG/ML
INJECTION, SOLUTION INTRAMUSCULAR; INTRAVENOUS
Status: DISPENSED
Start: 2024-07-16

## (undated) RX ORDER — ACETAMINOPHEN 325 MG/1
TABLET ORAL
Status: DISPENSED
Start: 2024-07-16

## (undated) RX ORDER — FENTANYL CITRATE 50 UG/ML
INJECTION, SOLUTION INTRAMUSCULAR; INTRAVENOUS
Status: DISPENSED
Start: 2024-07-14

## (undated) RX ORDER — ONDANSETRON 2 MG/ML
INJECTION INTRAMUSCULAR; INTRAVENOUS
Status: DISPENSED
Start: 2024-07-14

## (undated) RX ORDER — DEXAMETHASONE SODIUM PHOSPHATE 4 MG/ML
INJECTION, SOLUTION INTRA-ARTICULAR; INTRALESIONAL; INTRAMUSCULAR; INTRAVENOUS; SOFT TISSUE
Status: DISPENSED
Start: 2024-07-14

## (undated) RX ORDER — HYDROMORPHONE HYDROCHLORIDE 1 MG/ML
INJECTION, SOLUTION INTRAMUSCULAR; INTRAVENOUS; SUBCUTANEOUS
Status: DISPENSED
Start: 2024-07-16

## (undated) RX ORDER — INDOCYANINE GREEN AND WATER 25 MG
KIT INJECTION
Status: DISPENSED
Start: 2024-07-16

## (undated) RX ORDER — FENTANYL CITRATE-0.9 % NACL/PF 10 MCG/ML
PLASTIC BAG, INJECTION (ML) INTRAVENOUS
Status: DISPENSED
Start: 2024-07-16

## (undated) RX ORDER — ACETAMINOPHEN 325 MG/1
TABLET ORAL
Status: DISPENSED
Start: 2022-09-26

## (undated) RX ORDER — PROPOFOL 10 MG/ML
INJECTION, EMULSION INTRAVENOUS
Status: DISPENSED
Start: 2024-07-16

## (undated) RX ORDER — LIDOCAINE HYDROCHLORIDE AND EPINEPHRINE 10; 10 MG/ML; UG/ML
INJECTION, SOLUTION INFILTRATION; PERINEURAL
Status: DISPENSED
Start: 2024-07-16

## (undated) RX ORDER — FENTANYL CITRATE 50 UG/ML
INJECTION, SOLUTION INTRAMUSCULAR; INTRAVENOUS
Status: DISPENSED
Start: 2022-09-12

## (undated) RX ORDER — PROPOFOL 10 MG/ML
INJECTION, EMULSION INTRAVENOUS
Status: DISPENSED
Start: 2024-07-14

## (undated) RX ORDER — POTASSIUM CHLORIDE 7.45 MG/ML
INJECTION INTRAVENOUS
Status: DISPENSED
Start: 2024-07-14

## (undated) RX ORDER — MOXIFLOXACIN IN NACL,ISO-OS/PF 0.3MG/0.3
SYRINGE (ML) INTRAOCULAR
Status: DISPENSED
Start: 2022-09-26

## (undated) RX ORDER — FENTANYL CITRATE 50 UG/ML
INJECTION, SOLUTION INTRAMUSCULAR; INTRAVENOUS
Status: DISPENSED
Start: 2022-09-26

## (undated) RX ORDER — FENTANYL CITRATE-0.9 % NACL/PF 10 MCG/ML
PLASTIC BAG, INJECTION (ML) INTRAVENOUS
Status: DISPENSED
Start: 2024-07-14

## (undated) RX ORDER — CEFAZOLIN SODIUM/WATER 2 G/20 ML
SYRINGE (ML) INTRAVENOUS
Status: DISPENSED
Start: 2024-07-16

## (undated) RX ORDER — ACETAMINOPHEN 325 MG/1
TABLET ORAL
Status: DISPENSED
Start: 2022-09-12